# Patient Record
Sex: MALE | Race: WHITE | NOT HISPANIC OR LATINO | Employment: FULL TIME | URBAN - METROPOLITAN AREA
[De-identification: names, ages, dates, MRNs, and addresses within clinical notes are randomized per-mention and may not be internally consistent; named-entity substitution may affect disease eponyms.]

---

## 2017-01-01 ENCOUNTER — TELEPHONE (OUTPATIENT)
Dept: INTERNAL MEDICINE | Facility: CLINIC | Age: 62
End: 2017-01-01

## 2017-01-01 ENCOUNTER — ANESTHESIA EVENT (OUTPATIENT)
Dept: NEUROSURGERY | Facility: HOSPITAL | Age: 62
DRG: 981 | End: 2017-01-01
Payer: COMMERCIAL

## 2017-01-01 ENCOUNTER — ANESTHESIA (OUTPATIENT)
Dept: SURGERY | Facility: HOSPITAL | Age: 62
DRG: 981 | End: 2017-01-01
Payer: COMMERCIAL

## 2017-01-01 ENCOUNTER — ANESTHESIA (OUTPATIENT)
Dept: NEUROSURGERY | Facility: HOSPITAL | Age: 62
DRG: 981 | End: 2017-01-01
Payer: COMMERCIAL

## 2017-01-01 ENCOUNTER — ANESTHESIA EVENT (OUTPATIENT)
Dept: SURGERY | Facility: HOSPITAL | Age: 62
DRG: 981 | End: 2017-01-01
Payer: COMMERCIAL

## 2017-01-01 ENCOUNTER — HOSPITAL ENCOUNTER (INPATIENT)
Facility: HOSPITAL | Age: 62
LOS: 17 days | Discharge: NURSING, REHAB OR LONG-TERM CARE | DRG: 981 | End: 2017-10-13
Attending: INTERNAL MEDICINE | Admitting: INTERNAL MEDICINE
Payer: COMMERCIAL

## 2017-01-01 VITALS
TEMPERATURE: 98 F | HEART RATE: 82 BPM | BODY MASS INDEX: 30.79 KG/M2 | SYSTOLIC BLOOD PRESSURE: 130 MMHG | DIASTOLIC BLOOD PRESSURE: 87 MMHG | RESPIRATION RATE: 16 BRPM | HEIGHT: 78 IN | WEIGHT: 266.13 LBS | OXYGEN SATURATION: 95 %

## 2017-01-01 DIAGNOSIS — G40.209 COMPLEX PARTIAL SEIZURE EVOLVING TO GENERALIZED SEIZURE: ICD-10-CM

## 2017-01-01 DIAGNOSIS — G40.901 STATUS EPILEPTICUS, GENERALIZED CONVULSIVE: ICD-10-CM

## 2017-01-01 DIAGNOSIS — G04.90 ENCEPHALITIS AND ENCEPHALOMYELITIS, UNSPECIFIED: ICD-10-CM

## 2017-01-01 DIAGNOSIS — G93.40 ENCEPHALOPATHY: ICD-10-CM

## 2017-01-01 DIAGNOSIS — R56.9 SEIZURE: Primary | ICD-10-CM

## 2017-01-01 DIAGNOSIS — G04.90 ENCEPHALITIS: ICD-10-CM

## 2017-01-01 LAB
ABO + RH BLD: NORMAL
ALBUMIN SERPL BCP-MCNC: 2.5 G/DL
ALP SERPL-CCNC: 158 U/L
ALT SERPL W/O P-5'-P-CCNC: 27 U/L
ANION GAP SERPL CALC-SCNC: 10 MMOL/L
ANION GAP SERPL CALC-SCNC: 6 MMOL/L
ANION GAP SERPL CALC-SCNC: 7 MMOL/L
ANION GAP SERPL CALC-SCNC: 8 MMOL/L
ANION GAP SERPL CALC-SCNC: 9 MMOL/L
ANION GAP SERPL CALC-SCNC: 9 MMOL/L
ANISOCYTOSIS BLD QL SMEAR: SLIGHT
AORTIC VALVE REGURGITATION: NORMAL
APTT BLDCRRT: <21 SEC
AST SERPL-CCNC: 18 U/L
BACTERIA BLD CULT: NORMAL
BACTERIA BLD CULT: NORMAL
BACTERIA SPEC AEROBE CULT: NORMAL
BASOPHILS # BLD AUTO: 0 K/UL
BASOPHILS # BLD AUTO: 0.01 K/UL
BASOPHILS # BLD AUTO: 0.03 K/UL
BASOPHILS # BLD AUTO: 0.03 K/UL
BASOPHILS NFR BLD: 0 %
BASOPHILS NFR BLD: 0.1 %
BASOPHILS NFR BLD: 0.2 %
BASOPHILS NFR BLD: 0.4 %
BASOPHILS NFR BLD: 0.5 %
BILIRUB SERPL-MCNC: 0.4 MG/DL
BILIRUB UR QL STRIP: NEGATIVE
BLD GP AB SCN CELLS X3 SERPL QL: NORMAL
BUN SERPL-MCNC: 13 MG/DL
BUN SERPL-MCNC: 15 MG/DL
BUN SERPL-MCNC: 18 MG/DL
BUN SERPL-MCNC: 21 MG/DL
BUN SERPL-MCNC: 21 MG/DL
BUN SERPL-MCNC: 22 MG/DL
BUN SERPL-MCNC: 22 MG/DL
BUN SERPL-MCNC: 23 MG/DL
BUN SERPL-MCNC: 25 MG/DL
BUN SERPL-MCNC: 5 MG/DL
BUN SERPL-MCNC: 6 MG/DL
BUN SERPL-MCNC: 6 MG/DL
BUN SERPL-MCNC: 8 MG/DL
BUN SERPL-MCNC: 9 MG/DL
CALCIUM SERPL-MCNC: 8.5 MG/DL
CALCIUM SERPL-MCNC: 8.7 MG/DL
CALCIUM SERPL-MCNC: 8.8 MG/DL
CALCIUM SERPL-MCNC: 8.9 MG/DL
CALCIUM SERPL-MCNC: 9 MG/DL
CALCIUM SERPL-MCNC: 9.1 MG/DL
CALCIUM SERPL-MCNC: 9.2 MG/DL
CALCIUM SERPL-MCNC: 9.3 MG/DL
CHLORIDE SERPL-SCNC: 104 MMOL/L
CHLORIDE SERPL-SCNC: 105 MMOL/L
CHLORIDE SERPL-SCNC: 106 MMOL/L
CHLORIDE SERPL-SCNC: 106 MMOL/L
CHLORIDE SERPL-SCNC: 107 MMOL/L
CHLORIDE SERPL-SCNC: 107 MMOL/L
CHLORIDE SERPL-SCNC: 108 MMOL/L
CHLORIDE SERPL-SCNC: 109 MMOL/L
CHLORIDE SERPL-SCNC: 110 MMOL/L
CHLORIDE SERPL-SCNC: 111 MMOL/L
CHLORIDE SERPL-SCNC: 112 MMOL/L
CHOLEST SERPL-MCNC: 154 MG/DL
CHOLEST SERPL-MCNC: 160 MG/DL
CHOLEST/HDLC SERPL: 5 {RATIO}
CHOLEST/HDLC SERPL: 5.5 {RATIO}
CLARITY CSF: ABNORMAL
CLARITY UR REFRACT.AUTO: CLEAR
CMV SPEC QL SHELL VIAL CULT: NO GROWTH
CO2 SERPL-SCNC: 22 MMOL/L
CO2 SERPL-SCNC: 23 MMOL/L
CO2 SERPL-SCNC: 25 MMOL/L
CO2 SERPL-SCNC: 26 MMOL/L
CO2 SERPL-SCNC: 27 MMOL/L
CO2 SERPL-SCNC: 29 MMOL/L
COLOR CSF: ABNORMAL
COLOR UR AUTO: YELLOW
CREAT SERPL-MCNC: 0.8 MG/DL
CREAT SERPL-MCNC: 0.9 MG/DL
CREAT SERPL-MCNC: 1 MG/DL
CRP SERPL-MCNC: 4.61 MG/L
CSF, COXSACKIE A10 AB: NORMAL
CSF, COXSACKIE A16 AB: NORMAL
CSF, COXSACKIE A2 AB: NORMAL
CSF, COXSACKIE A4 AB: NORMAL
CSF, COXSACKIE A7 AB: NORMAL
CSF, COXSACKIE A9 AB: NORMAL
CSF, COXSACKIE B1 AB: NORMAL
CSF, COXSACKIE B2 AB: NORMAL
CSF, COXSACKIE B3 AB: NORMAL
CSF, COXSACKIE B4 AB: NORMAL
CSF, COXSACKIE B5 AB: NORMAL
CSF, COXSACKIE B6 AB: NORMAL
CSF, ECHOVIRUS, 11 AB: NORMAL
CSF, ECHOVIRUS, 30 AB: NORMAL
CSF, ECHOVIRUS, 4 AB: NORMAL
CSF, ECHOVIRUS, 7 AB: NORMAL
CSF, ECHOVIRUS, 9 AB: NORMAL
DACRYOCYTES BLD QL SMEAR: ABNORMAL
DIASTOLIC DYSFUNCTION: NO
DIFFERENTIAL METHOD: ABNORMAL
EEEV IGG TITR CSF IF: NORMAL {TITER}
EEEV IGM TITR CSF IF: NORMAL {TITER}
EOSINOPHIL # BLD AUTO: 0 K/UL
EOSINOPHIL # BLD AUTO: 0.1 K/UL
EOSINOPHIL # BLD AUTO: 0.2 K/UL
EOSINOPHIL # BLD AUTO: 0.2 K/UL
EOSINOPHIL NFR BLD: 0 %
EOSINOPHIL NFR BLD: 0.1 %
EOSINOPHIL NFR BLD: 2.2 %
EOSINOPHIL NFR BLD: 2.7 %
EOSINOPHIL NFR BLD: 3.6 %
EOSINOPHIL NFR CSF MANUAL: 1 %
ERYTHROCYTE [DISTWIDTH] IN BLOOD BY AUTOMATED COUNT: 12.5 %
ERYTHROCYTE [DISTWIDTH] IN BLOOD BY AUTOMATED COUNT: 12.7 %
ERYTHROCYTE [DISTWIDTH] IN BLOOD BY AUTOMATED COUNT: 12.8 %
ERYTHROCYTE [DISTWIDTH] IN BLOOD BY AUTOMATED COUNT: 12.9 %
ERYTHROCYTE [DISTWIDTH] IN BLOOD BY AUTOMATED COUNT: 12.9 %
ERYTHROCYTE [DISTWIDTH] IN BLOOD BY AUTOMATED COUNT: 13.1 %
ERYTHROCYTE [DISTWIDTH] IN BLOOD BY AUTOMATED COUNT: 13.2 %
ERYTHROCYTE [DISTWIDTH] IN BLOOD BY AUTOMATED COUNT: 13.4 %
ERYTHROCYTE [DISTWIDTH] IN BLOOD BY AUTOMATED COUNT: 13.7 %
ERYTHROCYTE [DISTWIDTH] IN BLOOD BY AUTOMATED COUNT: 13.8 %
ERYTHROCYTE [DISTWIDTH] IN BLOOD BY AUTOMATED COUNT: 13.8 %
ERYTHROCYTE [DISTWIDTH] IN BLOOD BY AUTOMATED COUNT: 13.9 %
ERYTHROCYTE [DISTWIDTH] IN BLOOD BY AUTOMATED COUNT: 13.9 %
ERYTHROCYTE [SEDIMENTATION RATE] IN BLOOD BY WESTERGREN METHOD: 28 MM/HR
EST. GFR  (AFRICAN AMERICAN): >60 ML/MIN/1.73 M^2
EST. GFR  (NON AFRICAN AMERICAN): >60 ML/MIN/1.73 M^2
ESTIMATED AVG GLUCOSE: 103 MG/DL
GLUCOSE CSF-MCNC: 57 MG/DL
GLUCOSE SERPL-MCNC: 114 MG/DL
GLUCOSE SERPL-MCNC: 115 MG/DL
GLUCOSE SERPL-MCNC: 118 MG/DL
GLUCOSE SERPL-MCNC: 121 MG/DL
GLUCOSE SERPL-MCNC: 122 MG/DL
GLUCOSE SERPL-MCNC: 124 MG/DL
GLUCOSE SERPL-MCNC: 125 MG/DL
GLUCOSE SERPL-MCNC: 129 MG/DL
GLUCOSE SERPL-MCNC: 147 MG/DL
GLUCOSE SERPL-MCNC: 152 MG/DL
GLUCOSE SERPL-MCNC: 87 MG/DL
GLUCOSE SERPL-MCNC: 93 MG/DL
GLUCOSE SERPL-MCNC: 99 MG/DL
GLUCOSE UR QL STRIP: NEGATIVE
GRAM STN SPEC: NORMAL
HBA1C MFR BLD HPLC: 5.2 %
HCT VFR BLD AUTO: 30.3 %
HCT VFR BLD AUTO: 30.7 %
HCT VFR BLD AUTO: 31.3 %
HCT VFR BLD AUTO: 31.5 %
HCT VFR BLD AUTO: 31.7 %
HCT VFR BLD AUTO: 32 %
HCT VFR BLD AUTO: 34 %
HCT VFR BLD AUTO: 34.9 %
HCT VFR BLD AUTO: 35.9 %
HCT VFR BLD AUTO: 36.2 %
HCT VFR BLD AUTO: 36.2 %
HCT VFR BLD AUTO: 36.7 %
HCT VFR BLD AUTO: 37.1 %
HCT VFR BLD AUTO: 37.4 %
HCT VFR BLD AUTO: 37.7 %
HCT VFR BLD AUTO: 38.2 %
HDLC SERPL-MCNC: 29 MG/DL
HDLC SERPL-MCNC: 31 MG/DL
HDLC SERPL: 18.1 %
HDLC SERPL: 20.1 %
HGB BLD-MCNC: 10.1 G/DL
HGB BLD-MCNC: 10.1 G/DL
HGB BLD-MCNC: 10.2 G/DL
HGB BLD-MCNC: 10.2 G/DL
HGB BLD-MCNC: 10.5 G/DL
HGB BLD-MCNC: 10.5 G/DL
HGB BLD-MCNC: 11.1 G/DL
HGB BLD-MCNC: 11.5 G/DL
HGB BLD-MCNC: 11.7 G/DL
HGB BLD-MCNC: 11.9 G/DL
HGB BLD-MCNC: 12 G/DL
HGB BLD-MCNC: 12.2 G/DL
HGB BLD-MCNC: 12.2 G/DL
HGB BLD-MCNC: 12.3 G/DL
HGB BLD-MCNC: 12.5 G/DL
HGB BLD-MCNC: 12.5 G/DL
HGB UR QL STRIP: NEGATIVE
HSV1, PCR, CSF: NEGATIVE
HSV2, PCR, CSF: NEGATIVE
HYPOCHROMIA BLD QL SMEAR: ABNORMAL
INR PPP: 1.1
KETONES UR QL STRIP: NEGATIVE
LACV IGG CSF QL IF: NORMAL
LACV IGM CSF QL IF: NORMAL
LDLC SERPL CALC-MCNC: 104.8 MG/DL
LDLC SERPL CALC-MCNC: 93 MG/DL
LEUKOCYTE ESTERASE UR QL STRIP: NEGATIVE
LYMPHOCYTES # BLD AUTO: 0.6 K/UL
LYMPHOCYTES # BLD AUTO: 0.7 K/UL
LYMPHOCYTES # BLD AUTO: 0.8 K/UL
LYMPHOCYTES # BLD AUTO: 0.9 K/UL
LYMPHOCYTES # BLD AUTO: 1 K/UL
LYMPHOCYTES # BLD AUTO: 1.2 K/UL
LYMPHOCYTES # BLD AUTO: 1.5 K/UL
LYMPHOCYTES # BLD AUTO: 1.6 K/UL
LYMPHOCYTES # BLD AUTO: 1.8 K/UL
LYMPHOCYTES # BLD AUTO: 1.8 K/UL
LYMPHOCYTES NFR BLD: 11.6 %
LYMPHOCYTES NFR BLD: 17 %
LYMPHOCYTES NFR BLD: 23.5 %
LYMPHOCYTES NFR BLD: 24.3 %
LYMPHOCYTES NFR BLD: 31.3 %
LYMPHOCYTES NFR BLD: 4.2 %
LYMPHOCYTES NFR BLD: 6.2 %
LYMPHOCYTES NFR BLD: 6.3 %
LYMPHOCYTES NFR BLD: 6.5 %
LYMPHOCYTES NFR BLD: 6.9 %
LYMPHOCYTES NFR BLD: 7 %
LYMPHOCYTES NFR BLD: 7.1 %
LYMPHOCYTES NFR BLD: 8.9 %
LYMPHOCYTES NFR BLD: 9.1 %
LYMPHOCYTES NFR BLD: 9.5 %
LYMPHOCYTES NFR CSF MANUAL: 18 %
MAGNESIUM SERPL-MCNC: 1.5 MG/DL
MAGNESIUM SERPL-MCNC: 1.6 MG/DL
MAGNESIUM SERPL-MCNC: 1.7 MG/DL
MAGNESIUM SERPL-MCNC: 1.8 MG/DL
MAGNESIUM SERPL-MCNC: 1.9 MG/DL
MAGNESIUM SERPL-MCNC: 2 MG/DL
MAGNESIUM SERPL-MCNC: 2.2 MG/DL
MCH RBC QN AUTO: 33.2 PG
MCH RBC QN AUTO: 33.2 PG
MCH RBC QN AUTO: 33.5 PG
MCH RBC QN AUTO: 33.6 PG
MCH RBC QN AUTO: 33.8 PG
MCH RBC QN AUTO: 33.9 PG
MCH RBC QN AUTO: 33.9 PG
MCH RBC QN AUTO: 34 PG
MCH RBC QN AUTO: 34.1 PG
MCH RBC QN AUTO: 34.1 PG
MCH RBC QN AUTO: 34.2 PG
MCH RBC QN AUTO: 34.3 PG
MCH RBC QN AUTO: 34.5 PG
MCHC RBC AUTO-ENTMCNC: 32.3 G/DL
MCHC RBC AUTO-ENTMCNC: 32.4 G/DL
MCHC RBC AUTO-ENTMCNC: 32.6 G/DL
MCHC RBC AUTO-ENTMCNC: 32.7 G/DL
MCHC RBC AUTO-ENTMCNC: 32.7 G/DL
MCHC RBC AUTO-ENTMCNC: 32.8 G/DL
MCHC RBC AUTO-ENTMCNC: 32.9 G/DL
MCHC RBC AUTO-ENTMCNC: 33 G/DL
MCHC RBC AUTO-ENTMCNC: 33.1 G/DL
MCHC RBC AUTO-ENTMCNC: 33.2 G/DL
MCHC RBC AUTO-ENTMCNC: 33.7 G/DL
MCHC RBC AUTO-ENTMCNC: 33.7 G/DL
MCV RBC AUTO: 102 FL
MCV RBC AUTO: 103 FL
MCV RBC AUTO: 104 FL
MCV RBC AUTO: 105 FL
MITRAL VALVE MOBILITY: NORMAL
MONOCYTES # BLD AUTO: 0.5 K/UL
MONOCYTES # BLD AUTO: 0.6 K/UL
MONOCYTES # BLD AUTO: 0.7 K/UL
MONOCYTES # BLD AUTO: 0.8 K/UL
MONOCYTES # BLD AUTO: 1 K/UL
MONOCYTES # BLD AUTO: 1.1 K/UL
MONOCYTES # BLD AUTO: 1.1 K/UL
MONOCYTES # BLD AUTO: 1.2 K/UL
MONOCYTES # BLD AUTO: 1.2 K/UL
MONOCYTES # BLD AUTO: 1.3 K/UL
MONOCYTES # BLD AUTO: 1.4 K/UL
MONOCYTES NFR BLD: 10 %
MONOCYTES NFR BLD: 10.5 %
MONOCYTES NFR BLD: 11.5 %
MONOCYTES NFR BLD: 5.1 %
MONOCYTES NFR BLD: 6.1 %
MONOCYTES NFR BLD: 6.4 %
MONOCYTES NFR BLD: 7.1 %
MONOCYTES NFR BLD: 7.4 %
MONOCYTES NFR BLD: 8 %
MONOCYTES NFR BLD: 8.5 %
MONOCYTES NFR BLD: 8.7 %
MONOCYTES NFR BLD: 8.8 %
MONOCYTES NFR BLD: 9 %
MONOCYTES NFR BLD: 9.8 %
MONOS+MACROS NFR CSF MANUAL: 4 %
NEUTROPHILS # BLD AUTO: 10 K/UL
NEUTROPHILS # BLD AUTO: 10.2 K/UL
NEUTROPHILS # BLD AUTO: 10.5 K/UL
NEUTROPHILS # BLD AUTO: 10.6 K/UL
NEUTROPHILS # BLD AUTO: 12.5 K/UL
NEUTROPHILS # BLD AUTO: 3.2 K/UL
NEUTROPHILS # BLD AUTO: 4.1 K/UL
NEUTROPHILS # BLD AUTO: 4.7 K/UL
NEUTROPHILS # BLD AUTO: 6.6 K/UL
NEUTROPHILS # BLD AUTO: 6.8 K/UL
NEUTROPHILS # BLD AUTO: 8.2 K/UL
NEUTROPHILS # BLD AUTO: 8.6 K/UL
NEUTROPHILS # BLD AUTO: 9.5 K/UL
NEUTROPHILS # BLD AUTO: 9.6 K/UL
NEUTROPHILS # BLD AUTO: 9.9 K/UL
NEUTROPHILS NFR BLD: 54.9 %
NEUTROPHILS NFR BLD: 62.3 %
NEUTROPHILS NFR BLD: 64.8 %
NEUTROPHILS NFR BLD: 73.8 %
NEUTROPHILS NFR BLD: 79.9 %
NEUTROPHILS NFR BLD: 80.8 %
NEUTROPHILS NFR BLD: 80.9 %
NEUTROPHILS NFR BLD: 81.2 %
NEUTROPHILS NFR BLD: 82.7 %
NEUTROPHILS NFR BLD: 82.8 %
NEUTROPHILS NFR BLD: 82.9 %
NEUTROPHILS NFR BLD: 84.6 %
NEUTROPHILS NFR BLD: 85.7 %
NEUTROPHILS NFR BLD: 86.1 %
NEUTROPHILS NFR BLD: 86.5 %
NEUTROPHILS NFR BLD: 86.8 %
NEUTROPHILS NFR CSF MANUAL: 77 %
NITRITE UR QL STRIP: NEGATIVE
NONHDLC SERPL-MCNC: 123 MG/DL
NONHDLC SERPL-MCNC: 131 MG/DL
OVALOCYTES BLD QL SMEAR: ABNORMAL
PH UR STRIP: 6 [PH] (ref 5–8)
PHOSPHATE SERPL-MCNC: 2.4 MG/DL
PHOSPHATE SERPL-MCNC: 2.6 MG/DL
PHOSPHATE SERPL-MCNC: 2.7 MG/DL
PHOSPHATE SERPL-MCNC: 2.8 MG/DL
PHOSPHATE SERPL-MCNC: 2.9 MG/DL
PHOSPHATE SERPL-MCNC: 3 MG/DL
PHOSPHATE SERPL-MCNC: 3.2 MG/DL
PHOSPHATE SERPL-MCNC: 3.2 MG/DL
PHOSPHATE SERPL-MCNC: 3.3 MG/DL
PHOSPHATE SERPL-MCNC: 3.4 MG/DL
PHOSPHATE SERPL-MCNC: 3.6 MG/DL
PLATELET # BLD AUTO: 170 K/UL
PLATELET # BLD AUTO: 173 K/UL
PLATELET # BLD AUTO: 177 K/UL
PLATELET # BLD AUTO: 179 K/UL
PLATELET # BLD AUTO: 184 K/UL
PLATELET # BLD AUTO: 184 K/UL
PLATELET # BLD AUTO: 224 K/UL
PLATELET # BLD AUTO: 277 K/UL
PLATELET # BLD AUTO: 290 K/UL
PLATELET # BLD AUTO: 326 K/UL
PLATELET # BLD AUTO: 356 K/UL
PLATELET # BLD AUTO: 357 K/UL
PLATELET # BLD AUTO: 368 K/UL
PLATELET # BLD AUTO: 395 K/UL
PLATELET # BLD AUTO: 401 K/UL
PLATELET # BLD AUTO: 404 K/UL
PMV BLD AUTO: 10 FL
PMV BLD AUTO: 10.2 FL
PMV BLD AUTO: 10.4 FL
PMV BLD AUTO: 10.6 FL
PMV BLD AUTO: 10.6 FL
PMV BLD AUTO: 10.7 FL
PMV BLD AUTO: 10.8 FL
PMV BLD AUTO: 11.1 FL
PMV BLD AUTO: 11.2 FL
PMV BLD AUTO: 11.5 FL
PMV BLD AUTO: 11.7 FL
PMV BLD AUTO: 11.7 FL
PMV BLD AUTO: 11.8 FL
PMV BLD AUTO: 9.9 FL
POCT GLUCOSE: 102 MG/DL (ref 70–110)
POCT GLUCOSE: 109 MG/DL (ref 70–110)
POCT GLUCOSE: 110 MG/DL (ref 70–110)
POCT GLUCOSE: 110 MG/DL (ref 70–110)
POCT GLUCOSE: 114 MG/DL (ref 70–110)
POCT GLUCOSE: 114 MG/DL (ref 70–110)
POCT GLUCOSE: 117 MG/DL (ref 70–110)
POCT GLUCOSE: 120 MG/DL (ref 70–110)
POCT GLUCOSE: 121 MG/DL (ref 70–110)
POCT GLUCOSE: 122 MG/DL (ref 70–110)
POCT GLUCOSE: 123 MG/DL (ref 70–110)
POCT GLUCOSE: 124 MG/DL (ref 70–110)
POCT GLUCOSE: 126 MG/DL (ref 70–110)
POCT GLUCOSE: 129 MG/DL (ref 70–110)
POCT GLUCOSE: 132 MG/DL (ref 70–110)
POCT GLUCOSE: 133 MG/DL (ref 70–110)
POCT GLUCOSE: 136 MG/DL (ref 70–110)
POCT GLUCOSE: 139 MG/DL (ref 70–110)
POCT GLUCOSE: 141 MG/DL (ref 70–110)
POCT GLUCOSE: 142 MG/DL (ref 70–110)
POCT GLUCOSE: 144 MG/DL (ref 70–110)
POCT GLUCOSE: 145 MG/DL (ref 70–110)
POCT GLUCOSE: 147 MG/DL (ref 70–110)
POCT GLUCOSE: 150 MG/DL (ref 70–110)
POCT GLUCOSE: 152 MG/DL (ref 70–110)
POCT GLUCOSE: 154 MG/DL (ref 70–110)
POCT GLUCOSE: 162 MG/DL (ref 70–110)
POCT GLUCOSE: 163 MG/DL (ref 70–110)
POCT GLUCOSE: 164 MG/DL (ref 70–110)
POCT GLUCOSE: 167 MG/DL (ref 70–110)
POCT GLUCOSE: 170 MG/DL (ref 70–110)
POCT GLUCOSE: 180 MG/DL (ref 70–110)
POCT GLUCOSE: 188 MG/DL (ref 70–110)
POCT GLUCOSE: 197 MG/DL (ref 70–110)
POIKILOCYTOSIS BLD QL SMEAR: SLIGHT
POLYCHROMASIA BLD QL SMEAR: ABNORMAL
POTASSIUM SERPL-SCNC: 3.4 MMOL/L
POTASSIUM SERPL-SCNC: 3.5 MMOL/L
POTASSIUM SERPL-SCNC: 3.8 MMOL/L
POTASSIUM SERPL-SCNC: 3.9 MMOL/L
POTASSIUM SERPL-SCNC: 3.9 MMOL/L
POTASSIUM SERPL-SCNC: 4.1 MMOL/L
POTASSIUM SERPL-SCNC: 4.2 MMOL/L
POTASSIUM SERPL-SCNC: 4.2 MMOL/L
POTASSIUM SERPL-SCNC: 4.4 MMOL/L
POTASSIUM SERPL-SCNC: 4.5 MMOL/L
POTASSIUM SERPL-SCNC: 4.6 MMOL/L
POTASSIUM SERPL-SCNC: 4.8 MMOL/L
PROCALCITONIN SERPL IA-MCNC: 0.03 NG/ML
PROCALCITONIN SERPL IA-MCNC: <0.02 NG/ML
PROT CSF-MCNC: 278 MG/DL
PROT SERPL-MCNC: 6.9 G/DL
PROT UR QL STRIP: NEGATIVE
PROTHROMBIN TIME: 11.8 SEC
RBC # BLD AUTO: 2.97 M/UL
RBC # BLD AUTO: 2.98 M/UL
RBC # BLD AUTO: 3.04 M/UL
RBC # BLD AUTO: 3.07 M/UL
RBC # BLD AUTO: 3.08 M/UL
RBC # BLD AUTO: 3.08 M/UL
RBC # BLD AUTO: 3.31 M/UL
RBC # BLD AUTO: 3.42 M/UL
RBC # BLD AUTO: 3.49 M/UL
RBC # BLD AUTO: 3.51 M/UL
RBC # BLD AUTO: 3.53 M/UL
RBC # BLD AUTO: 3.54 M/UL
RBC # BLD AUTO: 3.56 M/UL
RBC # BLD AUTO: 3.64 M/UL
RBC # BLD AUTO: 3.64 M/UL
RBC # BLD AUTO: 3.65 M/UL
RBC # CSF: ABNORMAL /CU MM
RETIRED EF AND QEF - SEE NOTES: 55 (ref 55–65)
SLEV IGG TITR CSF IF: NORMAL {TITER}
SLEV IGM TITR CSF IF: NORMAL {TITER}
SODIUM SERPL-SCNC: 137 MMOL/L
SODIUM SERPL-SCNC: 138 MMOL/L
SODIUM SERPL-SCNC: 139 MMOL/L
SODIUM SERPL-SCNC: 139 MMOL/L
SODIUM SERPL-SCNC: 140 MMOL/L
SODIUM SERPL-SCNC: 141 MMOL/L
SODIUM SERPL-SCNC: 142 MMOL/L
SODIUM SERPL-SCNC: 144 MMOL/L
SODIUM SERPL-SCNC: 144 MMOL/L
SP GR UR STRIP: 1.01 (ref 1–1.03)
SPECIMEN VOL CSF: 1 ML
T PALLIDUM AB SER QL IF: NORMAL
TRIGL SERPL-MCNC: 131 MG/DL
TRIGL SERPL-MCNC: 150 MG/DL
URN SPEC COLLECT METH UR: NORMAL
UROBILINOGEN UR STRIP-ACNC: NEGATIVE EU/DL
VANCOMYCIN TROUGH SERPL-MCNC: 18.1 UG/ML
VANCOMYCIN TROUGH SERPL-MCNC: 22 UG/ML
VANCOMYCIN TROUGH SERPL-MCNC: 26.6 UG/ML
VANCOMYCIN TROUGH SERPL-MCNC: 9.6 UG/ML
WBC # BLD AUTO: 10.41 K/UL
WBC # BLD AUTO: 10.99 K/UL
WBC # BLD AUTO: 11.62 K/UL
WBC # BLD AUTO: 12.24 K/UL
WBC # BLD AUTO: 12.26 K/UL
WBC # BLD AUTO: 12.35 K/UL
WBC # BLD AUTO: 12.46 K/UL
WBC # BLD AUTO: 12.62 K/UL
WBC # BLD AUTO: 12.67 K/UL
WBC # BLD AUTO: 14.41 K/UL
WBC # BLD AUTO: 5.82 K/UL
WBC # BLD AUTO: 6.31 K/UL
WBC # BLD AUTO: 7.53 K/UL
WBC # BLD AUTO: 8.08 K/UL
WBC # BLD AUTO: 9.17 K/UL
WBC # BLD AUTO: 9.54 K/UL
WBC # CSF: 64 /CU MM
WEEV IGG TITR CSF IF: NORMAL {TITER}
WEEV IGM TITR CSF IF: NORMAL {TITER}

## 2017-01-01 PROCEDURE — 63600175 PHARM REV CODE 636 W HCPCS: Performed by: HOSPITALIST

## 2017-01-01 PROCEDURE — 97530 THERAPEUTIC ACTIVITIES: CPT

## 2017-01-01 PROCEDURE — S0166 INJ OLANZAPINE 2.5MG: HCPCS | Performed by: PHYSICIAN ASSISTANT

## 2017-01-01 PROCEDURE — 63600175 PHARM REV CODE 636 W HCPCS: Performed by: STUDENT IN AN ORGANIZED HEALTH CARE EDUCATION/TRAINING PROGRAM

## 2017-01-01 PROCEDURE — 25000003 PHARM REV CODE 250: Performed by: HOSPITALIST

## 2017-01-01 PROCEDURE — 25000003 PHARM REV CODE 250: Performed by: PSYCHIATRY & NEUROLOGY

## 2017-01-01 PROCEDURE — 84100 ASSAY OF PHOSPHORUS: CPT

## 2017-01-01 PROCEDURE — 99232 SBSQ HOSP IP/OBS MODERATE 35: CPT | Mod: ,,, | Performed by: INTERNAL MEDICINE

## 2017-01-01 PROCEDURE — 25000003 PHARM REV CODE 250

## 2017-01-01 PROCEDURE — 95816 EEG AWAKE AND DROWSY: CPT

## 2017-01-01 PROCEDURE — 63600175 PHARM REV CODE 636 W HCPCS: Performed by: NURSE PRACTITIONER

## 2017-01-01 PROCEDURE — 80048 BASIC METABOLIC PNL TOTAL CA: CPT

## 2017-01-01 PROCEDURE — C8929 TTE W OR WO FOL WCON,DOPPLER: HCPCS

## 2017-01-01 PROCEDURE — 20600001 HC STEP DOWN PRIVATE ROOM

## 2017-01-01 PROCEDURE — 97116 GAIT TRAINING THERAPY: CPT

## 2017-01-01 PROCEDURE — 93306 TTE W/DOPPLER COMPLETE: CPT | Mod: 26,,, | Performed by: INTERNAL MEDICINE

## 2017-01-01 PROCEDURE — 63600175 PHARM REV CODE 636 W HCPCS: Performed by: INTERNAL MEDICINE

## 2017-01-01 PROCEDURE — 89051 BODY FLUID CELL COUNT: CPT

## 2017-01-01 PROCEDURE — 88307 TISSUE EXAM BY PATHOLOGIST: CPT

## 2017-01-01 PROCEDURE — 11000001 HC ACUTE MED/SURG PRIVATE ROOM

## 2017-01-01 PROCEDURE — 99233 SBSQ HOSP IP/OBS HIGH 50: CPT | Mod: ,,, | Performed by: PSYCHIATRY & NEUROLOGY

## 2017-01-01 PROCEDURE — S0030 INJECTION, METRONIDAZOLE: HCPCS | Performed by: NURSE PRACTITIONER

## 2017-01-01 PROCEDURE — 36415 COLL VENOUS BLD VENIPUNCTURE: CPT

## 2017-01-01 PROCEDURE — 99223 1ST HOSP IP/OBS HIGH 75: CPT | Mod: ,,, | Performed by: INTERNAL MEDICINE

## 2017-01-01 PROCEDURE — 97535 SELF CARE MNGMENT TRAINING: CPT

## 2017-01-01 PROCEDURE — 97802 MEDICAL NUTRITION INDIV IN: CPT

## 2017-01-01 PROCEDURE — 36000710: Performed by: NEUROLOGICAL SURGERY

## 2017-01-01 PROCEDURE — 25000003 PHARM REV CODE 250: Performed by: NURSE PRACTITIONER

## 2017-01-01 PROCEDURE — 86658 ENTEROVIRUS ANTIBODY: CPT | Mod: 91

## 2017-01-01 PROCEDURE — 25000003 PHARM REV CODE 250: Performed by: SURGERY

## 2017-01-01 PROCEDURE — 85651 RBC SED RATE NONAUTOMATED: CPT

## 2017-01-01 PROCEDURE — 80202 ASSAY OF VANCOMYCIN: CPT | Mod: 91

## 2017-01-01 PROCEDURE — 84145 PROCALCITONIN (PCT): CPT

## 2017-01-01 PROCEDURE — 99233 SBSQ HOSP IP/OBS HIGH 50: CPT | Mod: ,,, | Performed by: HOSPITALIST

## 2017-01-01 PROCEDURE — 20000000 HC ICU ROOM

## 2017-01-01 PROCEDURE — 85025 COMPLETE CBC W/AUTO DIFF WBC: CPT

## 2017-01-01 PROCEDURE — 99024 POSTOP FOLLOW-UP VISIT: CPT | Mod: ,,, | Performed by: PHYSICIAN ASSISTANT

## 2017-01-01 PROCEDURE — 99231 SBSQ HOSP IP/OBS SF/LOW 25: CPT | Mod: ,,, | Performed by: HOSPITALIST

## 2017-01-01 PROCEDURE — 97167 OT EVAL HIGH COMPLEX 60 MIN: CPT

## 2017-01-01 PROCEDURE — 25000003 PHARM REV CODE 250: Performed by: STUDENT IN AN ORGANIZED HEALTH CARE EDUCATION/TRAINING PROGRAM

## 2017-01-01 PROCEDURE — 25500020 PHARM REV CODE 255: Performed by: HOSPITALIST

## 2017-01-01 PROCEDURE — 63600175 PHARM REV CODE 636 W HCPCS: Performed by: PHYSICIAN ASSISTANT

## 2017-01-01 PROCEDURE — 83735 ASSAY OF MAGNESIUM: CPT | Mod: 91

## 2017-01-01 PROCEDURE — 87070 CULTURE OTHR SPECIMN AEROBIC: CPT

## 2017-01-01 PROCEDURE — 99233 SBSQ HOSP IP/OBS HIGH 50: CPT | Mod: ,,, | Performed by: PHYSICIAN ASSISTANT

## 2017-01-01 PROCEDURE — 99223 1ST HOSP IP/OBS HIGH 75: CPT | Mod: ,,, | Performed by: HOSPITALIST

## 2017-01-01 PROCEDURE — 88307 TISSUE EXAM BY PATHOLOGIST: CPT | Mod: 26,,,

## 2017-01-01 PROCEDURE — 00B20ZX EXCISION OF DURA MATER, OPEN APPROACH, DIAGNOSTIC: ICD-10-PCS | Performed by: NEUROLOGICAL SURGERY

## 2017-01-01 PROCEDURE — 27800903 OPTIME MED/SURG SUP & DEVICES OTHER IMPLANTS: Performed by: NEUROLOGICAL SURGERY

## 2017-01-01 PROCEDURE — 94761 N-INVAS EAR/PLS OXIMETRY MLT: CPT

## 2017-01-01 PROCEDURE — 99233 SBSQ HOSP IP/OBS HIGH 50: CPT | Mod: ,,, | Performed by: NURSE PRACTITIONER

## 2017-01-01 PROCEDURE — 99223 1ST HOSP IP/OBS HIGH 75: CPT | Mod: ,,, | Performed by: PSYCHIATRY & NEUROLOGY

## 2017-01-01 PROCEDURE — 80202 ASSAY OF VANCOMYCIN: CPT

## 2017-01-01 PROCEDURE — 84157 ASSAY OF PROTEIN OTHER: CPT

## 2017-01-01 PROCEDURE — 92507 TX SP LANG VOICE COMM INDIV: CPT

## 2017-01-01 PROCEDURE — 86900 BLOOD TYPING SEROLOGIC ABO: CPT

## 2017-01-01 PROCEDURE — 37000008 HC ANESTHESIA 1ST 15 MINUTES: Performed by: NEUROLOGICAL SURGERY

## 2017-01-01 PROCEDURE — 87529 HSV DNA AMP PROBE: CPT

## 2017-01-01 PROCEDURE — G8996 SWALLOW CURRENT STATUS: HCPCS | Mod: CM

## 2017-01-01 PROCEDURE — 61304 CRNEC/CRNOT EXPL SUPRATNTORL: CPT | Mod: ,,, | Performed by: NEUROLOGICAL SURGERY

## 2017-01-01 PROCEDURE — 25000003 PHARM REV CODE 250: Performed by: PHYSICIAN ASSISTANT

## 2017-01-01 PROCEDURE — 80053 COMPREHEN METABOLIC PANEL: CPT

## 2017-01-01 PROCEDURE — C9113 INJ PANTOPRAZOLE SODIUM, VIA: HCPCS | Performed by: STUDENT IN AN ORGANIZED HEALTH CARE EDUCATION/TRAINING PROGRAM

## 2017-01-01 PROCEDURE — D9220A PRA ANESTHESIA: Mod: ,,, | Performed by: ANESTHESIOLOGY

## 2017-01-01 PROCEDURE — 25000003 PHARM REV CODE 250: Performed by: INTERNAL MEDICINE

## 2017-01-01 PROCEDURE — 87077 CULTURE AEROBIC IDENTIFY: CPT

## 2017-01-01 PROCEDURE — 80061 LIPID PANEL: CPT

## 2017-01-01 PROCEDURE — 63600175 PHARM REV CODE 636 W HCPCS: Performed by: NEUROLOGICAL SURGERY

## 2017-01-01 PROCEDURE — 61781 SCAN PROC CRANIAL INTRA: CPT | Mod: ,,, | Performed by: NEUROLOGICAL SURGERY

## 2017-01-01 PROCEDURE — 84100 ASSAY OF PHOSPHORUS: CPT | Mod: 91

## 2017-01-01 PROCEDURE — 99232 SBSQ HOSP IP/OBS MODERATE 35: CPT | Mod: ,,, | Performed by: PSYCHIATRY & NEUROLOGY

## 2017-01-01 PROCEDURE — 25000003 PHARM REV CODE 250: Performed by: NEUROLOGICAL SURGERY

## 2017-01-01 PROCEDURE — 86780 TREPONEMA PALLIDUM: CPT

## 2017-01-01 PROCEDURE — 86901 BLOOD TYPING SEROLOGIC RH(D): CPT

## 2017-01-01 PROCEDURE — 27000221 HC OXYGEN, UP TO 24 HOURS

## 2017-01-01 PROCEDURE — 93005 ELECTROCARDIOGRAM TRACING: CPT

## 2017-01-01 PROCEDURE — G8998 SWALLOW D/C STATUS: HCPCS | Mod: CI

## 2017-01-01 PROCEDURE — 97110 THERAPEUTIC EXERCISES: CPT

## 2017-01-01 PROCEDURE — 83735 ASSAY OF MAGNESIUM: CPT

## 2017-01-01 PROCEDURE — 69990 MICROSURGERY ADD-ON: CPT | Mod: 59,,, | Performed by: NEUROLOGICAL SURGERY

## 2017-01-01 PROCEDURE — 99233 SBSQ HOSP IP/OBS HIGH 50: CPT | Mod: ,,, | Performed by: INTERNAL MEDICINE

## 2017-01-01 PROCEDURE — B31GYZZ FLUOROSCOPY OF BILATERAL VERTEBRAL ARTERIES USING OTHER CONTRAST: ICD-10-PCS | Performed by: NEUROLOGICAL SURGERY

## 2017-01-01 PROCEDURE — 99232 SBSQ HOSP IP/OBS MODERATE 35: CPT | Mod: ,,, | Performed by: HOSPITALIST

## 2017-01-01 PROCEDURE — 95816 EEG AWAKE AND DROWSY: CPT | Mod: 26,,, | Performed by: PSYCHIATRY & NEUROLOGY

## 2017-01-01 PROCEDURE — 99223 1ST HOSP IP/OBS HIGH 75: CPT | Mod: ,,, | Performed by: NEUROLOGICAL SURGERY

## 2017-01-01 PROCEDURE — 99239 HOSP IP/OBS DSCHRG MGMT >30: CPT | Mod: ,,, | Performed by: HOSPITALIST

## 2017-01-01 PROCEDURE — 93010 ELECTROCARDIOGRAM REPORT: CPT | Mod: ,,, | Performed by: INTERNAL MEDICINE

## 2017-01-01 PROCEDURE — 97162 PT EVAL MOD COMPLEX 30 MIN: CPT

## 2017-01-01 PROCEDURE — 87205 SMEAR GRAM STAIN: CPT

## 2017-01-01 PROCEDURE — 37000009 HC ANESTHESIA EA ADD 15 MINS: Performed by: NEUROLOGICAL SURGERY

## 2017-01-01 PROCEDURE — 82945 GLUCOSE OTHER FLUID: CPT

## 2017-01-01 PROCEDURE — 83036 HEMOGLOBIN GLYCOSYLATED A1C: CPT

## 2017-01-01 PROCEDURE — 63600175 PHARM REV CODE 636 W HCPCS: Performed by: RADIOLOGY

## 2017-01-01 PROCEDURE — 97164 PT RE-EVAL EST PLAN CARE: CPT

## 2017-01-01 PROCEDURE — 95951 PR EEG MONITORING/VIDEORECORD: CPT | Mod: 26,,, | Performed by: PSYCHIATRY & NEUROLOGY

## 2017-01-01 PROCEDURE — 97532 *HC OT COG SKL DEV EA 15: CPT

## 2017-01-01 PROCEDURE — 87186 SC STD MICRODIL/AGAR DIL: CPT

## 2017-01-01 PROCEDURE — 92523 SPEECH SOUND LANG COMPREHEN: CPT

## 2017-01-01 PROCEDURE — 00U20JZ SUPPLEMENT DURA MATER WITH SYNTHETIC SUBSTITUTE, OPEN APPROACH: ICD-10-PCS | Performed by: NEUROLOGICAL SURGERY

## 2017-01-01 PROCEDURE — 36000711: Performed by: NEUROLOGICAL SURGERY

## 2017-01-01 PROCEDURE — B318YZZ FLUOROSCOPY OF BILATERAL INTERNAL CAROTID ARTERIES USING OTHER CONTRAST: ICD-10-PCS | Performed by: NEUROLOGICAL SURGERY

## 2017-01-01 PROCEDURE — 95951 HC EEG MONITORING/VIDEO RECORD: CPT

## 2017-01-01 PROCEDURE — B31CYZZ FLUOROSCOPY OF BILATERAL EXTERNAL CAROTID ARTERIES USING OTHER CONTRAST: ICD-10-PCS | Performed by: NEUROLOGICAL SURGERY

## 2017-01-01 PROCEDURE — 99232 SBSQ HOSP IP/OBS MODERATE 35: CPT | Mod: ,,, | Performed by: NEUROLOGICAL SURGERY

## 2017-01-01 PROCEDURE — 00B70ZX EXCISION OF CEREBRAL HEMISPHERE, OPEN APPROACH, DIAGNOSTIC: ICD-10-PCS | Performed by: NEUROLOGICAL SURGERY

## 2017-01-01 PROCEDURE — 86141 C-REACTIVE PROTEIN HS: CPT

## 2017-01-01 PROCEDURE — 87040 BLOOD CULTURE FOR BACTERIA: CPT | Mod: 59

## 2017-01-01 PROCEDURE — S0166 INJ OLANZAPINE 2.5MG: HCPCS | Performed by: HOSPITALIST

## 2017-01-01 PROCEDURE — 97168 OT RE-EVAL EST PLAN CARE: CPT

## 2017-01-01 PROCEDURE — 81003 URINALYSIS AUTO W/O SCOPE: CPT

## 2017-01-01 PROCEDURE — 27201423 OPTIME MED/SURG SUP & DEVICES STERILE SUPPLY: Performed by: NEUROLOGICAL SURGERY

## 2017-01-01 PROCEDURE — 92610 EVALUATE SWALLOWING FUNCTION: CPT

## 2017-01-01 PROCEDURE — G8997 SWALLOW GOAL STATUS: HCPCS | Mod: CM

## 2017-01-01 PROCEDURE — 85730 THROMBOPLASTIN TIME PARTIAL: CPT

## 2017-01-01 PROCEDURE — 85610 PROTHROMBIN TIME: CPT

## 2017-01-01 PROCEDURE — 61304 CRNEC/CRNOT EXPL SUPRATNTORL: CPT | Mod: AS,,, | Performed by: PHYSICIAN ASSISTANT

## 2017-01-01 PROCEDURE — 95957 EEG DIGITAL ANALYSIS: CPT

## 2017-01-01 PROCEDURE — C1713 ANCHOR/SCREW BN/BN,TIS/BN: HCPCS | Performed by: NEUROLOGICAL SURGERY

## 2017-01-01 PROCEDURE — 99233 SBSQ HOSP IP/OBS HIGH 50: CPT | Mod: 57,,, | Performed by: NEUROLOGICAL SURGERY

## 2017-01-01 PROCEDURE — 86651 ENCEPHALITIS CALIFORN ANTBDY: CPT

## 2017-01-01 DEVICE — SCREW SD 1.5X4MM TI CROSS PIN: Type: IMPLANTABLE DEVICE | Site: SCALP | Status: FUNCTIONAL

## 2017-01-01 DEVICE — PLATE BONE 2X2 HOLE SM BOX: Type: IMPLANTABLE DEVICE | Site: SCALP | Status: FUNCTIONAL

## 2017-01-01 DEVICE — PLATE BONE BUR HLE CVR 7MM TAB: Type: IMPLANTABLE DEVICE | Site: SCALP | Status: FUNCTIONAL

## 2017-01-01 DEVICE — DURAFORM 2 X 2: Type: IMPLANTABLE DEVICE | Site: CRANIAL | Status: FUNCTIONAL

## 2017-01-01 RX ORDER — IODIXANOL 320 MG/ML
200 INJECTION, SOLUTION INTRAVASCULAR
Status: COMPLETED | OUTPATIENT
Start: 2017-01-01 | End: 2017-01-01

## 2017-01-01 RX ORDER — LANOLIN ALCOHOL/MO/W.PET/CERES
100 CREAM (GRAM) TOPICAL DAILY
COMMUNITY
Start: 2017-01-01

## 2017-01-01 RX ORDER — CEFEPIME HYDROCHLORIDE 2 G/50ML
2 INJECTION, SOLUTION INTRAVENOUS
Status: DISCONTINUED | OUTPATIENT
Start: 2017-01-01 | End: 2017-01-01

## 2017-01-01 RX ORDER — HEPARIN 100 UNIT/ML
300 SYRINGE INTRAVENOUS ONCE
Status: DISCONTINUED | OUTPATIENT
Start: 2017-01-01 | End: 2017-01-01

## 2017-01-01 RX ORDER — SODIUM,POTASSIUM PHOSPHATES 280-250MG
1 POWDER IN PACKET (EA) ORAL ONCE
Status: COMPLETED | OUTPATIENT
Start: 2017-01-01 | End: 2017-01-01

## 2017-01-01 RX ORDER — CARVEDILOL 25 MG/1
25 TABLET ORAL 2 TIMES DAILY WITH MEALS
Status: DISCONTINUED | OUTPATIENT
Start: 2017-01-01 | End: 2017-01-01 | Stop reason: HOSPADM

## 2017-01-01 RX ORDER — LANOLIN ALCOHOL/MO/W.PET/CERES
800 CREAM (GRAM) TOPICAL
Status: DISCONTINUED | OUTPATIENT
Start: 2017-01-01 | End: 2017-01-01

## 2017-01-01 RX ORDER — FENTANYL CITRATE 50 UG/ML
INJECTION, SOLUTION INTRAMUSCULAR; INTRAVENOUS CODE/TRAUMA/SEDATION MEDICATION
Status: COMPLETED | OUTPATIENT
Start: 2017-01-01 | End: 2017-01-01

## 2017-01-01 RX ORDER — METRONIDAZOLE 500 MG/100ML
500 INJECTION, SOLUTION INTRAVENOUS
Status: DISCONTINUED | OUTPATIENT
Start: 2017-01-01 | End: 2017-01-01

## 2017-01-01 RX ORDER — MIDAZOLAM HYDROCHLORIDE 1 MG/ML
INJECTION, SOLUTION INTRAMUSCULAR; INTRAVENOUS CODE/TRAUMA/SEDATION MEDICATION
Status: COMPLETED | OUTPATIENT
Start: 2017-01-01 | End: 2017-01-01

## 2017-01-01 RX ORDER — LACOSAMIDE 50 MG/1
200 TABLET ORAL 2 TIMES DAILY
Status: DISCONTINUED | OUTPATIENT
Start: 2017-01-01 | End: 2017-01-01 | Stop reason: HOSPADM

## 2017-01-01 RX ORDER — SODIUM CHLORIDE 0.9 % (FLUSH) 0.9 %
3 SYRINGE (ML) INJECTION
Status: DISCONTINUED | OUTPATIENT
Start: 2017-01-01 | End: 2017-01-01

## 2017-01-01 RX ORDER — OLANZAPINE 10 MG/2ML
7.5 INJECTION, POWDER, FOR SOLUTION INTRAMUSCULAR ONCE AS NEEDED
Status: COMPLETED | OUTPATIENT
Start: 2017-01-01 | End: 2017-01-01

## 2017-01-01 RX ORDER — QUETIAPINE FUMARATE 25 MG/1
100 TABLET, FILM COATED ORAL NIGHTLY
Status: DISCONTINUED | OUTPATIENT
Start: 2017-01-01 | End: 2017-01-01

## 2017-01-01 RX ORDER — MAGNESIUM SULFATE HEPTAHYDRATE 40 MG/ML
2 INJECTION, SOLUTION INTRAVENOUS ONCE
Status: COMPLETED | OUTPATIENT
Start: 2017-01-01 | End: 2017-01-01

## 2017-01-01 RX ORDER — FENTANYL CITRATE 50 UG/ML
INJECTION, SOLUTION INTRAMUSCULAR; INTRAVENOUS
Status: DISCONTINUED | OUTPATIENT
Start: 2017-01-01 | End: 2017-01-01

## 2017-01-01 RX ORDER — OLANZAPINE 10 MG/2ML
7.5 INJECTION, POWDER, FOR SOLUTION INTRAMUSCULAR ONCE
Status: COMPLETED | OUTPATIENT
Start: 2017-01-01 | End: 2017-01-01

## 2017-01-01 RX ORDER — NEOSTIGMINE METHYLSULFATE 1 MG/ML
INJECTION, SOLUTION INTRAVENOUS
Status: DISCONTINUED | OUTPATIENT
Start: 2017-01-01 | End: 2017-01-01

## 2017-01-01 RX ORDER — IBUPROFEN 200 MG
24 TABLET ORAL
Status: DISCONTINUED | OUTPATIENT
Start: 2017-01-01 | End: 2017-01-01 | Stop reason: HOSPADM

## 2017-01-01 RX ORDER — POTASSIUM CHLORIDE 20 MEQ/15ML
60 SOLUTION ORAL
Status: DISCONTINUED | OUTPATIENT
Start: 2017-01-01 | End: 2017-01-01

## 2017-01-01 RX ORDER — LANOLIN ALCOHOL/MO/W.PET/CERES
1200 CREAM (GRAM) TOPICAL ONCE
Status: DISCONTINUED | OUTPATIENT
Start: 2017-01-01 | End: 2017-01-01

## 2017-01-01 RX ORDER — MIDAZOLAM HYDROCHLORIDE 1 MG/ML
2 INJECTION INTRAMUSCULAR; INTRAVENOUS ONCE
Status: COMPLETED | OUTPATIENT
Start: 2017-01-01 | End: 2017-01-01

## 2017-01-01 RX ORDER — DEXMEDETOMIDINE HYDROCHLORIDE 4 UG/ML
INJECTION, SOLUTION INTRAVENOUS
Status: COMPLETED
Start: 2017-01-01 | End: 2017-01-01

## 2017-01-01 RX ORDER — QUETIAPINE FUMARATE 25 MG/1
25 TABLET, FILM COATED ORAL ONCE
Status: COMPLETED | OUTPATIENT
Start: 2017-01-01 | End: 2017-01-01

## 2017-01-01 RX ORDER — LIDOCAINE HCL/PF 100 MG/5ML
SYRINGE (ML) INTRAVENOUS
Status: DISCONTINUED | OUTPATIENT
Start: 2017-01-01 | End: 2017-01-01

## 2017-01-01 RX ORDER — INSULIN ASPART 100 [IU]/ML
1-10 INJECTION, SOLUTION INTRAVENOUS; SUBCUTANEOUS
Status: DISCONTINUED | OUTPATIENT
Start: 2017-01-01 | End: 2017-01-01 | Stop reason: HOSPADM

## 2017-01-01 RX ORDER — IBUPROFEN 200 MG
16 TABLET ORAL
Status: DISCONTINUED | OUTPATIENT
Start: 2017-01-01 | End: 2017-01-01 | Stop reason: HOSPADM

## 2017-01-01 RX ORDER — QUETIAPINE FUMARATE 25 MG/1
25 TABLET, FILM COATED ORAL ONCE
Status: DISCONTINUED | OUTPATIENT
Start: 2017-01-01 | End: 2017-01-01

## 2017-01-01 RX ORDER — GLUCAGON 1 MG
1 KIT INJECTION
Status: DISCONTINUED | OUTPATIENT
Start: 2017-01-01 | End: 2017-01-01 | Stop reason: HOSPADM

## 2017-01-01 RX ORDER — ENOXAPARIN SODIUM 100 MG/ML
40 INJECTION SUBCUTANEOUS EVERY 24 HOURS
Status: DISCONTINUED | OUTPATIENT
Start: 2017-01-01 | End: 2017-01-01

## 2017-01-01 RX ORDER — ACETAMINOPHEN 650 MG/1
650 SUPPOSITORY RECTAL EVERY 6 HOURS PRN
Status: DISCONTINUED | OUTPATIENT
Start: 2017-01-01 | End: 2017-01-01

## 2017-01-01 RX ORDER — DEXAMETHASONE SODIUM PHOSPHATE 4 MG/ML
4 INJECTION, SOLUTION INTRA-ARTICULAR; INTRALESIONAL; INTRAMUSCULAR; INTRAVENOUS; SOFT TISSUE EVERY 6 HOURS
Status: DISCONTINUED | OUTPATIENT
Start: 2017-01-01 | End: 2017-01-01

## 2017-01-01 RX ORDER — PROPOFOL 10 MG/ML
VIAL (ML) INTRAVENOUS
Status: DISCONTINUED | OUTPATIENT
Start: 2017-01-01 | End: 2017-01-01

## 2017-01-01 RX ORDER — BACITRACIN 50000 [IU]/1
INJECTION, POWDER, FOR SOLUTION INTRAMUSCULAR
Status: DISCONTINUED | OUTPATIENT
Start: 2017-01-01 | End: 2017-01-01 | Stop reason: HOSPADM

## 2017-01-01 RX ORDER — ONDANSETRON 8 MG/1
8 TABLET, ORALLY DISINTEGRATING ORAL EVERY 8 HOURS PRN
Status: DISCONTINUED | OUTPATIENT
Start: 2017-01-01 | End: 2017-01-01 | Stop reason: HOSPADM

## 2017-01-01 RX ORDER — LABETALOL HYDROCHLORIDE 5 MG/ML
10 INJECTION, SOLUTION INTRAVENOUS EVERY 4 HOURS PRN
Status: DISCONTINUED | OUTPATIENT
Start: 2017-01-01 | End: 2017-01-01 | Stop reason: HOSPADM

## 2017-01-01 RX ORDER — MAGNESIUM SULFATE HEPTAHYDRATE 40 MG/ML
2 INJECTION, SOLUTION INTRAVENOUS ONCE
Status: DISCONTINUED | OUTPATIENT
Start: 2017-01-01 | End: 2017-01-01

## 2017-01-01 RX ORDER — ROCURONIUM BROMIDE 10 MG/ML
INJECTION, SOLUTION INTRAVENOUS
Status: DISCONTINUED | OUTPATIENT
Start: 2017-01-01 | End: 2017-01-01

## 2017-01-01 RX ORDER — SODIUM,POTASSIUM PHOSPHATES 280-250MG
2 POWDER IN PACKET (EA) ORAL
Status: DISCONTINUED | OUTPATIENT
Start: 2017-01-01 | End: 2017-01-01

## 2017-01-01 RX ORDER — GADOBUTROL 604.72 MG/ML
10 INJECTION INTRAVENOUS
Status: DISCONTINUED | OUTPATIENT
Start: 2017-01-01 | End: 2017-01-01 | Stop reason: HOSPADM

## 2017-01-01 RX ORDER — QUETIAPINE FUMARATE 25 MG/1
75 TABLET, FILM COATED ORAL NIGHTLY
Qty: 90 TABLET | Refills: 1 | Status: SHIPPED | OUTPATIENT
Start: 2017-01-01

## 2017-01-01 RX ORDER — POTASSIUM CHLORIDE 20 MEQ/15ML
40 SOLUTION ORAL
Status: DISCONTINUED | OUTPATIENT
Start: 2017-01-01 | End: 2017-01-01

## 2017-01-01 RX ORDER — DEXAMETHASONE 1 MG/1
1 TABLET ORAL DAILY
Status: DISCONTINUED | OUTPATIENT
Start: 2017-01-01 | End: 2017-01-01 | Stop reason: HOSPADM

## 2017-01-01 RX ORDER — CEFTRIAXONE 2 G/50ML
2 INJECTION, SOLUTION INTRAVENOUS
Status: DISCONTINUED | OUTPATIENT
Start: 2017-01-01 | End: 2017-01-01

## 2017-01-01 RX ORDER — MUPIROCIN 20 MG/G
1 OINTMENT TOPICAL
Status: COMPLETED | OUTPATIENT
Start: 2017-01-01 | End: 2017-01-01

## 2017-01-01 RX ORDER — LABETALOL HYDROCHLORIDE 5 MG/ML
10 INJECTION, SOLUTION INTRAVENOUS EVERY 6 HOURS PRN
Status: DISCONTINUED | OUTPATIENT
Start: 2017-01-01 | End: 2017-01-01

## 2017-01-01 RX ORDER — DEXAMETHASONE SODIUM PHOSPHATE 4 MG/ML
INJECTION, SOLUTION INTRA-ARTICULAR; INTRALESIONAL; INTRAMUSCULAR; INTRAVENOUS; SOFT TISSUE
Status: DISCONTINUED | OUTPATIENT
Start: 2017-01-01 | End: 2017-01-01

## 2017-01-01 RX ORDER — OLANZAPINE 10 MG/2ML
5 INJECTION, POWDER, FOR SOLUTION INTRAMUSCULAR ONCE AS NEEDED
Status: COMPLETED | OUTPATIENT
Start: 2017-01-01 | End: 2017-01-01

## 2017-01-01 RX ORDER — DEXAMETHASONE 1 MG/1
2 TABLET ORAL EVERY 12 HOURS
Status: DISCONTINUED | OUTPATIENT
Start: 2017-01-01 | End: 2017-01-01 | Stop reason: HOSPADM

## 2017-01-01 RX ORDER — QUETIAPINE FUMARATE 25 MG/1
25 TABLET, FILM COATED ORAL DAILY
Qty: 30 TABLET | Refills: 2 | Status: SHIPPED | OUTPATIENT
Start: 2017-01-01

## 2017-01-01 RX ORDER — ASPIRIN 81 MG/1
81 TABLET ORAL DAILY
Refills: 0 | COMMUNITY
Start: 2017-01-01

## 2017-01-01 RX ORDER — DEXAMETHASONE 1 MG/1
1 TABLET ORAL EVERY 12 HOURS
Qty: 8 TABLET | Refills: 0
Start: 2017-01-01 | End: 2017-01-01

## 2017-01-01 RX ORDER — LIDOCAINE HYDROCHLORIDE AND EPINEPHRINE 10; 10 MG/ML; UG/ML
INJECTION, SOLUTION INFILTRATION; PERINEURAL
Status: DISCONTINUED | OUTPATIENT
Start: 2017-01-01 | End: 2017-01-01 | Stop reason: HOSPADM

## 2017-01-01 RX ORDER — DEXAMETHASONE 2 MG/1
2 TABLET ORAL EVERY 12 HOURS
Qty: 8 TABLET | Refills: 0
Start: 2017-01-01 | End: 2017-01-01

## 2017-01-01 RX ORDER — LIDOCAINE HYDROCHLORIDE 10 MG/ML
5 INJECTION INFILTRATION; PERINEURAL ONCE
Status: DISCONTINUED | OUTPATIENT
Start: 2017-01-01 | End: 2017-01-01

## 2017-01-01 RX ORDER — ATORVASTATIN CALCIUM 20 MG/1
20 TABLET, FILM COATED ORAL NIGHTLY
Status: DISCONTINUED | OUTPATIENT
Start: 2017-01-01 | End: 2017-01-01 | Stop reason: HOSPADM

## 2017-01-01 RX ORDER — QUETIAPINE FUMARATE 25 MG/1
25 TABLET, FILM COATED ORAL NIGHTLY
Status: DISCONTINUED | OUTPATIENT
Start: 2017-01-01 | End: 2017-01-01

## 2017-01-01 RX ORDER — ACETAMINOPHEN 325 MG/1
650 TABLET ORAL EVERY 4 HOURS PRN
Status: DISCONTINUED | OUTPATIENT
Start: 2017-01-01 | End: 2017-01-01

## 2017-01-01 RX ORDER — PANTOPRAZOLE SODIUM 40 MG/1
40 TABLET, DELAYED RELEASE ORAL DAILY
Status: DISCONTINUED | OUTPATIENT
Start: 2017-01-01 | End: 2017-01-01 | Stop reason: HOSPADM

## 2017-01-01 RX ORDER — BISACODYL 10 MG
10 SUPPOSITORY, RECTAL RECTAL DAILY PRN
Status: DISCONTINUED | OUTPATIENT
Start: 2017-01-01 | End: 2017-01-01 | Stop reason: HOSPADM

## 2017-01-01 RX ORDER — BACITRACIN ZINC 500 UNIT/G
OINTMENT (GRAM) TOPICAL
Status: DISCONTINUED | OUTPATIENT
Start: 2017-01-01 | End: 2017-01-01 | Stop reason: HOSPADM

## 2017-01-01 RX ORDER — SODIUM CHLORIDE 9 MG/ML
INJECTION, SOLUTION INTRAVENOUS CONTINUOUS PRN
Status: DISCONTINUED | OUTPATIENT
Start: 2017-01-01 | End: 2017-01-01

## 2017-01-01 RX ORDER — ONDANSETRON 2 MG/ML
4 INJECTION INTRAMUSCULAR; INTRAVENOUS DAILY PRN
Status: DISCONTINUED | OUTPATIENT
Start: 2017-01-01 | End: 2017-01-01

## 2017-01-01 RX ORDER — GLYCOPYRROLATE 0.2 MG/ML
INJECTION INTRAMUSCULAR; INTRAVENOUS
Status: DISCONTINUED | OUTPATIENT
Start: 2017-01-01 | End: 2017-01-01

## 2017-01-01 RX ORDER — RAMELTEON 8 MG/1
8 TABLET ORAL NIGHTLY
Status: DISCONTINUED | OUTPATIENT
Start: 2017-01-01 | End: 2017-01-01 | Stop reason: HOSPADM

## 2017-01-01 RX ORDER — ACETAMINOPHEN 325 MG/1
650 TABLET ORAL EVERY 6 HOURS PRN
Status: DISCONTINUED | OUTPATIENT
Start: 2017-01-01 | End: 2017-01-01

## 2017-01-01 RX ORDER — DEXAMETHASONE 4 MG/1
4 TABLET ORAL EVERY 8 HOURS
Status: COMPLETED | OUTPATIENT
Start: 2017-01-01 | End: 2017-01-01

## 2017-01-01 RX ORDER — HEPARIN SODIUM 5000 [USP'U]/ML
7500 INJECTION, SOLUTION INTRAVENOUS; SUBCUTANEOUS EVERY 8 HOURS
Status: DISCONTINUED | OUTPATIENT
Start: 2017-01-01 | End: 2017-01-01

## 2017-01-01 RX ORDER — HYDROMORPHONE HYDROCHLORIDE 1 MG/ML
0.2 INJECTION, SOLUTION INTRAMUSCULAR; INTRAVENOUS; SUBCUTANEOUS EVERY 5 MIN PRN
Status: DISCONTINUED | OUTPATIENT
Start: 2017-01-01 | End: 2017-01-01

## 2017-01-01 RX ORDER — SODIUM CHLORIDE 9 MG/ML
INJECTION, SOLUTION INTRAVENOUS CONTINUOUS
Status: DISCONTINUED | OUTPATIENT
Start: 2017-01-01 | End: 2017-01-01

## 2017-01-01 RX ORDER — QUETIAPINE FUMARATE 25 MG/1
25 TABLET, FILM COATED ORAL DAILY
Status: DISCONTINUED | OUTPATIENT
Start: 2017-01-01 | End: 2017-01-01 | Stop reason: HOSPADM

## 2017-01-01 RX ORDER — RAMELTEON 8 MG/1
8 TABLET ORAL NIGHTLY
Qty: 30 TABLET | Refills: 0 | Status: SHIPPED | OUTPATIENT
Start: 2017-01-01

## 2017-01-01 RX ORDER — VANCOMYCIN HCL IN 5 % DEXTROSE 1.25 G/25
1250 PLASTIC BAG, INJECTION (ML) INTRAVENOUS
Status: DISCONTINUED | OUTPATIENT
Start: 2017-01-01 | End: 2017-01-01

## 2017-01-01 RX ORDER — DEXMEDETOMIDINE HYDROCHLORIDE 4 UG/ML
0.2 INJECTION, SOLUTION INTRAVENOUS CONTINUOUS
Status: DISCONTINUED | OUTPATIENT
Start: 2017-01-01 | End: 2017-01-01

## 2017-01-01 RX ORDER — DEXAMETHASONE SODIUM PHOSPHATE 4 MG/ML
4 INJECTION, SOLUTION INTRA-ARTICULAR; INTRALESIONAL; INTRAMUSCULAR; INTRAVENOUS; SOFT TISSUE EVERY 6 HOURS
Status: COMPLETED | OUTPATIENT
Start: 2017-01-01 | End: 2017-01-01

## 2017-01-01 RX ORDER — HYDRALAZINE HYDROCHLORIDE 20 MG/ML
10 INJECTION INTRAMUSCULAR; INTRAVENOUS EVERY 6 HOURS PRN
Status: DISCONTINUED | OUTPATIENT
Start: 2017-01-01 | End: 2017-01-01

## 2017-01-01 RX ORDER — LABETALOL 100 MG/1
100 TABLET, FILM COATED ORAL EVERY 12 HOURS
Status: DISCONTINUED | OUTPATIENT
Start: 2017-01-01 | End: 2017-01-01

## 2017-01-01 RX ORDER — ACETAMINOPHEN 325 MG/1
650 TABLET ORAL EVERY 6 HOURS PRN
Status: DISCONTINUED | OUTPATIENT
Start: 2017-01-01 | End: 2017-01-01 | Stop reason: HOSPADM

## 2017-01-01 RX ORDER — DEXAMETHASONE 1 MG/1
1 TABLET ORAL EVERY 12 HOURS
Status: DISCONTINUED | OUTPATIENT
Start: 2017-01-01 | End: 2017-01-01 | Stop reason: HOSPADM

## 2017-01-01 RX ORDER — LEVETIRACETAM 500 MG/1
500 TABLET ORAL 2 TIMES DAILY
Status: DISCONTINUED | OUTPATIENT
Start: 2017-01-01 | End: 2017-01-01

## 2017-01-01 RX ORDER — QUETIAPINE FUMARATE 25 MG/1
75 TABLET, FILM COATED ORAL NIGHTLY
Status: DISCONTINUED | OUTPATIENT
Start: 2017-01-01 | End: 2017-01-01 | Stop reason: HOSPADM

## 2017-01-01 RX ORDER — LACOSAMIDE 50 MG/1
200 TABLET ORAL 2 TIMES DAILY
Qty: 240 TABLET | Refills: 2 | Status: SHIPPED | OUTPATIENT
Start: 2017-01-01

## 2017-01-01 RX ORDER — DEXAMETHASONE 4 MG/1
4 TABLET ORAL EVERY 12 HOURS
Qty: 8 TABLET | Refills: 0
Start: 2017-01-01 | End: 2017-01-01

## 2017-01-01 RX ORDER — DEXAMETHASONE 4 MG/1
4 TABLET ORAL EVERY 12 HOURS
Status: DISCONTINUED | OUTPATIENT
Start: 2017-01-01 | End: 2017-01-01 | Stop reason: HOSPADM

## 2017-01-01 RX ORDER — VANCOMYCIN HCL IN 5 % DEXTROSE 1.5G/250ML
1500 PLASTIC BAG, INJECTION (ML) INTRAVENOUS
Status: DISCONTINUED | OUTPATIENT
Start: 2017-01-01 | End: 2017-01-01

## 2017-01-01 RX ORDER — POLYETHYLENE GLYCOL 3350 17 G/17G
17 POWDER, FOR SOLUTION ORAL DAILY
Status: DISCONTINUED | OUTPATIENT
Start: 2017-01-01 | End: 2017-01-01 | Stop reason: HOSPADM

## 2017-01-01 RX ORDER — DEXAMETHASONE 1 MG/1
1 TABLET ORAL DAILY
Qty: 4 TABLET | Refills: 0
Start: 2017-01-01 | End: 2017-10-27

## 2017-01-01 RX ORDER — QUETIAPINE FUMARATE 25 MG/1
50 TABLET, FILM COATED ORAL NIGHTLY
Status: DISCONTINUED | OUTPATIENT
Start: 2017-01-01 | End: 2017-01-01

## 2017-01-01 RX ORDER — LIDOCAINE HYDROCHLORIDE 10 MG/ML
1 INJECTION INFILTRATION; PERINEURAL ONCE AS NEEDED
Status: ACTIVE | OUTPATIENT
Start: 2017-01-01 | End: 2017-01-01

## 2017-01-01 RX ORDER — HEPARIN SODIUM 5000 [USP'U]/ML
5000 INJECTION, SOLUTION INTRAVENOUS; SUBCUTANEOUS EVERY 8 HOURS
Status: DISCONTINUED | OUTPATIENT
Start: 2017-01-01 | End: 2017-01-01 | Stop reason: HOSPADM

## 2017-01-01 RX ORDER — OLANZAPINE 10 MG/2ML
5 INJECTION, POWDER, FOR SOLUTION INTRAMUSCULAR ONCE AS NEEDED
Status: DISCONTINUED | OUTPATIENT
Start: 2017-01-01 | End: 2017-01-01

## 2017-01-01 RX ORDER — PANTOPRAZOLE SODIUM 40 MG/10ML
40 INJECTION, POWDER, LYOPHILIZED, FOR SOLUTION INTRAVENOUS DAILY
Status: DISCONTINUED | OUTPATIENT
Start: 2017-01-01 | End: 2017-01-01

## 2017-01-01 RX ORDER — LANOLIN ALCOHOL/MO/W.PET/CERES
400 CREAM (GRAM) TOPICAL ONCE
Status: DISCONTINUED | OUTPATIENT
Start: 2017-01-01 | End: 2017-01-01

## 2017-01-01 RX ORDER — HYDRALAZINE HYDROCHLORIDE 20 MG/ML
10 INJECTION INTRAMUSCULAR; INTRAVENOUS EVERY 6 HOURS PRN
Status: DISCONTINUED | OUTPATIENT
Start: 2017-01-01 | End: 2017-01-01 | Stop reason: SDUPTHER

## 2017-01-01 RX ORDER — HYDRALAZINE HYDROCHLORIDE 20 MG/ML
10 INJECTION INTRAMUSCULAR; INTRAVENOUS EVERY 4 HOURS PRN
Status: DISCONTINUED | OUTPATIENT
Start: 2017-01-01 | End: 2017-01-01

## 2017-01-01 RX ORDER — LANOLIN ALCOHOL/MO/W.PET/CERES
800 CREAM (GRAM) TOPICAL ONCE
Status: DISCONTINUED | OUTPATIENT
Start: 2017-01-01 | End: 2017-01-01 | Stop reason: HOSPADM

## 2017-01-01 RX ORDER — THIAMINE HCL 50 MG
100 TABLET ORAL DAILY
Status: DISCONTINUED | OUTPATIENT
Start: 2017-01-01 | End: 2017-01-01 | Stop reason: HOSPADM

## 2017-01-01 RX ORDER — RAMELTEON 8 MG/1
8 TABLET ORAL ONCE
Status: DISCONTINUED | OUTPATIENT
Start: 2017-01-01 | End: 2017-01-01

## 2017-01-01 RX ORDER — POTASSIUM CHLORIDE 20 MEQ/1
60 TABLET, EXTENDED RELEASE ORAL ONCE
Status: COMPLETED | OUTPATIENT
Start: 2017-01-01 | End: 2017-01-01

## 2017-01-01 RX ORDER — MIDAZOLAM HYDROCHLORIDE 1 MG/ML
INJECTION, SOLUTION INTRAMUSCULAR; INTRAVENOUS
Status: DISCONTINUED | OUTPATIENT
Start: 2017-01-01 | End: 2017-01-01

## 2017-01-01 RX ORDER — ONDANSETRON 2 MG/ML
4 INJECTION INTRAMUSCULAR; INTRAVENOUS EVERY 8 HOURS PRN
Status: DISCONTINUED | OUTPATIENT
Start: 2017-01-01 | End: 2017-01-01

## 2017-01-01 RX ADMIN — ACYCLOVIR SODIUM 900 MG: 50 INJECTION, SOLUTION INTRAVENOUS at 09:09

## 2017-01-01 RX ADMIN — ENOXAPARIN SODIUM 40 MG: 100 INJECTION SUBCUTANEOUS at 05:09

## 2017-01-01 RX ADMIN — CARVEDILOL 25 MG: 25 TABLET, FILM COATED ORAL at 05:10

## 2017-01-01 RX ADMIN — DEXAMETHASONE SODIUM PHOSPHATE 4 MG: 4 INJECTION, SOLUTION INTRAMUSCULAR; INTRAVENOUS at 01:10

## 2017-01-01 RX ADMIN — HEPARIN SODIUM 7500 UNITS: 5000 INJECTION, SOLUTION INTRAVENOUS; SUBCUTANEOUS at 02:10

## 2017-01-01 RX ADMIN — ATORVASTATIN CALCIUM 20 MG: 20 TABLET, FILM COATED ORAL at 09:09

## 2017-01-01 RX ADMIN — HYDRALAZINE HYDROCHLORIDE 10 MG: 20 INJECTION INTRAMUSCULAR; INTRAVENOUS at 10:10

## 2017-01-01 RX ADMIN — CEFTRIAXONE 2 G: 2 INJECTION, SOLUTION INTRAVENOUS at 08:10

## 2017-01-01 RX ADMIN — MIDAZOLAM HYDROCHLORIDE 2 MG: 1 INJECTION, SOLUTION INTRAMUSCULAR; INTRAVENOUS at 04:10

## 2017-01-01 RX ADMIN — DEXAMETHASONE 4 MG: 4 TABLET ORAL at 05:10

## 2017-01-01 RX ADMIN — QUETIAPINE FUMARATE 25 MG: 25 TABLET, FILM COATED ORAL at 08:10

## 2017-01-01 RX ADMIN — MAGNESIUM SULFATE HEPTAHYDRATE 3 G: 500 INJECTION, SOLUTION INTRAMUSCULAR; INTRAVENOUS at 09:10

## 2017-01-01 RX ADMIN — SODIUM CHLORIDE: 0.9 INJECTION, SOLUTION INTRAVENOUS at 03:09

## 2017-01-01 RX ADMIN — OLANZAPINE 5 MG: 10 INJECTION, POWDER, FOR SOLUTION INTRAMUSCULAR at 04:10

## 2017-01-01 RX ADMIN — ATORVASTATIN CALCIUM 20 MG: 20 TABLET, FILM COATED ORAL at 08:10

## 2017-01-01 RX ADMIN — LACOSAMIDE 200 MG: 50 TABLET, FILM COATED ORAL at 10:09

## 2017-01-01 RX ADMIN — RAMELTEON 8 MG: 8 TABLET, FILM COATED ORAL at 09:10

## 2017-01-01 RX ADMIN — HEPARIN SODIUM 5000 UNITS: 5000 INJECTION, SOLUTION INTRAVENOUS; SUBCUTANEOUS at 05:10

## 2017-01-01 RX ADMIN — QUETIAPINE FUMARATE 25 MG: 25 TABLET, FILM COATED ORAL at 08:09

## 2017-01-01 RX ADMIN — SODIUM CHLORIDE: 0.9 INJECTION, SOLUTION INTRAVENOUS at 05:10

## 2017-01-01 RX ADMIN — CARVEDILOL 25 MG: 25 TABLET, FILM COATED ORAL at 08:10

## 2017-01-01 RX ADMIN — QUETIAPINE FUMARATE 75 MG: 25 TABLET, FILM COATED ORAL at 09:10

## 2017-01-01 RX ADMIN — LACOSAMIDE 200 MG: 50 TABLET, FILM COATED ORAL at 09:10

## 2017-01-01 RX ADMIN — Medication 1250 MG: at 01:10

## 2017-01-01 RX ADMIN — DEXMEDETOMIDINE HYDROCHLORIDE 0.4 MCG/KG/HR: 4 INJECTION, SOLUTION INTRAVENOUS at 04:10

## 2017-01-01 RX ADMIN — LACOSAMIDE 200 MG: 50 TABLET, FILM COATED ORAL at 08:10

## 2017-01-01 RX ADMIN — Medication 1500 MG: at 12:10

## 2017-01-01 RX ADMIN — DEXAMETHASONE SODIUM PHOSPHATE 4 MG: 4 INJECTION, SOLUTION INTRAMUSCULAR; INTRAVENOUS at 09:09

## 2017-01-01 RX ADMIN — METRONIDAZOLE 500 MG: 500 INJECTION, SOLUTION INTRAVENOUS at 01:10

## 2017-01-01 RX ADMIN — LACOSAMIDE 200 MG: 50 TABLET, FILM COATED ORAL at 09:09

## 2017-01-01 RX ADMIN — CARVEDILOL 25 MG: 25 TABLET, FILM COATED ORAL at 09:10

## 2017-01-01 RX ADMIN — PANTOPRAZOLE SODIUM 40 MG: 40 TABLET, DELAYED RELEASE ORAL at 08:10

## 2017-01-01 RX ADMIN — CEFTRIAXONE 2 G: 2 INJECTION, SOLUTION INTRAVENOUS at 10:10

## 2017-01-01 RX ADMIN — METRONIDAZOLE 500 MG: 500 INJECTION, SOLUTION INTRAVENOUS at 04:10

## 2017-01-01 RX ADMIN — LIDOCAINE HYDROCHLORIDE 100 MG: 20 INJECTION, SOLUTION INTRAVENOUS at 07:10

## 2017-01-01 RX ADMIN — ATORVASTATIN CALCIUM 20 MG: 20 TABLET, FILM COATED ORAL at 02:09

## 2017-01-01 RX ADMIN — OLANZAPINE 7.5 MG: 10 INJECTION, POWDER, LYOPHILIZED, FOR SOLUTION INTRAMUSCULAR at 09:10

## 2017-01-01 RX ADMIN — ENOXAPARIN SODIUM 40 MG: 100 INJECTION SUBCUTANEOUS at 05:10

## 2017-01-01 RX ADMIN — VANCOMYCIN HYDROCHLORIDE 2000 MG: 1 INJECTION, POWDER, LYOPHILIZED, FOR SOLUTION INTRAVENOUS at 02:10

## 2017-01-01 RX ADMIN — DEXAMETHASONE 4 MG: 4 TABLET ORAL at 08:10

## 2017-01-01 RX ADMIN — METRONIDAZOLE 500 MG: 500 INJECTION, SOLUTION INTRAVENOUS at 08:10

## 2017-01-01 RX ADMIN — DEXAMETHASONE SODIUM PHOSPHATE 4 MG: 4 INJECTION, SOLUTION INTRAMUSCULAR; INTRAVENOUS at 11:10

## 2017-01-01 RX ADMIN — DEXAMETHASONE 4 MG: 4 TABLET ORAL at 09:10

## 2017-01-01 RX ADMIN — QUETIAPINE FUMARATE 75 MG: 25 TABLET, FILM COATED ORAL at 08:10

## 2017-01-01 RX ADMIN — DEXAMETHASONE SODIUM PHOSPHATE 4 MG: 4 INJECTION, SOLUTION INTRAMUSCULAR; INTRAVENOUS at 04:09

## 2017-01-01 RX ADMIN — CEFEPIME HYDROCHLORIDE 2 G: 2 INJECTION, SOLUTION INTRAVENOUS at 01:10

## 2017-01-01 RX ADMIN — LACOSAMIDE 200 MG: 50 TABLET, FILM COATED ORAL at 08:09

## 2017-01-01 RX ADMIN — QUETIAPINE FUMARATE 50 MG: 25 TABLET, FILM COATED ORAL at 08:10

## 2017-01-01 RX ADMIN — DEXAMETHASONE SODIUM PHOSPHATE 4 MG: 4 INJECTION, SOLUTION INTRAMUSCULAR; INTRAVENOUS at 05:10

## 2017-01-01 RX ADMIN — QUETIAPINE FUMARATE 25 MG: 25 TABLET, FILM COATED ORAL at 09:10

## 2017-01-01 RX ADMIN — DEXAMETHASONE 4 MG: 4 TABLET ORAL at 07:10

## 2017-01-01 RX ADMIN — OLANZAPINE 5 MG: 10 INJECTION, POWDER, LYOPHILIZED, FOR SOLUTION INTRAMUSCULAR at 03:10

## 2017-01-01 RX ADMIN — CARVEDILOL 25 MG: 25 TABLET, FILM COATED ORAL at 09:09

## 2017-01-01 RX ADMIN — LABETALOL HCL 100 MG: 100 TABLET, FILM COATED ORAL at 06:10

## 2017-01-01 RX ADMIN — Medication 800 MG: at 06:10

## 2017-01-01 RX ADMIN — Medication 100 MG: at 08:10

## 2017-01-01 RX ADMIN — PANTOPRAZOLE SODIUM 40 MG: 40 INJECTION, POWDER, FOR SOLUTION INTRAVENOUS at 09:10

## 2017-01-01 RX ADMIN — ACYCLOVIR SODIUM 900 MG: 50 INJECTION, SOLUTION INTRAVENOUS at 06:10

## 2017-01-01 RX ADMIN — LABETALOL HYDROCHLORIDE 10 MG: 5 INJECTION, SOLUTION INTRAVENOUS at 06:10

## 2017-01-01 RX ADMIN — ACYCLOVIR SODIUM 914 MG: 1000 INJECTION, SOLUTION INTRAVENOUS at 10:09

## 2017-01-01 RX ADMIN — LACOSAMIDE 200 MG: 50 TABLET, FILM COATED ORAL at 12:09

## 2017-01-01 RX ADMIN — PANTOPRAZOLE SODIUM 40 MG: 40 INJECTION, POWDER, FOR SOLUTION INTRAVENOUS at 08:10

## 2017-01-01 RX ADMIN — DEXAMETHASONE 4 MG: 4 TABLET ORAL at 01:10

## 2017-01-01 RX ADMIN — LABETALOL HYDROCHLORIDE 10 MG: 5 INJECTION, SOLUTION INTRAVENOUS at 10:10

## 2017-01-01 RX ADMIN — CEFTRIAXONE 2 G: 2 INJECTION, SOLUTION INTRAVENOUS at 09:10

## 2017-01-01 RX ADMIN — ACETAMINOPHEN 650 MG: 325 TABLET ORAL at 05:10

## 2017-01-01 RX ADMIN — CARVEDILOL 25 MG: 25 TABLET, FILM COATED ORAL at 04:10

## 2017-01-01 RX ADMIN — Medication 100 MG: at 10:09

## 2017-01-01 RX ADMIN — Medication 100 MG: at 09:10

## 2017-01-01 RX ADMIN — HEPARIN SODIUM 7500 UNITS: 5000 INJECTION, SOLUTION INTRAVENOUS; SUBCUTANEOUS at 05:10

## 2017-01-01 RX ADMIN — HEPARIN SODIUM 5000 UNITS: 5000 INJECTION, SOLUTION INTRAVENOUS; SUBCUTANEOUS at 09:10

## 2017-01-01 RX ADMIN — PROPOFOL 200 MG: 10 INJECTION, EMULSION INTRAVENOUS at 07:10

## 2017-01-01 RX ADMIN — DEXAMETHASONE SODIUM PHOSPHATE 4 MG: 4 INJECTION, SOLUTION INTRAMUSCULAR; INTRAVENOUS at 09:10

## 2017-01-01 RX ADMIN — METRONIDAZOLE 500 MG: 500 INJECTION, SOLUTION INTRAVENOUS at 12:10

## 2017-01-01 RX ADMIN — Medication 1250 MG: at 02:10

## 2017-01-01 RX ADMIN — ATORVASTATIN CALCIUM 20 MG: 20 TABLET, FILM COATED ORAL at 09:10

## 2017-01-01 RX ADMIN — METRONIDAZOLE 500 MG: 500 INJECTION, SOLUTION INTRAVENOUS at 05:10

## 2017-01-01 RX ADMIN — MIDAZOLAM HYDROCHLORIDE 1 MG: 1 INJECTION, SOLUTION INTRAMUSCULAR; INTRAVENOUS at 07:09

## 2017-01-01 RX ADMIN — Medication 1250 MG: at 12:10

## 2017-01-01 RX ADMIN — OLANZAPINE 7.5 MG: 10 INJECTION, POWDER, FOR SOLUTION INTRAMUSCULAR at 09:10

## 2017-01-01 RX ADMIN — DEXAMETHASONE SODIUM PHOSPHATE 4 MG: 4 INJECTION, SOLUTION INTRAMUSCULAR; INTRAVENOUS at 07:10

## 2017-01-01 RX ADMIN — INSULIN ASPART 1 UNITS: 100 INJECTION, SOLUTION INTRAVENOUS; SUBCUTANEOUS at 09:10

## 2017-01-01 RX ADMIN — HEPARIN SODIUM 7500 UNITS: 5000 INJECTION, SOLUTION INTRAVENOUS; SUBCUTANEOUS at 10:10

## 2017-01-01 RX ADMIN — METRONIDAZOLE 500 MG: 500 INJECTION, SOLUTION INTRAVENOUS at 09:10

## 2017-01-01 RX ADMIN — ACETAMINOPHEN 650 MG: 325 TABLET ORAL at 10:10

## 2017-01-01 RX ADMIN — HEPARIN SODIUM 7500 UNITS: 5000 INJECTION, SOLUTION INTRAVENOUS; SUBCUTANEOUS at 04:10

## 2017-01-01 RX ADMIN — DEXMEDETOMIDINE HYDROCHLORIDE 0.4 MCG: 4 INJECTION, SOLUTION INTRAVENOUS at 04:10

## 2017-01-01 RX ADMIN — CARVEDILOL 25 MG: 25 TABLET, FILM COATED ORAL at 08:09

## 2017-01-01 RX ADMIN — RAMELTEON 8 MG: 8 TABLET, FILM COATED ORAL at 08:10

## 2017-01-01 RX ADMIN — Medication 1250 MG: at 07:10

## 2017-01-01 RX ADMIN — HYDRALAZINE HYDROCHLORIDE 10 MG: 20 INJECTION INTRAMUSCULAR; INTRAVENOUS at 07:10

## 2017-01-01 RX ADMIN — HEPARIN SODIUM 7500 UNITS: 5000 INJECTION, SOLUTION INTRAVENOUS; SUBCUTANEOUS at 09:10

## 2017-01-01 RX ADMIN — HYDRALAZINE HYDROCHLORIDE 10 MG: 20 INJECTION INTRAMUSCULAR; INTRAVENOUS at 03:10

## 2017-01-01 RX ADMIN — DEXAMETHASONE SODIUM PHOSPHATE 8 MG: 4 INJECTION, SOLUTION INTRAMUSCULAR; INTRAVENOUS at 07:10

## 2017-01-01 RX ADMIN — QUETIAPINE FUMARATE 100 MG: 25 TABLET, FILM COATED ORAL at 09:10

## 2017-01-01 RX ADMIN — HEPARIN SODIUM 7500 UNITS: 5000 INJECTION, SOLUTION INTRAVENOUS; SUBCUTANEOUS at 01:10

## 2017-01-01 RX ADMIN — CARVEDILOL 25 MG: 25 TABLET, FILM COATED ORAL at 07:10

## 2017-01-01 RX ADMIN — CARVEDILOL 25 MG: 25 TABLET, FILM COATED ORAL at 12:09

## 2017-01-01 RX ADMIN — Medication 800 MG: at 03:10

## 2017-01-01 RX ADMIN — SODIUM CHLORIDE: 0.9 INJECTION, SOLUTION INTRAVENOUS at 06:10

## 2017-01-01 RX ADMIN — INSULIN ASPART 1 UNITS: 100 INJECTION, SOLUTION INTRAVENOUS; SUBCUTANEOUS at 10:10

## 2017-01-01 RX ADMIN — IODIXANOL 85 ML: 320 INJECTION, SOLUTION INTRAVASCULAR at 08:09

## 2017-01-01 RX ADMIN — MIDAZOLAM HYDROCHLORIDE 2 MG: 1 INJECTION, SOLUTION INTRAMUSCULAR; INTRAVENOUS at 06:10

## 2017-01-01 RX ADMIN — Medication 800 MG: at 07:10

## 2017-01-01 RX ADMIN — QUETIAPINE FUMARATE 25 MG: 25 TABLET, FILM COATED ORAL at 03:10

## 2017-01-01 RX ADMIN — DEXAMETHASONE 4 MG: 4 TABLET ORAL at 02:10

## 2017-01-01 RX ADMIN — Medication 1250 MG: at 10:10

## 2017-01-01 RX ADMIN — POLYETHYLENE GLYCOL 3350 17 G: 17 POWDER, FOR SOLUTION ORAL at 09:10

## 2017-01-01 RX ADMIN — ACYCLOVIR SODIUM 914 MG: 1000 INJECTION, SOLUTION INTRAVENOUS at 02:09

## 2017-01-01 RX ADMIN — CARVEDILOL 25 MG: 25 TABLET, FILM COATED ORAL at 05:09

## 2017-01-01 RX ADMIN — DEXAMETHASONE SODIUM PHOSPHATE 4 MG: 4 INJECTION, SOLUTION INTRAMUSCULAR; INTRAVENOUS at 12:10

## 2017-01-01 RX ADMIN — ACYCLOVIR SODIUM 900 MG: 50 INJECTION, SOLUTION INTRAVENOUS at 10:09

## 2017-01-01 RX ADMIN — METRONIDAZOLE 500 MG: 500 INJECTION, SOLUTION INTRAVENOUS at 11:10

## 2017-01-01 RX ADMIN — ACYCLOVIR SODIUM 900 MG: 50 INJECTION, SOLUTION INTRAVENOUS at 12:09

## 2017-01-01 RX ADMIN — ACYCLOVIR SODIUM 900 MG: 50 INJECTION, SOLUTION INTRAVENOUS at 09:10

## 2017-01-01 RX ADMIN — Medication 1250 MG: at 05:10

## 2017-01-01 RX ADMIN — ACYCLOVIR SODIUM 914 MG: 1000 INJECTION, SOLUTION INTRAVENOUS at 03:09

## 2017-01-01 RX ADMIN — FENTANYL CITRATE 50 MCG: 50 INJECTION, SOLUTION INTRAMUSCULAR; INTRAVENOUS at 07:10

## 2017-01-01 RX ADMIN — SODIUM CHLORIDE: 0.9 INJECTION, SOLUTION INTRAVENOUS at 10:09

## 2017-01-01 RX ADMIN — QUETIAPINE FUMARATE 50 MG: 25 TABLET, FILM COATED ORAL at 09:10

## 2017-01-01 RX ADMIN — LABETALOL HYDROCHLORIDE 10 MG: 5 INJECTION, SOLUTION INTRAVENOUS at 02:10

## 2017-01-01 RX ADMIN — INSULIN ASPART 2 UNITS: 100 INJECTION, SOLUTION INTRAVENOUS; SUBCUTANEOUS at 05:10

## 2017-01-01 RX ADMIN — HEPARIN SODIUM 5000 UNITS: 5000 INJECTION, SOLUTION INTRAVENOUS; SUBCUTANEOUS at 01:10

## 2017-01-01 RX ADMIN — POTASSIUM & SODIUM PHOSPHATES POWDER PACK 280-160-250 MG 2 PACKET: 280-160-250 PACK at 07:10

## 2017-01-01 RX ADMIN — NEOSTIGMINE METHYLSULFATE 5 MG: 1 INJECTION INTRAVENOUS at 08:10

## 2017-01-01 RX ADMIN — DEXAMETHASONE 4 MG: 4 TABLET ORAL at 10:10

## 2017-01-01 RX ADMIN — QUETIAPINE FUMARATE 25 MG: 25 TABLET, FILM COATED ORAL at 01:10

## 2017-01-01 RX ADMIN — POLYETHYLENE GLYCOL 3350 17 G: 17 POWDER, FOR SOLUTION ORAL at 08:10

## 2017-01-01 RX ADMIN — CARVEDILOL 25 MG: 25 TABLET, FILM COATED ORAL at 04:09

## 2017-01-01 RX ADMIN — CEFTRIAXONE 2 G: 2 INJECTION, SOLUTION INTRAVENOUS at 11:10

## 2017-01-01 RX ADMIN — ACYCLOVIR SODIUM 900 MG: 50 INJECTION, SOLUTION INTRAVENOUS at 03:09

## 2017-01-01 RX ADMIN — ENOXAPARIN SODIUM 40 MG: 100 INJECTION SUBCUTANEOUS at 04:09

## 2017-01-01 RX ADMIN — CEFTRIAXONE 2 G: 2 INJECTION, SOLUTION INTRAVENOUS at 07:10

## 2017-01-01 RX ADMIN — HYDRALAZINE HYDROCHLORIDE 10 MG: 20 INJECTION INTRAMUSCULAR; INTRAVENOUS at 05:10

## 2017-01-01 RX ADMIN — QUETIAPINE FUMARATE 25 MG: 25 TABLET, FILM COATED ORAL at 09:09

## 2017-01-01 RX ADMIN — DEXAMETHASONE SODIUM PHOSPHATE 4 MG: 4 INJECTION, SOLUTION INTRAMUSCULAR; INTRAVENOUS at 06:10

## 2017-01-01 RX ADMIN — PANTOPRAZOLE SODIUM 40 MG: 40 TABLET, DELAYED RELEASE ORAL at 09:10

## 2017-01-01 RX ADMIN — DEXAMETHASONE SODIUM PHOSPHATE 4 MG: 4 INJECTION, SOLUTION INTRAMUSCULAR; INTRAVENOUS at 03:09

## 2017-01-01 RX ADMIN — ACYCLOVIR SODIUM 900 MG: 50 INJECTION, SOLUTION INTRAVENOUS at 06:09

## 2017-01-01 RX ADMIN — ATORVASTATIN CALCIUM 20 MG: 20 TABLET, FILM COATED ORAL at 08:09

## 2017-01-01 RX ADMIN — ACYCLOVIR SODIUM 914 MG: 1000 INJECTION, SOLUTION INTRAVENOUS at 06:09

## 2017-01-01 RX ADMIN — METRONIDAZOLE 500 MG: 500 INJECTION, SOLUTION INTRAVENOUS at 03:10

## 2017-01-01 RX ADMIN — Medication 1500 MG: at 01:10

## 2017-01-01 RX ADMIN — CARVEDILOL 25 MG: 25 TABLET, FILM COATED ORAL at 06:10

## 2017-01-01 RX ADMIN — FENTANYL CITRATE 50 MCG: 50 INJECTION, SOLUTION INTRAMUSCULAR; INTRAVENOUS at 07:09

## 2017-01-01 RX ADMIN — MAGNESIUM SULFATE HEPTAHYDRATE 3 G: 500 INJECTION, SOLUTION INTRAMUSCULAR; INTRAVENOUS at 12:09

## 2017-01-01 RX ADMIN — INSULIN ASPART 2 UNITS: 100 INJECTION, SOLUTION INTRAVENOUS; SUBCUTANEOUS at 09:10

## 2017-01-01 RX ADMIN — FENTANYL CITRATE 100 MCG: 50 INJECTION, SOLUTION INTRAMUSCULAR; INTRAVENOUS at 07:10

## 2017-01-01 RX ADMIN — POTASSIUM & SODIUM PHOSPHATES POWDER PACK 280-160-250 MG 2 PACKET: 280-160-250 PACK at 03:10

## 2017-01-01 RX ADMIN — Medication 100 MG: at 09:09

## 2017-01-01 RX ADMIN — Medication 800 MG: at 09:10

## 2017-01-01 RX ADMIN — SODIUM CHLORIDE: 0.9 INJECTION, SOLUTION INTRAVENOUS at 09:10

## 2017-01-01 RX ADMIN — ROCURONIUM BROMIDE 50 MG: 10 INJECTION, SOLUTION INTRAVENOUS at 07:10

## 2017-01-01 RX ADMIN — HEPARIN SODIUM 7500 UNITS: 5000 INJECTION, SOLUTION INTRAVENOUS; SUBCUTANEOUS at 06:10

## 2017-01-01 RX ADMIN — POTASSIUM & SODIUM PHOSPHATES POWDER PACK 280-160-250 MG 1 PACKET: 280-160-250 PACK at 09:10

## 2017-01-01 RX ADMIN — Medication 1500 MG: at 02:10

## 2017-01-01 RX ADMIN — MUPIROCIN 1 G: 20 OINTMENT TOPICAL at 06:10

## 2017-01-01 RX ADMIN — ACYCLOVIR SODIUM 900 MG: 50 INJECTION, SOLUTION INTRAVENOUS at 05:09

## 2017-01-01 RX ADMIN — Medication 1250 MG: at 11:10

## 2017-01-01 RX ADMIN — SODIUM CHLORIDE: 0.9 INJECTION, SOLUTION INTRAVENOUS at 02:09

## 2017-01-01 RX ADMIN — HYDRALAZINE HYDROCHLORIDE 10 MG: 20 INJECTION INTRAMUSCULAR; INTRAVENOUS at 02:10

## 2017-01-01 RX ADMIN — Medication 100 MG: at 12:09

## 2017-01-01 RX ADMIN — POTASSIUM CHLORIDE 60 MEQ: 1500 TABLET, EXTENDED RELEASE ORAL at 09:10

## 2017-01-01 RX ADMIN — GLYCOPYRROLATE 0.6 MG: 0.2 INJECTION, SOLUTION INTRAMUSCULAR; INTRAVENOUS at 08:10

## 2017-01-01 RX ADMIN — MAGNESIUM SULFATE IN WATER 2 G: 40 INJECTION, SOLUTION INTRAVENOUS at 07:10

## 2017-01-01 RX ADMIN — LABETALOL HYDROCHLORIDE 10 MG: 5 INJECTION, SOLUTION INTRAVENOUS at 04:10

## 2017-01-01 RX ADMIN — FENTANYL CITRATE 50 MCG: 50 INJECTION, SOLUTION INTRAMUSCULAR; INTRAVENOUS at 08:09

## 2017-01-01 RX ADMIN — CEFTRIAXONE 2 G: 2 INJECTION, SOLUTION INTRAVENOUS at 12:10

## 2017-04-07 PROBLEM — G89.29 CHRONIC PAIN OF RIGHT KNEE: Status: ACTIVE | Noted: 2017-01-01

## 2017-04-07 PROBLEM — M25.561 CHRONIC PAIN OF RIGHT KNEE: Status: ACTIVE | Noted: 2017-01-01

## 2017-04-20 PROBLEM — I10 HYPERTENSION: Status: ACTIVE | Noted: 2017-01-01

## 2017-04-20 PROBLEM — K21.9 GERD (GASTROESOPHAGEAL REFLUX DISEASE): Status: ACTIVE | Noted: 2017-01-01

## 2017-04-20 PROBLEM — E78.5 HYPERLIPIDEMIA: Status: ACTIVE | Noted: 2017-01-01

## 2017-04-20 PROBLEM — E63.9 INADEQUATE DIETARY INTAKE: Status: ACTIVE | Noted: 2017-01-01

## 2017-04-21 PROBLEM — D64.9 ANEMIA: Status: ACTIVE | Noted: 2017-01-01

## 2017-08-11 PROBLEM — R56.9 SEIZURE: Status: ACTIVE | Noted: 2017-01-01

## 2017-09-09 PROBLEM — G40.901 STATUS EPILEPTICUS, GENERALIZED CONVULSIVE: Status: ACTIVE | Noted: 2017-01-01

## 2017-09-10 PROBLEM — Z99.11 ON MECHANICALLY ASSISTED VENTILATION: Status: ACTIVE | Noted: 2017-01-01

## 2017-09-10 PROBLEM — J69.0 ASPIRATION PNEUMONIA OF BOTH LOWER LOBES DUE TO VOMIT: Status: ACTIVE | Noted: 2017-01-01

## 2017-09-10 PROBLEM — G04.90 ENCEPHALITIS AND ENCEPHALOMYELITIS, UNSPECIFIED: Status: ACTIVE | Noted: 2017-01-01

## 2017-09-10 PROBLEM — E87.6 HYPOKALEMIA: Status: ACTIVE | Noted: 2017-01-01

## 2017-09-16 PROBLEM — E87.6 HYPOKALEMIA: Status: RESOLVED | Noted: 2017-01-01 | Resolved: 2017-01-01

## 2017-09-21 PROBLEM — S22.089A T11 VERTEBRAL FRACTURE: Status: ACTIVE | Noted: 2017-01-01

## 2017-09-23 PROBLEM — Z99.11 ON MECHANICALLY ASSISTED VENTILATION: Status: RESOLVED | Noted: 2017-01-01 | Resolved: 2017-01-01

## 2017-09-26 PROBLEM — G04.90 ENCEPHALITIS: Status: ACTIVE | Noted: 2017-01-01

## 2017-09-26 NOTE — PLAN OF CARE
Outside Transfer Acceptance Note    Transferring Physician or Mid Level Provider/Speciality: Dr. Cassie Cruz/Neurology    Accepting Physician: Carol Ware     Date of Acceptance: 09/26/2017     Transferring Facility/Hospital: [facility] [place]    Reason for Transfer to [facility]: Neurosurgery evaluation    Report from Transferring Physician or Mid-Level provider/Hospital course: A 61-year-old man with recently diagnosed tonic clonic seizure disorder,  HTN and dyslipidemia admitted to [facility] [place] [facility] after 2 witnessed seizures 9/10/17. 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 He was treated with Acyclovir and ultimately underwent 2 lumbar punctures which have been unrevealing. HSV PCR from first LP is negative. After he failed to improve significantly, diagnostic imaging was done and showed expansion of the meningeal enhancement as well as possible pathologic spinal compression fractures. C discussed case with Dr. Sweeney for concern of possible malignancy and need for tissue diagnosis.     To do list upon patient arrival:    -Review [facility] chart available in [facility]   -Consult Neurosurgery. [facility] discussed with Dr. Sweeney   -Consider spine service consultation for possible bone biopsy.   -Continue acyclovir and consider ID service evaluation.   -I have requested HSV PCR be done on fluid from second LP if available.    -Perform CT head w/wo contrast. (Requested by Dr. Sweeney)    Please call extension [phone] upon patient arrival to floor for Hospital Medicine admit team assignment and for additional admit orders. If patient is coming from another [facility] facility please also call [phone] to inform the admit team/office that patient has arrived from the [facility] facility to the floor so patient can be evaluated.

## 2017-09-27 PROBLEM — G93.40 ENCEPHALOPATHY: Status: ACTIVE | Noted: 2017-01-01

## 2017-09-27 NOTE — HOSPITAL COURSE
9/26: Patient presented to C from OSH for further evaluation of L temporoparietal brain lesion; AMS with no FNDs  9/27: Patient to undergo diagnostic angiogram tomorrow; reccs to follow; exam stable  9/28: No acute events overnight. Diagnostic cerebral angiogram today.   9/29: Angio shows possible vasculitis; starting dex 4q6; will plan for bx Tuesday if steroids unyielding  9/30: FU LP results. Will discuss w/neurology. Bx tentatively scheduled for Tuesday  10/1: Dex4q6 started 2 days ago. No significant improvement with pt's speech. Several episodes of agitation overnight.   10/2: Pt with mild improvement of symptoms. Completed course of acyclovir. MRI brain with stealth. Plan for biopsy tomorrow.   10/3: Patient went for Bx today and is progressing well post-op. He is admitted to [facility] for post-op surveillance and will likely step down in AM  10/4: Patient scan shows expected post-op changes; He will step down to primary service today.  10/5: NAEON. Pt transitioned back to primary team once on floor  10/12: AZALEA. Pending rehab

## 2017-09-27 NOTE — SUBJECTIVE & OBJECTIVE
Prescriptions Prior to Admission   Medication Sig Dispense Refill Last Dose    aspirin (ECOTRIN) 81 MG EC tablet Take 81 mg by mouth once daily.   9/9/2017    atorvastatin (LIPITOR) 20 MG tablet Take 20 mg by mouth every evening.   9/9/2017    carvedilol (COREG) 25 MG tablet Take 25 mg by mouth 2 (two) times daily with meals.   9/9/2017    cyclobenzaprine (FLEXERIL) 10 MG tablet Take 10 mg by mouth 3 (three) times daily.   Unknown at Unknown time    irbesartan-hydrochlorothiazide (AVALIDE) 150-12.5 mg per tablet Take 1 tablet by mouth once daily.    9/9/2017    levetiracetam (KEPPRA) 500 MG Tab Take 500 mg by mouth 2 (two) times daily.       niacin (NIASPAN) 1000 MG CR tablet Take 1,000 mg by mouth once daily.   9/9/2017    omeprazole (PRILOSEC) 40 MG capsule Take 40 mg by mouth once daily.   9/9/2017       Review of patient's allergies indicates:  No Known Allergies    Past Medical History:   Diagnosis Date    Acid reflux     Arthritis     Elevated cholesterol     Hypertension      Past Surgical History:   Procedure Laterality Date    JOINT REPLACEMENT Right 04/20/2017    uneventful recovery    KNEE ARTHROPLASTY Right 02/2007    WISDOM TOOTH EXTRACTION       Family History     Problem Relation (Age of Onset)    Cancer Mother    Diabetes Father        Social History Main Topics    Smoking status: Former Smoker    Smokeless tobacco: Never Used      Comment: quit 17 yrs ago    Alcohol use 16.2 oz/week     3 Glasses of wine, 24 Cans of beer per week    Drug use: No    Sexual activity: Yes     Partners: Female     Birth control/ protection: Other-see comments      Comment: same partner many years; wife is postmenopausal     Review of Systems   Unable to perform ROS: Mental status change     Objective:     Weight: 121.8 kg (268 lb 8 oz)  Body mass index is 31.03 kg/m².  Vital Signs (Most Recent):  Temp: 98.4 °F (36.9 °C) (09/27/17 0740)  Pulse: 87 (09/27/17 0740)  Resp: 20 (09/27/17 0740)  BP: (!)  153/93 (09/27/17 0740)  SpO2: 96 % (09/27/17 0740) Vital Signs (24h Range):  Temp:  [97.2 °F (36.2 °C)-98.5 °F (36.9 °C)] 98.4 °F (36.9 °C)  Pulse:  [] 87  Resp:  [18-20] 20  SpO2:  [96 %-97 %] 96 %  BP: (116-153)/(69-94) 153/93                 Oxygen Concentration (%):  [94-97] 97         Physical Exam:  Nursing note and vitals reviewed.    Constitutional: He appears well-developed and well-nourished.     Eyes: Pupils are equal, round, and reactive to light. Conjunctivae and EOM are normal.     Cardiovascular: Normal rate, regular rhythm and normal pulses.     Abdominal: Soft. Bowel sounds are normal.     Psych/Behavior: He is alert. He is oriented to person, place, and time. He has a normal mood and affect.     Neurosurgery Exam:  General: AOx1, GCS15, confused speech during exam  CNII-XII: Fine exam demonstrated no CN deficits; PERRL; No pronator  Extremities: 5/5 motor in all distributions; sensorium intact; DTRs 2+; coordination intact    Significant Labs:    Recent Labs  Lab 09/26/17  0540 09/27/17  0436    99    140   K 3.8 4.1    106   CO2 26 26   BUN 7* 6   CREATININE 1.00 0.9   CALCIUM 8.9 9.3       Recent Labs  Lab 09/26/17  0540 09/27/17  0436   WBC 8.70 7.53   HGB 10.1* 10.1*   HCT 29.8* 31.3*   * 401*     No results for input(s): LABPT, INR, APTT in the last 48 hours.  Microbiology Results (last 7 days)     ** No results found for the last 168 hours. **        ABGs: No results for input(s): PH, PCO2, PO2, HCO3, POCSATURATED, BE in the last 48 hours.  Cardiac markers: No results for input(s): CKMB, CPKMB, TROPONINT, TROPONINI, MYOGLOBIN in the last 48 hours.  CMP:   Recent Labs  Lab 09/26/17  0540 09/27/17  0436    99   CALCIUM 8.9 9.3   ALBUMIN 3.2* 2.5*   PROT 5.9* 6.9    140   K 3.8 4.1   CO2 26 26    106   BUN 7* 6   CREATININE 1.00 0.9   ALKPHOS 146* 158*   ALT 54* 27   AST 21 18   BILITOT 0.7 0.4     CRP: No results for input(s): CRP in the last  48 hours.  ESR: No results for input(s): POCESR, ERYTHROCYTES in the last 48 hours.  LFTs:   Recent Labs  Lab 09/26/17  0540 09/27/17  0436   ALT 54* 27   AST 21 18   ALKPHOS 146* 158*   BILITOT 0.7 0.4   PROT 5.9* 6.9   ALBUMIN 3.2* 2.5*     Procalcitonin: No results for input(s): PROCAL in the last 48 hours.    Significant Diagnostics:  No new diagnostic results in the last 24h

## 2017-09-27 NOTE — SUBJECTIVE & OBJECTIVE
Past Medical History:   Diagnosis Date    Acid reflux     Arthritis     Elevated cholesterol     Hypertension        Past Surgical History:   Procedure Laterality Date    JOINT REPLACEMENT Right 04/20/2017    uneventful recovery    KNEE ARTHROPLASTY Right 02/2007    WISDOM TOOTH EXTRACTION         Review of patient's allergies indicates:  No Known Allergies    Medications:  Prescriptions Prior to Admission   Medication Sig    aspirin (ECOTRIN) 81 MG EC tablet Take 81 mg by mouth once daily.    atorvastatin (LIPITOR) 20 MG tablet Take 20 mg by mouth every evening.    carvedilol (COREG) 25 MG tablet Take 25 mg by mouth 2 (two) times daily with meals.    cyclobenzaprine (FLEXERIL) 10 MG tablet Take 10 mg by mouth 3 (three) times daily.    irbesartan-hydrochlorothiazide (AVALIDE) 150-12.5 mg per tablet Take 1 tablet by mouth once daily.     levetiracetam (KEPPRA) 500 MG Tab Take 500 mg by mouth 2 (two) times daily.    niacin (NIASPAN) 1000 MG CR tablet Take 1,000 mg by mouth once daily.    omeprazole (PRILOSEC) 40 MG capsule Take 40 mg by mouth once daily.     Antibiotics     None        Antifungals     None        Antivirals         Stop Route Frequency     acyclovir (ZOVIRAX) injection      -- IV Every 8 hours (non-standard times)             There is no immunization history on file for this patient.    Family History     Problem Relation (Age of Onset)    Cancer Mother    Diabetes Father        Social History     Social History    Marital status:      Spouse name: N/A    Number of children: N/A    Years of education: N/A     Social History Main Topics    Smoking status: Former Smoker    Smokeless tobacco: Never Used      Comment: quit 17 yrs ago    Alcohol use 16.2 oz/week     3 Glasses of wine, 24 Cans of beer per week    Drug use: No    Sexual activity: Yes     Partners: Female     Birth control/ protection: Other-see comments      Comment: same partner many years; wife is  postmenopausal     Other Topics Concern    None     Social History Narrative    None     Review of Systems   Constitutional: Negative for chills, diaphoresis, fatigue and fever.   HENT: Negative for congestion, drooling, ear discharge, mouth sores and rhinorrhea.    Gastrointestinal: Negative for abdominal distention, abdominal pain, diarrhea, nausea and vomiting.   Skin: Negative for rash.   Neurological: Positive for seizures and speech difficulty.   Psychiatric/Behavioral: Positive for confusion and hallucinations.     Objective:     Vital Signs (Most Recent):  Temp: 98 °F (36.7 °C) (09/27/17 1534)  Pulse: 83 (09/27/17 1534)  Resp: 20 (09/27/17 1534)  BP: (!) 150/86 (09/27/17 1534)  SpO2: 97 % (09/27/17 1534) Vital Signs (24h Range):  Temp:  [97.2 °F (36.2 °C)-98.5 °F (36.9 °C)] 98 °F (36.7 °C)  Pulse:  [] 83  Resp:  [18-20] 20  SpO2:  [96 %-97 %] 97 %  BP: (125-153)/(69-94) 150/86     Weight: 121.8 kg (268 lb 8 oz)  Body mass index is 31.03 kg/m².    Estimated Creatinine Clearance: 127.9 mL/min (based on SCr of 0.9 mg/dL).    Physical Exam   Constitutional: He appears well-developed and well-nourished.   HENT:   Head: Normocephalic and atraumatic.   Eyes: EOM are normal.   Neck: Normal range of motion. Neck supple.   Cardiovascular: Normal rate, regular rhythm and normal heart sounds.    Pulmonary/Chest: Effort normal and breath sounds normal.   Abdominal: Soft. Bowel sounds are normal. He exhibits no distension. There is no tenderness. There is no guarding.   Neurological: He is alert.   Skin: No rash noted.       Significant Labs:   Blood Culture:   Recent Labs  Lab 09/09/17 2040   LABBLOO No growth after 5 days.     BMP:   Recent Labs  Lab 09/27/17 0436   GLU 99      K 4.1      CO2 26   BUN 6   CREATININE 0.9   CALCIUM 9.3     CBC:   Recent Labs  Lab 09/26/17  0540 09/27/17 0436   WBC 8.70 7.53   HGB 10.1* 10.1*   HCT 29.8* 31.3*   * 401*     CSF:   Recent Labs  Lab  09/11/17  1047   CSFCULTURE No Growth     Hepatitis Panel: No results for input(s): HEPBSAG, HEPAIGM, HEPCAB in the last 48 hours.    Invalid input(s): HAPBIGM  HIV 1/2 Antibodies: No results for input(s): TLA78VBGI in the last 48 hours.  Lactic Acid: No results for input(s): LACTATE in the last 48 hours.  Microbiology Results (last 7 days)     ** No results found for the last 168 hours. **        Urine Culture:   Recent Labs  Lab 09/15/17  1216 09/18/17  1526   LABURIN No growth No growth       Significant Imaging: I have reviewed all pertinent imaging results/findings within the past 24 hours.

## 2017-09-27 NOTE — ASSESSMENT & PLAN NOTE
Likely secondary to encephalitis. No further seizures since initial presentation. Pt had recently been dx'ed with seizure d/o prior to admission. Started on lacosamide at OSH, will resume. Seizure precautions. Neuro consult.

## 2017-09-27 NOTE — SUBJECTIVE & OBJECTIVE
Past Medical History:   Diagnosis Date    Acid reflux     Arthritis     Elevated cholesterol     Hypertension      Past Surgical History:   Procedure Laterality Date    JOINT REPLACEMENT Right 04/20/2017    uneventful recovery    KNEE ARTHROPLASTY Right 02/2007    WISDOM TOOTH EXTRACTION       Review of patient's allergies indicates:  No Known Allergies    Current Facility-Administered Medications on File Prior to Encounter   Medication    [DISCONTINUED] 0.9%  NaCl infusion    [DISCONTINUED] acetaminophen oral solution 650 mg    [DISCONTINUED] acetaminophen suppository 650 mg    [DISCONTINUED] acyclovir (ZOVIRAX) 910 mg in dextrose 5 % 250 mL IVPB    [DISCONTINUED] albuterol sulfate nebulizer solution 2.5 mg    [DISCONTINUED] diphenhydrAMINE injection 25 mg    [DISCONTINUED] HYDROmorphone injection 0.2 mg    [DISCONTINUED] lacosamide tablet 200 mg    [DISCONTINUED] mupirocin 2 % ointment    [DISCONTINUED] ondansetron injection 4 mg    [DISCONTINUED] ondansetron injection 4 mg    [DISCONTINUED] pantoprazole injection 40 mg    [DISCONTINUED] piperacillin-tazobactam 3.375 g in dextrose 5 % 50 mL IVPB (ready to mix system)    [DISCONTINUED] potassium chloride 10% solution 40 mEq    [DISCONTINUED] promethazine (PHENERGAN) 12.5 mg in dextrose 5 % 50 mL IVPB    [DISCONTINUED] promethazine (PHENERGAN) 6.25 mg in dextrose 5 % 50 mL IVPB    [DISCONTINUED] quetiapine tablet 25 mg    [DISCONTINUED] sodium chloride 0.9% flush 3 mL    [DISCONTINUED] thiamine tablet 100 mg    [DISCONTINUED] vancomycin (VANCOCIN) 1,500 mg in dextrose 5 % 250 mL IVPB     Current Outpatient Prescriptions on File Prior to Encounter   Medication Sig    aspirin (ECOTRIN) 81 MG EC tablet Take 81 mg by mouth once daily.    atorvastatin (LIPITOR) 20 MG tablet Take 20 mg by mouth every evening.    carvedilol (COREG) 25 MG tablet Take 25 mg by mouth 2 (two) times daily with meals.    cyclobenzaprine (FLEXERIL) 10 MG tablet Take  10 mg by mouth 3 (three) times daily.    irbesartan-hydrochlorothiazide (AVALIDE) 150-12.5 mg per tablet Take 1 tablet by mouth once daily.     levetiracetam (KEPPRA) 500 MG Tab Take 500 mg by mouth 2 (two) times daily.    niacin (NIASPAN) 1000 MG CR tablet Take 1,000 mg by mouth once daily.    omeprazole (PRILOSEC) 40 MG capsule Take 40 mg by mouth once daily.     Family History     Problem Relation (Age of Onset)    Cancer Mother    Diabetes Father        Social History Main Topics    Smoking status: Former Smoker    Smokeless tobacco: Never Used      Comment: quit 17 yrs ago    Alcohol use 16.2 oz/week     3 Glasses of wine, 24 Cans of beer per week    Drug use: No    Sexual activity: Yes     Partners: Female     Birth control/ protection: Other-see comments      Comment: same partner many years; wife is postmenopausal     Review of Systems   Unable to perform ROS: Mental status change   Neurological: Positive for seizures.   Psychiatric/Behavioral: Positive for confusion, decreased concentration and hallucinations.     Objective:     Vital Signs (Most Recent):  Temp: 98 °F (36.7 °C) (09/27/17 1534)  Pulse: 83 (09/27/17 1534)  Resp: 20 (09/27/17 1534)  BP: (!) 150/86 (09/27/17 1534)  SpO2: 97 % (09/27/17 1534) Vital Signs (24h Range):  Temp:  [97.2 °F (36.2 °C)-98.5 °F (36.9 °C)] 98 °F (36.7 °C)  Pulse:  [] 83  Resp:  [18-20] 20  SpO2:  [96 %-97 %] 97 %  BP: (125-153)/(69-94) 150/86     Weight: 121.8 kg (268 lb 8 oz)  Body mass index is 31.03 kg/m².    Physical Exam   Constitutional: He appears well-developed and well-nourished. No distress.   HENT:   Head: Normocephalic.   Eyes: Conjunctivae and EOM are normal. Pupils are equal, round, and reactive to light.   Neck: Normal range of motion. Neck supple.   Pulmonary/Chest: Effort normal.   Musculoskeletal: Normal range of motion.   Neurological: He has a normal Finger-Nose-Finger Test.   Reflex Scores:       Tricep reflexes are 2+ on the right side  "and 2+ on the left side.       Bicep reflexes are 2+ on the right side and 2+ on the left side.       Brachioradialis reflexes are 2+ on the right side and 2+ on the left side.  Skin: He is not diaphoretic.   Psychiatric: His speech is normal.     NEUROLOGICAL EXAMINATION:     MENTAL STATUS   Oriented to person.   Disoriented to place. (Thinks he is still in [place])  Follows commands: trouble with multi step commands.   Attention: decreased. Concentration: decreased.   Speech: speech is normal   Level of consciousness: alert  Unable to name object: able to name ring, but not ring band or key. Unable to repeat: needed prompting to repeat "[employer]"         Oriented to wife     CRANIAL NERVES     CN III, IV, VI   Pupils are equal, round, and reactive to light.  Extraocular motions are normal.   Right pupil: Shape: regular. Reactivity: brisk.   Left pupil: Shape: regular. Reactivity: brisk.   Nystagmus: none   Ophthalmoparesis: none    CN V   Facial sensation intact.     CN VII   Facial expression full, symmetric.     CN VIII   Hearing: intact    CN XI   CN XI normal.     CN XII   CN XII normal.     MOTOR EXAM   Muscle bulk: normal  Overall muscle tone: normal  Right arm pronator drift: absent  Left arm pronator drift: absent       Moving all extremities antigravity  Unable to follow command to fully assess strength      REFLEXES     Reflexes   Right brachioradialis: 2+  Left brachioradialis: 2+  Right biceps: 2+  Left biceps: 2+  Right triceps: 2+  Left triceps: 2+    SENSORY EXAM   Light touch normal.     GAIT AND COORDINATION      Coordination   Finger to nose coordination: normal    Tremor   Resting tremor: absent    Significant Labs:   Hemoglobin A1c: No results for input(s): HGBA1C in the last 720 hours.  Blood Culture: No results for input(s): LABBLOO in the last 48 hours.  CBC:   Recent Labs  Lab 09/26/17  0540 09/27/17  0436   WBC 8.70 7.53   HGB 10.1* 10.1*   HCT 29.8* 31.3*   * 401* "     CMP:   Recent Labs  Lab 09/26/17  0540 09/27/17  0436    99    140   K 3.8 4.1    106   CO2 26 26   BUN 7* 6   CREATININE 1.00 0.9   CALCIUM 8.9 9.3   PROT 5.9* 6.9   ALBUMIN 3.2* 2.5*   BILITOT 0.7 0.4   ALKPHOS 146* 158*   AST 21 18   ALT 54* 27   ANIONGAP  --  8   EGFRNONAA >60 >60.0     CSF Culture: No results for input(s): CSFCULTURE in the last 48 hours.  CSF Studies: No results for input(s): ALIQUT, APPEARCSF, COLORCSF, CSFWBC, CSFRBC, GLUCCSF, LDHCSF, PROTEINCSF, VDRLCSF in the last 48 hours.  Inflammatory Markers: No results for input(s): SEDRATE, CRP, PROCAL in the last 48 hours.  Respiratory Culture: No results for input(s): GSRESP, RESPIRATORYC in the last 48 hours.  Urine Culture: No results for input(s): LABURIN in the last 48 hours.  Urine Studies: No results for input(s): COLORU, APPEARANCEUA, PHUR, SPECGRAV, PROTEINUA, GLUCUA, KETONESU, BILIRUBINUA, OCCULTUA, NITRITE, UROBILINOGEN, LEUKOCYTESUR, RBCUA, WBCUA, BACTERIA, SQUAMEPITHEL, HYALINECASTS in the last 48 hours.    Invalid input(s): WRIGHTSUR     HSV PCR 9/11/17 negative     Negative:  -[place] IgM and IgG, HIV, ANCA, Hepatitis panel, VDRL, Arbovirus     Positive:  -Quantiferon gold   -Influenza     All pertinent lab results from the past 24 hours have been reviewed.    Significant Imaging: I have reviewed and interpreted all pertinent imaging results/findings within the past 24 hours.     MRI Brain W WO contrast (9/25/17):  Significant increase in the T2 signal in the left temporal lobe particularly increased in the left posterior parietal lobe with some serpiginous meningeal enhancement suspicious for encephalitis.Punctate areas of diffusion signal in the left posterior parietal lobe suspicious for punctate areas of acute ischemia versus less likely shine through    MRI Cervical spine W WO contrast (9/25/17):  Reversal of the normal cervical lordosis. There are anterior osteophytes and disc desiccation throughout. The  cord signal is normal.  C3-4 there is a 2-3 mm disc or bony proliferation decrease in the subarachnoid space anteriorly.At C4-5 bulge or bony perforation effacing the cord causing some mild narrowing of the spinal canal and decrease in fat in the neural foramina greater on the right.At C5-6 small bulge or bony proliferation effacing the cord with mild narrowing of spinal canal and decrease in the neural foramina.At C6-7 small bulge and/or bony proliferation obliterating the subarachnoid space and decrease in the neural foramina. There is faint enhancement along the surface of the cord probably normal physiologic enhancement no discrete enhancing masses.    MRI Thoracic spine W WO contrast (9/25/17):  Bone marrow edema T11 vertebral body with decreased height approximately 30% suggesting an acute compression fracture. There is old mild compression of the superior aspect of L1.  There is some mild old compression of the superior aspect of 4 and T5 levels.  Cord signal is normal.  There is no abnormal enhancement of the cord.  Small bulge at T6-7.  There is a small central protrusion at T7-8.    MRI Lumbar spine W WO contrast (9/25/17):  There is abnormal bone marrow signal throughout the L4 vertebral body which enhances. There is slight decrease in height superiorly suggesting a compression fracture.  Fluid is in the disc however cannot exclude infection or pathologic fracture..  Minimal retrolisthesis of L2 on L3.  Facet changes and 2-3 mm bulge at L3-4.  Small disc bulge extending into lateral positions greater on the left with decrease of the neural foramina. At L4-5 there are facet changes small bulge decrease of the neural foramina.  Anterolisthesis of L5 on S1 with facet changes and diffuse disc bulge causing bilateral neural foramina narrowing.Old mild compression fracture of the superior L1

## 2017-09-27 NOTE — ASSESSMENT & PLAN NOTE
Patient likely aspirated during period of AMS. Chest X ray with resolved findings of pneumonia. Patient past course with zosyn and vancomycin covered empirically. No need for additional treatment at this time.

## 2017-09-27 NOTE — HPI
62 y/o male PMHX arthritis, AHTN, admitted on 9/26/17 transferred from [facility]. Patient originally presented to OSH due to 2 episodes of witnessed seizures at home. Patient was at that time intubated in the ED due to desaturation and AMS for airway protection. On 9/11 MRI done showed temporal lobe abnormalities consistent with encephalitis. Patient had lumbar punctures X 2. Multiple CSF studies were ordered and all were negative.  He was started on steroids empirically about 5 days ago.  He ultimately underwent brain biopsy on October 3.  We are re-consulted to assist with his care.

## 2017-09-27 NOTE — HPI
"A 61-year-old man with recently diagnosed tonic clonic seizure disorder,  HTN and dyslipidemia admitted to [facility] after 5 witnessed seizures 9/10/17. He was initially intubated on presentation for low sats and inability to protect airway, extubated 9/14. 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 He was treated with Acyclovir and ultimately underwent 2 lumbar punctures which have been unrevealing. HSV PCR from first LP is negative. After he failed to improve significantly, diagnostic imaging was done and showed expansion of the meningeal enhancement as well as possible pathologic spinal compression fractures. EEG's done failed to reveal epileptic form seizures. MR Spect/Stealth non-specific, infectious/seizure-related edema rather than tumor but non-diagnostic. MRI on 09/22 shows "significant increase in the T2 signal in the left temporal lobe particularly increased in the left posterior parietal lobe with some serpiginous meningeal enhancement suspicious for encephalitis."  Patient subsequently had brain bx of L posterior temperol and occipital lobe lesion with NSGY. Brain bx suspicious for infection that ID was consulted and patient was started on vancomycin, cefepime and flagyl. Patient recent Bcx, Ucx and chest xray are all negative for infectious process. AFB from CSF pending. Currently patient is on seizure prophylaxis and Seroquel for delirium and agitation. Per wife who was at bedside today states patient is not quiet at baseline, however, he is much improved cognitively, he knows he is in the hospital as well as his mobility has improved. Cheng to pull himself up from his seat and standing though still needs some support. Wife states patient was health prior initial presentation with only hypertension and HLD. Also said his behavior has improved, less agitation and confusion since been weaned of decadron.          "

## 2017-09-27 NOTE — CONSULTS
"[facility]  Neurology  Consult Note    Patient Name: Tommy Way  MRN: [id number]  Admission Date: 9/26/2017  Hospital Length of Stay: 1 days  Code Status: Full Code   Attending Provider: Gregor Abbott MD   Consulting Provider: Layla Vanegas PA-C  Primary Care Physician: Adan Torres MD  Principal Problem:Encephalopathy    Inpatient consult to Neurology  Consult performed by: LAYLA VANEGAS  Consult ordered by: SHAVON CARLOS         Subjective:     Chief Complaint:  AMS, ?encephalitis    HPI:   61 y.o. Male with hx of HTN, HLD and GERD was transferred from [facility] to [facility] on 9/26/17 for higher level of care and workup of vasculitis vs encephalitis vs malignancy.    8/11/17- presented to [facility] after episode at home described as "playing cards w/ family members then became unable to speak around followed almost immediately by onset of decerebrate posturing and rhythmic jerking motions that lasted about 5 minutes. Afterward, he was very combative, would alternately respond inappropriately to questions or speak unintelligibly. This lasted all the way to the ED and only stopped after MRI of his brain around 90 minutes later. His signs and symptoms then totally resolved and have not returned.  -B/B incontinence. Pt only remembers initially not being able to speak at onset of the episode, but he does not remember anything else up until after resolution of symptoms as noted above." No hx of seizures, head trauma with LOC or CNS infections. Evaluated by Dr. Ceron and MRI Brain WO contrast showed T2 signal abnormality in the left temporal lobe possibly 2/2 post ictal temporary sequelae. Repeat MRI brain with and without contrast in 1-2 weeks with outpatient EEG. Started on Keppra 500 mg BID.   8/12/17-discharged home, irritable and combative so Keppra was weaned off over 3 days  8/15/17 routine EEG in outpatient Neuro clinic was unremarkable for ictal " "discharges  9/9/[facility] after having multiple witnessed seizures at home. He was given 6mg Ativan bolus, started on an Ativan drip and propofol. Intubated for airway protection. Concern for status epilepticus, started on Vimpat 200 mg BID.   9/10/17-MRI Brain showed increased signal in the left temporal lobe with enhancement suggesting herpes encephalitis, started on IV acyclovir 850 mg q 8 hr.   9/11/17-LP performed remarkable for elevated protein and RBC (1 WBC, 94 RBC, glu 66, protein 77). HSV PCR negative   EEG generalized slowing with superimposed beta activity and no ongoing subclinical seizure activity.   9/14/17-extubated and able to answer questions, but still encephalopathic. CT chest, abdomen and pelvis negative for occult malignancy.   9/16/17-ID consult and acyclovir continued empirically while viral studies pending  9/19/17-LP repeated for further viral studies and cytology   9/25/17- MRI C, T, L spine showed enhancement in C spine, bone marrow edema and enhancement of the vertebral body of T11,  acute compression fracture. Old compression fractures of L1-, T4 and T5. MRI Brain W WO contrast showed "significant increase in the T2 signal in the left temporal lobe particularly increased in the left posterior parietal lobe with some serpiginous meningeal enhancement suspicious for encephalitis.Punctate areas of diffusion signal in the left posterior parietal lobe suspicious for punctate areas of acute ischemia versus less likely shine through"   9/26/17-transferred to [facility] for angiogram and possible brain bx     Past Medical History:   Diagnosis Date    Acid reflux     Arthritis     Elevated cholesterol     Hypertension      Past Surgical History:   Procedure Laterality Date    JOINT REPLACEMENT Right 04/20/2017    uneventful recovery    KNEE ARTHROPLASTY Right 02/2007    WISDOM TOOTH EXTRACTION       Review of patient's allergies indicates:  No Known Allergies    Current " Facility-Administered Medications on File Prior to Encounter   Medication    [DISCONTINUED] 0.9%  NaCl infusion    [DISCONTINUED] acetaminophen oral solution 650 mg    [DISCONTINUED] acetaminophen suppository 650 mg    [DISCONTINUED] acyclovir (ZOVIRAX) 910 mg in dextrose 5 % 250 mL IVPB    [DISCONTINUED] albuterol sulfate nebulizer solution 2.5 mg    [DISCONTINUED] diphenhydrAMINE injection 25 mg    [DISCONTINUED] HYDROmorphone injection 0.2 mg    [DISCONTINUED] lacosamide tablet 200 mg    [DISCONTINUED] mupirocin 2 % ointment    [DISCONTINUED] ondansetron injection 4 mg    [DISCONTINUED] ondansetron injection 4 mg    [DISCONTINUED] pantoprazole injection 40 mg    [DISCONTINUED] piperacillin-tazobactam 3.375 g in dextrose 5 % 50 mL IVPB (ready to mix system)    [DISCONTINUED] potassium chloride 10% solution 40 mEq    [DISCONTINUED] promethazine (PHENERGAN) 12.5 mg in dextrose 5 % 50 mL IVPB    [DISCONTINUED] promethazine (PHENERGAN) 6.25 mg in dextrose 5 % 50 mL IVPB    [DISCONTINUED] quetiapine tablet 25 mg    [DISCONTINUED] sodium chloride 0.9% flush 3 mL    [DISCONTINUED] thiamine tablet 100 mg    [DISCONTINUED] vancomycin (VANCOCIN) 1,500 mg in dextrose 5 % 250 mL IVPB     Current Outpatient Prescriptions on File Prior to Encounter   Medication Sig    aspirin (ECOTRIN) 81 MG EC tablet Take 81 mg by mouth once daily.    atorvastatin (LIPITOR) 20 MG tablet Take 20 mg by mouth every evening.    carvedilol (COREG) 25 MG tablet Take 25 mg by mouth 2 (two) times daily with meals.    cyclobenzaprine (FLEXERIL) 10 MG tablet Take 10 mg by mouth 3 (three) times daily.    irbesartan-hydrochlorothiazide (AVALIDE) 150-12.5 mg per tablet Take 1 tablet by mouth once daily.     levetiracetam (KEPPRA) 500 MG Tab Take 500 mg by mouth 2 (two) times daily.    niacin (NIASPAN) 1000 MG CR tablet Take 1,000 mg by mouth once daily.    omeprazole (PRILOSEC) 40 MG capsule Take 40 mg by mouth once daily.      Family History     Problem Relation (Age of Onset)    Cancer Mother    Diabetes Father        Social History Main Topics    Smoking status: Former Smoker    Smokeless tobacco: Never Used      Comment: quit 17 yrs ago    Alcohol use 16.2 oz/week     3 Glasses of wine, 24 Cans of beer per week    Drug use: No    Sexual activity: Yes     Partners: Female     Birth control/ protection: Other-see comments      Comment: same partner many years; wife is postmenopausal     Review of Systems   Unable to perform ROS: Mental status change   Neurological: Positive for seizures.   Psychiatric/Behavioral: Positive for confusion, decreased concentration and hallucinations.     Objective:     Vital Signs (Most Recent):  Temp: 98 °F (36.7 °C) (09/27/17 1534)  Pulse: 83 (09/27/17 1534)  Resp: 20 (09/27/17 1534)  BP: (!) 150/86 (09/27/17 1534)  SpO2: 97 % (09/27/17 1534) Vital Signs (24h Range):  Temp:  [97.2 °F (36.2 °C)-98.5 °F (36.9 °C)] 98 °F (36.7 °C)  Pulse:  [] 83  Resp:  [18-20] 20  SpO2:  [96 %-97 %] 97 %  BP: (125-153)/(69-94) 150/86     Weight: 121.8 kg (268 lb 8 oz)  Body mass index is 31.03 kg/m².    Physical Exam   Constitutional: He appears well-developed and well-nourished. No distress.   HENT:   Head: Normocephalic.   Eyes: Conjunctivae and EOM are normal. Pupils are equal, round, and reactive to light.   Neck: Normal range of motion. Neck supple.   Pulmonary/Chest: Effort normal.   Musculoskeletal: Normal range of motion.   Neurological: He has a normal Finger-Nose-Finger Test.   Reflex Scores:       Tricep reflexes are 2+ on the right side and 2+ on the left side.       Bicep reflexes are 2+ on the right side and 2+ on the left side.       Brachioradialis reflexes are 2+ on the right side and 2+ on the left side.  Skin: He is not diaphoretic.   Psychiatric: His speech is normal.     NEUROLOGICAL EXAMINATION:     MENTAL STATUS   Oriented to person.   Disoriented to place. (Thinks he is still in  "[place])  Follows commands: trouble with multi step commands.   Attention: decreased. Concentration: decreased.   Speech: speech is normal   Level of consciousness: alert  Unable to name object: able to name ring, but not ring band or key. Unable to repeat: needed prompting to repeat "Hoahaoism, Restorationist"         Oriented to wife     CRANIAL NERVES     CN III, IV, VI   Pupils are equal, round, and reactive to light.  Extraocular motions are normal.   Right pupil: Shape: regular. Reactivity: brisk.   Left pupil: Shape: regular. Reactivity: brisk.   Nystagmus: none   Ophthalmoparesis: none    CN V   Facial sensation intact.     CN VII   Facial expression full, symmetric.     CN VIII   Hearing: intact    CN XI   CN XI normal.     CN XII   CN XII normal.     MOTOR EXAM   Muscle bulk: normal  Overall muscle tone: normal  Right arm pronator drift: absent  Left arm pronator drift: absent       Moving all extremities antigravity  Unable to follow command to fully assess strength      REFLEXES     Reflexes   Right brachioradialis: 2+  Left brachioradialis: 2+  Right biceps: 2+  Left biceps: 2+  Right triceps: 2+  Left triceps: 2+    SENSORY EXAM   Light touch normal.     GAIT AND COORDINATION      Coordination   Finger to nose coordination: normal    Tremor   Resting tremor: absent    Significant Labs:   Hemoglobin A1c: No results for input(s): HGBA1C in the last 720 hours.  Blood Culture: No results for input(s): LABBLOO in the last 48 hours.  CBC:   Recent Labs  Lab 09/26/17  0540 09/27/17  0436   WBC 8.70 7.53   HGB 10.1* 10.1*   HCT 29.8* 31.3*   * 401*     CMP:   Recent Labs  Lab 09/26/17  0540 09/27/17  0436    99    140   K 3.8 4.1    106   CO2 26 26   BUN 7* 6   CREATININE 1.00 0.9   CALCIUM 8.9 9.3   PROT 5.9* 6.9   ALBUMIN 3.2* 2.5*   BILITOT 0.7 0.4   ALKPHOS 146* 158*   AST 21 18   ALT 54* 27   ANIONGAP  --  8   EGFRNONAA >60 >60.0     CSF Culture: No results for input(s): CSFCULTURE in " the last 48 hours.  CSF Studies: No results for input(s): ALIQUT, APPEARCSF, COLORCSF, CSFWBC, CSFRBC, GLUCCSF, LDHCSF, PROTEINCSF, VDRLCSF in the last 48 hours.  Inflammatory Markers: No results for input(s): SEDRATE, CRP, PROCAL in the last 48 hours.  Respiratory Culture: No results for input(s): GSRESP, RESPIRATORYC in the last 48 hours.  Urine Culture: No results for input(s): LABURIN in the last 48 hours.  Urine Studies: No results for input(s): COLORU, APPEARANCEUA, PHUR, SPECGRAV, PROTEINUA, GLUCUA, KETONESU, BILIRUBINUA, OCCULTUA, NITRITE, UROBILINOGEN, LEUKOCYTESUR, RBCUA, WBCUA, BACTERIA, SQUAMEPITHEL, HYALINECASTS in the last 48 hours.    Invalid input(s): WRIGHTSUR     HSV PCR 9/11/17 negative     Negative:  -[place] IgM and IgG, HIV, ANCA, Hepatitis panel, VDRL, Arbovirus     Positive:  -Quantiferon gold   -Influenza     All pertinent lab results from the past 24 hours have been reviewed.    Significant Imaging: I have reviewed and interpreted all pertinent imaging results/findings within the past 24 hours.     MRI Brain W WO contrast (9/25/17):  Significant increase in the T2 signal in the left temporal lobe particularly increased in the left posterior parietal lobe with some serpiginous meningeal enhancement suspicious for encephalitis.Punctate areas of diffusion signal in the left posterior parietal lobe suspicious for punctate areas of acute ischemia versus less likely shine through    MRI Cervical spine W WO contrast (9/25/17):  Reversal of the normal cervical lordosis. There are anterior osteophytes and disc desiccation throughout. The cord signal is normal.  C3-4 there is a 2-3 mm disc or bony proliferation decrease in the subarachnoid space anteriorly.At C4-5 bulge or bony perforation effacing the cord causing some mild narrowing of the spinal canal and decrease in fat in the neural foramina greater on the right.At C5-6 small bulge or bony proliferation effacing the cord with mild narrowing of  spinal canal and decrease in the neural foramina.At C6-7 small bulge and/or bony proliferation obliterating the subarachnoid space and decrease in the neural foramina. There is faint enhancement along the surface of the cord probably normal physiologic enhancement no discrete enhancing masses.    MRI Thoracic spine W WO contrast (9/25/17):  Bone marrow edema T11 vertebral body with decreased height approximately 30% suggesting an acute compression fracture. There is old mild compression of the superior aspect of L1.  There is some mild old compression of the superior aspect of 4 and T5 levels.  Cord signal is normal.  There is no abnormal enhancement of the cord.  Small bulge at T6-7.  There is a small central protrusion at T7-8.    MRI Lumbar spine W WO contrast (9/25/17):  There is abnormal bone marrow signal throughout the L4 vertebral body which enhances. There is slight decrease in height superiorly suggesting a compression fracture.  Fluid is in the disc however cannot exclude infection or pathologic fracture..  Minimal retrolisthesis of L2 on L3.  Facet changes and 2-3 mm bulge at L3-4.  Small disc bulge extending into lateral positions greater on the left with decrease of the neural foramina. At L4-5 there are facet changes small bulge decrease of the neural foramina.  Anterolisthesis of L5 on S1 with facet changes and diffuse disc bulge causing bilateral neural foramina narrowing.Old mild compression fracture of the superior L1    Assessment and Plan:     * Encephalopathy    61 y.o male with hx of HTN transferred from [facility] 9/26/17. Patient originally presented to OSH after witnessed seizures at home. Intubated for airway protection. 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 LP done at OSH x 2 with benign CSF besides elevated protein 77. Vancomycin, zosyn started for aspiration pneumonia as well. LP repeated 9/19/17. HSV PCR, VDRL, HIV  "and arbovirus panel negative. Quantiferon gold and influenza panel only positive results. Followed by Dr. Reyes (ID) and Osmany (Neuro) in [place].     9/27- patient encephalopathic with aphasia, but able to follow some simple commands.  Cerebral angiogram to r/o vasculitis  ID consulted "CSF from both LPs with mildly elevated protein, normal glucose and no significant WBC, non diagnostic. Dificult to determine source of encephalopathy at this time. "    Recommendations:  -f/u angiogram results  -appreciate ID recommendations--"repeat LP prior to brain bx to repeat HSV PCR, TB PCR from CSF, enterovirus and arbovirus panel. FTA to evaluate syphilis exposure. Continue acyclovir since can't rule out HSV at this time. All blood cultures and CSF cultures negative to date for growth."  Add cytology/flow cytometry, 14-3-3 and paraneoplastic panel to CSF orders please  -routine EEG pending to r/o subclinical seizures, continue Vimpat 200 mg BID   -appreciate NSGY involvement for possible brain bx        VTE Risk Mitigation         Ordered     enoxaparin injection 40 mg  Daily     Route:  Subcutaneous        09/27/17 0029     Medium Risk of VTE  Once      09/27/17 0029        Thank you for your consult. I will follow-up with patient. Please contact us if you have any additional questions.    Layla Vanegas PA-C  General Neurology Consult  Neuro Consult Spectralink # [id number]  "

## 2017-09-27 NOTE — SUBJECTIVE & OBJECTIVE
Past Medical History:   Diagnosis Date    Acid reflux     Arthritis     Elevated cholesterol     Hypertension        Past Surgical History:   Procedure Laterality Date    JOINT REPLACEMENT Right 04/20/2017    uneventful recovery    KNEE ARTHROPLASTY Right 02/2007    WISDOM TOOTH EXTRACTION         Review of patient's allergies indicates:  No Known Allergies    Current Facility-Administered Medications on File Prior to Encounter   Medication    [COMPLETED] quetiapine tablet 25 mg    [DISCONTINUED] 0.9%  NaCl infusion    [DISCONTINUED] acetaminophen oral solution 650 mg    [DISCONTINUED] acetaminophen suppository 650 mg    [DISCONTINUED] acyclovir (ZOVIRAX) 910 mg in dextrose 5 % 250 mL IVPB    [DISCONTINUED] albuterol sulfate nebulizer solution 2.5 mg    [DISCONTINUED] diphenhydrAMINE injection 25 mg    [DISCONTINUED] HYDROmorphone injection 0.2 mg    [DISCONTINUED] lacosamide tablet 200 mg    [DISCONTINUED] mupirocin 2 % ointment    [DISCONTINUED] ondansetron injection 4 mg    [DISCONTINUED] ondansetron injection 4 mg    [DISCONTINUED] pantoprazole injection 40 mg    [DISCONTINUED] piperacillin-tazobactam 3.375 g in dextrose 5 % 50 mL IVPB (ready to mix system)    [DISCONTINUED] potassium chloride 10% solution 40 mEq    [DISCONTINUED] promethazine (PHENERGAN) 12.5 mg in dextrose 5 % 50 mL IVPB    [DISCONTINUED] promethazine (PHENERGAN) 6.25 mg in dextrose 5 % 50 mL IVPB    [DISCONTINUED] quetiapine tablet 25 mg    [DISCONTINUED] sodium chloride 0.9% flush 3 mL    [DISCONTINUED] thiamine tablet 100 mg    [DISCONTINUED] vancomycin (VANCOCIN) 1,500 mg in dextrose 5 % 250 mL IVPB     Current Outpatient Prescriptions on File Prior to Encounter   Medication Sig    aspirin (ECOTRIN) 81 MG EC tablet Take 81 mg by mouth once daily.    atorvastatin (LIPITOR) 20 MG tablet Take 20 mg by mouth every evening.    carvedilol (COREG) 25 MG tablet Take 25 mg by mouth 2 (two) times daily with meals.     cyclobenzaprine (FLEXERIL) 10 MG tablet Take 10 mg by mouth 3 (three) times daily.    irbesartan-hydrochlorothiazide (AVALIDE) 150-12.5 mg per tablet Take 1 tablet by mouth once daily.     levetiracetam (KEPPRA) 500 MG Tab Take 500 mg by mouth 2 (two) times daily.    niacin (NIASPAN) 1000 MG CR tablet Take 1,000 mg by mouth once daily.    omeprazole (PRILOSEC) 40 MG capsule Take 40 mg by mouth once daily.     Family History     Problem Relation (Age of Onset)    Cancer Mother    Diabetes Father        Social History Main Topics    Smoking status: Former Smoker    Smokeless tobacco: Never Used      Comment: quit 17 yrs ago    Alcohol use 16.2 oz/week     3 Glasses of wine, 24 Cans of beer per week    Drug use: No    Sexual activity: Yes     Partners: Female     Birth control/ protection: Other-see comments      Comment: same partner many years; wife is postmenopausal     Review of Systems   Unable to perform ROS: Mental status change     Objective:     Vital Signs (Most Recent):  Temp: 97.9 °F (36.6 °C) (09/26/17 2353)  Pulse: 95 (09/26/17 2353)  Resp: 20 (09/26/17 2353)  BP: (!) 151/94 (09/26/17 2353)  SpO2: 96 % (09/26/17 2353) Vital Signs (24h Range):  Temp:  [96.2 °F (35.7 °C)-98.1 °F (36.7 °C)] 97.9 °F (36.6 °C)  Pulse:  [] 95  Resp:  [18-20] 20  SpO2:  [96 %] 96 %  BP: (116-151)/(69-94) 151/94        There is no height or weight on file to calculate BMI.    Physical Exam   Constitutional: He appears well-nourished. No distress.   HENT:   Head: Normocephalic and atraumatic.   Nose: Nose normal.   Mouth/Throat: Oropharynx is clear and moist. No oropharyngeal exudate.   Eyes: Conjunctivae and EOM are normal. Pupils are equal, round, and reactive to light. Right eye exhibits no discharge. Left eye exhibits no discharge. No scleral icterus.   Neck: Neck supple. No JVD present. No tracheal deviation present. No thyromegaly present.   Cardiovascular: Normal rate, regular rhythm, normal heart sounds and  intact distal pulses.  Exam reveals no friction rub.    No murmur heard.  Pulmonary/Chest: Effort normal and breath sounds normal. No stridor. No respiratory distress. He has no wheezes. He has no rales. He exhibits no tenderness.   Abdominal: Soft. Bowel sounds are normal. He exhibits no distension and no mass. There is no tenderness. There is no rebound and no guarding.   Musculoskeletal: Normal range of motion. He exhibits no edema, tenderness or deformity.   Lymphadenopathy:     He has no cervical adenopathy.   Neurological: He is alert. No cranial nerve deficit. He exhibits normal muscle tone. Coordination normal.   Oriented to self only; confused; attempts to answer questions but inappropriately; follows commands; no focal deficits.    Skin: Skin is warm and dry. No rash noted. He is not diaphoretic. No erythema. No pallor.   Psychiatric: He has a normal mood and affect. His behavior is normal. Judgment and thought content normal.   Vitals reviewed.       Significant Labs:   BMP:   Recent Labs  Lab 09/26/17  0540         K 3.8      CO2 26   BUN 7*   CREATININE 1.00   CALCIUM 8.9     CBC:   Recent Labs  Lab 09/25/17  0513 09/26/17  0540   WBC 7.20 8.70   HGB 10.6* 10.1*   HCT 31.8* 29.8*   * 370*       Significant Imaging:  Spinal MRI: acute compression fracture at T11. Mild compression fracture of L4. Old compression fractures at L1, T4, T5  Brain MRI: Significant increase in the T2 signal in the left temporal lobe particularly increased in the left posterior parietal lobe with some serpiginous meningeal enhancement findings concerning for encephalitis; punctate areas of diffusion signal in the left posterior parietal lobe suspicious for punctate areas of acute ischemia versus less likely shine through

## 2017-09-27 NOTE — CONSULTS
Consult received. Full note to follow.    Please call for questions.    Thanks,  Lalo Yeung MD  Infectious Disease Fellow, PGY-4  Pager: [phone]  [facility]

## 2017-09-27 NOTE — ASSESSMENT & PLAN NOTE
Pt alert but oriented to self only. Follows commands but use of words not appropriate. 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 He was treated with acyclovir and ultimately underwent 2 lumbar punctures which have been unrevealing. HSV PCR from first LP is negative. Additional viral studies apparently unremarkable. After he failed to improve significantly, diagnostic imaging was done and showed expansion of the meningeal enhancement as well as possible pathologic spnal compression fractures. [facility] discussed case with Dr. Sweeney for concern of possible malignancy and need for tissue diagnosis. Dr Ware requested HSV PCR on 2nd LP sample if available. Continue acyclovir. Consult Neuro. Consult Neurosurgery for consideration of biopsy for tissue diagnosis. CT head with & w/o contrast ordered. Seizure precautions. Neuro checks.

## 2017-09-27 NOTE — ASSESSMENT & PLAN NOTE
OSH MRI with acute compression fracture at T11. Mild compression fracture of L4. Old compression fractures at L1, T4, T5. No neuro deficits noted on exam other than altered mentation. Neurosurgery consult as above.

## 2017-09-27 NOTE — H&P
Inpatient Radiology Pre-procedure Note    History of Present Illness:  Tommy Way is a 61 y.o. male transfer from OSH for AMS and tonic-clonic seizure. MRI has shown left temporal lobe edema and peculiar finding at the left transverse-sigmoid sinus junction.  Angiography is requested to better delineate the arterial for an inflow etiology such as vasculitis or focal stenosis, as well as to evaluate the venous outflow from the temporal lobe to see if this could be a venous infarct.  These measures are being taken prior to brain biopsy.      Admission H&P reviewed.  Past Medical History:   Diagnosis Date    Acid reflux     Arthritis     Elevated cholesterol     Hypertension      Past Surgical History:   Procedure Laterality Date    JOINT REPLACEMENT Right 04/20/2017    uneventful recovery    KNEE ARTHROPLASTY Right 02/2007    WISDOM TOOTH EXTRACTION         Review of Systems:   As documented in primary team H&P    Home Meds:   Prior to Admission medications    Medication Sig Start Date End Date Taking? Authorizing Provider   aspirin (ECOTRIN) 81 MG EC tablet Take 81 mg by mouth once daily.    Historical Provider, MD   atorvastatin (LIPITOR) 20 MG tablet Take 20 mg by mouth every evening.    Historical Provider, MD   carvedilol (COREG) 25 MG tablet Take 25 mg by mouth 2 (two) times daily with meals.    Historical Provider, MD   cyclobenzaprine (FLEXERIL) 10 MG tablet Take 10 mg by mouth 3 (three) times daily.    Historical Provider, MD   irbesartan-hydrochlorothiazide (AVALIDE) 150-12.5 mg per tablet Take 1 tablet by mouth once daily.     Historical Provider, MD   levetiracetam (KEPPRA) 500 MG Tab Take 500 mg by mouth 2 (two) times daily.    Historical Provider, MD   niacin (NIASPAN) 1000 MG CR tablet Take 1,000 mg by mouth once daily.    Historical Provider, MD   omeprazole (PRILOSEC) 40 MG capsule Take 40 mg by mouth once daily.    Historical Provider, MD     Scheduled Meds:    acyclovir  10 mg/kg (Ideal)  Intravenous Q8H    atorvastatin  20 mg Oral QHS    carvedilol  25 mg Oral BID WM    enoxaparin  40 mg Subcutaneous Daily    lacosamide  200 mg Oral BID    quetiapine  25 mg Oral QHS    thiamine  100 mg Oral Daily     Continuous Infusions:    PRN Meds:dextrose 50%, dextrose 50%, glucagon (human recombinant), glucose, glucose  Anticoagulants/Antiplatelets: aspirin    Allergies: Review of patient's allergies indicates:  No Known Allergies  Sedation Hx: have not been any systemic reactions    Labs:  No results for input(s): INR in the last 168 hours.    Invalid input(s):  PT,  PTT    Recent Labs  Lab 09/27/17 0436   WBC 7.53   HGB 10.1*   HCT 31.3*   *   *      Recent Labs  Lab 09/27/17 0436   GLU 99      K 4.1      CO2 26   BUN 6   CREATININE 0.9   CALCIUM 9.3   ALT 27   AST 18   ALBUMIN 2.5*   BILITOT 0.4         Vitals:  Temp: 98.4 °F (36.9 °C) (09/27/17 0740)  Pulse: 87 (09/27/17 0740)  Resp: 20 (09/27/17 0740)  BP: (!) 153/93 (09/27/17 0740)  SpO2: 96 % (09/27/17 0740)     Physical Exam:  ASA: 3  Mallampati: 2    Assessment / Plan: 61 year old male with altered mental status and possible encephalitis vs. Developing infarction in left temporal and occipital lobes.    Diagnostic cerebral angiogram to be performed today.    Sedation: moderate    Diego Rivero MD  Neurointerventional Fellow  Department of Radiology  [phone]

## 2017-09-27 NOTE — H&P
[facility] Hospital Medicine  History & Physical    Patient Name: Tommy Way  MRN: [id number]  Admission Date: 9/26/2017  Attending Physician: Carol Ware MD   Primary Care Provider: Adan Torres MD      Patient information was obtained from past medical records.     Subjective:     Principal Problem:Encephalitis    Chief Complaint: altered mental status x 2 wks     HPI: History is limited as patient is unable to provide given altered mentation. A 61-year-old man with recently diagnosed tonic clonic seizure disorder,  HTN and dyslipidemia admitted to [facility] after 5 witnessed seizures 9/10/17. He was initially intubated on presentation for low sats and inability to protect airway, extubated 9/14. 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 He was treated with Acyclovir and ultimately underwent 2 lumbar punctures which have been unrevealing. HSV PCR from first LP is negative. After he failed to improve significantly, diagnostic imaging was done and showed expansion of the meningeal enhancement as well as possible pathologic spinal compression fractures. [facility] discussed case with Dr. Sweeney for concern of possible malignancy and need for tissue diagnosis. On interview, patient is alert but oriented to self only. Attempts to answer questions but inappropriately and is confused. Follows commands. Denies any complaints.     Past Medical History:   Diagnosis Date    Acid reflux     Arthritis     Elevated cholesterol     Hypertension        Past Surgical History:   Procedure Laterality Date    JOINT REPLACEMENT Right 04/20/2017    uneventful recovery    KNEE ARTHROPLASTY Right 02/2007    WISDOM TOOTH EXTRACTION         Review of patient's allergies indicates:  No Known Allergies    Current Facility-Administered Medications on File Prior to Encounter   Medication    [COMPLETED] quetiapine tablet  25 mg    [DISCONTINUED] 0.9%  NaCl infusion    [DISCONTINUED] acetaminophen oral solution 650 mg    [DISCONTINUED] acetaminophen suppository 650 mg    [DISCONTINUED] acyclovir (ZOVIRAX) 910 mg in dextrose 5 % 250 mL IVPB    [DISCONTINUED] albuterol sulfate nebulizer solution 2.5 mg    [DISCONTINUED] diphenhydrAMINE injection 25 mg    [DISCONTINUED] HYDROmorphone injection 0.2 mg    [DISCONTINUED] lacosamide tablet 200 mg    [DISCONTINUED] mupirocin 2 % ointment    [DISCONTINUED] ondansetron injection 4 mg    [DISCONTINUED] ondansetron injection 4 mg    [DISCONTINUED] pantoprazole injection 40 mg    [DISCONTINUED] piperacillin-tazobactam 3.375 g in dextrose 5 % 50 mL IVPB (ready to mix system)    [DISCONTINUED] potassium chloride 10% solution 40 mEq    [DISCONTINUED] promethazine (PHENERGAN) 12.5 mg in dextrose 5 % 50 mL IVPB    [DISCONTINUED] promethazine (PHENERGAN) 6.25 mg in dextrose 5 % 50 mL IVPB    [DISCONTINUED] quetiapine tablet 25 mg    [DISCONTINUED] sodium chloride 0.9% flush 3 mL    [DISCONTINUED] thiamine tablet 100 mg    [DISCONTINUED] vancomycin (VANCOCIN) 1,500 mg in dextrose 5 % 250 mL IVPB     Current Outpatient Prescriptions on File Prior to Encounter   Medication Sig    aspirin (ECOTRIN) 81 MG EC tablet Take 81 mg by mouth once daily.    atorvastatin (LIPITOR) 20 MG tablet Take 20 mg by mouth every evening.    carvedilol (COREG) 25 MG tablet Take 25 mg by mouth 2 (two) times daily with meals.    cyclobenzaprine (FLEXERIL) 10 MG tablet Take 10 mg by mouth 3 (three) times daily.    irbesartan-hydrochlorothiazide (AVALIDE) 150-12.5 mg per tablet Take 1 tablet by mouth once daily.     levetiracetam (KEPPRA) 500 MG Tab Take 500 mg by mouth 2 (two) times daily.    niacin (NIASPAN) 1000 MG CR tablet Take 1,000 mg by mouth once daily.    omeprazole (PRILOSEC) 40 MG capsule Take 40 mg by mouth once daily.     Family History     Problem Relation (Age of Onset)    Cancer Mother     Diabetes Father        Social History Main Topics    Smoking status: Former Smoker    Smokeless tobacco: Never Used      Comment: quit 17 yrs ago    Alcohol use 16.2 oz/week     3 Glasses of wine, 24 Cans of beer per week    Drug use: No    Sexual activity: Yes     Partners: Female     Birth control/ protection: Other-see comments      Comment: same partner many years; wife is postmenopausal     Review of Systems   Unable to perform ROS: Mental status change     Objective:     Vital Signs (Most Recent):  Temp: 97.9 °F (36.6 °C) (09/26/17 2353)  Pulse: 95 (09/26/17 2353)  Resp: 20 (09/26/17 2353)  BP: (!) 151/94 (09/26/17 2353)  SpO2: 96 % (09/26/17 2353) Vital Signs (24h Range):  Temp:  [96.2 °F (35.7 °C)-98.1 °F (36.7 °C)] 97.9 °F (36.6 °C)  Pulse:  [] 95  Resp:  [18-20] 20  SpO2:  [96 %] 96 %  BP: (116-151)/(69-94) 151/94        There is no height or weight on file to calculate BMI.    Physical Exam   Constitutional: He appears well-nourished. No distress.   HENT:   Head: Normocephalic and atraumatic.   Nose: Nose normal.   Mouth/Throat: Oropharynx is clear and moist. No oropharyngeal exudate.   Eyes: Conjunctivae and EOM are normal. Pupils are equal, round, and reactive to light. Right eye exhibits no discharge. Left eye exhibits no discharge. No scleral icterus.   Neck: Neck supple. No JVD present. No tracheal deviation present. No thyromegaly present.   Cardiovascular: Normal rate, regular rhythm, normal heart sounds and intact distal pulses.  Exam reveals no friction rub.    No murmur heard.  Pulmonary/Chest: Effort normal and breath sounds normal. No stridor. No respiratory distress. He has no wheezes. He has no rales. He exhibits no tenderness.   Abdominal: Soft. Bowel sounds are normal. He exhibits no distension and no mass. There is no tenderness. There is no rebound and no guarding.   Musculoskeletal: Normal range of motion. He exhibits no edema, tenderness or deformity.   Lymphadenopathy:      He has no cervical adenopathy.   Neurological: He is alert. No cranial nerve deficit. He exhibits normal muscle tone. Coordination normal.   Oriented to self only; confused; attempts to answer questions but inappropriately; follows commands; no focal deficits.    Skin: Skin is warm and dry. No rash noted. He is not diaphoretic. No erythema. No pallor.   Psychiatric: He has a normal mood and affect. His behavior is normal. Judgment and thought content normal.   Vitals reviewed.       Significant Labs:   BMP:   Recent Labs  Lab 09/26/17  0540         K 3.8      CO2 26   BUN 7*   CREATININE 1.00   CALCIUM 8.9     CBC:   Recent Labs  Lab 09/25/17  0513 09/26/17  0540   WBC 7.20 8.70   HGB 10.6* 10.1*   HCT 31.8* 29.8*   * 370*       Significant Imaging:  Spinal MRI: acute compression fracture at T11. Mild compression fracture of L4. Old compression fractures at L1, T4, T5  Brain MRI: Significant increase in the T2 signal in the left temporal lobe particularly increased in the left posterior parietal lobe with some serpiginous meningeal enhancement findings concerning for encephalitis; punctate areas of diffusion signal in the left posterior parietal lobe suspicious for punctate areas of acute ischemia versus less likely shine through     Assessment/Plan:     * Encephalitis    Pt alert but oriented to self only. Follows commands but use of words not appropriate. 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 He was treated with acyclovir and ultimately underwent 2 lumbar punctures which have been unrevealing. HSV PCR from first LP is negative. Additional viral studies apparently unremarkable. After he failed to improve significantly, diagnostic imaging was done and showed expansion of the meningeal enhancement as well as possible pathologic spnal compression fractures. [facility] discussed case with Dr. Sweeney for  concern of possible malignancy and need for tissue diagnosis. Dr Ware requested HSV PCR on 2nd LP sample if available. Continue acyclovir. Consult Neuro. Consult Neurosurgery for consideration of biopsy for tissue diagnosis. CT head with & w/o contrast ordered. Seizure precautions. Neuro checks.         T11 vertebral fracture    OSH MRI with acute compression fracture at T11. Mild compression fracture of L4. Old compression fractures at L1, T4, T5. No neuro deficits noted on exam other than altered mentation. Neurosurgery consult as above.           Aspiration pneumonia of both lower lobes due to vomit    Treated with Zosyn for 11 days at OSH; will not continue abx. Respiratory status, VS stable.           Status epilepticus, generalized convulsive    Likely secondary to encephalitis. No further seizures since initial presentation. Pt had recently been dx'ed with seizure d/o prior to admission. Started on lacosamide at OSH, will resume. Seizure precautions. Neuro consult.         Hypertension    's. Resume home carvedilol           VTE Risk Mitigation         Ordered     enoxaparin injection 40 mg  Daily     Route:  Subcutaneous        09/27/17 0029     Medium Risk of VTE  Once      09/27/17 0029             Maren Li MD  Department of Hospital Medicine   [facility]

## 2017-09-27 NOTE — ASSESSMENT & PLAN NOTE
"Patient with extensive work up done for encephalitis. MRI suggestive for HSV as etiology with "significant increase in the T2 signal in the left temporal lobe particularly increased in the left posterior parietal lobe with some serpiginous meningeal enhancement suspicious for encephalitis. Punctate areas of diffusion signal in the left posterior parietal lobe suspicious for punctate areas of acute ischemia versus" So far HSV PCR done on the 9/11/17 LP was negative this study is highly sensitive but false negatives can occur. [place] IgM and IgG also done with negative results. Patient also had HIV, ANCA, Hepatitis panel, VDRL, Arbovirus panel with negative results. Only positive results quantiferon gold and Influenza virus not explaining MRI findings or current neurological state. Patient CSF from both LPs with mildly elevated protein, normal glucose and no significant WBC, non diagnostic. Dificult to determine source of encephalopathy at this time. Will recommend repeat LP prior to brain biopsy. Along with routine serology for CSF order repeat HSV PCR, get TB PCR from CSF, enterovirus panel and repeat arbovirus panel. Would also recommend FTA to evaluate if past syphilis exposure. Depending on results obtained from new LP sample would make recommendation for brain biopsy. Continue acyclovir since can't rule out HSV at this time. All blood cultures and CSF cultures negative to date for growth.    - Repeat LP  - From CSF do HSV PCR, arboviral panel, TB PCR, enterovirus panel  - order FTA   - continue acyclovir   "

## 2017-09-27 NOTE — HPI
"61 y.o. Male with hx of HTN, HLD and GERD was transferred from [facility] to [facility] on 9/26/17 for higher level of care and workup of vasculitis vs encephalitis vs malignancy.    8/11/17- presented to [facility] after episode at home described as "playing cards w/ family members then became unable to speak around followed almost immediately by onset of decerebrate posturing and rhythmic jerking motions that lasted about 5 minutes. Afterward, he was very combative, would alternately respond inappropriately to questions or speak unintelligibly. This lasted all the way to the ED and only stopped after MRI of his brain around 90 minutes later. His signs and symptoms then totally resolved and have not returned.  -B/B incontinence. Pt only remembers initially not being able to speak at onset of the episode, but he does not remember anything else up until after resolution of symptoms as noted above." Evaluated by Dr. Ceron and MRI Brain WO contrast showed T2 signal abnormality in the left temporal lobe possibly 2/2 post ictal temporary sequelae. Repeat MRI brain with and without contrast in 1-2 weeks with outpatient EEG. Started on Keppra 500 mg BID.   8/12/17-discharged home, irritable and combative so Keppra was weaned off over 3 days  8/15/17 routine EEG in outpatient Neuro clinic was unremarkable for ictal discharges  9/9/17-[facility] after having multiple witnessed seizures at home. He was given 6mg Ativan bolus, started on an Ativan drip and propofol. Intubated for airway protection. Concern for status epilepticus, started on Vimpat 200 mg BID.   9/10/17-MRI Brain showed increased signal in the left temporal lobe with enhancement suggesting herpes encephalitis, started on IV acyclovir 850 mg q 8 hr.   9/11/17-LP performed remarkable for elevated protein and RBC (1 WBC, 94 RBC, glu 66, protein 77). HSV PCR negative   EEG generalized slowing with superimposed beta activity and no ongoing " "subclinical seizure activity.   9/14/17-extubated and able to answer questions, but still encephalopathic. CT chest, abdomen and pelvis negative for occult malignancy.   9/16/17-ID consult and acyclovir continued empirically while viral studies pending  9/19/17-LP repeated for further viral studies and cytology   9/25/17- MRI C, T, L spine showed enhancement in C spine, bone marrow edema and enhancement of the vertebral body of T11,  acute compression fracture. Old compression fractures of L1-, T4 and T5. MRI Brain W WO contrast showed "significant increase in the T2 signal in the left temporal lobe particularly increased in the left posterior parietal lobe with some serpiginous meningeal enhancement suspicious for encephalitis.Punctate areas of diffusion signal in the left posterior parietal lobe suspicious for punctate areas of acute ischemia versus less likely shine through"   9/26/17-transferred to [facility] for angiogram and possible brain bx  ID rec repeat LP prior to brain bx          "

## 2017-09-27 NOTE — HPI
Patient is a transfer from [facility] who presented for tonic-clonic seizure and AMS on 9/10 per OSH. CTH and follow-up MRI demonstrated L posterior temporal and occipital lobe lesion. 2x LP with analysis were negative for HSV, VZV, syphillis and HHV-6 were all negative and differential was unremarkable. Thorough autoimmune and infectious testing was also completed and returned without result. Patient is [place] [facility] [place] diagnostic work-up per NSGY and neurology. He remains altered on mentation with otherwise non-focal exam.

## 2017-09-27 NOTE — CONSULTS
"[facility]  Infectious Disease  Consult Note    Patient Name: Tommy Way  MRN: [id number]  Admission Date: 9/26/2017  Hospital Length of Stay: 1 days  Attending Physician: Gregor Abbott MD  Primary Care Provider: Adan Torres MD     Isolation Status: No active isolations    Patient information was obtained from spouse/SO and ER records.      Consults  Assessment/Plan:     Aspiration pneumonia of both lower lobes due to vomit    Patient likely aspirated during period of AMS. Chest X ray with resolved findings of pneumonia. Patient past course with zosyn and vancomycin covered empirically. No need for additional treatment at this time.          Encephalitis and encephalomyelitis, unspecified    Patient with extensive work up done for encephalitis. MRI suggestive for HSV as etiology with "significant increase in the T2 signal in the left temporal lobe particularly increased in the left posterior parietal lobe with some serpiginous meningeal enhancement suspicious for encephalitis. Punctate areas of diffusion signal in the left posterior parietal lobe suspicious for punctate areas of acute ischemia versus" So far HSV PCR done on the 9/11/17 LP was negative this study is highly sensitive but false negatives can occur. [place] IgM and IgG also done with negative results. Patient also had HIV, ANCA, Hepatitis panel, VDRL, Arbovirus panel with negative results. Only positive results quantiferon gold and Influenza virus not explaining MRI findings or current neurological state. Patient CSF from both LPs with mildly elevated protein, normal glucose and no significant WBC, non diagnostic. Dificult to determine source of encephalopathy at this time. Will recommend repeat LP prior to brain biopsy. Along with routine serology for CSF order repeat HSV PCR, get TB PCR from CSF, enterovirus panel and repeat arbovirus panel. Would also recommend FTA to evaluate if past syphilis exposure. Depending on " results obtained from new LP sample would make recommendation for brain biopsy. Continue acyclovir since can't rule out HSV at this time. All blood cultures and CSF cultures negative to date for growth.    - Repeat LP  - From CSF do HSV PCR, arboviral panel, TB PCR, enterovirus panel  - order FTA   - continue acyclovir             Thank you for your consult. I will follow-up with patient. Please contact us if you have any additional questions.    Lalo Banerjee MD  Infectious Disease  [facility]    Subjective:     Principal Problem: Encephalitis    HPI: Case of 62 y/o male PMHX arthritis, AHTN, admitted on 9/26/17 transferred from [facility]. Patient originally presented to OSH due to 2 episodes of witnessed seizures at home. Patient was at that time intubated in the ED due to desaturation and AMS for airway protection. On 9/11 MRI done showed temporal lobe abnormalities consistent with encephalitis. Patient had LP done as well non diagnostic. Patient at that time started on acyclovir. 9/14/17 patient was extubated. Had been on a course of vancomycin, zosyn for aspiration pneumonia and acyclovir for suspicion of HSV meningitis. 9/19/17 LP repeated non diagnostic once more. Patient had extensive work up done with HSV PCR that was negative, VDRL, HIV and arbovirus panel with no positive results. Quantiferon gold was positive as well as influenza panel. ID consulted at [facility] this time after transfer for further recommendations, patient had been followed by Dr. Reyes at [facility]. Wife denies recent travel outside of the country, fevers, chills, recent viral illness or new rashes. Denied pets in the house. Mentions having contact with small children that have not been reported ill at this time.        Past Medical History:   Diagnosis Date    Acid reflux     Arthritis     Elevated cholesterol     Hypertension        Past Surgical History:   Procedure Laterality Date    JOINT REPLACEMENT  Right 04/20/2017    uneventful recovery    KNEE ARTHROPLASTY Right 02/2007    WISDOM TOOTH EXTRACTION         Review of patient's allergies indicates:  No Known Allergies    Medications:  Prescriptions Prior to Admission   Medication Sig    aspirin (ECOTRIN) 81 MG EC tablet Take 81 mg by mouth once daily.    atorvastatin (LIPITOR) 20 MG tablet Take 20 mg by mouth every evening.    carvedilol (COREG) 25 MG tablet Take 25 mg by mouth 2 (two) times daily with meals.    cyclobenzaprine (FLEXERIL) 10 MG tablet Take 10 mg by mouth 3 (three) times daily.    irbesartan-hydrochlorothiazide (AVALIDE) 150-12.5 mg per tablet Take 1 tablet by mouth once daily.     levetiracetam (KEPPRA) 500 MG Tab Take 500 mg by mouth 2 (two) times daily.    niacin (NIASPAN) 1000 MG CR tablet Take 1,000 mg by mouth once daily.    omeprazole (PRILOSEC) 40 MG capsule Take 40 mg by mouth once daily.     Antibiotics     None        Antifungals     None        Antivirals         Stop Route Frequency     acyclovir (ZOVIRAX) injection      -- IV Every 8 hours (non-standard times)             There is no immunization history on file for this patient.    Family History     Problem Relation (Age of Onset)    Cancer Mother    Diabetes Father        Social History     Social History    Marital status:      Spouse name: N/A    Number of children: N/A    Years of education: N/A     Social History Main Topics    Smoking status: Former Smoker    Smokeless tobacco: Never Used      Comment: quit 17 yrs ago    Alcohol use 16.2 oz/week     3 Glasses of wine, 24 Cans of beer per week    Drug use: No    Sexual activity: Yes     Partners: Female     Birth control/ protection: Other-see comments      Comment: same partner many years; wife is postmenopausal     Other Topics Concern    None     Social History Narrative    None     Review of Systems   Constitutional: Negative for chills, diaphoresis, fatigue and fever.   HENT: Negative for  congestion, drooling, ear discharge, mouth sores and rhinorrhea.    Gastrointestinal: Negative for abdominal distention, abdominal pain, diarrhea, nausea and vomiting.   Skin: Negative for rash.   Neurological: Positive for seizures and speech difficulty.   Psychiatric/Behavioral: Positive for confusion and hallucinations.     Objective:     Vital Signs (Most Recent):  Temp: 98 °F (36.7 °C) (09/27/17 1534)  Pulse: 83 (09/27/17 1534)  Resp: 20 (09/27/17 1534)  BP: (!) 150/86 (09/27/17 1534)  SpO2: 97 % (09/27/17 1534) Vital Signs (24h Range):  Temp:  [97.2 °F (36.2 °C)-98.5 °F (36.9 °C)] 98 °F (36.7 °C)  Pulse:  [] 83  Resp:  [18-20] 20  SpO2:  [96 %-97 %] 97 %  BP: (125-153)/(69-94) 150/86     Weight: 121.8 kg (268 lb 8 oz)  Body mass index is 31.03 kg/m².    Estimated Creatinine Clearance: 127.9 mL/min (based on SCr of 0.9 mg/dL).    Physical Exam   Constitutional: He appears well-developed and well-nourished.   HENT:   Head: Normocephalic and atraumatic.   Eyes: EOM are normal.   Neck: Normal range of motion. Neck supple.   Cardiovascular: Normal rate, regular rhythm and normal heart sounds.    Pulmonary/Chest: Effort normal and breath sounds normal.   Abdominal: Soft. Bowel sounds are normal. He exhibits no distension. There is no tenderness. There is no guarding.   Neurological: He is alert.   Skin: No rash noted.       Significant Labs:   Blood Culture:   Recent Labs  Lab 09/09/17  2040   LABBLOO No growth after 5 days.     BMP:   Recent Labs  Lab 09/27/17  0436   GLU 99      K 4.1      CO2 26   BUN 6   CREATININE 0.9   CALCIUM 9.3     CBC:   Recent Labs  Lab 09/26/17  0540 09/27/17  0436   WBC 8.70 7.53   HGB 10.1* 10.1*   HCT 29.8* 31.3*   * 401*     CSF:   Recent Labs  Lab 09/11/17  1047   CSFCULTURE No Growth     Hepatitis Panel: No results for input(s): HEPBSAG, HEPAIGM, HEPCAB in the last 48 hours.    Invalid input(s): HAPBIGM  HIV 1/2 Antibodies: No results for input(s):  [id number] in the last 48 hours.  Lactic Acid: No results for input(s): LACTATE in the last 48 hours.  Microbiology Results (last 7 days)     ** No results found for the last 168 hours. **        Urine Culture:   Recent Labs  Lab 09/15/17  1216 09/18/17  1526   LABURIN No growth No growth       Significant Imaging: I have reviewed all pertinent imaging results/findings within the past 24 hours.

## 2017-09-27 NOTE — ASSESSMENT & PLAN NOTE
"61 y.o male with hx of HTN transferred from [facility] 9/26/17. Patient originally presented to OSH after witnessed seizures at home. Intubated for airway protection. 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 LP done at OSH x 2 with benign CSF besides elevated protein 77. On course of vancomycin, zosyn for aspiration pneumonia as well. LP repeated 9/19/17. HSV PCR, VDRL, HIV and arbovirus panel negative. Quantiferon gold and influenza panel positive. Followed by Dr. Reyes (ID) and Osmany (Neuro) in [place].     9/27- patient encephalopathic with aphasia, but able to follow some simple commands  Cerebral angiogram to r/o vasculitis  ID consulted "CSF from both LPs with mildly elevated protein, normal glucose and no significant WBC, non diagnostic. Dificult to determine source of encephalopathy at this time. "    Recommendations:  -f/u angiogram results  -appreciate ID recommendations--"repeat LP prior to brain bx to repeat HSV PCR, TB PCR from CSF, enterovirus and arbovirus panel. FTA to evaluate syphilis exposure. Continue acyclovir since can't rule out HSV at this time. All blood cultures and CSF cultures negative to date for growth."  Add cytology/flow cytometry, 14-3-3 and paraneoplastic panel to CSF orders please  -routine EEG pending to r/o subclinical seizures, continue Vimpat 200 mg BID   -appreciate NSGY involvement for possible brain bx  "

## 2017-09-27 NOTE — CONSULTS
[facility]  Neurosurgery  Consult Note    Consults  Subjective:     Chief Complaint/Reason for Admission: AMS with brain lesion    History of Present Illness: Patient is a transfer from [facility] who presented for tonic-clonic seizure and AMS on 9/10 per OSH. CTH and follow-up MRI demonstrated L posterior temporal and occipital lobe lesion. 2x LP with analysis were negative for HSV, VZV, syphillis and HHV-6 were all negative and differential was unremarkable. Thorough autoimmune and infectious testing was also completed and returned without result. Patient is transferred to [facility] for further diagnostic work-up per NSGY and neurology. He remains altered on mentation with otherwise non-focal exam.    Prescriptions Prior to Admission   Medication Sig Dispense Refill Last Dose    aspirin (ECOTRIN) 81 MG EC tablet Take 81 mg by mouth once daily.   9/9/2017    atorvastatin (LIPITOR) 20 MG tablet Take 20 mg by mouth every evening.   9/9/2017    carvedilol (COREG) 25 MG tablet Take 25 mg by mouth 2 (two) times daily with meals.   9/9/2017    cyclobenzaprine (FLEXERIL) 10 MG tablet Take 10 mg by mouth 3 (three) times daily.   Unknown at Unknown time    irbesartan-hydrochlorothiazide (AVALIDE) 150-12.5 mg per tablet Take 1 tablet by mouth once daily.    9/9/2017    levetiracetam (KEPPRA) 500 MG Tab Take 500 mg by mouth 2 (two) times daily.       niacin (NIASPAN) 1000 MG CR tablet Take 1,000 mg by mouth once daily.   9/9/2017    omeprazole (PRILOSEC) 40 MG capsule Take 40 mg by mouth once daily.   9/9/2017       Review of patient's allergies indicates:  No Known Allergies    Past Medical History:   Diagnosis Date    Acid reflux     Arthritis     Elevated cholesterol     Hypertension      Past Surgical History:   Procedure Laterality Date    JOINT REPLACEMENT Right 04/20/2017    uneventful recovery    KNEE ARTHROPLASTY Right 02/2007    WISDOM TOOTH EXTRACTION       Family History      Problem Relation (Age of Onset)    Cancer Mother    Diabetes Father        Social History Main Topics    Smoking status: Former Smoker    Smokeless tobacco: Never Used      Comment: quit 17 yrs ago    Alcohol use 16.2 oz/week     3 Glasses of wine, 24 Cans of beer per week    Drug use: No    Sexual activity: Yes     Partners: Female     Birth control/ protection: Other-see comments      Comment: same partner many years; wife is postmenopausal     Review of Systems   Unable to perform ROS: Mental status change     Objective:     Weight: 121.8 kg (268 lb 8 oz)  Body mass index is 31.03 kg/m².  Vital Signs (Most Recent):  Temp: 98.4 °F (36.9 °C) (09/27/17 0740)  Pulse: 87 (09/27/17 0740)  Resp: 20 (09/27/17 0740)  BP: (!) 153/93 (09/27/17 0740)  SpO2: 96 % (09/27/17 0740) Vital Signs (24h Range):  Temp:  [97.2 °F (36.2 °C)-98.5 °F (36.9 °C)] 98.4 °F (36.9 °C)  Pulse:  [] 87  Resp:  [18-20] 20  SpO2:  [96 %-97 %] 96 %  BP: (116-153)/(69-94) 153/93                 Oxygen Concentration (%):  [94-97] 97         Physical Exam:  Nursing note and vitals reviewed.    Constitutional: He appears well-developed and well-nourished.     Eyes: Pupils are equal, round, and reactive to light. Conjunctivae and EOM are normal.     Cardiovascular: Normal rate, regular rhythm and normal pulses.     Abdominal: Soft. Bowel sounds are normal.     Psych/Behavior: He is alert. He is oriented to person, place, and time. He has a normal mood and affect.     Neurosurgery Exam:  General: AOx1, GCS15, confused speech during exam  CNII-XII: Fine exam demonstrated no CN deficits; PERRL; No pronator  Extremities: 5/5 motor in all distributions; sensorium intact; DTRs 2+; coordination intact    Significant Labs:    Recent Labs  Lab 09/26/17  0540 09/27/17  0436    99    140   K 3.8 4.1    106   CO2 26 26   BUN 7* 6   CREATININE 1.00 0.9   CALCIUM 8.9 9.3       Recent Labs  Lab 09/26/17  0540 09/27/17  0436   WBC 8.70 7.53    HGB 10.1* 10.1*   HCT 29.8* 31.3*   * 401*     No results for input(s): LABPT, INR, APTT in the last 48 hours.  Microbiology Results (last 7 days)     ** No results found for the last 168 hours. **        ABGs: No results for input(s): PH, PCO2, PO2, HCO3, POCSATURATED, BE in the last 48 hours.  Cardiac markers: No results for input(s): CKMB, CPKMB, TROPONINT, TROPONINI, MYOGLOBIN in the last 48 hours.  CMP:   Recent Labs  Lab 09/26/17  0540 09/27/17  0436    99   CALCIUM 8.9 9.3   ALBUMIN 3.2* 2.5*   PROT 5.9* 6.9    140   K 3.8 4.1   CO2 26 26    106   BUN 7* 6   CREATININE 1.00 0.9   ALKPHOS 146* 158*   ALT 54* 27   AST 21 18   BILITOT 0.7 0.4     CRP: No results for input(s): CRP in the last 48 hours.  ESR: No results for input(s): POCESR, ERYTHROCYTES in the last 48 hours.  LFTs:   Recent Labs  Lab 09/26/17  0540 09/27/17  0436   ALT 54* 27   AST 21 18   ALKPHOS 146* 158*   BILITOT 0.7 0.4   PROT 5.9* 6.9   ALBUMIN 3.2* 2.5*     Procalcitonin: No results for input(s): PROCAL in the last 48 hours.    Significant Diagnostics:  No new diagnostic results in the last 24h    Assessment/Plan:     Encephalitis and encephalomyelitis, unspecified    61M who presents from OSH with presenting sx on 9/10 of AMS and tonic-clonic seizure for further work-up.    --Admitted to medicine  --Patient discussed at radiology conference this AM   -To undergo cerebral angiogram today  --Possible future brain bx pending IR results  --Consult neurology for recommendations concerning further workup  --Continue NPO  --Continue MNGT per IM            Thank you for your consult. I will follow-up with patient. Please contact us if you have any additional questions.    Gabino Rich MD  Neurosurgery  [facility]

## 2017-09-27 NOTE — ASSESSMENT & PLAN NOTE
61M who presents from OSH with presenting sx on 9/10 of AMS and tonic-clonic seizure for further work-up.    --Admitted to medicine  --Patient discussed at radiology conference this AM   -To undergo cerebral angiogram today  --Possible future brain bx pending IR results  --Consult neurology for recommendations concerning further workup  --Continue NPO  --Continue MNGT per IM

## 2017-09-28 NOTE — PROGRESS NOTES
Dr. Waylon Mcgee notified of new drainage on patients groin dressing.  Slight swelling noted.  Pulses still present. Only tenderness upon palpation noted. Dr at bedside to assess.  No orders given.

## 2017-09-28 NOTE — SUBJECTIVE & OBJECTIVE
Past Medical History:   Diagnosis Date    Acid reflux     Arthritis     Elevated cholesterol     Hypertension        Past Surgical History:   Procedure Laterality Date    JOINT REPLACEMENT Right 04/20/2017    uneventful recovery    KNEE ARTHROPLASTY Right 02/2007    WISDOM TOOTH EXTRACTION         Review of patient's allergies indicates:  No Known Allergies    Medications:  Prescriptions Prior to Admission   Medication Sig    aspirin (ECOTRIN) 81 MG EC tablet Take 81 mg by mouth once daily.    atorvastatin (LIPITOR) 20 MG tablet Take 20 mg by mouth every evening.    carvedilol (COREG) 25 MG tablet Take 25 mg by mouth 2 (two) times daily with meals.    cyclobenzaprine (FLEXERIL) 10 MG tablet Take 10 mg by mouth 3 (three) times daily.    irbesartan-hydrochlorothiazide (AVALIDE) 150-12.5 mg per tablet Take 1 tablet by mouth once daily.     levetiracetam (KEPPRA) 500 MG Tab Take 500 mg by mouth 2 (two) times daily.    niacin (NIASPAN) 1000 MG CR tablet Take 1,000 mg by mouth once daily.    omeprazole (PRILOSEC) 40 MG capsule Take 40 mg by mouth once daily.     Antibiotics     None        Antifungals     None        Antivirals         Stop Route Frequency     acyclovir (ZOVIRAX) injection      -- IV Every 8 hours (non-standard times)             There is no immunization history on file for this patient.    Family History     Problem Relation (Age of Onset)    Cancer Mother    Diabetes Father        Social History     Social History    Marital status:      Spouse name: N/A    Number of children: N/A    Years of education: N/A     Social History Main Topics    Smoking status: Former Smoker    Smokeless tobacco: Never Used      Comment: quit 17 yrs ago    Alcohol use 16.2 oz/week     3 Glasses of wine, 24 Cans of beer per week    Drug use: No    Sexual activity: Yes     Partners: Female     Birth control/ protection: Other-see comments      Comment: same partner many years; wife is  postmenopausal     Other Topics Concern    None     Social History Narrative    None     Review of Systems   Constitutional: Negative for chills, diaphoresis, fatigue and fever.   HENT: Negative for congestion, drooling, ear discharge, mouth sores and rhinorrhea.    Gastrointestinal: Negative for abdominal distention, abdominal pain, diarrhea, nausea and vomiting.   Skin: Negative for rash.   Neurological: Positive for seizures and speech difficulty.   Psychiatric/Behavioral: Positive for confusion and hallucinations.     Objective:     Vital Signs (Most Recent):  Temp: 97.1 °F (36.2 °C) (09/28/17 1218)  Pulse: 80 (09/28/17 1328)  Resp: 18 (09/28/17 1328)  BP: 127/79 (09/28/17 1328)  SpO2: 95 % (09/28/17 1328) Vital Signs (24h Range):  Temp:  [97.1 °F (36.2 °C)-98.8 °F (37.1 °C)] 97.1 °F (36.2 °C)  Pulse:  [63-88] 80  Resp:  [11-20] 18  SpO2:  [3 %-100 %] 95 %  BP: (127-172)/(61-99) 127/79     Weight: 121.8 kg (268 lb 8 oz)  Body mass index is 31.03 kg/m².    Estimated Creatinine Clearance: 143.9 mL/min (based on SCr of 0.8 mg/dL).    Physical Exam   Constitutional: He appears well-developed and well-nourished.   HENT:   Head: Normocephalic and atraumatic.   Eyes: EOM are normal.   Neck: Normal range of motion. Neck supple.   Cardiovascular: Normal rate, regular rhythm and normal heart sounds.    Pulmonary/Chest: Effort normal and breath sounds normal.   Abdominal: Soft. Bowel sounds are normal. He exhibits no distension. There is no tenderness. There is no guarding.   Neurological: He is alert.   Skin: No rash noted.       Significant Labs:   Blood Culture:     Recent Labs  Lab 09/09/17 2040   LABBLOO No growth after 5 days.     BMP:     Recent Labs  Lab 09/28/17  0300   GLU 87      K 3.5      CO2 26   BUN 6   CREATININE 0.8   CALCIUM 9.0   MG 1.5*     CBC:     Recent Labs  Lab 09/27/17  0436 09/28/17  0300   WBC 7.53 5.82   HGB 10.1* 10.2*   HCT 31.3* 30.3*   * 395*     CSF:     Recent  Labs  Lab 09/11/17  1047   CSFCULTURE No Growth     Hepatitis Panel: No results for input(s): HEPBSAG, HEPAIGM, HEPCAB in the last 48 hours.    Invalid input(s): HAPBIGM  HIV 1/2 Antibodies: No results for input(s): [id number] in the last 48 hours.  Lactic Acid: No results for input(s): LACTATE in the last 48 hours.  Microbiology Results (last 7 days)     Procedure Component Value Units Date/Time    CSF culture [[id number]]     Order Status:  No result Specimen:  CSF (Spinal Fluid)         Urine Culture:     Recent Labs  Lab 09/15/17  1216 09/18/17  1526   LABURIN No growth No growth       Significant Imaging: I have reviewed all pertinent imaging results/findings within the past 24 hours.

## 2017-09-28 NOTE — PROCEDURES
DATE OF PROCEDURE:  09/27/2017    EEG NUMBER:  [id number]    REQUESTING PHYSICIAN:  Gregor Abbott M.D.    LOCATION OF SERVICE:  721    ELECTROENCEPHALOGRAM REPORT  Extended Recording    METHODOLOGY:  Electroencephalographic (EEG) is recorded with electrodes placed   according to the International 10-20 placement system.  Thirty two (32) channels   of digital signal (sampling rate of 512/sec), including T1 and T2, were   simultaneously recorded from the scalp and may include EKG, EMG, and/or eye   monitors.  Recording band pass was 0.1 to 512 Hz.  Digital video recording of   the patient is simultaneously recorded with the EEG.  The patient is instructed   to report clinical symptoms which may occur during the recording session.  EEG   and video recording are stored and archived in digital format.  Activation   procedures, which include photic stimulation, hyperventilation and instructing   patients to perform simple tasks, are done in selected patients.    The EEG is displayed on a monitor screen and can be reviewed using different   montages.  Computer-assisted analysis is employed to detect spike and   electrographic seizure activity.  The entire record is submitted for computer   analysis.  The entire recording is visually reviewed, and the times identified   by computer analysis as being spikes or seizures are reviewed again.    Compressed spectral analysis (CSA) is also performed on the activity recorded   from each individual channel.  This is displayed as a power display of   frequencies from 0 to 30 Hz over time.  The CSA is reviewed looking for   asymmetries in power between homologous areas of the scalp, then compared with   the original EEG recording.    [employer] software was also utilized in the review of this study.  This software   suite analyzes the EEG recording in multiple domains.  Coherence and rhythmicity   are computed to identify EEG sections which may contain organized seizures.    Each channel  undergoes analysis to detect the presence of spike and sharp waves   which have special and morphological characteristics of epileptic activity.  The   routine EEG recording is converted from special into frequency domain.  This is   then displayed comparing homologous areas to identify areas of significant   asymmetry.  Algorithm to identify non-cortically generated artifact is used to   separate artifact from the EEG.    EEG FINDINGS:  Recording was obtained at the patient's bedside in the hospital   room.  The patient was awake and moving around spontaneously and interacting   with the nursing personnel.  Background was very disorganized.  There were brief   segments in which an 8 Hz posterior dominant rhythm was seen in the occipital   regions bilaterally.  In general, the background over the 2 hemispheres is slow   consisting of an irregular theta and delta frequencies.  The delta is more   prominent over the left hemisphere, but maximally noted in the temporal leads.    No spike or sharp wave activity was seen.  Activation procedures were not   carried out.    IMPRESSION:  Markedly abnormal EEG.  There was slowing and disorganization of   the background rhythms indicating the presence of a moderate generalized   encephalopathy.  In addition, there is focal slowing, which is a polymorphic   delta noted over the left hemisphere, maximal in the temporal leads.  This would   indicate a lateralized structural process and a polymorphic character of the   waveforms would suggest subcortical, particularly white matter involvement.  No   epileptic activity was seen.    CLINICAL CORRELATION:  The patient is a 61-year-old male who was transferred   from an outside hospital for altered mental status and the patient apparently   has experienced a tonic-clonic seizure.  MRI shows left temporal lobe edema,   which correlates well with the findings on this EEG, in which there is evidence   for structural dysfunction in the  left hemisphere, maximum in the temporal area.    In this recording, there was no evidence for an irritative process.      RR/HN  dd: 09/28/2017 10:05:30 (CDT)  td: 09/28/2017 10:42:51 (CDT)  Doc ID   #[id number]  Job ID #[id number]    CC:

## 2017-09-28 NOTE — PROCEDURES
Radiology Post-Procedure Note    Pre Op Diagnosis: Seizures, left temporal lobe edema    Post Op Diagnosis: Possible vasculitis    Procedure: Cerebral angiogram    Procedure performed by: Dr. Sweeney, Dr. Rivero, Dr. Mcgee    Written Informed Consent Obtained: Yes    Specimen Removed: NO    Estimated Blood Loss: Minimal    Procedure report:     A 5F sheath was placed into the right femoral artery and a 5F Berenstein catheter was advanced into the aortic arch.  The bilateral common carotid, internal carotid, external carotid, and vertebral arteries were subselected and angiography of the brain was performed after injection into each of these vessels.    Please see Imaging report for full details.    A right femoral artery angiogram was performed, the sheath removed and hemostasis achieved using Angioseal.  No hematoma was present at the time of hemostasis.    The patient tolerated the procedure well.     Diego Rivero MD  Neurointerventional Fellow  Department of Radiology  [phone]

## 2017-09-28 NOTE — ASSESSMENT & PLAN NOTE
61M who presents from OSH with presenting sx on 9/10 of AMS and tonic-clonic seizure for further work-up.    -- s/p diagnostic cerebral angiogram: narrowing of left distal MCA arteries possibly d/t vasculitis or secondary to cerebral edema  -- rec lumbar puncture for infectious workup per ID  -- will continue to follow for possible biopsy if CSF studies non-diagnostic   --Continue MNGT per IM

## 2017-09-28 NOTE — PROGRESS NOTES
Pt arrived to room 190 for diagnostic cerebral angiogram. Pt identified using two pt identifiers. Allergies reviewed. NAD noted. OMI.

## 2017-09-28 NOTE — PROGRESS NOTES
[facility] Hospital Medicine  Progress Note    Patient Name: Tommy Way  MRN: [id number]  Patient Class: IP- Inpatient   Admission Date: 9/26/2017  Length of Stay: 2 days  Attending Physician: Gregor Abbott MD  Primary Care Provider: Adan Torres MD    [facility] Medicine Team: [facility] Gregor Abbott MD    Subjective:     Principal Problem:Encephalopathy    HPI:  History is limited as patient is unable to provide given altered mentation. A 61-year-old man with recently diagnosed tonic clonic seizure disorder,  HTN and dyslipidemia admitted to [facility] after 5 witnessed seizures 9/10/17. He was initially intubated on presentation for low sats and inability to protect airway, extubated 9/14. 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 He was treated with Acyclovir and ultimately underwent 2 lumbar punctures which have been unrevealing. HSV PCR from first LP is negative. After he failed to improve significantly, diagnostic imaging was done and showed expansion of the meningeal enhancement as well as possible pathologic spinal compression fractures. [facility] discussed case with Dr. Sweeney for concern of possible malignancy and need for tissue diagnosis. On interview, patient is alert but oriented to self only. Attempts to answer questions but inappropriately and is confused. Follows commands. Denies any complaints.     Hospital Course:  No notes on file    Interval History: doing well today; planning for brain angiogram and LP in the AM; cont acyclovir, ID and neurosurgery on board    Review of Systems   Constitutional: Negative for activity change and appetite change.   HENT: Negative for drooling and facial swelling.    Eyes: Negative for discharge and itching.   Respiratory: Negative for cough, shortness of breath and wheezing.    Cardiovascular: Negative for chest pain and palpitations.    Gastrointestinal: Negative for abdominal pain and nausea.   Genitourinary: Negative for difficulty urinating and dysuria.   Skin: Negative for color change and pallor.   Neurological: Negative for dizziness and numbness.   Psychiatric/Behavioral: Negative for behavioral problems and confusion.     Objective:     Vital Signs (Most Recent):  Temp: 98 °F (36.7 °C) (09/28/17 1738)  Pulse: 75 (09/28/17 1738)  Resp: 16 (09/28/17 1738)  BP: 135/84 (09/28/17 1738)  SpO2: 95 % (09/28/17 1738) Vital Signs (24h Range):  Temp:  [97.1 °F (36.2 °C)-98.8 °F (37.1 °C)] 98 °F (36.7 °C)  Pulse:  [63-88] 75  Resp:  [11-20] 16  SpO2:  [3 %-100 %] 95 %  BP: (127-172)/(61-99) 135/84     Weight: 121.8 kg (268 lb 8 oz)  Body mass index is 31.03 kg/m².  No intake or output data in the 24 hours ending 09/28/17 1750   Physical Exam   Constitutional: He is oriented to person, place, and time. He appears well-developed and well-nourished.   HENT:   Head: Normocephalic and atraumatic.   Eyes: Conjunctivae are normal. Pupils are equal, round, and reactive to light.   Neck: Normal range of motion. No thyromegaly present.   Cardiovascular: Normal rate, regular rhythm and normal heart sounds.    Pulmonary/Chest: Effort normal and breath sounds normal.   Abdominal: Soft. He exhibits no distension. There is no tenderness.   Neurological: He is alert and oriented to person, place, and time. Coordination normal.   wernickes aphasia   Skin: No rash noted. No erythema.       MELD-Na score: 8 at 8/12/2017  4:50 AM  MELD score: 8 at 8/12/2017  4:50 AM  Calculated from:  Serum Creatinine: 0.80 mg/dL (Rounded to 1) at 8/12/2017  4:50 AM  Serum Sodium: 139 mmol/L (Rounded to 137) at 8/12/2017  4:50 AM  Total Bilirubin: 1.2 mg/dL at 8/12/2017  4:50 AM  INR(ratio): 1.1 at 8/11/2017  4:10 PM  Age: 61 years    Significant Labs:  CBC:    Recent Labs  Lab 09/27/17  0436 09/28/17  0300   WBC 7.53 5.82   HGB 10.1* 10.2*   HCT 31.3* 30.3*   * 395*      CMP:    Recent Labs  Lab 09/27/17  0436 09/28/17  0300    142   K 4.1 3.5    108   CO2 26 26   GLU 99 87   BUN 6 6   CREATININE 0.9 0.8   CALCIUM 9.3 9.0   PROT 6.9  --    ALBUMIN 2.5*  --    BILITOT 0.4  --    ALKPHOS 158*  --    AST 18  --    ALT 27  --    ANIONGAP 8 8   EGFRNONAA >60.0 >60.0     PTINR:  No results for input(s): INR in the last 48 hours.    Significant Procedures:   Dobutamine Stress Test with Color Flow: No results found for this or any previous visit.      Assessment/Plan:      * Encephalopathy    Pt alert but oriented to self only. Follows commands but use of words not appropriate. 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 He was treated with acyclovir and ultimately underwent 2 lumbar punctures which have been unrevealing. HSV PCR from first LP is negative. Additional viral studies apparently unremarkable. After he failed to improve significantly, diagnostic imaging was done and showed expansion of the meningeal enhancement as well as possible pathologic spnal compression fractures. [facility] discussed case with Dr. Sweeney for concern of possible malignancy and need for tissue diagnosis. Dr Ware requested HSV PCR on 2nd LP sample if available. Continue acyclovir. Consult Neuro. Consult Neurosurgery for consideration of biopsy for tissue diagnosis. CT head with & w/o contrast ordered. Seizure precautions. Neuro checks.         T11 vertebral fracture    OSH MRI with acute compression fracture at T11. Mild compression fracture of L4. Old compression fractures at L1, T4, T5. No neuro deficits noted on exam other than altered mentation. Neurosurgery consult as above.           Aspiration pneumonia of both lower lobes due to vomit    Treated with Zosyn for 11 days at OSH; will not continue abx. Respiratory status, VS stable.           Encephalitis and encephalomyelitis, unspecified    - cont acyclovir;  ID and neurosurgery on board; planning for brain angiogram and LP in the AM          Status epilepticus, generalized convulsive    Likely secondary to encephalitis. No further seizures since initial presentation. Pt had recently been dx'ed with seizure d/o prior to admission. Started on lacosamide at OSH, will resume. Seizure precautions. Neuro consult.         Hypertension    's. Resume home carvedilol           VTE Risk Mitigation         Ordered     enoxaparin injection 40 mg  Daily     Route:  Subcutaneous        09/27/17 0029     Medium Risk of VTE  Once      09/27/17 0029              Gregor Abbott MD  Department of Hospital Medicine   [facility]

## 2017-09-28 NOTE — PT/OT/SLP PROGRESS
Occupational Therapy      Tommy Way  MRN: [id number]    OT evaluation acknowledged. Patient not seen today secondary to off floor for angiogram in AM. OT unable to return to perform evaluation 2/2 pt remain with HOB flat. Will follow up as scheduled.   Hilary josé OT  9/28/2017

## 2017-09-28 NOTE — PT/OT/SLP PROGRESS
Physical Therapy      Tommy Way  MRN: [id number]    Patient not seen today secondary to pt undergoing angiogram in AM on first attempt. PT unable to return later in day to perform evaluation . Will follow-up on next scheduled visit.      Claudia Garrett, PT, DPT  9/28/2017

## 2017-09-28 NOTE — SUBJECTIVE & OBJECTIVE
Subjective:     Interval History:   Routine EEG 9/27 showed slowing and disorganization c/w moderate encephalopathy, no ictal discharges  Cerebral angiogram today without clear evidence of vasculitis. Planned for LP later this afternoon     Current Facility-Administered Medications   Medication Dose Route Frequency Provider Last Rate Last Dose    0.9%  NaCl infusion   Intravenous Continuous Gregor Abbott  mL/hr at 09/27/17 1440      acyclovir (ZOVIRAX) 900 mg in dextrose 5 % 250 mL IVPB  900 mg Intravenous Q8H Venessa Reyes  mL/hr at 09/28/17 1205 900 mg at 09/28/17 1205    atorvastatin tablet 20 mg  20 mg Oral QHS Maren Li MD   20 mg at 09/27/17 2057    carvedilol tablet 25 mg  25 mg Oral BID WM Maren Li MD   25 mg at 09/28/17 1206    dextrose 50% injection 12.5 g  12.5 g Intravenous PRN Maren Li MD        dextrose 50% injection 25 g  25 g Intravenous PRN Maren Li MD        enoxaparin injection 40 mg  40 mg Subcutaneous Daily Maren Li MD   40 mg at 09/27/17 1723    glucagon (human recombinant) injection 1 mg  1 mg Intramuscular PRN Maren Li MD        glucose chewable tablet 16 g  16 g Oral PRN Maren Li MD        glucose chewable tablet 24 g  24 g Oral PRN Maren Li MD        lacosamide tablet 200 mg  200 mg Oral BID Maren Li MD   200 mg at 09/28/17 1206    lidocaine HCL 10 mg/ml (1%) injection 1 mL  1 mL Other Once PRN Gregor Abbott MD        lidocaine HCL 10 mg/ml (1%) injection 5 mL  5 mL Other Once Gregor Abbott MD   Stopped at 09/27/17 2330    magnesium sulfate 3 g in dextrose 5 % 250 mL IVPB  3 g Intravenous Once Gregor Abbott MD 62.5 mL/hr at 09/28/17 1224 3 g at 09/28/17 1224    quetiapine tablet 25 mg  25 mg Oral QHS Maren iL MD   25 mg at 09/27/17 2057    thiamine tablet 100 mg  100 mg Oral Daily Maren Li MD   100 mg at 09/28/17 1206     Review of Systems   Unable to perform ROS: Mental status change  "  Psychiatric/Behavioral: Positive for confusion and decreased concentration.     Objective:     Vital Signs (Most Recent):  Temp: 97.1 °F (36.2 °C) (09/28/17 1218)  Pulse: 72 (09/28/17 1453)  Resp: 18 (09/28/17 1453)  BP: (!) 153/86 (09/28/17 1453)  SpO2: 96 % (09/28/17 1453) Vital Signs (24h Range):  Temp:  [97.1 °F (36.2 °C)-98.8 °F (37.1 °C)] 97.1 °F (36.2 °C)  Pulse:  [63-88] 72  Resp:  [11-20] 18  SpO2:  [3 %-100 %] 96 %  BP: (127-172)/(61-99) 153/86     Weight: 121.8 kg (268 lb 8 oz)  Body mass index is 31.03 kg/m².    Physical Exam   Constitutional: He appears well-developed and well-nourished. No distress.   HENT:   Head: Normocephalic and atraumatic.   Neck: Neck supple.   Pulmonary/Chest: Effort normal.   Skin: He is not diaphoretic.     NEUROLOGICAL EXAMINATION:     MENTAL STATUS   Oriented to person.   Attention: decreased. Concentration: decreased.   Level of consciousness: alert  Abnormal comprehension.        Unable to state wife's name, but called her "the wife"    Unable to repeat or name objects (key, earring etc) called everything a stone after hearing the phrase "does a stone float on water?"     Significant Labs:   Hemoglobin A1c: No results for input(s): HGBA1C in the last 720 hours.  Blood Culture: No results for input(s): LABBLOO in the last 48 hours.  CBC:   Recent Labs  Lab 09/27/17  0436 09/28/17  0300   WBC 7.53 5.82   HGB 10.1* 10.2*   HCT 31.3* 30.3*   * 395*     CMP:   Recent Labs  Lab 09/27/17  0436 09/28/17  0300   GLU 99 87    142   K 4.1 3.5    108   CO2 26 26   BUN 6 6   CREATININE 0.9 0.8   CALCIUM 9.3 9.0   MG  --  1.5*   PROT 6.9  --    ALBUMIN 2.5*  --    BILITOT 0.4  --    ALKPHOS 158*  --    AST 18  --    ALT 27  --    ANIONGAP 8 8   EGFRNONAA >60.0 >60.0     Inflammatory Markers: No results for input(s): SEDRATE, CRP, PROCAL in the last 48 hours.  Respiratory Culture: No results for input(s): GSRESP, RESPIRATORYC in the last 48 hours.  Urine Culture: No " results for input(s): LABURIN in the last 48 hours.  Urine Studies: No results for input(s): COLORU, APPEARANCEUA, PHUR, SPECGRAV, PROTEINUA, GLUCUA, KETONESU, BILIRUBINUA, OCCULTUA, NITRITE, UROBILINOGEN, LEUKOCYTESUR, RBCUA, WBCUA, BACTERIA, SQUAMEPITHEL, HYALINECASTS in the last 48 hours.    Invalid input(s): WRIGHTSUR  All pertinent lab results     EEG (9/27/17): Wilda  EEG FINDINGS:  Recording was obtained at the patient's bedside in the hospital   room.  The patient was awake and moving around spontaneously and interacting   with the nursing personnel.  Background was very disorganized.  There were brief   segments in which an 8 Hz posterior dominant rhythm was seen in the occipital   regions bilaterally.  In general, the background over the 2 hemispheres is slow   consisting of an irregular theta and delta frequencies.  The delta is more   prominent over the left hemisphere, but maximally noted in the temporal leads.    No spike or sharp wave activity was seen.  Activation procedures were not   carried out.     IMPRESSION:  Markedly abnormal EEG.  There was slowing and disorganization of   the background rhythms indicating the presence of a moderate generalized   encephalopathy.  In addition, there is focal slowing, which is a polymorphic   delta noted over the left hemisphere, maximal in the temporal leads.  This would   indicate a lateralized structural process and a polymorphic character of the   waveforms would suggest subcortical, particularly white matter involvement.  No   epileptic activity was seen.     CLINICAL CORRELATION:  The patient is a 61-year-old male who was transferred   from an outside hospital for altered mental status and the patient apparently   has experienced a tonic-clonic seizure.  MRI shows left temporal lobe edema,   which correlates well with the findings on this EEG, in which there is evidence   for structural dysfunction in the left hemisphere, maximum in the temporal  area.  In this recording, there was no evidence for an irritative process.    Significant Imaging: I have reviewed and interpreted all pertinent imaging results/findings within the past 24 hours.     Cerebral angiogram (9/28/17): preliminary   Impression: Narrowing of small arteries involving the posterior left MCA territory. It is unclear whether is this a primary process or sequelae of swelling noted on MRI.  If it is to be a considered a primary process then vasculitis could be questioned, however no other region of vascular abnormality is identified to support this.

## 2017-09-28 NOTE — SUBJECTIVE & OBJECTIVE
Interval History: doing well today; planning for brain angiogram and LP in the AM; cont acyclovir, ID and neurosurgery on board    Review of Systems   Constitutional: Negative for activity change and appetite change.   HENT: Negative for drooling and facial swelling.    Eyes: Negative for discharge and itching.   Respiratory: Negative for cough, shortness of breath and wheezing.    Cardiovascular: Negative for chest pain and palpitations.   Gastrointestinal: Negative for abdominal pain and nausea.   Genitourinary: Negative for difficulty urinating and dysuria.   Skin: Negative for color change and pallor.   Neurological: Negative for dizziness and numbness.   Psychiatric/Behavioral: Negative for behavioral problems and confusion.     Objective:     Vital Signs (Most Recent):  Temp: 98 °F (36.7 °C) (09/28/17 1738)  Pulse: 75 (09/28/17 1738)  Resp: 16 (09/28/17 1738)  BP: 135/84 (09/28/17 1738)  SpO2: 95 % (09/28/17 1738) Vital Signs (24h Range):  Temp:  [97.1 °F (36.2 °C)-98.8 °F (37.1 °C)] 98 °F (36.7 °C)  Pulse:  [63-88] 75  Resp:  [11-20] 16  SpO2:  [3 %-100 %] 95 %  BP: (127-172)/(61-99) 135/84     Weight: 121.8 kg (268 lb 8 oz)  Body mass index is 31.03 kg/m².  No intake or output data in the 24 hours ending 09/28/17 1750   Physical Exam   Constitutional: He is oriented to person, place, and time. He appears well-developed and well-nourished.   HENT:   Head: Normocephalic and atraumatic.   Eyes: Conjunctivae are normal. Pupils are equal, round, and reactive to light.   Neck: Normal range of motion. No thyromegaly present.   Cardiovascular: Normal rate, regular rhythm and normal heart sounds.    Pulmonary/Chest: Effort normal and breath sounds normal.   Abdominal: Soft. He exhibits no distension. There is no tenderness.   Neurological: He is alert and oriented to person, place, and time. Coordination normal.   wernickes aphasia   Skin: No rash noted. No erythema.       MELD-Na score: 8 at 8/12/2017  4:50 AM  MELD  score: 8 at 8/12/2017  4:50 AM  Calculated from:  Serum Creatinine: 0.80 mg/dL (Rounded to 1) at 8/12/2017  4:50 AM  Serum Sodium: 139 mmol/L (Rounded to 137) at 8/12/2017  4:50 AM  Total Bilirubin: 1.2 mg/dL at 8/12/2017  4:50 AM  INR(ratio): 1.1 at 8/11/2017  4:10 PM  Age: 61 years    Significant Labs:  CBC:    Recent Labs  Lab 09/27/17  0436 09/28/17  0300   WBC 7.53 5.82   HGB 10.1* 10.2*   HCT 31.3* 30.3*   * 395*     CMP:    Recent Labs  Lab 09/27/17 0436 09/28/17  0300    142   K 4.1 3.5    108   CO2 26 26   GLU 99 87   BUN 6 6   CREATININE 0.9 0.8   CALCIUM 9.3 9.0   PROT 6.9  --    ALBUMIN 2.5*  --    BILITOT 0.4  --    ALKPHOS 158*  --    AST 18  --    ALT 27  --    ANIONGAP 8 8   EGFRNONAA >60.0 >60.0     PTINR:  No results for input(s): INR in the last 48 hours.    Significant Procedures:   Dobutamine Stress Test with Color Flow: No results found for this or any previous visit.

## 2017-09-28 NOTE — SUBJECTIVE & OBJECTIVE
Interval History: patient had brain angiogram done today, results pending; anesthesia planning on coming by and doing the LP today and csf will be sent off for several serologies; wife at the bedside;     Review of Systems   Constitutional: Negative for activity change and appetite change.   HENT: Negative for drooling and facial swelling.    Eyes: Negative for discharge and itching.   Respiratory: Negative for cough, shortness of breath and wheezing.    Cardiovascular: Negative for chest pain and palpitations.   Gastrointestinal: Negative for abdominal pain and nausea.   Genitourinary: Negative for difficulty urinating and dysuria.   Skin: Negative for color change and pallor.   Neurological: Negative for dizziness and numbness.   Psychiatric/Behavioral: Negative for behavioral problems and confusion.     Objective:     Vital Signs (Most Recent):  Temp: 98 °F (36.7 °C) (09/28/17 1738)  Pulse: 75 (09/28/17 1738)  Resp: 16 (09/28/17 1738)  BP: 135/84 (09/28/17 1738)  SpO2: 95 % (09/28/17 1738) Vital Signs (24h Range):  Temp:  [97.1 °F (36.2 °C)-98.8 °F (37.1 °C)] 98 °F (36.7 °C)  Pulse:  [63-88] 75  Resp:  [11-20] 16  SpO2:  [3 %-100 %] 95 %  BP: (127-172)/(61-99) 135/84     Weight: 121.8 kg (268 lb 8 oz)  Body mass index is 31.03 kg/m².  No intake or output data in the 24 hours ending 09/28/17 1756   Physical Exam   Constitutional: He is oriented to person, place, and time. He appears well-developed and well-nourished.   HENT:   Head: Normocephalic and atraumatic.   Eyes: Conjunctivae are normal. Pupils are equal, round, and reactive to light.   Neck: Normal range of motion. No thyromegaly present.   Cardiovascular: Normal rate, regular rhythm and normal heart sounds.    Pulmonary/Chest: Effort normal and breath sounds normal.   Abdominal: Soft. He exhibits no distension. There is no tenderness.   Neurological: He is alert and oriented to person, place, and time. Coordination normal.   wernickes aphasia   Skin: No  rash noted. No erythema.       MELD-Na score: 8 at 8/12/2017  4:50 AM  MELD score: 8 at 8/12/2017  4:50 AM  Calculated from:  Serum Creatinine: 0.80 mg/dL (Rounded to 1) at 8/12/2017  4:50 AM  Serum Sodium: 139 mmol/L (Rounded to 137) at 8/12/2017  4:50 AM  Total Bilirubin: 1.2 mg/dL at 8/12/2017  4:50 AM  INR(ratio): 1.1 at 8/11/2017  4:10 PM  Age: 61 years    Significant Labs:  CBC:    Recent Labs  Lab 09/27/17  0436 09/28/17  0300   WBC 7.53 5.82   HGB 10.1* 10.2*   HCT 31.3* 30.3*   * 395*     CMP:    Recent Labs  Lab 09/27/17  0436 09/28/17  0300    142   K 4.1 3.5    108   CO2 26 26   GLU 99 87   BUN 6 6   CREATININE 0.9 0.8   CALCIUM 9.3 9.0   PROT 6.9  --    ALBUMIN 2.5*  --    BILITOT 0.4  --    ALKPHOS 158*  --    AST 18  --    ALT 27  --    ANIONGAP 8 8   EGFRNONAA >60.0 >60.0     PTINR:  No results for input(s): INR in the last 48 hours.    Significant Procedures:   Dobutamine Stress Test with Color Flow: No results found for this or any previous visit.

## 2017-09-28 NOTE — SUBJECTIVE & OBJECTIVE
Interval History: No acute events overnight. Diagnostic cerebral angiogram today.     Medications:  Continuous Infusions:   sodium chloride 0.9% 125 mL/hr at 09/27/17 1440     Scheduled Meds:   acyclovir  900 mg Intravenous Q8H    atorvastatin  20 mg Oral QHS    carvedilol  25 mg Oral BID WM    enoxaparin  40 mg Subcutaneous Daily    lacosamide  200 mg Oral BID    lidocaine HCL 10 mg/ml (1%)  5 mL Other Once    magnesium sulfate IVPB  3 g Intravenous Once    quetiapine  25 mg Oral QHS    thiamine  100 mg Oral Daily     PRN Meds:dextrose 50%, dextrose 50%, glucagon (human recombinant), glucose, glucose, lidocaine HCL 10 mg/ml (1%)     Review of Systems  Objective:     Weight: 121.8 kg (268 lb 8 oz)  Body mass index is 31.03 kg/m².  Vital Signs (Most Recent):  Temp: 97.1 °F (36.2 °C) (09/28/17 1218)  Pulse: 78 (09/28/17 1237)  Resp: 16 (09/28/17 1218)  BP: (!) 148/95 (09/28/17 1237)  SpO2: 96 % (09/28/17 1237) Vital Signs (24h Range):  Temp:  [97.1 °F (36.2 °C)-98.8 °F (37.1 °C)] 97.1 °F (36.2 °C)  Pulse:  [63-88] 78  Resp:  [11-20] 16  SpO2:  [3 %-100 %] 96 %  BP: (136-172)/(61-99) 148/95       Neurosurgery Physical Exam  Vital signs: reviewed above.   Constitutional: well-developed, no apparent distress  Abdomen soft, non-tender  Cardiovascular: regular rate and rhythm  Respiratory: non-distressed on room air   Extremities: groin soft, no e/o hematoma. Dorsalis pedis 2/4  Neurological  GCS 15  Alert and oriented  Speech/Language: expressive aphasia  Head: normocephalic, atraumatic   PERRL, EOMI, Visual fields intact.   Facial expression symmetric  Tongue midline  Spontaneously moves all extremities;   Strength       RUE: 5/5       RLE: 5/5       LUE: 5/5        LLE: 5/5  Tone: no spasticity, no rigidity, no clonus, no atrophy  Pronator Drift: neg   DTRs 2/4 in all extremities   Sensation to Light touch: intact  Coordination: Finger-to-nose intact    Significant Labs:    Recent Labs  Lab 09/27/17  4135  09/28/17  0300   GLU 99 87    142   K 4.1 3.5    108   CO2 26 26   BUN 6 6   CREATININE 0.9 0.8   CALCIUM 9.3 9.0   MG  --  1.5*       Recent Labs  Lab 09/27/17  0436 09/28/17  0300   WBC 7.53 5.82   HGB 10.1* 10.2*   HCT 31.3* 30.3*   * 395*     All pertinent labs from the last 24 hours have been reviewed.    Significant Diagnostics:  Narrowing of small arteries involving the posterior left MCA territory.  It is unclear whether is this a primary process or sequelae of swelling noted on MRI.  If it is to be a considered a primary process then vasculitis could be questioned, however no other region of vascular abnormality is identified to support this.  Diego Rivero  Date: 09/28/17  Time: 12:05

## 2017-09-28 NOTE — PROGRESS NOTES
"[facility]  Infectious Disease  Progress Note    Patient Name: Tommy Way  MRN: [id number]  Admission Date: 9/26/2017  Length of Stay: 2 days  Attending Physician: Gregor Abbott MD  Primary Care Provider: Adan Torres MD    Isolation Status: No active isolations  Assessment/Plan:      Aspiration pneumonia of both lower lobes due to vomit    Patient likely aspirated during period of AMS. Chest X ray with resolved findings of pneumonia. Patient past course with zosyn and vancomycin covered empirically. No need for additional treatment at this time.          Encephalitis and encephalomyelitis, unspecified    Patient with extensive work up done for encephalitis. MRI suggestive for HSV as etiology with "significant increase in the T2 signal in the left temporal lobe particularly increased in the left posterior parietal lobe with some serpiginous meningeal enhancement suspicious for encephalitis. Punctate areas of diffusion signal in the left posterior parietal lobe suspicious for punctate areas of acute ischemia versus" So far HSV PCR done on the 9/11/17 LP was negative this study is highly sensitive but false negatives can occur. [place] IgM and IgG also done with negative results. Patient also had HIV, ANCA, Hepatitis panel, VDRL, Arbovirus panel with negative results. Only positive results quantiferon gold and Influenza virus not explaining MRI findings or current neurological state. Patient CSF from both LPs with mildly elevated protein, normal glucose and no significant WBC, non diagnostic. Dificult to determine source of encephalopathy at this time. Will recommend repeat LP prior to brain biopsy. Along with routine serology for CSF order repeat HSV PCR, get TB PCR from CSF, enterovirus panel and repeat arbovirus panel. Would also recommend FTA to evaluate if past syphilis exposure. Depending on results obtained from new LP sample would make recommendation for brain biopsy. Continue " acyclovir since can't rule out HSV at this time. All blood cultures and CSF cultures negative to date for growth.      Plan  - Will follow up with LP results of CSF do HSV PCR, arboviral panel, TB PCR, enterovirus panel, and FTA   - continue acyclovir             Anticipated Disposition: as above    Thank you for your consult. I will follow-up with patient. Please contact us if you have any additional questions.    Timo Livingston MD  Infectious Disease  [facility]    Subjective:     Principal Problem:Encephalopathy    HPI: Case of 62 y/o male PMHX arthritis, AHTN, admitted on 9/26/17 transferred from [facility]. Patient originally presented to OSH due to 2 episodes of witnessed seizures at home. Patient was at that time intubated in the ED due to desaturation and AMS for airway protection. On 9/11 MRI done showed temporal lobe abnormalities consistent with encephalitis. Patient had LP done as well non diagnostic. Patient at that time started on acyclovir. 9/14/17 patient was extubated. Had been on a course of vancomycin, zosyn for aspiration pneumonia and acyclovir for suspicion of HSV meningitis. 9/19/17 LP repeated non diagnostic once more. Patient had extensive work up done with HSV PCR that was negative, VDRL, HIV and arbovirus panel with no positive results. Quantiferon gold was positive as well as influenza panel. ID consulted at [facility] this time after transfer for further recommendations, patient had been followed by Dr. Reyes at [facility]. Wife denies recent travel outside of the country, fevers, chills, recent viral illness or new rashes. Denied pets in the house. Mentions having contact with small children that have not been reported ill at this time.      Past Medical History:   Diagnosis Date    Acid reflux     Arthritis     Elevated cholesterol     Hypertension        Past Surgical History:   Procedure Laterality Date    JOINT REPLACEMENT Right 04/20/2017    uneventful recovery     KNEE ARTHROPLASTY Right 02/2007    WISDOM TOOTH EXTRACTION         Review of patient's allergies indicates:  No Known Allergies    Medications:  Prescriptions Prior to Admission   Medication Sig    aspirin (ECOTRIN) 81 MG EC tablet Take 81 mg by mouth once daily.    atorvastatin (LIPITOR) 20 MG tablet Take 20 mg by mouth every evening.    carvedilol (COREG) 25 MG tablet Take 25 mg by mouth 2 (two) times daily with meals.    cyclobenzaprine (FLEXERIL) 10 MG tablet Take 10 mg by mouth 3 (three) times daily.    irbesartan-hydrochlorothiazide (AVALIDE) 150-12.5 mg per tablet Take 1 tablet by mouth once daily.     levetiracetam (KEPPRA) 500 MG Tab Take 500 mg by mouth 2 (two) times daily.    niacin (NIASPAN) 1000 MG CR tablet Take 1,000 mg by mouth once daily.    omeprazole (PRILOSEC) 40 MG capsule Take 40 mg by mouth once daily.     Antibiotics     None        Antifungals     None        Antivirals         Stop Route Frequency     acyclovir (ZOVIRAX) injection      -- IV Every 8 hours (non-standard times)             There is no immunization history on file for this patient.    Family History     Problem Relation (Age of Onset)    Cancer Mother    Diabetes Father        Social History     Social History    Marital status:      Spouse name: N/A    Number of children: N/A    Years of education: N/A     Social History Main Topics    Smoking status: Former Smoker    Smokeless tobacco: Never Used      Comment: quit 17 yrs ago    Alcohol use 16.2 oz/week     3 Glasses of wine, 24 Cans of beer per week    Drug use: No    Sexual activity: Yes     Partners: Female     Birth control/ protection: Other-see comments      Comment: same partner many years; wife is postmenopausal     Other Topics Concern    None     Social History Narrative    None     Review of Systems   Constitutional: Negative for chills, diaphoresis, fatigue and fever.   HENT: Negative for congestion, drooling, ear discharge, mouth  sores and rhinorrhea.    Gastrointestinal: Negative for abdominal distention, abdominal pain, diarrhea, nausea and vomiting.   Skin: Negative for rash.   Neurological: Positive for seizures and speech difficulty.   Psychiatric/Behavioral: Positive for confusion and hallucinations.     Objective:     Vital Signs (Most Recent):  Temp: 97.1 °F (36.2 °C) (09/28/17 1218)  Pulse: 80 (09/28/17 1328)  Resp: 18 (09/28/17 1328)  BP: 127/79 (09/28/17 1328)  SpO2: 95 % (09/28/17 1328) Vital Signs (24h Range):  Temp:  [97.1 °F (36.2 °C)-98.8 °F (37.1 °C)] 97.1 °F (36.2 °C)  Pulse:  [63-88] 80  Resp:  [11-20] 18  SpO2:  [3 %-100 %] 95 %  BP: (127-172)/(61-99) 127/79     Weight: 121.8 kg (268 lb 8 oz)  Body mass index is 31.03 kg/m².    Estimated Creatinine Clearance: 143.9 mL/min (based on SCr of 0.8 mg/dL).    Physical Exam   Constitutional: He appears well-developed and well-nourished.   HENT:   Head: Normocephalic and atraumatic.   Eyes: EOM are normal.   Neck: Normal range of motion. Neck supple.   Cardiovascular: Normal rate, regular rhythm and normal heart sounds.    Pulmonary/Chest: Effort normal and breath sounds normal.   Abdominal: Soft. Bowel sounds are normal. He exhibits no distension. There is no tenderness. There is no guarding.   Neurological: He is alert.   Skin: No rash noted.       Significant Labs:   Blood Culture:     Recent Labs  Lab 09/09/17  2040   LABBLOO No growth after 5 days.     BMP:     Recent Labs  Lab 09/28/17  0300   GLU 87      K 3.5      CO2 26   BUN 6   CREATININE 0.8   CALCIUM 9.0   MG 1.5*     CBC:     Recent Labs  Lab 09/27/17  0436 09/28/17  0300   WBC 7.53 5.82   HGB 10.1* 10.2*   HCT 31.3* 30.3*   * 395*     CSF:     Recent Labs  Lab 09/11/17  1047   CSFCULTURE No Growth     Hepatitis Panel: No results for input(s): HEPBSAG, HEPAIGM, HEPCAB in the last 48 hours.    Invalid input(s): HAPBIGM  HIV 1/2 Antibodies: No results for input(s): [id number] in the last 48  hours.  Lactic Acid: No results for input(s): LACTATE in the last 48 hours.  Microbiology Results (last 7 days)     Procedure Component Value Units Date/Time    CSF culture [[id number]]     Order Status:  No result Specimen:  CSF (Spinal Fluid)         Urine Culture:     Recent Labs  Lab 09/15/17  1216 09/18/17  1526   LABURIN No growth No growth       Significant Imaging: I have reviewed all pertinent imaging results/findings within the past 24 hours.

## 2017-09-28 NOTE — ASSESSMENT & PLAN NOTE
- cont acyclovir; ID and neurosurgery on board; planning for brain angiogram and LP in the AM    9/28- angiogram done today, LP to be done as well; cont acyclovir; appreciate recs

## 2017-09-28 NOTE — ASSESSMENT & PLAN NOTE
"Patient with extensive work up done for encephalitis. MRI suggestive for HSV as etiology with "significant increase in the T2 signal in the left temporal lobe particularly increased in the left posterior parietal lobe with some serpiginous meningeal enhancement suspicious for encephalitis. Punctate areas of diffusion signal in the left posterior parietal lobe suspicious for punctate areas of acute ischemia versus" So far HSV PCR done on the 9/11/17 LP was negative this study is highly sensitive but false negatives can occur. [place] IgM and IgG also done with negative results. Patient also had HIV, ANCA, Hepatitis panel, VDRL, Arbovirus panel with negative results. Only positive results quantiferon gold and Influenza virus not explaining MRI findings or current neurological state. Patient CSF from both LPs with mildly elevated protein, normal glucose and no significant WBC, non diagnostic. Dificult to determine source of encephalopathy at this time. Will recommend repeat LP prior to brain biopsy. Along with routine serology for CSF order repeat HSV PCR, get TB PCR from CSF, enterovirus panel and repeat arbovirus panel. Would also recommend FTA to evaluate if past syphilis exposure. Depending on results obtained from new LP sample would make recommendation for brain biopsy. Continue acyclovir since can't rule out HSV at this time. All blood cultures and CSF cultures negative to date for growth.      Plan  - Will follow up with LP results of CSF do HSV PCR, arboviral panel, TB PCR, enterovirus panel, and FTA   - continue acyclovir   "

## 2017-09-28 NOTE — PLAN OF CARE
Problem: Patient Care Overview  Goal: Plan of Care Review  Outcome: Ongoing (interventions implemented as appropriate)  Plan of care reviewed with pt and spouse at bedside, pt unable to verbalize understanding or acceptance, Pt displays some expressive aphasia and word finding difficulties, pleasant calm and cooperative.  Pt free from falls or injury. No new skin breakdown noted. Pt had 1 BM this shift. Pt has been NPO since 0000 in preparation for angiogram today.   VS stable throughout shift. No s/sx of distress noted, slept throughout shift. [facility]    Temp:  [97.7 °F (36.5 °C)-98.8 °F (37.1 °C)]   Pulse:  [84-88]   Resp:  [18]   BP: (137-168)/(88-94)   SpO2:  [93 %-95 %]

## 2017-09-28 NOTE — PROGRESS NOTES
[facility] Hospital Medicine  Progress Note    Patient Name: Tommy Way  MRN: [id number]  Patient Class: IP- Inpatient   Admission Date: 9/26/2017  Length of Stay: 2 days  Attending Physician: Gregor Abbott MD  Primary Care Provider: Adan Torres MD    [facility] MED A Gregor Abbott MD    Subjective:     Principal Problem:Encephalopathy    HPI:  History is limited as patient is unable to provide given altered mentation. A 61-year-old man with recently diagnosed tonic clonic seizure disorder,  HTN and dyslipidemia admitted to [facility] after 5 witnessed seizures 9/10/17. He was initially intubated on presentation for low sats and inability to protect airway, extubated 9/14. 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 He was treated with Acyclovir and ultimately underwent 2 lumbar punctures which have been unrevealing. HSV PCR from first LP is negative. After he failed to improve significantly, diagnostic imaging was done and showed expansion of the meningeal enhancement as well as possible pathologic spinal compression fractures. [facility] discussed case with Dr. Sweeney for concern of possible malignancy and need for tissue diagnosis. On interview, patient is alert but oriented to self only. Attempts to answer questions but inappropriately and is confused. Follows commands. Denies any complaints.     Hospital Course:  No notes on file    Interval History: patient had brain angiogram done today, results pending; anesthesia planning on coming by and doing the LP today and csf will be sent off for several serologies; wife at the bedside;     Review of Systems   Constitutional: Negative for activity change and appetite change.   HENT: Negative for drooling and facial swelling.    Eyes: Negative for discharge and itching.   Respiratory: Negative for cough, shortness of breath and wheezing.     Cardiovascular: Negative for chest pain and palpitations.   Gastrointestinal: Negative for abdominal pain and nausea.   Genitourinary: Negative for difficulty urinating and dysuria.   Skin: Negative for color change and pallor.   Neurological: Negative for dizziness and numbness.   Psychiatric/Behavioral: Negative for behavioral problems and confusion.     Objective:     Vital Signs (Most Recent):  Temp: 98 °F (36.7 °C) (09/28/17 1738)  Pulse: 75 (09/28/17 1738)  Resp: 16 (09/28/17 1738)  BP: 135/84 (09/28/17 1738)  SpO2: 95 % (09/28/17 1738) Vital Signs (24h Range):  Temp:  [97.1 °F (36.2 °C)-98.8 °F (37.1 °C)] 98 °F (36.7 °C)  Pulse:  [63-88] 75  Resp:  [11-20] 16  SpO2:  [3 %-100 %] 95 %  BP: (127-172)/(61-99) 135/84     Weight: 121.8 kg (268 lb 8 oz)  Body mass index is 31.03 kg/m².  No intake or output data in the 24 hours ending 09/28/17 1756   Physical Exam   Constitutional: He is oriented to person, place, and time. He appears well-developed and well-nourished.   HENT:   Head: Normocephalic and atraumatic.   Eyes: Conjunctivae are normal. Pupils are equal, round, and reactive to light.   Neck: Normal range of motion. No thyromegaly present.   Cardiovascular: Normal rate, regular rhythm and normal heart sounds.    Pulmonary/Chest: Effort normal and breath sounds normal.   Abdominal: Soft. He exhibits no distension. There is no tenderness.   Neurological: He is alert and oriented to person, place, and time. Coordination normal.   wernickes aphasia   Skin: No rash noted. No erythema.       MELD-Na score: 8 at 8/12/2017  4:50 AM  MELD score: 8 at 8/12/2017  4:50 AM  Calculated from:  Serum Creatinine: 0.80 mg/dL (Rounded to 1) at 8/12/2017  4:50 AM  Serum Sodium: 139 mmol/L (Rounded to 137) at 8/12/2017  4:50 AM  Total Bilirubin: 1.2 mg/dL at 8/12/2017  4:50 AM  INR(ratio): 1.1 at 8/11/2017  4:10 PM  Age: 61 years    Significant Labs:  CBC:    Recent Labs  Lab 09/27/17  0436 09/28/17  0300   WBC 7.53 5.82    HGB 10.1* 10.2*   HCT 31.3* 30.3*   * 395*     CMP:    Recent Labs  Lab 09/27/17  0436 09/28/17  0300    142   K 4.1 3.5    108   CO2 26 26   GLU 99 87   BUN 6 6   CREATININE 0.9 0.8   CALCIUM 9.3 9.0   PROT 6.9  --    ALBUMIN 2.5*  --    BILITOT 0.4  --    ALKPHOS 158*  --    AST 18  --    ALT 27  --    ANIONGAP 8 8   EGFRNONAA >60.0 >60.0     PTINR:  No results for input(s): INR in the last 48 hours.    Significant Procedures:   Dobutamine Stress Test with Color Flow: No results found for this or any previous visit.      Assessment/Plan:      * Encephalopathy    Pt alert but oriented to self only. Follows commands but use of words not appropriate. 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 He was treated with acyclovir and ultimately underwent 2 lumbar punctures which have been unrevealing. HSV PCR from first LP is negative. Additional viral studies apparently unremarkable. After he failed to improve significantly, diagnostic imaging was done and showed expansion of the meningeal enhancement as well as possible pathologic spnal compression fractures. C discussed case with Dr. Sweeney for concern of possible malignancy and need for tissue diagnosis. Dr Ware requested HSV PCR on 2nd LP sample if available. Continue acyclovir. Consult Neuro. Consult Neurosurgery for consideration of biopsy for tissue diagnosis. CT head with & w/o contrast ordered. Seizure precautions. Neuro checks.         T11 vertebral fracture    OSH MRI with acute compression fracture at T11. Mild compression fracture of L4. Old compression fractures at L1, T4, T5. No neuro deficits noted on exam other than altered mentation. Neurosurgery consult as above.           Aspiration pneumonia of both lower lobes due to vomit    Treated with Zosyn for 11 days at OSH; will not continue abx. Respiratory status, VS stable.           Encephalitis and  encephalomyelitis, unspecified    - cont acyclovir; ID and neurosurgery on board; planning for brain angiogram and LP in the AM    9/28- angiogram done today, LP to be done as well; cont acyclovir; appreciate recs        Status epilepticus, generalized convulsive    Likely secondary to encephalitis. No further seizures since initial presentation. Pt had recently been dx'ed with seizure d/o prior to admission. Started on lacosamide at OSH, will resume. Seizure precautions. Neuro consult.         Hypertension    's. Resume home carvedilol           VTE Risk Mitigation         Ordered     enoxaparin injection 40 mg  Daily     Route:  Subcutaneous        09/27/17 0029     Medium Risk of VTE  Once      09/27/17 0029              Gregor Abbott MD  Department of Hospital Medicine   [facility]

## 2017-09-28 NOTE — PROGRESS NOTES
[facility]  Neurosurgery  Progress Note    Subjective:     History of Present Illness: Patient is a transfer from [facility] who presented for tonic-clonic seizure and AMS on 9/10 per OSH. CTH and follow-up MRI demonstrated L posterior temporal and occipital lobe lesion. 2x LP with analysis were negative for HSV, VZV, syphillis and HHV-6 were all negative and differential was unremarkable. Thorough autoimmune and infectious testing was also completed and returned without result. Patient is transferred to [facility] for further diagnostic work-up per NSGY and neurology. He remains altered on mentation with otherwise non-focal exam.    Post-Op Info:  Procedure(s) (LRB):  ANGIOGRAM-CEREBRAL for Dr. Brennan Lynn (Bilateral)         Interval History: No acute events overnight. Diagnostic cerebral angiogram today.     Medications:  Continuous Infusions:   sodium chloride 0.9% 125 mL/hr at 09/27/17 1440     Scheduled Meds:   acyclovir  900 mg Intravenous Q8H    atorvastatin  20 mg Oral QHS    carvedilol  25 mg Oral BID WM    enoxaparin  40 mg Subcutaneous Daily    lacosamide  200 mg Oral BID    lidocaine HCL 10 mg/ml (1%)  5 mL Other Once    magnesium sulfate IVPB  3 g Intravenous Once    quetiapine  25 mg Oral QHS    thiamine  100 mg Oral Daily     PRN Meds:dextrose 50%, dextrose 50%, glucagon (human recombinant), glucose, glucose, lidocaine HCL 10 mg/ml (1%)     Review of Systems  Objective:     Weight: 121.8 kg (268 lb 8 oz)  Body mass index is 31.03 kg/m².  Vital Signs (Most Recent):  Temp: 97.1 °F (36.2 °C) (09/28/17 1218)  Pulse: 78 (09/28/17 1237)  Resp: 16 (09/28/17 1218)  BP: (!) 148/95 (09/28/17 1237)  SpO2: 96 % (09/28/17 1237) Vital Signs (24h Range):  Temp:  [97.1 °F (36.2 °C)-98.8 °F (37.1 °C)] 97.1 °F (36.2 °C)  Pulse:  [63-88] 78  Resp:  [11-20] 16  SpO2:  [3 %-100 %] 96 %  BP: (136-172)/(61-99) 148/95       Neurosurgery Physical Exam  Vital signs: reviewed above.    Constitutional: well-developed, no apparent distress  Abdomen soft, non-tender  Cardiovascular: regular rate and rhythm  Respiratory: non-distressed on room air   Extremities: groin soft, no e/o hematoma. Dorsalis pedis 2/4  Neurological  GCS 15  Alert and oriented  Speech/Language: expressive aphasia  Head: normocephalic, atraumatic   PERRL, EOMI, Visual fields intact.   Facial expression symmetric  Tongue midline  Spontaneously moves all extremities;   Strength       RUE: 5/5       RLE: 5/5       LUE: 5/5        LLE: 5/5  Tone: no spasticity, no rigidity, no clonus, no atrophy  Pronator Drift: neg   DTRs 2/4 in all extremities   Sensation to Light touch: intact  Coordination: Finger-to-nose intact    Significant Labs:    Recent Labs  Lab 09/27/17  0436 09/28/17  0300   GLU 99 87    142   K 4.1 3.5    108   CO2 26 26   BUN 6 6   CREATININE 0.9 0.8   CALCIUM 9.3 9.0   MG  --  1.5*       Recent Labs  Lab 09/27/17  0436 09/28/17  0300   WBC 7.53 5.82   HGB 10.1* 10.2*   HCT 31.3* 30.3*   * 395*     All pertinent labs from the last 24 hours have been reviewed.    Significant Diagnostics:  Narrowing of small arteries involving the posterior left MCA territory.  It is unclear whether is this a primary process or sequelae of swelling noted on MRI.  If it is to be a considered a primary process then vasculitis could be questioned, however no other region of vascular abnormality is identified to support this.  Diego Rivero  Date: 09/28/17  Time: 12:05    Assessment/Plan:     Encephalitis and encephalomyelitis, unspecified    61M who presents from OSH with presenting sx on 9/10 of AMS and tonic-clonic seizure for further work-up.    -- s/p diagnostic cerebral angiogram: narrowing of left distal MCA arteries possibly d/t vasculitis or secondary to cerebral edema  -- rec lumbar puncture for infectious workup per ID  -- will continue to follow for possible biopsy if CSF studies non-diagnostic    --Continue MNGT per IM            Tu Guerrero D.O.  Neurological Surgery  [facility] [place] [facility]

## 2017-09-28 NOTE — PT/OT/SLP EVAL
Speech Language Pathology      Tommy Way   721/721 A    MRN: [id number]    Patient not seen today secondary to Nursing hold (Comment) (2nd attempt to see this date. Per RN, pt must remain flat on back until 3pm. ). Will follow-up 9/29/17 if medically stable.     FILIPE Ying, CCC-SLP  [phone]  9/28/2017

## 2017-09-28 NOTE — MEDICAL/APP STUDENT
[facility]  Infectious Disease  Consult Note    Patient Name: Tommy Way  MRN: [id number]  Admission Date: 9/26/2017  Hospital Length of Stay: 2 days  Attending Physician: Gregor Abbott MD  Primary Care Provider: Adan Torres MD     Isolation Status: No active isolations      Consults  Assessment/Plan:     Assessment:  Patient is currently stable, he remains afebrile with normal WBC.     Plan:  Encephalopathy  - Continue acyclovir dude to possible HSV infection (started 9/12)   - Will repeat LP with CSF analysis (HSV, arbovirus, TB, enterovirus)  - FTA IgG/IgM ordered  - IR to assess for vasculitis via angiogram     Active Diagnoses:    Diagnosis Date Noted POA    PRINCIPAL PROBLEM:  Encephalopathy [G93.40] 09/26/2017 Yes    T11 vertebral fracture [S22.089A] 09/21/2017 Yes    Aspiration pneumonia of both lower lobes due to vomit [J69.0] 09/10/2017 Yes    Encephalitis and encephalomyelitis, unspecified [G04.90] 09/10/2017 Yes    Status epilepticus, generalized convulsive [G40.901] 09/09/2017 Yes    Hypertension [I10] 04/20/2017 Yes      Problems Resolved During this Admission:    Diagnosis Date Noted Date Resolved POA         Thank you for your consult, we will continue following the patient.     Miguel Banegas  Infectious Disease  [facility]    Subjective:     Principal Problem: Encephalopathy    HPI: See HPI    Past Medical History:   Diagnosis Date    Acid reflux     Arthritis     Elevated cholesterol     Hypertension        Past Surgical History:   Procedure Laterality Date    JOINT REPLACEMENT Right 04/20/2017    uneventful recovery    KNEE ARTHROPLASTY Right 02/2007    WISDOM TOOTH EXTRACTION         Review of patient's allergies indicates:  No Known Allergies    Medications:  Prescriptions Prior to Admission   Medication Sig    aspirin (ECOTRIN) 81 MG EC tablet Take 81 mg by mouth once daily.    atorvastatin (LIPITOR) 20 MG tablet Take 20 mg by  mouth every evening.    carvedilol (COREG) 25 MG tablet Take 25 mg by mouth 2 (two) times daily with meals.    cyclobenzaprine (FLEXERIL) 10 MG tablet Take 10 mg by mouth 3 (three) times daily.    irbesartan-hydrochlorothiazide (AVALIDE) 150-12.5 mg per tablet Take 1 tablet by mouth once daily.     levetiracetam (KEPPRA) 500 MG Tab Take 500 mg by mouth 2 (two) times daily.    niacin (NIASPAN) 1000 MG CR tablet Take 1,000 mg by mouth once daily.    omeprazole (PRILOSEC) 40 MG capsule Take 40 mg by mouth once daily.     Antibiotics     None        Antifungals     None        Antivirals         Stop Route Frequency     acyclovir (ZOVIRAX) injection      -- IV Every 8 hours (non-standard times)             There is no immunization history on file for this patient.    Family History     Problem Relation (Age of Onset)    Cancer Mother    Diabetes Father        Social History     Social History    Marital status:      Spouse name: N/A    Number of children: N/A    Years of education: N/A     Social History Main Topics    Smoking status: Former Smoker    Smokeless tobacco: Never Used      Comment: quit 17 yrs ago    Alcohol use 16.2 oz/week     3 Glasses of wine, 24 Cans of beer per week    Drug use: No    Sexual activity: Yes     Partners: Female     Birth control/ protection: Other-see comments      Comment: same partner many years; wife is postmenopausal     Other Topics Concern    None     Social History Narrative    None       Review of Systems   Unable to complete ROS, patient undergoing angiogram    Objective:     Vital Signs (Most Recent):  Temp: 97.1 °F (36.2 °C) (09/28/17 1218)  Pulse: 78 (09/28/17 1237)  Resp: 16 (09/28/17 1218)  BP: (!) 148/95 (09/28/17 1237)  SpO2: 96 % (09/28/17 1237) Vital Signs (24h Range):  Temp:  [97.1 °F (36.2 °C)-98.8 °F (37.1 °C)] 97.1 °F (36.2 °C)  Pulse:  [63-88] 78  Resp:  [11-20] 16  SpO2:  [3 %-100 %] 96 %  BP: (136-172)/(61-99) 148/95     Weight: 121.8 kg  (268 lb 8 oz)  Body mass index is 31.03 kg/m².    Estimated Creatinine Clearance: 143.9 mL/min (based on SCr of 0.8 mg/dL).    Physical Exam   Unable to perform physical exam, patient undergoing angiogram    Significant Labs: All lab results have been reviewed     Significant Imaging: All imaging has been reviewed

## 2017-09-28 NOTE — PROGRESS NOTES
[facility]  Neurology  Progress Note    Patient Name: Tommy Way  MRN: [id number]  Admission Date: 9/26/2017  Hospital Length of Stay: 2 days  Code Status: Full Code   Attending Provider: Gregor Abbott MD  Primary Care Physician: Adan Torres MD   Principal Problem:Encephalopathy      Subjective:     Interval History:   Routine EEG 9/27 showed slowing and disorganization c/w moderate encephalopathy, no ictal discharges  Cerebral angiogram today without clear evidence of vasculitis. Planned for LP later this afternoon     Current Facility-Administered Medications   Medication Dose Route Frequency Provider Last Rate Last Dose    0.9%  NaCl infusion   Intravenous Continuous Gregor Abbott  mL/hr at 09/27/17 1440      acyclovir (ZOVIRAX) 900 mg in dextrose 5 % 250 mL IVPB  900 mg Intravenous Q8H Venessa Reyes  mL/hr at 09/28/17 1205 900 mg at 09/28/17 1205    atorvastatin tablet 20 mg  20 mg Oral QHS Maren Li MD   20 mg at 09/27/17 2057    carvedilol tablet 25 mg  25 mg Oral BID WM Maren Li MD   25 mg at 09/28/17 1206    dextrose 50% injection 12.5 g  12.5 g Intravenous PRN Maren Li MD        dextrose 50% injection 25 g  25 g Intravenous PRN Maren Li MD        enoxaparin injection 40 mg  40 mg Subcutaneous Daily Maren Li MD   40 mg at 09/27/17 1723    glucagon (human recombinant) injection 1 mg  1 mg Intramuscular PRN Maren Li MD        glucose chewable tablet 16 g  16 g Oral PRN Maren Li MD        glucose chewable tablet 24 g  24 g Oral PRN Maren Li MD        lacosamide tablet 200 mg  200 mg Oral BID Maren Li MD   200 mg at 09/28/17 1206    lidocaine HCL 10 mg/ml (1%) injection 1 mL  1 mL Other Once PRN Gregor Abbott MD        lidocaine HCL 10 mg/ml (1%) injection 5 mL  5 mL Other Once Gregor Abbott MD   Stopped at 09/27/17 2330    magnesium sulfate 3 g in dextrose 5 % 250 mL IVPB  3 g Intravenous Once Gregor  "MD Scar 62.5 mL/hr at 09/28/17 1224 3 g at 09/28/17 1224    quetiapine tablet 25 mg  25 mg Oral QHS Maren Li MD   25 mg at 09/27/17 2057    thiamine tablet 100 mg  100 mg Oral Daily Maren Li MD   100 mg at 09/28/17 1206     Review of Systems   Unable to perform ROS: Mental status change   Psychiatric/Behavioral: Positive for confusion and decreased concentration.     Objective:     Vital Signs (Most Recent):  Temp: 97.1 °F (36.2 °C) (09/28/17 1218)  Pulse: 72 (09/28/17 1453)  Resp: 18 (09/28/17 1453)  BP: (!) 153/86 (09/28/17 1453)  SpO2: 96 % (09/28/17 1453) Vital Signs (24h Range):  Temp:  [97.1 °F (36.2 °C)-98.8 °F (37.1 °C)] 97.1 °F (36.2 °C)  Pulse:  [63-88] 72  Resp:  [11-20] 18  SpO2:  [3 %-100 %] 96 %  BP: (127-172)/(61-99) 153/86     Weight: 121.8 kg (268 lb 8 oz)  Body mass index is 31.03 kg/m².    Physical Exam   Constitutional: He appears well-developed and well-nourished. No distress.   HENT:   Head: Normocephalic and atraumatic.   Neck: Neck supple.   Pulmonary/Chest: Effort normal.   Skin: He is not diaphoretic.     NEUROLOGICAL EXAMINATION:     MENTAL STATUS   Oriented to person.   Attention: decreased. Concentration: decreased.   Level of consciousness: alert  Abnormal comprehension.        Unable to state wife's name, but called her "the wife"    Unable to repeat or name objects (key, earring etc) called everything a stone after hearing the phrase "does a stone float on water?"     Significant Labs:   Hemoglobin A1c: No results for input(s): HGBA1C in the last 720 hours.  Blood Culture: No results for input(s): LABBLOO in the last 48 hours.  CBC:   Recent Labs  Lab 09/27/17  0436 09/28/17  0300   WBC 7.53 5.82   HGB 10.1* 10.2*   HCT 31.3* 30.3*   * 395*     CMP:   Recent Labs  Lab 09/27/17  0436 09/28/17  0300   GLU 99 87    142   K 4.1 3.5    108   CO2 26 26   BUN 6 6   CREATININE 0.9 0.8   CALCIUM 9.3 9.0   MG  --  1.5*   PROT 6.9  --    ALBUMIN 2.5*  --  "   BILITOT 0.4  --    ALKPHOS 158*  --    AST 18  --    ALT 27  --    ANIONGAP 8 8   EGFRNONAA >60.0 >60.0     Inflammatory Markers: No results for input(s): SEDRATE, CRP, PROCAL in the last 48 hours.  Respiratory Culture: No results for input(s): GSRESP, RESPIRATORYC in the last 48 hours.  Urine Culture: No results for input(s): LABURIN in the last 48 hours.  Urine Studies: No results for input(s): COLORU, APPEARANCEUA, PHUR, SPECGRAV, PROTEINUA, GLUCUA, KETONESU, BILIRUBINUA, OCCULTUA, NITRITE, UROBILINOGEN, LEUKOCYTESUR, RBCUA, WBCUA, BACTERIA, SQUAMEPITHEL, HYALINECASTS in the last 48 hours.    Invalid input(s): WRIGHTSUR  All pertinent lab results     EEG (9/27/17): Wilda  EEG FINDINGS:  Recording was obtained at the patient's bedside in the hospital   room.  The patient was awake and moving around spontaneously and interacting   with the nursing personnel.  Background was very disorganized.  There were brief   segments in which an 8 Hz posterior dominant rhythm was seen in the occipital   regions bilaterally.  In general, the background over the 2 hemispheres is slow   consisting of an irregular theta and delta frequencies.  The delta is more   prominent over the left hemisphere, but maximally noted in the temporal leads.    No spike or sharp wave activity was seen.  Activation procedures were not   carried out.     IMPRESSION:  Markedly abnormal EEG.  There was slowing and disorganization of   the background rhythms indicating the presence of a moderate generalized   encephalopathy.  In addition, there is focal slowing, which is a polymorphic   delta noted over the left hemisphere, maximal in the temporal leads.  This would   indicate a lateralized structural process and a polymorphic character of the   waveforms would suggest subcortical, particularly white matter involvement.  No   epileptic activity was seen.     CLINICAL CORRELATION:  The patient is a 61-year-old male who was transferred   from an outside  hospital for altered mental status and the patient apparently   has experienced a tonic-clonic seizure.  MRI shows left temporal lobe edema,   which correlates well with the findings on this EEG, in which there is evidence   for structural dysfunction in the left hemisphere, maximum in the temporal area.  In this recording, there was no evidence for an irritative process.    Significant Imaging: I have reviewed and interpreted all pertinent imaging results/findings within the past 24 hours.     Cerebral angiogram (9/28/17): preliminary   Impression: Narrowing of small arteries involving the posterior left MCA territory. It is unclear whether is this a primary process or sequelae of swelling noted on MRI.  If it is to be a considered a primary process then vasculitis could be questioned, however no other region of vascular abnormality is identified to support this.    Assessment and Plan:     * Encephalopathy    61 y.o male with hx of HTN transferred from [facility] 9/26/17. Patient originally presented to OSH after witnessed seizures at home. Intubated for airway protection. 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 LP done at OSH x 2 with benign CSF besides elevated protein 77. On course of vancomycin, zosyn for aspiration pneumonia as well. LP repeated 9/19/17. HSV PCR, VDRL, HIV and arbovirus panel negative. Quantiferon gold and influenza panel positive. Followed by Dr. Reyes (ID) and Osmany (Neuro) in [place].     9/27- patient encephalopathic with aphasia, but able to follow some simple commands  EEG showed structural dysfunction in the left hemisphere, maximum in the temporal area. No evidence for an irritative process    9/28- cerebral angiogram performed without clear evidence of vasculitis  LP planned for later this afternoon    Recommendations:  -LP with cell ct with diff, glucose, protein, CSF Cx, HSV, TB PCR, FTA, cytology/flow cytometry    Currently on empiric IV acyclovir while HSV is pending  -continue Vimpat 200 mg BID   -appreciate NSGY involvement for possible brain bx if CSF is unremarkable        VTE Risk Mitigation         Ordered     enoxaparin injection 40 mg  Daily     Route:  Subcutaneous        09/27/17 0029     Medium Risk of VTE  Once      09/27/17 0029        Neurology to follow. Please call with any questions/clarifications    Layla Vanegas PA-C  General Neurology Consult  Neuro Consult [facility] # [id number]

## 2017-09-28 NOTE — PT/OT/SLP EVAL
Speech Language Pathology      Tommy Way   721/721 A    MRN: [id number]    Patient not seen today secondary to Other (Comment) (Per NSG, pt NIKKI in cath lab. ). Will follow-up pm of 9/28/17 or 9/29/17.     FILIPE Ying, CCC-SLP  [phone]  9/28/2017

## 2017-09-28 NOTE — ASSESSMENT & PLAN NOTE
61 y.o male with hx of HTN transferred from [facility] 9/26/17. Patient originally presented to OSH after witnessed seizures at home. Intubated for airway protection. 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 LP done at OSH x 2 with benign CSF besides elevated protein 77. On course of vancomycin, zosyn for aspiration pneumonia as well. LP repeated 9/19/17. HSV PCR, VDRL, HIV and arbovirus panel negative. Quantiferon gold and influenza panel positive. Followed by Dr. Reyes (ID) and Osmany (Neuro) in [place].     9/27- patient encephalopathic with aphasia, but able to follow some simple commands  EEG showed structural dysfunction in the left hemisphere, maximum in the temporal area. No evidence for an irritative process    9/28- cerebral angiogram performed without clear evidence of vasculitis  LP planned for later this afternoon    Recommendations:  -LP with cell ct with diff, glucose, protein, CSF Cx, HSV, TB PCR, FTA, cytology/flow cytometry   Currently on empiric IV acyclovir while HSV is pending  -continue Vimpat 200 mg BID   -appreciate NSGY involvement for possible brain bx if CSF is unremarkable

## 2017-09-29 NOTE — PROGRESS NOTES
[facility]  Neurology  Progress Note    Patient Name: Tommy Way  MRN: [id number]  Admission Date: 9/26/2017  Hospital Length of Stay: 3 days  Code Status: Full Code   Attending Provider: Gregor Abbott MD  Primary Care Physician: Adan Torres MD   Principal Problem:Encephalopathy      Subjective:     Interval History:   9/29/2017: No acute events overnight. This morning not oriented to time and place for me. Following simple midline commands.       Current Facility-Administered Medications   Medication Dose Route Frequency Provider Last Rate Last Dose    0.9%  NaCl infusion   Intravenous Continuous Gregor Abbott  mL/hr at 09/27/17 1440      acyclovir (ZOVIRAX) 900 mg in dextrose 5 % 250 mL IVPB  900 mg Intravenous Q8H Venessa Reyes  mL/hr at 09/29/17 1027 900 mg at 09/29/17 1027    atorvastatin tablet 20 mg  20 mg Oral QHS Maren Li MD   20 mg at 09/28/17 2151    carvedilol tablet 25 mg  25 mg Oral BID WM Maren Li MD   25 mg at 09/29/17 0930    dextrose 50% injection 12.5 g  12.5 g Intravenous PRN Maren Li MD        dextrose 50% injection 25 g  25 g Intravenous PRN Maren Li MD        enoxaparin injection 40 mg  40 mg Subcutaneous Daily Maren Li MD   40 mg at 09/28/17 1734    glucagon (human recombinant) injection 1 mg  1 mg Intramuscular PRN Maren Li MD        glucose chewable tablet 16 g  16 g Oral PRN Maren Li MD        glucose chewable tablet 24 g  24 g Oral PRN Maren Li MD        lacosamide tablet 200 mg  200 mg Oral BID Maren Li MD   200 mg at 09/29/17 0931    lidocaine HCL 10 mg/ml (1%) injection 5 mL  5 mL Other Once Gregor Abbott MD   Stopped at 09/27/17 2330    quetiapine tablet 25 mg  25 mg Oral QHS Maren Li MD   25 mg at 09/28/17 2150    thiamine tablet 100 mg  100 mg Oral Daily Maren Li MD   100 mg at 09/29/17 0931     Review of Systems   Unable to perform ROS: Mental status change  "  Psychiatric/Behavioral: Positive for confusion and decreased concentration.     Objective:     Vital Signs (Most Recent):  Temp: 98.8 °F (37.1 °C) (09/29/17 1224)  Pulse: 84 (09/29/17 1224)  Resp: 18 (09/29/17 1224)  BP: (!) 121/56 (09/29/17 1224)  SpO2: 98 % (09/29/17 1224) Vital Signs (24h Range):  Temp:  [97.2 °F (36.2 °C)-98.8 °F (37.1 °C)] 98.8 °F (37.1 °C)  Pulse:  [72-97] 84  Resp:  [16-20] 18  SpO2:  [95 %-98 %] 98 %  BP: (102-154)/(56-92) 121/56     Weight: 121.8 kg (268 lb 8 oz)  Body mass index is 31.03 kg/m².    Physical Exam   Constitutional: He appears well-developed and well-nourished. No distress.   HENT:   Head: Normocephalic and atraumatic.   Neck: Neck supple.   Pulmonary/Chest: Effort normal.   Skin: He is not diaphoretic.     NEUROLOGICAL EXAMINATION:     MENTAL STATUS   Oriented to person.   Attention: decreased. Concentration: decreased.   Level of consciousness: alert  Abnormal comprehension.        Unable to repeat or name objects (key, earring etc) called everything a stone after hearing the phrase "does a stone float on water?"     Significant Labs:   Hemoglobin A1c: No results for input(s): HGBA1C in the last 720 hours.  Blood Culture: No results for input(s): LABBLOO in the last 48 hours.  CBC:     Recent Labs  Lab 09/28/17 0300 09/29/17  0538   WBC 5.82 6.31   HGB 10.2* 10.5*   HCT 30.3* 32.0*   * 357*     CMP:     Recent Labs  Lab 09/28/17 0300 09/29/17  0538   GLU 87 93    139   K 3.5 3.5    107   CO2 26 22*   BUN 6 5*   CREATININE 0.8 0.8   CALCIUM 9.0 8.9   MG 1.5* 1.9   ANIONGAP 8 10   EGFRNONAA >60.0 >60.0     Inflammatory Markers: No results for input(s): SEDRATE, CRP, PROCAL in the last 48 hours.  Respiratory Culture: No results for input(s): GSRESP, RESPIRATORYC in the last 48 hours.  Urine Culture: No results for input(s): LABURIN in the last 48 hours.  Urine Studies: No results for input(s): COLORU, APPEARANCEUA, PHUR, SPECGRAV, PROTEINUA, GLUCUA, " KETONESU, BILIRUBINUA, OCCULTUA, NITRITE, UROBILINOGEN, LEUKOCYTESUR, RBCUA, WBCUA, BACTERIA, SQUAMEPITHEL, HYALINECASTS in the last 48 hours.    Invalid input(s): JAQUAN  All pertinent lab results     EEG (9/27/17): Wilda  EEG FINDINGS:  Recording was obtained at the patient's bedside in the hospital   room.  The patient was awake and moving around spontaneously and interacting   with the nursing personnel.  Background was very disorganized.  There were brief   segments in which an 8 Hz posterior dominant rhythm was seen in the occipital   regions bilaterally.  In general, the background over the 2 hemispheres is slow   consisting of an irregular theta and delta frequencies.  The delta is more   prominent over the left hemisphere, but maximally noted in the temporal leads.    No spike or sharp wave activity was seen.  Activation procedures were not   carried out.     IMPRESSION:  Markedly abnormal EEG.  There was slowing and disorganization of   the background rhythms indicating the presence of a moderate generalized   encephalopathy.  In addition, there is focal slowing, which is a polymorphic   delta noted over the left hemisphere, maximal in the temporal leads.  This would   indicate a lateralized structural process and a polymorphic character of the   waveforms would suggest subcortical, particularly white matter involvement.  No   epileptic activity was seen.     CLINICAL CORRELATION:  The patient is a 61-year-old male who was transferred   from an outside hospital for altered mental status and the patient apparently   has experienced a tonic-clonic seizure.  MRI shows left temporal lobe edema,   which correlates well with the findings on this EEG, in which there is evidence   for structural dysfunction in the left hemisphere, maximum in the temporal area.  In this recording, there was no evidence for an irritative process.    Significant Imaging: I have reviewed and interpreted all pertinent imaging  results/findings within the past 24 hours.     Cerebral angiogram (9/28/17): preliminary   Impression: Narrowing of small arteries involving the posterior left MCA territory. It is unclear whether is this a primary process or sequelae of swelling noted on MRI.  If it is to be a considered a primary process then vasculitis could be questioned, however no other region of vascular abnormality is identified to support this.    Assessment and Plan:     * Encephalopathy    61 y.o male with hx of HTN transferred from [facility] 9/26/17. Patient originally presented to OSH after witnessed seizures at home. Intubated for airway protection. 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 LP done at OSH x 2 with benign CSF besides elevated protein 77. On course of vancomycin, zosyn for aspiration pneumonia as well. LP repeated 9/19/17. HSV PCR, VDRL, HIV and arbovirus panel negative. Quantiferon gold and influenza panel positive. Followed by Dr. Reyes (ID) and Osmany (Neuro) in [place].     9/27- patient encephalopathic with aphasia, but able to follow some simple commands  EEG showed structural dysfunction in the left hemisphere, maximum in the temporal area. No evidence for an irritative process    9/28- cerebral angiogram performed without clear evidence of vasculitis    Recommendations:  - Discussed with Neurosurgery. Neurosurgery recommended starting steroid and we agree with the decision.  - If no improvement would consider biopsy. Neurosurgery onboard.   - If neurosurgery thinks steroids will decrease the yield of biopsy, we can consider biopsy first followed by steroid trial   - Send ACE and Paraneoplastic panel   - Consider MR spectroscopy with perfusion - which may (not always) give us an idea of infection vs tumor pathology.   - Follow CSF lab   - Currently on empiric IV acyclovir while HSV is pending  - Continue Vimpat 200 mg BID               VTE Risk  Mitigation         Ordered     enoxaparin injection 40 mg  Daily     Route:  Subcutaneous        09/27/17 0029     Medium Risk of VTE  Once      09/27/17 0029          Nessa Dhillon II, MD  Neurology  [facility]

## 2017-09-29 NOTE — PT/OT/SLP EVAL
Occupational Therapy  Evaluation/Treatment    Tommy Way   MRN: [id number]   Admitting Diagnosis: Encephalopathy    OT Date of Treatment: 09/29/17   OT Start Time: 1400  OT Stop Time: 1455  OT Total Time (min): 55 min    Billable Minutes:  Evaluation 15  Self Care/Home Management 20  Therapeutic Activity 10  Cognitive Retraining 10    Diagnosis: Encephalopathy   Pt admitted from [facility] with confusion and seizures of unknown etiology.  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    Past Medical History:   Diagnosis Date    Acid reflux     Arthritis     Elevated cholesterol     Hypertension       Past Surgical History:   Procedure Laterality Date    JOINT REPLACEMENT Right 04/20/2017    uneventful recovery    KNEE ARTHROPLASTY Right 02/2007    WISDOM TOOTH EXTRACTION         Referring physician: Dr. Gregor Abbott  Date referred to OT: 9/27/2017    General Precautions: Standard, fall  Orthopedic Precautions: N/A  Braces: N/A    Do you have any cultural, spiritual, Yazidism conflicts, given your current situation?: no issues     Patient History:  Living Environment  Lives With:  (See above for complicated situation.  He and wife has a 2story house in [place]; their bed/bath is on second floor but there are other bed/baths on first.  0 [address].  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 Tub/shower.)  Equipment Currently Used at Home: bedside commode, walker, rolling, cane, straight (did not use these items)    Prior level of function:   Bed Mobility/Transfers: independent  Grooming: independent  Bathing: independent  Upper Body Dressing: independent  Lower Body Dressing: independent  Toileting: independent  Home Management Skills: independent  Type of Occupation: Pt works FT for [employer] in [place].  He lives most of the time in [place] in a second floor condo with full flight of steps to enter.  He cooks,  cleans, etc. the condo.  He drives home to his wife and house in [place] every other weekend.  Leisure and Hobbies: Pt enjoys plista and [employer].  He watches racing on tv and has been to [employer] races.     Dominant hand: right    Subjective:  Communicated with RN prior to session.  No issues  Chief Complaint: Pt unable to verbalize; word salad  Patient/Family stated goals: Family's goal is a return to plof    Pain/Comfort  Pain Rating 1: 0/10    Objective:       Cognitive Exam:  Oriented to: Person  Follows Commands/attention: Easily distracted and follows one step commands approx 60% of time; requires repetition  Communication: 80% of the time, the pt has word salad and his speech is incomprehensible  Memory:  Memory appears impaired; pt has no understanding that he is in the hospital  Safety awareness/insight to disability: impaired  Coping skills/emotional control: Appropriate to situation    Visual/perceptual:  Intact    Physical Exam:  Postural examination/scapula alignment: Rounded shoulder  Skin integrity: Visible skin intact  Edema: None noted     Sensation:   Pt unable to participate in sensory eval but he was aware of OT touching all 4 limbs    Upper Extremity Range of Motion:  Right Upper Extremity: AROM WFL  Left Upper Extremity: AROM WFL    Upper Extremity Strength:  Right Upper Extremity: Pt unable to participate in MMT but approx 4/5 BUEs and 4-/5 BLEs  Left Upper Extremity: see RUE   Strength: approx 4/5    Fine motor coordination:   Intact    Gross motor coordination: WFL    Functional Mobility:  Bed Mobility:  Rolling/Turning to Left: Stand by assistance, With side rail  Rolling/Turning Right: Minimum assistance  Scooting/Bridging: Stand by Assistance (scoot to eob in sitting)  Supine to Sit: Minimum Assistance, WIth side rail (hobup 40 deg)  Sit to Supine: Contact Guard Assistance (no rail; hob up 20 deg)    Transfers:  Sit <> Stand Assistance: Minimum Assistance  Sit <> Stand  Assistive Device: No Assistive Device  Bed <> Chair Technique: Stand Pivot  Bed <> Chair Transfer Assistance: Moderate Assistance  Bed <> Chair Assistive Device: No Assistive Device    Functional Ambulation: 2 steps to chair with mod a    Activities of Daily Living:     Feeding:  Min a     UB dressing: Mod a to don gown like a robe due to pt confusion.  He understood putting it on like a robe and started doing it correctly but he then grabbed the wrong gown by the neck and could not self correct.     LB dressing:  Don/doff slipper socks with SBA  Grooming Position: Seated  Grooming Level of Assistance: Minimum assistance (toothbrushing; see details below. SBA hair combing')  Toileting Where Assessed: Bed level  Toileting Level of Assistance: Total assistance (Incontinent of bowel; nugent cath)            Balance:   Static Sit: FAIR+: Able to take MINIMAL challenges from all directions  Dynamic Sit: FAIR+: Maintains balance through MINIMAL excursions of active trunk motion  Static Stand: POOR: Needs MODERATE assist to maintain  Dynamic stand: POOR: N/A    Therapeutic Activities and Exercises:  -OT set up for toothbrushing at sink.  Pt immed picked a cup up and twisted it as if attempting to get a lid off of a jar.  OT placed toothpaste in hand, he was unable to name it.  OT asked him to open it, he stuck the whole thing in his mouth and left it there like a cigar.  OT showed how to turn lid, he opened it and started squeezing paste into mouth.  OT showed toothbrush, he applied and then needed almost no cues to actually brush, rinse, spit and manage faucet.  -Pt was very excited to be given a comb and combed hair with no cues and good quality    AM-PAC 6 CLICK ADL  How much help from another person does this patient currently need?  1 = Unable, Total/Dependent Assistance  2 = A lot, Maximum/Moderate Assistance  3 = A little, Minimum/Contact Guard/Supervision  4 = None, Modified [place]/Independent    Putting on  "and taking off regular lower body clothing? : 2  Bathing (including washing, rinsing, drying)?: 2  Toileting, which includes using toilet, bedpan, or urinal? : 2  Putting on and taking off regular upper body clothing?: 2  Taking care of personal grooming such as brushing teeth?: 3  Eating meals?: 3  Total Score: 14    AM-PAC Raw Score CMS "G-Code Modifier Level of Impairment Assistance   6 % Total / Unable   7 - 9 CM 80 - 100% Maximal Assist   10-14 CL 60 - 80% Moderate Assist   15 - 19 CK 40 - 60% Moderate Assist   20 - 22 CJ 20 - 40% Minimal Assist   23 CI 1-20% SBA / CGA   24 CH 0% Independent/ Mod I       Patient left HOB elevated with all lines intact, call button in reach and sister present    Assessment:  Tommy Way is a 61 y.o. male with a medical diagnosis of Encephalopathy.  The pt is currently at min a to total a level self-care 2' to severely impaired communication, max impaired insight/problem solving, severe confusion, impaired strength BUE/BLEs, impaired standing balance/endurance, impaired sequencing, motor apraxia, inability to name and recognize common objects.  The pt was previously working full time and indep all self-care and I-ADLs.  Pt to be seen by OT to increase self care indep.  The pt's cognition is difficult to fully understand as seen in varying ability during toothbrushing task.    Rehab identified problem list/impairments: Rehab identified problem list/impairments: weakness, impaired self care skills, impaired functional mobilty, gait instability, impaired endurance, impaired cognition, decreased safety awareness    Rehab potential is good for goals.    Activity tolerance: Excellent    Discharge recommendations: Discharge Facility/Level Of Care Needs: rehabilitation facility     Barriers to discharge: Barriers to Discharge: Decreased caregiver support    Equipment recommendations: wheelchair, bath bench     GOALS:    Occupational Therapy Goals        Problem: Occupational " Therapy Goal    Goal Priority Disciplines Outcome Interventions   Occupational Therapy Goal     OT, PT/OT Ongoing (interventions implemented as appropriate)    Description:  Goals to be met by 10/13/2017:    1.  Brush teeth with min cues for the steps  2.  Don robe with min a and min cues  3.  Transfer bed-chair with min a  4.  Toilet self with mod a  5.  BSC transfer with min a                      PLAN:  Patient to be seen 5 x/week to address the above listed problems via self-care/home management, therapeutic activities, therapeutic exercises, neuromuscular re-education, cognitive retraining  Plan of Care expires: 10/29/17  Plan of Care reviewed with: patient, sibling         Tasha SCHULTZ PABLO Oneil  09/29/2017

## 2017-09-29 NOTE — HOSPITAL COURSE
9/29/2017: NO acute events overnight. This morning not oriented to jeramy and place for me. Following simple midline commands for me.

## 2017-09-29 NOTE — PROGRESS NOTES
"[facility]  Infectious Disease  Progress Note    Patient Name: Tommy Way  MRN: [id number]  Admission Date: 9/26/2017  Length of Stay: 3 days  Attending Physician: Gregor Abbott MD  Primary Care Provider: Adan Torres MD    Isolation Status: No active isolations  Assessment/Plan:      Encephalitis and encephalomyelitis, unspecified    Patient with extensive work up done for encephalitis. MRI suggestive for HSV as etiology with "significant increase in the T2 signal in the left temporal lobe particularly increased in the left posterior parietal lobe with some serpiginous meningeal enhancement suspicious for encephalitis. Punctate areas of diffusion signal in the left posterior parietal lobe suspicious for punctate areas of acute ischemia versus" So far HSV PCR done on the 9/11/17 LP was negative this study is highly sensitive but false negatives can occur. [place] IgM and IgG also done with negative results. Patient also had HIV, ANCA, Hepatitis panel, VDRL, Arbovirus panel with negative results. Only positive results quantiferon gold and Influenza virus not explaining MRI findings or current neurological state. Patient CSF from both LPs with mildly elevated protein, normal glucose and no significant WBC, non diagnostic. Dificult to determine source of encephalopathy at this time. Will recommend repeat LP prior to brain biopsy. Along with routine serology for CSF order repeat HSV PCR, get TB PCR from CSF, enterovirus panel and repeat arbovirus panel. Would also recommend FTA to evaluate if past syphilis exposure. Depending on results obtained from new LP sample would make recommendation for brain biopsy. Continue acyclovir since can't rule out HSV at this time. All blood cultures and CSF cultures negative to date for growth.      Plan  - Will follow up with LP results of CSF: HSV PCR, arboviral panel, TB PCR, enterovirus panel, FTA, TB PCR  - continue acyclovir             Anticipated " Disposition: as above    Thank you for your consult. I will follow-up with patient. Please contact us if you have any additional questions.    Sandra Young MD  Infectious Disease  [facility]    Subjective:     Principal Problem:Encephalopathy    HPI: Case of 60 y/o male PMHX arthritis, AHTN, admitted on 9/26/17 transferred from [facility]. Patient originally presented to OSH due to 2 episodes of witnessed seizures at home. Patient was at that time intubated in the ED due to desaturation and AMS for airway protection. On 9/11 MRI done showed temporal lobe abnormalities consistent with encephalitis. Patient had LP done as well non diagnostic. Patient at that time started on acyclovir. 9/14/17 patient was extubated. Had been on a course of vancomycin, zosyn for aspiration pneumonia and acyclovir for suspicion of HSV meningitis. 9/19/17 LP repeated non diagnostic once more. Patient had extensive work up done with HSV PCR that was negative, VDRL, HIV and arbovirus panel with no positive results. Quantiferon gold was positive as well as influenza panel. ID consulted at [facility] this time after transfer for further recommendations, patient had been followed by Dr. Reyes at [facility]. Wife denies recent travel outside of the country, fevers, chills, recent viral illness or new rashes. Denied pets in the house. Mentions having contact with small children that have not been reported ill at this time.          Review of Systems   Unable to perform ROS: Mental status change     Objective:     Vital Signs (Most Recent):  Temp: 98.8 °F (37.1 °C) (09/29/17 1224)  Pulse: 84 (09/29/17 1224)  Resp: 18 (09/29/17 1224)  BP: (!) 121/56 (09/29/17 1224)  SpO2: 98 % (09/29/17 1224) Vital Signs (24h Range):  Temp:  [97.2 °F (36.2 °C)-98.8 °F (37.1 °C)] 98.8 °F (37.1 °C)  Pulse:  [75-97] 84  Resp:  [16-20] 18  SpO2:  [95 %-98 %] 98 %  BP: (102-154)/(56-92) 121/56     Weight: 121.8 kg (268 lb 8 oz)  Body mass index is  31.03 kg/m².    Estimated Creatinine Clearance: 143.9 mL/min (based on SCr of 0.8 mg/dL).    Physical Exam   Constitutional: He appears well-developed.   HENT:   Head: Normocephalic and atraumatic.   Eyes: Conjunctivae and EOM are normal. Right eye exhibits no discharge. Left eye exhibits no discharge.   Neck: Normal range of motion.   Cardiovascular: Normal rate.    No murmur heard.  Pulmonary/Chest: Effort normal. No respiratory distress.   Abdominal: Soft. Bowel sounds are normal.   Musculoskeletal: Normal range of motion.   Neurological: He is alert.   Skin: Skin is warm and dry.       Significant Labs: All pertinent labs within the past 24 hours have been reviewed.    Significant Imaging: I have reviewed all pertinent imaging results/findings within the past 24 hours.

## 2017-09-29 NOTE — PT/OT/SLP EVAL
Physical Therapy  Evaluation& Treatment    Tommy Way   MRN: [id number]   Admitting Diagnosis: Encephalopathy    PT Received On: 09/29/17  PT Start Time: 0635     PT Stop Time: 0702    PT Total Time (min): 27 min       Billable Minutes:  Evaluation 15 and Therapeutic Activity 12    Diagnosis: Encephalopathy      Past Medical History:   Diagnosis Date    Acid reflux     Arthritis     Elevated cholesterol     Hypertension       Past Surgical History:   Procedure Laterality Date    JOINT REPLACEMENT Right 04/20/2017    uneventful recovery    KNEE ARTHROPLASTY Right 02/2007    WISDOM TOOTH EXTRACTION         Referring physician: Gregor Abbott  Date referred to PT: 9/27/2017    General Precautions: Standard, fall  Orthopedic Precautions: N/A   Braces: N/A            Patient History:  Living Environment Comment: Pt lives with wife in 2 story home c/ threshold to enter; also lives in condominum at times. (I) with mobility and self care; utilized RW for transfer into tub/shower to assist with safety. Working and driving. Wife able to assist upon discharge.   DME owned (not currently used): rolling walker, single point cane and bedside commode    Previous Level of Function:  Ambulation Skills: independent  Transfer Skills: independent  ADL Skills: independent  Work/Leisure Activity: independent    Subjective:  Communicated with nsg prior to session.    Chief Complaint: wanting to move  Patient goals:to improve overall mobility     Pain/Comfort  Pain Rating 1: 0/10      Objective:   Patient found with: PICC line, oxygen, nugent catheter; found supine in bed c/ wife at bedside     Cognitive Exam:  Oriented to: Person; reported he was at a house and the year was 2011    Follows Commands/attention: Easily distracted and Follows one-step commands  Communication: clear/fluent  Safety awareness/insight to disability: impaired    Physical Exam:  Postural examination/scapula alignment: Rounded shoulder and Head  forward    Skin integrity: Visible skin intact  Edema: None noted in BLE    Sensation:   Intact; pt with no reports of numbness or tingling    Lower Extremity Range of Motion:  Right Lower Extremity: WFL  Left Lower Extremity: WFL    Lower Extremity Strength:  Right Lower Extremity: 4/5 hip flexion, knee ext/flex, ankle DF  Left Lower Extremity: 4/5 hip flexion, knee ext/flex, ankle DF     Fine motor coordination:  Impaired  RLE heel shin mod and LLE heel shin mod    Gross motor coordination: req incr verbal cueing to initiate movement     Functional Mobility:  Bed Mobility:  Supine to Sit: Minimum Assistance    Transfers:  Sit <> Stand Assistance: Moderate Assistance, Maximum Assistance  Sit <> Stand Assistive Device: Rolling Walker    Gait:   Gait Distance: 10 ft in room; slowed kirstin and required cueing for proper navigation, incr flexed trunk and shortened step and stride length  Assistance 1: Minimum assistance  Gait Assistive Device: Rolling walker  Gait Pattern: reciprocal  Gait Deviation(s): decreased kirstin, increased time in double stance, decreased weight-shifting ability, decreased step length, decreased stride length      Balance:   Static Sit: GOOD: Takes MODERATE challenges from all directions  Dynamic Sit: GOOD: Maintains balance through MODERATE excursions of active trunk movement  Static Stand: GOOD-: Takes MODERATE challenges from all directions inconsistently  Dynamic stand: FAIR+: Needs CLOSE SUPERVISION during gait and is able to right self with minor LOB    Therapeutic Activities and Exercises:  Educated pt and pt wife on the following topics (8 minutes)  - role of PT  - PT POC  - safety with mobility in hospital environment (sitting edge of bed for meals and performing stand pivot transfer with nsg assistance; instructed to leave pt in chair only with supervision)  - expected need for further therapy    EDUCATION  Pt educated pt on incr OOB activity including sitting in bedside chair  majority of day   Educated pt on being appropriate to transfer with nsg and PCT; safe to transfer with 1 person assistance and RW usage  **Mobility Technician appropriate to perform: out of bed, up to chair, back to bed, bed exercises, PROM,  Updated white board with appropriate PT information; notified nsg   Pt nsg agreement.       AM-PAC 6 CLICK MOBILITY  How much help from another person does this patient currently need?   1 = Unable, Total/Dependent Assistance  2 = A lot, Maximum/Moderate Assistance  3 = A little, Minimum/Contact Guard/Supervision  4 = None, Modified [place]/Independent    Turning over in bed (including adjusting bedclothes, sheets and blankets)?: 3  Sitting down on and standing up from a chair with arms (e.g., wheelchair, bedside commode, etc.): 2  Moving from lying on back to sitting on the side of the bed?: 3  Moving to and from a bed to a chair (including a wheelchair)?: 2  Need to walk in hospital room?: 2  Climbing 3-5 steps with a railing?: 1  Total Score: 13     AM-PAC Raw Score CMS G-Code Modifier Level of Impairment Assistance   6 % Total / Unable   7 - 9 CM 80 - 100% Maximal Assist   10 - 14 CL 60 - 80% Moderate Assist   15 - 19 CK 40 - 60% Moderate Assist   20 - 22 CJ 20 - 40% Minimal Assist   23 CI 1-20% SBA / CGA   24 CH 0% Independent/ Mod I     Patient left seated edge of bed with all lines intact and call button in reach.    Assessment:   Tommy Way is a 61 y.o. male with a medical diagnosis of Encephalopathy and presents with impaired balance, cognition, coordination, and gait during today's evaluation. PTA, pt (I) with mobility and self care; working and driving,living with wife who is able to assist. During evaluation, pt oriented to self only and unable to follow past two step commands; required demonstrative cueing for following commands. Pt with req incr cueing and assistance to perform transfers due to impaired coordination of LE musculature. Pt  appropriate for discharge to inpatient rehab to address current deficits.  Excellent inpatient rehab candidate; able to tolerate 3 hrs of therapy, motivated to improve mobility, great family support, and multiple neurological and cognitive impairments currently.  Pt appropriate for continued skilled services and discharge to IP REHAB to address the below deficits and improve pt return to PLOF.      MODerate PT EVAL CODE  - History/Comorbidities:encephalopathy, home environment  - Examination of Body Systems: addressing balance, coordination, cognition, gait instability  - Clinical Presentation:  evolving clinical presentation with changing clinical characteristics  - Outcome Measures: AMPAC, MMT      Rehab identified problem list/impairments: Rehab identified problem list/impairments: weakness, impaired endurance, impaired balance, gait instability, decreased lower extremity function, decreased safety awareness, decreased coordination, impaired cognition, impaired coordination, impaired cardiopulmonary response to activity    Rehab potential is good.    Activity tolerance: Good    Discharge recommendations: Discharge Facility/Level Of Care Needs: rehabilitation facility     Barriers to discharge: Barriers to Discharge: Decreased caregiver support    Equipment recommendations: Equipment Needed After Discharge:  (TBD pending progress)     GOALS:    Physical Therapy Goals        Problem: Physical Therapy Goal    Goal Priority Disciplines Outcome Goal Variances Interventions   Physical Therapy Goal     PT/OT, PT Ongoing (interventions implemented as appropriate)     Description:  Goals to be met by: 7 days (10/6)    Patient will increase functional independence with mobility by performin. Supine to sit with Set-up [place]  2. Sit to supine with Set-up [place]  3. Sit to stand transfer with Contact Guard Assistance  4. Gait  x 100 feet with Contact Guard Assistance using Rolling Walker.   5. Stand for 5  minutes with Stand-by Assistance using Rolling Walker  6. Lower extremity exercise program x30 reps per handout, with independence to improve muscular strength and endurance.                       PLAN:    Patient to be seen 5 x/week to address the above listed problems via gait training, therapeutic activities, therapeutic exercises, neuromuscular re-education  Plan of Care expires: 10/29/17  Plan of Care reviewed with: patient, spouse          Claudia Garrett, PT, DPT  09/29/2017

## 2017-09-29 NOTE — PLAN OF CARE
Problem: Fall Risk (Adult)  Goal: Identify Related Risk Factors and Signs and Symptoms  Related risk factors and signs and symptoms are identified upon initiation of Human Response Clinical Practice Guideline (CPG)   Outcome: Ongoing (interventions implemented as appropriate)  Pt will remain free of falls during hosital stay.

## 2017-09-29 NOTE — PLAN OF CARE
Problem: Occupational Therapy Goal  Goal: Occupational Therapy Goal  Goals to be met by 10/13/2017:    1.  Brush teeth with min cues for the steps  2.  Don robe with min a and min cues  3.  Transfer bed-chair with min a  4.  Toilet self with mod a  5.  BSC transfer with min a    Outcome: Ongoing (interventions implemented as appropriate)  Eval completed and goals established  PABLO Knox  9/29/2017  [phone]

## 2017-09-29 NOTE — SUBJECTIVE & OBJECTIVE
Review of Systems   Unable to perform ROS: Mental status change     Objective:     Vital Signs (Most Recent):  Temp: 98.8 °F (37.1 °C) (09/29/17 1224)  Pulse: 84 (09/29/17 1224)  Resp: 18 (09/29/17 1224)  BP: (!) 121/56 (09/29/17 1224)  SpO2: 98 % (09/29/17 1224) Vital Signs (24h Range):  Temp:  [97.2 °F (36.2 °C)-98.8 °F (37.1 °C)] 98.8 °F (37.1 °C)  Pulse:  [75-97] 84  Resp:  [16-20] 18  SpO2:  [95 %-98 %] 98 %  BP: (102-154)/(56-92) 121/56     Weight: 121.8 kg (268 lb 8 oz)  Body mass index is 31.03 kg/m².    Estimated Creatinine Clearance: 143.9 mL/min (based on SCr of 0.8 mg/dL).    Physical Exam   Constitutional: He appears well-developed.   HENT:   Head: Normocephalic and atraumatic.   Eyes: Conjunctivae and EOM are normal. Right eye exhibits no discharge. Left eye exhibits no discharge.   Neck: Normal range of motion.   Cardiovascular: Normal rate.    No murmur heard.  Pulmonary/Chest: Effort normal. No respiratory distress.   Abdominal: Soft. Bowel sounds are normal.   Musculoskeletal: Normal range of motion.   Neurological: He is alert.   Skin: Skin is warm and dry.       Significant Labs: All pertinent labs within the past 24 hours have been reviewed.    Significant Imaging: I have reviewed all pertinent imaging results/findings within the past 24 hours.

## 2017-09-29 NOTE — SUBJECTIVE & OBJECTIVE
Subjective:     Interval History:   9/29/2017: NO acute events overnight. This morning not oriented to jeramy and place for me. Following simple midline commands for me.      Current Facility-Administered Medications   Medication Dose Route Frequency Provider Last Rate Last Dose    0.9%  NaCl infusion   Intravenous Continuous Gregor Abbott  mL/hr at 09/27/17 1440      acyclovir (ZOVIRAX) 900 mg in dextrose 5 % 250 mL IVPB  900 mg Intravenous Q8H Venessa Reyes  mL/hr at 09/29/17 1027 900 mg at 09/29/17 1027    atorvastatin tablet 20 mg  20 mg Oral QHS Maren Li MD   20 mg at 09/28/17 2151    carvedilol tablet 25 mg  25 mg Oral BID WM Maren Li MD   25 mg at 09/29/17 0930    dextrose 50% injection 12.5 g  12.5 g Intravenous PRN Maren Li MD        dextrose 50% injection 25 g  25 g Intravenous PRN Maren Li MD        enoxaparin injection 40 mg  40 mg Subcutaneous Daily Maren Li MD   40 mg at 09/28/17 1734    glucagon (human recombinant) injection 1 mg  1 mg Intramuscular PRN Maren Li MD        glucose chewable tablet 16 g  16 g Oral PRN Maren Li MD        glucose chewable tablet 24 g  24 g Oral PRN Maren Li MD        lacosamide tablet 200 mg  200 mg Oral BID Maren Li MD   200 mg at 09/29/17 0931    lidocaine HCL 10 mg/ml (1%) injection 5 mL  5 mL Other Once Gregor Abbott MD   Stopped at 09/27/17 2330    quetiapine tablet 25 mg  25 mg Oral QHS Maren Li MD   25 mg at 09/28/17 2150    thiamine tablet 100 mg  100 mg Oral Daily Maren Li MD   100 mg at 09/29/17 0931     Review of Systems   Unable to perform ROS: Mental status change   Psychiatric/Behavioral: Positive for confusion and decreased concentration.     Objective:     Vital Signs (Most Recent):  Temp: 98.8 °F (37.1 °C) (09/29/17 1224)  Pulse: 84 (09/29/17 1224)  Resp: 18 (09/29/17 1224)  BP: (!) 121/56 (09/29/17 1224)  SpO2: 98 % (09/29/17 1224) Vital Signs (24h Range):  Temp:   "[97.2 °F (36.2 °C)-98.8 °F (37.1 °C)] 98.8 °F (37.1 °C)  Pulse:  [72-97] 84  Resp:  [16-20] 18  SpO2:  [95 %-98 %] 98 %  BP: (102-154)/(56-92) 121/56     Weight: 121.8 kg (268 lb 8 oz)  Body mass index is 31.03 kg/m².    Physical Exam   Constitutional: He appears well-developed and well-nourished. No distress.   HENT:   Head: Normocephalic and atraumatic.   Neck: Neck supple.   Pulmonary/Chest: Effort normal.   Skin: He is not diaphoretic.     NEUROLOGICAL EXAMINATION:     MENTAL STATUS   Oriented to person.   Attention: decreased. Concentration: decreased.   Level of consciousness: alert  Abnormal comprehension.        Unable to repeat or name objects (key, earring etc) called everything a stone after hearing the phrase "does a stone float on water?"     Significant Labs:   Hemoglobin A1c: No results for input(s): HGBA1C in the last 720 hours.  Blood Culture: No results for input(s): LABBLOO in the last 48 hours.  CBC:     Recent Labs  Lab 09/28/17  0300 09/29/17  0538   WBC 5.82 6.31   HGB 10.2* 10.5*   HCT 30.3* 32.0*   * 357*     CMP:     Recent Labs  Lab 09/28/17  0300 09/29/17  0538   GLU 87 93    139   K 3.5 3.5    107   CO2 26 22*   BUN 6 5*   CREATININE 0.8 0.8   CALCIUM 9.0 8.9   MG 1.5* 1.9   ANIONGAP 8 10   EGFRNONAA >60.0 >60.0     Inflammatory Markers: No results for input(s): SEDRATE, CRP, PROCAL in the last 48 hours.  Respiratory Culture: No results for input(s): GSRESP, RESPIRATORYC in the last 48 hours.  Urine Culture: No results for input(s): LABURIN in the last 48 hours.  Urine Studies: No results for input(s): COLORU, APPEARANCEUA, PHUR, SPECGRAV, PROTEINUA, GLUCUA, KETONESU, BILIRUBINUA, OCCULTUA, NITRITE, UROBILINOGEN, LEUKOCYTESUR, RBCUA, WBCUA, BACTERIA, SQUAMEPITHEL, HYALINECASTS in the last 48 hours.    Invalid input(s): JAQUAN  All pertinent lab results     EEG (9/27/17): [place]  EEG FINDINGS:  Recording was obtained at the patient's bedside in the hospital   room.  " The patient was awake and moving around spontaneously and interacting   with the nursing personnel.  Background was very disorganized.  There were brief   segments in which an 8 Hz posterior dominant rhythm was seen in the occipital   regions bilaterally.  In general, the background over the 2 hemispheres is slow   consisting of an irregular theta and delta frequencies.  The delta is more   prominent over the left hemisphere, but maximally noted in the temporal leads.    No spike or sharp wave activity was seen.  Activation procedures were not   carried out.     IMPRESSION:  Markedly abnormal EEG.  There was slowing and disorganization of   the background rhythms indicating the presence of a moderate generalized   encephalopathy.  In addition, there is focal slowing, which is a polymorphic   delta noted over the left hemisphere, maximal in the temporal leads.  This would   indicate a lateralized structural process and a polymorphic character of the   waveforms would suggest subcortical, particularly white matter involvement.  No   epileptic activity was seen.     CLINICAL CORRELATION:  The patient is a 61-year-old male who was transferred   from an outside hospital for altered mental status and the patient apparently   has experienced a tonic-clonic seizure.  MRI shows left temporal lobe edema,   which correlates well with the findings on this EEG, in which there is evidence   for structural dysfunction in the left hemisphere, maximum in the temporal area.  In this recording, there was no evidence for an irritative process.    Significant Imaging: I have reviewed and interpreted all pertinent imaging results/findings within the past 24 hours.     Cerebral angiogram (9/28/17): preliminary   Impression: Narrowing of small arteries involving the posterior left MCA territory. It is unclear whether is this a primary process or sequelae of swelling noted on MRI.  If it is to be a considered a primary process then  vasculitis could be questioned, however no other region of vascular abnormality is identified to support this.

## 2017-09-29 NOTE — ASSESSMENT & PLAN NOTE
"Patient with extensive work up done for encephalitis. MRI suggestive for HSV as etiology with "significant increase in the T2 signal in the left temporal lobe particularly increased in the left posterior parietal lobe with some serpiginous meningeal enhancement suspicious for encephalitis. Punctate areas of diffusion signal in the left posterior parietal lobe suspicious for punctate areas of acute ischemia versus" So far HSV PCR done on the 9/11/17 LP was negative this study is highly sensitive but false negatives can occur. [place] IgM and IgG also done with negative results. Patient also had HIV, ANCA, Hepatitis panel, VDRL, Arbovirus panel with negative results. Only positive results quantiferon gold and Influenza virus not explaining MRI findings or current neurological state. Patient CSF from both LPs with mildly elevated protein, normal glucose and no significant WBC, non diagnostic. Dificult to determine source of encephalopathy at this time. Will recommend repeat LP prior to brain biopsy. Along with routine serology for CSF order repeat HSV PCR, get TB PCR from CSF, enterovirus panel and repeat arbovirus panel. Would also recommend FTA to evaluate if past syphilis exposure. Depending on results obtained from new LP sample would make recommendation for brain biopsy. Continue acyclovir since can't rule out HSV at this time. All blood cultures and CSF cultures negative to date for growth.      Plan  - Will follow up with LP results of CSF: HSV PCR, arboviral panel, TB PCR, enterovirus panel, FTA, TB PCR  - continue acyclovir   "

## 2017-09-29 NOTE — ASSESSMENT & PLAN NOTE
61 y.o male with hx of HTN transferred from [facility] 9/26/17. Patient originally presented to OSH after witnessed seizures at home. Intubated for airway protection. 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 LP done at OSH x 2 with benign CSF besides elevated protein 77. On course of vancomycin, zosyn for aspiration pneumonia as well. LP repeated 9/19/17. HSV PCR, VDRL, HIV and arbovirus panel negative. Quantiferon gold and influenza panel positive. Followed by Dr. Reyes (ID) and Osmany (Neuro) in [place].     9/27- patient encephalopathic with aphasia, but able to follow some simple commands  EEG showed structural dysfunction in the left hemisphere, maximum in the temporal area. No evidence for an irritative process    9/28- cerebral angiogram performed without clear evidence of vasculitis    Recommendations:  - Discussed with Neurosurgery. Neurosurgery recommended starting steroid and we agree with the decision.  - If no improvement would consider biopsy. Neurosurgery onboard.   - If neurosurgery thinks steroids will decrease the yield of biopsy, we can consider biopsy first followed by steroid trial   - Send ACE and Paraneoplastic panel   - Consider MR spectroscopy with perfusion - which may (not always) give us an idea of infection vs tumor pathology.   - Follow CSF lab   - Currently on empiric IV acyclovir while HSV is pending  - Continue Vimpat 200 mg BID

## 2017-09-29 NOTE — PLAN OF CARE
Problem: SLP Goal  Goal: SLP Goal  Speech Language Pathology  Goals expected to be met by 10/6:  1. Pt will tolerate regular consistency diet and thin liquids with no overt s/s aspiration.   2. Pt will complete confrontational naming tasks with 70% accuracy given mod A.   3. Given a model, pt will complete rote speech tasks with 70% accuracy.   4. Pt will follow 1-step directions with 70% accuracy given mod A.   5. Pt will complete basic sentence completion tasks with 70% accuracy given mod A.   Outcome: Ongoing (interventions implemented as appropriate)  Clinical evaluation of swallow and speech/language/cognitive evaluation complete. Recommend continue regular consistency diet and thin liquids, NO straws, and strict aspiration precautions.     FILIPE Ying, CCC-SLP  [phone]  9/29/2017

## 2017-09-29 NOTE — PLAN OF CARE
Problem: Physical Therapy Goal  Goal: Physical Therapy Goal  Goals to be met by: 7 days (10/6)    Patient will increase functional independence with mobility by performin. Supine to sit with Set-up [place]  2. Sit to supine with Set-up [place]  3. Sit to stand transfer with Contact Guard Assistance  4. Gait  x 100 feet with Contact Guard Assistance using Rolling Walker.   5. Stand for 5 minutes with Stand-by Assistance using Rolling Walker  6. Lower extremity exercise program x30 reps per handout, with independence to improve muscular strength and endurance.     Outcome: Ongoing (interventions implemented as appropriate)  PT evaluation completed. POC initiated.  DC recommendation: Rehab pending progress with cognition    Claudia Garrett, PT, DPT  2017

## 2017-09-29 NOTE — PT/OT/SLP EVAL
"Speech Language Pathology Evaluation  Clinical Evaluation of Swallow    Tommy Way   MRN: [id number]   721/721 A    Admitting Diagnosis: Encephalopathy    Diet recommendations: Solid Diet Level: Regular  Liquid Diet Level: Thin   · NO straws  · PO intake only when fully awake  · Fully upright position for all PO intake  · Small bites/ sips  · Slow rate of eating/ drinking  · Refrain from talking prior to completion of swallow  · 1 bite/ sip @ a time    SLP Treatment Date: 09/29/17  Speech Start Time: 0935     Speech Stop Time: 1000     Speech Total (min): 25 min       TREATMENT BILLABLE MINUTES:  Eval 15  and Eval Swallow and Oral Function 10    Diagnosis: Encephalopathy    Past Medical History:   Diagnosis Date    Acid reflux     Arthritis     Elevated cholesterol     Hypertension      Past Surgical History:   Procedure Laterality Date    JOINT REPLACEMENT Right 04/20/2017    uneventful recovery    KNEE ARTHROPLASTY Right 02/2007    WISDOM TOOTH EXTRACTION         Has the patient been evaluated by SLP for swallowing? : Yes  Keep patient NPO?: No   General Precautions: Standard, aspiration, fall, seizure    Current Respiratory Status:  (room air)     Social Hx: Pt unable to report.     Prior diet: Pt unable to report.     Occupational/hobbies/homemaking: Pt unable to report.     Subjective:  "You're crackin me up." Pt pleasantly confused, appearing happy and laughing throughout session.     Pain/Comfort  Pain Rating 1: 0/10  Pain Rating Post-Intervention 1: 0/10    Objective:        Oral Musculature Evaluation  Oral Musculature: WFL  Dentition: present and adequate  Mucosal Quality: good  Mandibular Strength and Mobility: WFL  Oral Labial Strength and Mobility: WFL  Lingual Strength and Mobility: WFL  Velar Elevation: WFL  Buccal Strength and Mobility: WFL  Volitional Cough: WFL  Volitional Swallow: WFL  Voice Prior to PO Intake: Clear, dry     Cognitive Status:  Behavioral Observations: confabulatory, " confused, distractible and inappropriate-  Memory and Orientation: Despite cues, pt disoriented x4. Pt unable to complete any trials attempted during long term and immediate memory tasks.   Attention: Appeared complicated by confusion and perseveration.   Problem Solving: Despite cues, pt unable to answer any questions attempted during basic problem solving task.   Pragmatics: topic maintenance problems  Executive Function: insight/awareness, mental flexibility, organization, planning, self-correction and self-monitoring    Language: confabulation, perseveration and irrelevant    Auditory Comprehension: Pt unable to answer any questions attempted during basic y/n q's. Pt unable to follow 0/3 1-step directions ind'ly, completed 1/3 given repetition.     Verbal Expression: Pt unable to complete the following: rote speech task attempted x1, responsive naming tasks on 0/3 trials. Pt unable to complete confrontational naming task on 0/3 trials ind'ly however completed 1/3 trials given supervision. During repetition tasks, pt repeated 1-syllable words on 1/3 attempts ind'ly and 3/3 attempts given visual cues, however unable to repeat 2- and 3- syllable words. Pt completed 2/4 sentence completion tasks.     Motor Speech: No evidence of dysarthria. Given difficulty repeating multisyllabic words, unable to rule out apraxia. However, given evidence cognitive deficits, difficulty more likely thought to be 2/2 dec'd understanding of task.     Voice: WFL    Bedside Swallow Eval:  Consistencies Assessed: Multiple cup sips water in isolation and as liquid wash, straw sip water x1, PO meds x~3-4 @ once administered by RN with cup sip water, multiple tsp pureed apple sauce, multiple bites juan cracker  Oral Phase: WFL  Pharyngeal Phase: Cough elicited with straw sip water in isolation. No further overt s/s aspiration.     Additional Treatment:    Pt awake upon entry. HOB raised. Confusion and confabulatory speech evident  throughout session. Speech cx by intermittent non-sense words, as well as intermittent phonemic substitutions. Pt educated re: role of SLP and SLP POC. White board updated. No further questions. Results of clinical evaluation of swallow reviewed with NSG.     FIM:  Social Interaction: 1 Total Assistance--The patient interact appropriately les than 25% of the time, or not at all, and may need restraint due to socially inappropriate behaviors.   Problem Solvin Total Assistance--The patient solves routine problems less than 25% of the time, or does not effectively solve problems, and may require constant one-to-one direction to complete simple daily activities.  The patient may need a restraint for safety.   Comprehension: 2 Maximal Prompting--The patient understands directions and conversation about basic daily needs 25 to 49% of the time.  Understand only simple, commonly used spoken expressions (e.g., hello, how are you) or gestures (e.g., waving good-bye, thank you).   Expression: 1 Total Assistance--The patient expresses basic daily needs and ideas less than 25% of the time, or does not express basic needs appropriately or consistently despite prompting.   Memory: 1 Total Assistance--The patient recognizes and remembers less than 25% of the time, or does not effectively recognize and remember.     Assessment:  Tommy Way is a 61 y.o. male with a SLP diagnosis of severe cognitive-linguistic deficits and risk of aspiration.          Discharge recommendations: Discharge Facility/Level Of Care Needs: rehabilitation facility     Goals:    SLP Goals        Problem: SLP Goal    Goal Priority Disciplines Outcome   SLP Goal     SLP Ongoing (interventions implemented as appropriate)   Description:  Speech Language Pathology  Goals expected to be met by 10/6:  1. Pt will tolerate regular consistency diet and thin liquids with no overt s/s aspiration.   2. Pt will complete confrontational naming tasks with 70%  accuracy given mod A.   3. Given a model, pt will complete rote speech tasks with 70% accuracy.   4. Pt will follow 1-step directions with 70% accuracy given mod A.   5. Pt will complete basic sentence completion tasks with 70% accuracy given mod A.                      Plan:   Patient to be seen Therapy Frequency: 5 x/week   Plan of Care expires: 10/28/17  Plan of Care reviewed with: patient  SLP Follow-up?: Yes              FILIPE Ying, CCC-SLP  [phone]  9/29/2017

## 2017-09-30 NOTE — SUBJECTIVE & OBJECTIVE
Subjective:     Interval History:     Active concerns for agitations overnight. ? Relation to initiation of steroids, however stable over the morning.     Persisting concerns of global aphasia.     Current Facility-Administered Medications   Medication Dose Route Frequency Provider Last Rate Last Dose    0.9%  NaCl infusion   Intravenous Continuous Gregor Abbott  mL/hr at 09/30/17 0345      acyclovir (ZOVIRAX) 900 mg in dextrose 5 % 250 mL IVPB  900 mg Intravenous Q8H Venessa Reyes  mL/hr at 09/30/17 0941 900 mg at 09/30/17 0941    atorvastatin tablet 20 mg  20 mg Oral QHS Maren Li MD   20 mg at 09/29/17 2137    carvedilol tablet 25 mg  25 mg Oral BID WM Maren Li MD   25 mg at 09/30/17 0936    dexamethasone injection 4 mg  4 mg Intravenous Q6H Gabino Rich MD   4 mg at 09/30/17 0937    dextrose 50% injection 12.5 g  12.5 g Intravenous PRN Maren Li MD        dextrose 50% injection 25 g  25 g Intravenous PRN Maren Li MD        enoxaparin injection 40 mg  40 mg Subcutaneous Daily Maren Li MD   40 mg at 09/29/17 1742    glucagon (human recombinant) injection 1 mg  1 mg Intramuscular PRN Maren Li MD        glucose chewable tablet 16 g  16 g Oral PRN Maren Li MD        glucose chewable tablet 24 g  24 g Oral PRN Maren Li MD        lacosamide tablet 200 mg  200 mg Oral BID Maren Li MD   200 mg at 09/30/17 0936    lidocaine HCL 10 mg/ml (1%) injection 5 mL  5 mL Other Once Gregor Abbott MD   Stopped at 09/27/17 2330    quetiapine tablet 25 mg  25 mg Oral QHS Maren Li MD   25 mg at 09/29/17 2137    thiamine tablet 100 mg  100 mg Oral Daily Maren Li MD   100 mg at 09/30/17 0936     Review of Systems   Unable to perform ROS: Mental status change   Constitutional: Negative for fever.   Respiratory: Negative for shortness of breath.    Cardiovascular: Negative for chest pain.   Musculoskeletal: Negative for back pain.  "  Neurological: Negative for seizures, syncope, facial asymmetry and headaches.   Psychiatric/Behavioral: Positive for confusion and decreased concentration.     Objective:     Vital Signs (Most Recent):  Temp: 96.8 °F (36 °C) (09/30/17 0800)  Pulse: 91 (09/30/17 0800)  Resp: 18 (09/30/17 0800)  BP: 132/65 (09/30/17 0800)  SpO2: (!) 92 % (09/30/17 0800) Vital Signs (24h Range):  Temp:  [96.8 °F (36 °C)-98.1 °F (36.7 °C)] 96.8 °F (36 °C)  Pulse:  [75-91] 91  Resp:  [18-20] 18  SpO2:  [92 %-98 %] 92 %  BP: (123-147)/(65-87) 132/65     Weight: 120.7 kg (266 lb)  Body mass index is 30.74 kg/m².    Physical Exam   Constitutional: He appears well-developed and well-nourished. No distress.   HENT:   Head: Normocephalic and atraumatic.   Eyes: EOM are normal. Pupils are equal, round, and reactive to light.   Neck: Neck supple.   Pulmonary/Chest: Effort normal.   Neurological: He has normal strength.   Reflex Scores:       Tricep reflexes are 2+ on the right side and 2+ on the left side.       Bicep reflexes are 2+ on the right side and 2+ on the left side.       Brachioradialis reflexes are 2+ on the right side and 2+ on the left side.       Patellar reflexes are 2+ on the right side and 2+ on the left side.       Achilles reflexes are 2+ on the right side and 2+ on the left side.  Skin: He is not diaphoretic.   Psychiatric: His speech is normal.     NEUROLOGICAL EXAMINATION:     MENTAL STATUS   Oriented to person (global aphasia ).   Registration of memory: global aphasia    Attention: decreased. Concentration: decreased.   Speech: speech is normal   Level of consciousness: alert (global aphasia + ? follow in commands )  Unable to name object. Unable to repeat. Abnormal comprehension.        Unable to repeat or name objects (key, earring etc) called everything a stone after hearing the phrase "does a stone float on water?"     CRANIAL NERVES     CN II   Visual fields full to confrontation.     CN III, IV, VI   Pupils are " equal, round, and reactive to light.  Extraocular motions are normal.   Nystagmus: none   Ophthalmoparesis: none    CN V   Facial sensation intact.     CN VII   Facial expression full, symmetric.     CN VIII   CN VIII normal.     CN IX, X   CN IX normal.   CN X normal.     CN XI   CN XI normal.     CN XII   Tongue: not atrophic    MOTOR EXAM   Muscle bulk: normal  Overall muscle tone: normal    Strength   Strength 5/5 throughout.     REFLEXES     Reflexes   Right brachioradialis: 2+  Left brachioradialis: 2+  Right biceps: 2+  Left biceps: 2+  Right triceps: 2+  Left triceps: 2+  Right patellar: 2+  Left patellar: 2+  Right achilles: 2+  Left achilles: 2+  Right plantar: normal  Left plantar: normal    SENSORY EXAM   Pinprick normal.     GAIT AND COORDINATION     Gait  Gait: (NT)    Tremor   Resting tremor: absent  Intention tremor: absent    Significant Labs:   Hemoglobin A1c: No results for input(s): HGBA1C in the last 720 hours.  Blood Culture: No results for input(s): LABBLOO in the last 48 hours.  CBC:     Recent Labs  Lab 09/29/17  0538   WBC 6.31   HGB 10.5*   HCT 32.0*   *     CMP:     Recent Labs  Lab 09/29/17  0538   GLU 93      K 3.5      CO2 22*   BUN 5*   CREATININE 0.8   CALCIUM 8.9   MG 1.9   ANIONGAP 10   EGFRNONAA >60.0     Inflammatory Markers: No results for input(s): SEDRATE, CRP, PROCAL in the last 48 hours.  Respiratory Culture: No results for input(s): GSRESP, RESPIRATORYC in the last 48 hours.  Urine Culture: No results for input(s): LABURIN in the last 48 hours.  Urine Studies: No results for input(s): COLORU, APPEARANCEUA, PHUR, SPECGRAV, PROTEINUA, GLUCUA, KETONESU, BILIRUBINUA, OCCULTUA, NITRITE, UROBILINOGEN, LEUKOCYTESUR, RBCUA, WBCUA, BACTERIA, SQUAMEPITHEL, HYALINECASTS in the last 48 hours.    Invalid input(s): WRIGHTSUR  All pertinent lab results     EEG (9/27/17): [facility]  EEG FINDINGS:  Recording was obtained at the patient's bedside in the hospital   room.  The  patient was awake and moving around spontaneously and interacting   with the nursing personnel.  Background was very disorganized.  There were brief   segments in which an 8 Hz posterior dominant rhythm was seen in the occipital   regions bilaterally.  In general, the background over the 2 hemispheres is slow   consisting of an irregular theta and delta frequencies.  The delta is more   prominent over the left hemisphere, but maximally noted in the temporal leads.    No spike or sharp wave activity was seen.  Activation procedures were not   carried out.     IMPRESSION:  Markedly abnormal EEG.  There was slowing and disorganization of   the background rhythms indicating the presence of a moderate generalized   encephalopathy.  In addition, there is focal slowing, which is a polymorphic   delta noted over the left hemisphere, maximal in the temporal leads.  This would   indicate a lateralized structural process and a polymorphic character of the   waveforms would suggest subcortical, particularly white matter involvement.  No   epileptic activity was seen.     CLINICAL CORRELATION:  The patient is a 61-year-old male who was transferred   from an outside hospital for altered mental status and the patient apparently   has experienced a tonic-clonic seizure.  MRI shows left temporal lobe edema,   which correlates well with the findings on this EEG, in which there is evidence   for structural dysfunction in the left hemisphere, maximum in the temporal area.  In this recording, there was no evidence for an irritative process.    Significant Imaging: I have reviewed and interpreted all pertinent imaging results/findings within the past 24 hours.     Cerebral angiogram (9/28/17): preliminary   Impression: Narrowing of small arteries involving the posterior left MCA territory. It is unclear whether is this a primary process or sequelae of swelling noted on MRI.  If it is to be a considered a primary process then vasculitis  could be questioned, however no other region of vascular abnormality is identified to support this.

## 2017-09-30 NOTE — PROGRESS NOTES
[facility]  Neurology  Progress Note    Patient Name: Tommy Way  MRN: [id number]  Admission Date: 9/26/2017  Hospital Length of Stay: 4 days  Code Status: Full Code   Attending Provider: Gregor Abbott MD  Primary Care Physician: Adan Torres MD   Principal Problem:Encephalopathy      Subjective:     Interval History:     Active concerns for agitations overnight. ? Relation to initiation of steroids, however stable over the morning.     Persisting concerns of global aphasia.     Current Facility-Administered Medications   Medication Dose Route Frequency Provider Last Rate Last Dose    0.9%  NaCl infusion   Intravenous Continuous Gregor Abbott  mL/hr at 09/30/17 0345      acyclovir (ZOVIRAX) 900 mg in dextrose 5 % 250 mL IVPB  900 mg Intravenous Q8H Venessa Reyes  mL/hr at 09/30/17 0941 900 mg at 09/30/17 0941    atorvastatin tablet 20 mg  20 mg Oral QHS Maren Li MD   20 mg at 09/29/17 2137    carvedilol tablet 25 mg  25 mg Oral BID WM Maren Li MD   25 mg at 09/30/17 0936    dexamethasone injection 4 mg  4 mg Intravenous Q6H Gabino Rich MD   4 mg at 09/30/17 0937    dextrose 50% injection 12.5 g  12.5 g Intravenous PRN Maren Li MD        dextrose 50% injection 25 g  25 g Intravenous PRN Maren Li MD        enoxaparin injection 40 mg  40 mg Subcutaneous Daily Maren Li MD   40 mg at 09/29/17 1742    glucagon (human recombinant) injection 1 mg  1 mg Intramuscular PRN Maren Li MD        glucose chewable tablet 16 g  16 g Oral PRN Maren Li MD        glucose chewable tablet 24 g  24 g Oral PRN Maren Li MD        lacosamide tablet 200 mg  200 mg Oral BID Maren Li MD   200 mg at 09/30/17 0936    lidocaine HCL 10 mg/ml (1%) injection 5 mL  5 mL Other Once Gregor Abbott MD   Stopped at 09/27/17 2330    quetiapine tablet 25 mg  25 mg Oral QHS Maren Li MD   25 mg at 09/29/17 2137    thiamine tablet 100 mg  100 mg  Oral Daily Maren Li MD   100 mg at 09/30/17 0936     Review of Systems   Unable to perform ROS: Mental status change   Constitutional: Negative for fever.   Respiratory: Negative for shortness of breath.    Cardiovascular: Negative for chest pain.   Musculoskeletal: Negative for back pain.   Neurological: Negative for seizures, syncope, facial asymmetry and headaches.   Psychiatric/Behavioral: Positive for confusion and decreased concentration.     Objective:     Vital Signs (Most Recent):  Temp: 96.8 °F (36 °C) (09/30/17 0800)  Pulse: 91 (09/30/17 0800)  Resp: 18 (09/30/17 0800)  BP: 132/65 (09/30/17 0800)  SpO2: (!) 92 % (09/30/17 0800) Vital Signs (24h Range):  Temp:  [96.8 °F (36 °C)-98.1 °F (36.7 °C)] 96.8 °F (36 °C)  Pulse:  [75-91] 91  Resp:  [18-20] 18  SpO2:  [92 %-98 %] 92 %  BP: (123-147)/(65-87) 132/65     Weight: 120.7 kg (266 lb)  Body mass index is 30.74 kg/m².    Physical Exam   Constitutional: He appears well-developed and well-nourished. No distress.   HENT:   Head: Normocephalic and atraumatic.   Eyes: EOM are normal. Pupils are equal, round, and reactive to light.   Neck: Neck supple.   Pulmonary/Chest: Effort normal.   Neurological: He has normal strength.   Reflex Scores:       Tricep reflexes are 2+ on the right side and 2+ on the left side.       Bicep reflexes are 2+ on the right side and 2+ on the left side.       Brachioradialis reflexes are 2+ on the right side and 2+ on the left side.       Patellar reflexes are 2+ on the right side and 2+ on the left side.       Achilles reflexes are 2+ on the right side and 2+ on the left side.  Skin: He is not diaphoretic.   Psychiatric: His speech is normal.     NEUROLOGICAL EXAMINATION:     MENTAL STATUS   Oriented to person (global aphasia ).   Registration of memory: global aphasia    Attention: decreased. Concentration: decreased.   Speech: speech is normal   Level of consciousness: alert (global aphasia + ? follow in commands )  Unable to  "name object. Unable to repeat. Abnormal comprehension.        Unable to repeat or name objects (key, earring etc) called everything a stone after hearing the phrase "does a stone float on water?"     CRANIAL NERVES     CN II   Visual fields full to confrontation.     CN III, IV, VI   Pupils are equal, round, and reactive to light.  Extraocular motions are normal.   Nystagmus: none   Ophthalmoparesis: none    CN V   Facial sensation intact.     CN VII   Facial expression full, symmetric.     CN VIII   CN VIII normal.     CN IX, X   CN IX normal.   CN X normal.     CN XI   CN XI normal.     CN XII   Tongue: not atrophic    MOTOR EXAM   Muscle bulk: normal  Overall muscle tone: normal    Strength   Strength 5/5 throughout.     REFLEXES     Reflexes   Right brachioradialis: 2+  Left brachioradialis: 2+  Right biceps: 2+  Left biceps: 2+  Right triceps: 2+  Left triceps: 2+  Right patellar: 2+  Left patellar: 2+  Right achilles: 2+  Left achilles: 2+  Right plantar: normal  Left plantar: normal    SENSORY EXAM   Pinprick normal.     GAIT AND COORDINATION     Gait  Gait: (NT)    Tremor   Resting tremor: absent  Intention tremor: absent    Significant Labs:   Hemoglobin A1c: No results for input(s): HGBA1C in the last 720 hours.  Blood Culture: No results for input(s): LABBLOO in the last 48 hours.  CBC:     Recent Labs  Lab 09/29/17  0538   WBC 6.31   HGB 10.5*   HCT 32.0*   *     CMP:     Recent Labs  Lab 09/29/17  0538   GLU 93      K 3.5      CO2 22*   BUN 5*   CREATININE 0.8   CALCIUM 8.9   MG 1.9   ANIONGAP 10   EGFRNONAA >60.0     Inflammatory Markers: No results for input(s): SEDRATE, CRP, PROCAL in the last 48 hours.  Respiratory Culture: No results for input(s): GSRESP, RESPIRATORYC in the last 48 hours.  Urine Culture: No results for input(s): LABURIN in the last 48 hours.  Urine Studies: No results for input(s): COLORU, APPEARANCEUA, PHUR, SPECGRAV, PROTEINUA, GLUCUA, KETONESU, BILIRUBINUA, " OCCULTUA, NITRITE, UROBILINOGEN, LEUKOCYTESUR, RBCUA, WBCUA, BACTERIA, SQUAMEPITHEL, HYALINECASTS in the last 48 hours.    Invalid input(s): JAQUAN  All pertinent lab results     EEG (9/27/17): [facility]  EEG FINDINGS:  Recording was obtained at the patient's bedside in the hospital   room.  The patient was awake and moving around spontaneously and interacting   with the nursing personnel.  Background was very disorganized.  There were brief   segments in which an 8 Hz posterior dominant rhythm was seen in the occipital   regions bilaterally.  In general, the background over the 2 hemispheres is slow   consisting of an irregular theta and delta frequencies.  The delta is more   prominent over the left hemisphere, but maximally noted in the temporal leads.    No spike or sharp wave activity was seen.  Activation procedures were not   carried out.     IMPRESSION:  Markedly abnormal EEG.  There was slowing and disorganization of   the background rhythms indicating the presence of a moderate generalized   encephalopathy.  In addition, there is focal slowing, which is a polymorphic   delta noted over the left hemisphere, maximal in the temporal leads.  This would   indicate a lateralized structural process and a polymorphic character of the   waveforms would suggest subcortical, particularly white matter involvement.  No   epileptic activity was seen.     CLINICAL CORRELATION:  The patient is a 61-year-old male who was transferred   from an outside hospital for altered mental status and the patient apparently   has experienced a tonic-clonic seizure.  MRI shows left temporal lobe edema,   which correlates well with the findings on this EEG, in which there is evidence   for structural dysfunction in the left hemisphere, maximum in the temporal area.  In this recording, there was no evidence for an irritative process.    Significant Imaging: I have reviewed and interpreted all pertinent imaging results/findings within the  past 24 hours.     Cerebral angiogram (9/28/17): preliminary   Impression: Narrowing of small arteries involving the posterior left MCA territory. It is unclear whether is this a primary process or sequelae of swelling noted on MRI.  If it is to be a considered a primary process then vasculitis could be questioned, however no other region of vascular abnormality is identified to support this.    Assessment and Plan:     * Encephalopathy    61 y.o male with hx of HTN transferred from [facility] 9/26/17. Patient originally presented to OSH after witnessed seizures at home. Intubated for airway protection. 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 LP done at OSH x 2 with benign CSF besides elevated protein 77. On course of vancomycin, zosyn for aspiration pneumonia as well. LP repeated 9/19/17. HSV PCR, VDRL, HIV and arbovirus panel negative. Quantiferon gold and influenza panel positive. Followed by Dr. Reyes (ID) and Osmany (Neuro) in [place].     9/27- patient encephalopathic with aphasia, but able to follow some simple commands  EEG showed structural dysfunction in the left hemisphere, maximum in the temporal area. No evidence for an irritative process    9/28- cerebral angiogram performed without clear evidence of vasculitis  9/29- initiated on steroids. persisting concerns of global aphasia     Recommendations:  - Continue steroids as per NSGY   - Consideration for biopsy. Plan to perform on Tuesday by Neurosurgery onboard.   - F/u ACE and Paraneoplastic panel   - Consider MR spectroscopy with perfusion - which may (not always) give us an idea of infection vs tumor pathology.   - Follow CSF lab   - Currently on empiric IV acyclovir while HSV is pending  - Continue Vimpat 200 mg BID           Encephalitis and encephalomyelitis, unspecified    - see section above         Complex partial seizure evolving to generalized seizure    - see section above          Hyperlipidemia    - as per primary         Hypertension    - as per primary             VTE Risk Mitigation         Ordered     enoxaparin injection 40 mg  Daily     Route:  Subcutaneous        09/27/17 0029     Medium Risk of VTE  Once      09/27/17 0029          Jay Graves MD  Neurology  [facility]

## 2017-09-30 NOTE — SUBJECTIVE & OBJECTIVE
Medications:  Continuous Infusions:   sodium chloride 0.9% 125 mL/hr at 09/30/17 0345     Scheduled Meds:   acyclovir  900 mg Intravenous Q8H    atorvastatin  20 mg Oral QHS    carvedilol  25 mg Oral BID WM    dexamethasone  4 mg Intravenous Q6H    enoxaparin  40 mg Subcutaneous Daily    lacosamide  200 mg Oral BID    lidocaine HCL 10 mg/ml (1%)  5 mL Other Once    quetiapine  25 mg Oral QHS    thiamine  100 mg Oral Daily     PRN Meds:dextrose 50%, dextrose 50%, glucagon (human recombinant), glucose, glucose     Review of Systems   Constitutional: Negative for chills and fever.   Neurological: Negative for facial asymmetry and weakness.   Psychiatric/Behavioral: Positive for confusion and decreased concentration.   All other systems reviewed and are negative.    Objective:     Weight: 120.7 kg (266 lb)  Body mass index is 30.74 kg/m².  Vital Signs (Most Recent):  Temp: 97.4 °F (36.3 °C) (09/30/17 0354)  Pulse: 90 (09/30/17 0354)  Resp: 18 (09/30/17 0354)  BP: (!) 147/76 (09/30/17 0354)  SpO2: (!) 93 % (09/30/17 0354) Vital Signs (24h Range):  Temp:  [97 °F (36.1 °C)-98.8 °F (37.1 °C)] 97.4 °F (36.3 °C)  Pulse:  [75-90] 90  Resp:  [18-20] 18  SpO2:  [93 %-98 %] 93 %  BP: (121-147)/(56-87) 147/76       Date 09/30/17 0700 - 10/01/17 0659   Shift 3986-0166 8922-6440 4624-4110 24 Hour Total   I  N  T  A  K  E   P.O. 240   240    I.V.  (mL/kg) 1500  (12.4)   1500  (12.4)    Shift Total  (mL/kg) 1740  (14.4)   1740  (14.4)   O  U  T  P  U  T   Urine  (mL/kg/hr) 650   650    Shift Total  (mL/kg) 650  (5.4)   650  (5.4)   Weight (kg) 120.7 120.7 120.7 120.7                   Arterial Sheath 09/28/17 0800 Right (Active)   Closure Device Angio seal 9/28/2017  8:51 AM       Physical Exam:  Vitals reviewed.    Constitutional: He appears well-developed and well-nourished. He is not diaphoretic. No distress.     Eyes: Pupils are equal, round, and reactive to light. Right eye exhibits no discharge. Left eye exhibits  no discharge.     Cardiovascular: Normal rate.     Abdominal: Soft.     Skin: Skin displays no rash on trunk and no rash on extremities.     Psych/Behavior: He is alert. He has a normal mood and affect.   AOx1     Musculoskeletal:        Neck: Range of motion is full. There is no tenderness. Tone is normal.        Right Upper Extremities: Range of motion is full. There is no tenderness. Muscle strength is 5/5. Tone is normal.        Left Upper Extremities: Range of motion is full. There is no tenderness. Muscle strength is 5/5. Tone is normal.       Right Lower Extremities: Range of motion is full. There is no tenderness. Muscle strength is 5/5. Tone is normal.        Left Lower Extremities: Range of motion is full. There is no tenderness. Muscle strength is 5/5. Tone is normal.     Neurological:        Sensory: There is no sensory deficit in the trunk. There is no sensory deficit in the extremities.        Cranial nerves: Cranial nerve(s) II, III, IV, V, VI, VII, VIII, IX, X, XI and XII are intact.       Significant Labs:    Recent Labs  Lab 09/29/17  0538   GLU 93      K 3.5      CO2 22*   BUN 5*   CREATININE 0.8   CALCIUM 8.9   MG 1.9       Recent Labs  Lab 09/29/17  0538   WBC 6.31   HGB 10.5*   HCT 32.0*   *     No results for input(s): LABPT, INR, APTT in the last 48 hours.  Microbiology Results (last 7 days)     Procedure Component Value Units Date/Time    CSF culture [[id number]] Collected:  09/28/17 3809    Order Status:  Completed Specimen:  CSF (Spinal Fluid) from CSF Tap, Tube 4 Updated:  09/30/17 0736     CSF CULTURE No Growth to date     Gram Stain Result Rare WBC's      No organisms seen        All pertinent labs from the last 24 hours have been reviewed.    Significant Diagnostics:  I have reviewed all pertinent imaging results/findings within the past 24 hours.

## 2017-09-30 NOTE — PT/OT/SLP PROGRESS
Physical Therapy  Treatment    Tommy Way   MRN: [id number]   Admitting Diagnosis: Encephalopathy    PT Received On: 09/30/17  PT Start Time: 0956     PT Stop Time: 1025    PT Total Time (min): 29 min       Billable Minutes:  Gait [id number] and Therapeutic Activity 10    Treatment Type: Treatment  PT/PTA: PT     PTA Visit Number: 0       General Precautions: Standard, fall  Orthopedic Precautions: N/A   Braces: N/A         Subjective:  Communicated with RN prior to session.  Pt agreeable to therapy session.     Pain/Comfort  Pain Rating 1: 0/10  Pain Rating Post-Intervention 1: 0/10    Objective:   Patient found with: PICC line    Functional Mobility:  Bed Mobility:   Supine to Sit: Contact Guard Assistance    Transfers:  Sit <> Stand Assistance: Contact Guard Assistance (from EOB)  Sit <> Stand Assistive Device: Rolling Walker    Gait:   Gait Distance: 3 side steps to bedside chair, min A with AD management and max vc's for safety  Assistance 1: Minimum assistance  Gait Assistive Device: Rolling walker  Gait Pattern: reciprocal  Gait Deviation(s): decreased weight-shifting ability, decreased velocity of limb motion, decreased toe-to-floor clearance    Balance:   Static Sit: FAIR: Maintains without assist, but unable to take any challenges   Dynamic Sit: FAIR: Cannot move trunk without losing balance  Static Stand: FAIR: Maintains without assist but unable to take challenges  Dynamic stand: POOR: N/A     Therapeutic Activities and Exercises:  Pt and wife educated on safety with transfers and amb.  PT brought pt in hallway in bedside chair to further gait train.  Pt unable to follow commands or attend to task to perform sit to stand from chair with max verbal and tactile cues.  Pt unable to keep B feet on floor to prep for sit to stand transfer; pt crossing legs, pt putting his feet on PT or wife's feet or putting feet on RW.  Pt safe to perform transfers with RN staff once pt wanting to get back in bed.      AM-PAC 6 CLICK MOBILITY  How much help from another person does this patient currently need?   1 = Unable, Total/Dependent Assistance  2 = A lot, Maximum/Moderate Assistance  3 = A little, Minimum/Contact Guard/Supervision  4 = None, Modified [place]/Independent    Turning over in bed (including adjusting bedclothes, sheets and blankets)?: 3  Sitting down on and standing up from a chair with arms (e.g., wheelchair, bedside commode, etc.): 3  Moving from lying on back to sitting on the side of the bed?: 3  Moving to and from a bed to a chair (including a wheelchair)?: 3  Need to walk in hospital room?: 2  Climbing 3-5 steps with a railing?: 1  Total Score: 15    AM-PAC Raw Score CMS G-Code Modifier Level of Impairment Assistance   6 % Total / Unable   7 - 9 CM 80 - 100% Maximal Assist   10 - 14 CL 60 - 80% Moderate Assist   15 - 19 CK 40 - 60% Moderate Assist   20 - 22 CJ 20 - 40% Minimal Assist   23 CI 1-20% SBA / CGA   24 CH 0% Independent/ Mod I     Patient left up in chair with all lines intact, call button in reach, RN notified and wife present.    Assessment:  Tommy Way is a 61 y.o. male with a medical diagnosis of Encephalopathy and presents with decreased cognition, safety awareness and overall functional mobility. Pt performed bed mobility CGA and transfers CGA with RW. Pt took 3 side steps to bedside chair, min A with AD management and max vc's for safety. Pt then unable to follow commands or attend to task to further perform gait training. Pt will continue to benefit from skilled PT to improve deficits and increase overall functional mobility.     Rehab identified problem list/impairments: Rehab identified problem list/impairments: weakness, gait instability, decreased lower extremity function, impaired balance, impaired endurance, decreased safety awareness, impaired cognition, impaired functional mobilty    Rehab potential is good.    Activity tolerance: Good    Discharge  recommendations: Discharge Facility/Level Of Care Needs: rehabilitation facility     Barriers to discharge: Barriers to Discharge: Decreased caregiver support    Equipment recommendations: Equipment Needed After Discharge: wheelchair, bath bench     GOALS:    Physical Therapy Goals        Problem: Physical Therapy Goal    Goal Priority Disciplines Outcome Goal Variances Interventions   Physical Therapy Goal     PT/OT, PT Ongoing (interventions implemented as appropriate)     Description:  Goals to be met by: 7 days (10/6)    Patient will increase functional independence with mobility by performin. Supine to sit with Set-up [place]  2. Sit to supine with Set-up [place]  3. Sit to stand transfer with Contact Guard Assistance-met   Revised: sit to stand transfer with Supervision   4. Gait  x 100 feet with Contact Guard Assistance using Rolling Walker.   5. Stand for 5 minutes with Stand-by Assistance using Rolling Walker  6. Lower extremity exercise program x30 reps per handout, with independence to improve muscular strength and endurance.                        PLAN:    Patient to be seen 5 x/week  to address the above listed problems via gait training, therapeutic activities, therapeutic exercises, neuromuscular re-education  Plan of Care expires: 10/29/17  Plan of Care reviewed with: patient, spouse         CHELSEA YONI, PT  2017

## 2017-09-30 NOTE — SUBJECTIVE & OBJECTIVE
Interval History: No acute events overnight. Diagnostic cerebral angiogram shows possible vasculitis; starting on dex4q6    Medications:  Continuous Infusions:   sodium chloride 0.9% 125 mL/hr at 09/27/17 1440     Scheduled Meds:   acyclovir  900 mg Intravenous Q8H    atorvastatin  20 mg Oral QHS    carvedilol  25 mg Oral BID WM    dexamethasone  4 mg Intravenous Q6H    enoxaparin  40 mg Subcutaneous Daily    lacosamide  200 mg Oral BID    lidocaine HCL 10 mg/ml (1%)  5 mL Other Once    quetiapine  25 mg Oral QHS    thiamine  100 mg Oral Daily     PRN Meds:dextrose 50%, dextrose 50%, glucagon (human recombinant), glucose, glucose     Review of Systems    Objective:     Weight: 121.8 kg (268 lb 8 oz)  Body mass index is 31.03 kg/m².  Vital Signs (Most Recent):  Temp: 98.1 °F (36.7 °C) (09/29/17 1600)  Pulse: 82 (09/29/17 1600)  Resp: 20 (09/29/17 1600)  BP: 123/74 (09/29/17 1600)  SpO2: 98 % (09/29/17 1600) Vital Signs (24h Range):  Temp:  [97.2 °F (36.2 °C)-98.8 °F (37.1 °C)] 98.1 °F (36.7 °C)  Pulse:  [75-97] 82  Resp:  [17-20] 20  SpO2:  [96 %-98 %] 98 %  BP: (102-154)/(56-86) 123/74       Neurosurgery Physical Exam    Vital signs: reviewed above.   Constitutional: well-developed, no apparent distress  Abdomen soft, non-tender  Cardiovascular: regular rate and rhythm  Respiratory: non-distressed on room air   Extremities: groin soft, no e/o hematoma. Dorsalis pedis 2/4  Neurological  GCS 15  AOx1  Speech/Language: expressive aphasia  Head: normocephalic, atraumatic   PERRL, EOMI, Visual fields intact.   Facial expression symmetric  Tongue midline  Spontaneously moves all extremities;   Strength       RUE: 5/5       RLE: 5/5       LUE: 5/5        LLE: 5/5  Tone: no spasticity, no rigidity, no clonus, no atrophy  Pronator Drift: neg   DTRs 2/4 in all extremities   Sensation to Light touch: intact  Coordination: Finger-to-nose intact    Significant Labs:    Recent Labs  Lab 09/28/17  0300 09/29/17  0538    GLU 87 93    139   K 3.5 3.5    107   CO2 26 22*   BUN 6 5*   CREATININE 0.8 0.8   CALCIUM 9.0 8.9   MG 1.5* 1.9       Recent Labs  Lab 09/28/17  0300 09/29/17  0538   WBC 5.82 6.31   HGB 10.2* 10.5*   HCT 30.3* 32.0*   * 357*     All pertinent labs from the last 24 hours have been reviewed.    Significant Diagnostics:  Narrowing of small arteries involving the posterior left MCA territory.  It is unclear whether is this a primary process or sequelae of swelling noted on MRI.  If it is to be a considered a primary process then vasculitis could be questioned, however no other region of vascular abnormality is identified to support this.  Diego Rivero  Date: 09/28/17  Time: 12:05

## 2017-09-30 NOTE — SUBJECTIVE & OBJECTIVE
Interval History: patient had LP done yesterday, pending several serologies; cont acyclovir     Review of Systems   Constitutional: Negative for activity change and appetite change.   HENT: Negative for drooling and facial swelling.    Eyes: Negative for discharge and itching.   Respiratory: Negative for cough, shortness of breath and wheezing.    Cardiovascular: Negative for chest pain and palpitations.   Gastrointestinal: Negative for abdominal pain and nausea.   Genitourinary: Negative for difficulty urinating and dysuria.   Skin: Negative for color change and pallor.   Neurological: Negative for dizziness and numbness.   Psychiatric/Behavioral: Negative for behavioral problems and confusion.     Objective:     Vital Signs (Most Recent):  Temp: 98 °F (36.7 °C) (09/30/17 1510)  Pulse: 94 (09/30/17 1510)  Resp: 18 (09/30/17 1510)  BP: 130/65 (09/30/17 1614)  SpO2: 95 % (09/30/17 1510) Vital Signs (24h Range):  Temp:  [96.8 °F (36 °C)-98 °F (36.7 °C)] 98 °F (36.7 °C)  Pulse:  [75-94] 94  Resp:  [18] 18  SpO2:  [92 %-95 %] 95 %  BP: (114-147)/(44-87) 130/65     Weight: 120.7 kg (266 lb)  Body mass index is 30.74 kg/m².    Intake/Output Summary (Last 24 hours) at 09/30/17 1641  Last data filed at 09/30/17 0700   Gross per 24 hour   Intake             1740 ml   Output             2051 ml   Net             -311 ml      Physical Exam   Constitutional: He is oriented to person, place, and time. He appears well-developed and well-nourished.   HENT:   Head: Normocephalic and atraumatic.   Eyes: Conjunctivae are normal. Pupils are equal, round, and reactive to light.   Neck: Normal range of motion. No thyromegaly present.   Cardiovascular: Normal rate, regular rhythm and normal heart sounds.    Pulmonary/Chest: Effort normal and breath sounds normal.   Abdominal: Soft. He exhibits no distension. There is no tenderness.   Neurological: He is alert and oriented to person, place, and time. Coordination normal.   wernickes  aphasia   Skin: No rash noted. No erythema.       MELD-Na score: 8 at 8/12/2017  4:50 AM  MELD score: 8 at 8/12/2017  4:50 AM  Calculated from:  Serum Creatinine: 0.80 mg/dL (Rounded to 1) at 8/12/2017  4:50 AM  Serum Sodium: 139 mmol/L (Rounded to 137) at 8/12/2017  4:50 AM  Total Bilirubin: 1.2 mg/dL at 8/12/2017  4:50 AM  INR(ratio): 1.1 at 8/11/2017  4:10 PM  Age: 61 years    Significant Labs:  CBC:    Recent Labs  Lab 09/29/17  0538   WBC 6.31   HGB 10.5*   HCT 32.0*   *     CMP:    Recent Labs  Lab 09/29/17  0538      K 3.5      CO2 22*   GLU 93   BUN 5*   CREATININE 0.8   CALCIUM 8.9   ANIONGAP 10   EGFRNONAA >60.0     PTINR:  No results for input(s): INR in the last 48 hours.    Significant Procedures:   Dobutamine Stress Test with Color Flow: No results found for this or any previous visit.

## 2017-09-30 NOTE — PROGRESS NOTES
[facility] Hospital Medicine  Progress Note    Patient Name: Tommy Way  MRN: [id number]  Patient Class: IP- Inpatient   Admission Date: 9/26/2017  Length of Stay: 4 days  Attending Physician: Greogr Abbott MD  Primary Care Provider: Adan Torres MD    [facility] Medicine Team: [facility] [place] [facility] MED A Gregor Abbott MD    Subjective:     Principal Problem:Encephalopathy    HPI:  History is limited as patient is unable to provide given altered mentation. A 61-year-old man with recently diagnosed tonic clonic seizure disorder,  HTN and dyslipidemia admitted to [facility] after 5 witnessed seizures 9/10/17. He was initially intubated on presentation for low sats and inability to protect airway, extubated 9/14. 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 He was treated with Acyclovir and ultimately underwent 2 lumbar punctures which have been unrevealing. HSV PCR from first LP is negative. After he failed to improve significantly, diagnostic imaging was done and showed expansion of the meningeal enhancement as well as possible pathologic spinal compression fractures. [facility] discussed case with Dr. Sweeney for concern of possible malignancy and need for tissue diagnosis. On interview, patient is alert but oriented to self only. Attempts to answer questions but inappropriately and is confused. Follows commands. Denies any complaints.     Hospital Course:  No notes on file    Interval History: patient had LP done yesterday, pending several serologies; cont acyclovir     Review of Systems   Constitutional: Negative for activity change and appetite change.   HENT: Negative for drooling and facial swelling.    Eyes: Negative for discharge and itching.   Respiratory: Negative for cough, shortness of breath and wheezing.    Cardiovascular: Negative for chest pain and palpitations.   Gastrointestinal: Negative for abdominal pain and  nausea.   Genitourinary: Negative for difficulty urinating and dysuria.   Skin: Negative for color change and pallor.   Neurological: Negative for dizziness and numbness.   Psychiatric/Behavioral: Negative for behavioral problems and confusion.     Objective:     Vital Signs (Most Recent):  Temp: 98 °F (36.7 °C) (09/30/17 1510)  Pulse: 94 (09/30/17 1510)  Resp: 18 (09/30/17 1510)  BP: 130/65 (09/30/17 1614)  SpO2: 95 % (09/30/17 1510) Vital Signs (24h Range):  Temp:  [96.8 °F (36 °C)-98 °F (36.7 °C)] 98 °F (36.7 °C)  Pulse:  [75-94] 94  Resp:  [18] 18  SpO2:  [92 %-95 %] 95 %  BP: (114-147)/(44-87) 130/65     Weight: 120.7 kg (266 lb)  Body mass index is 30.74 kg/m².    Intake/Output Summary (Last 24 hours) at 09/30/17 1641  Last data filed at 09/30/17 0700   Gross per 24 hour   Intake             1740 ml   Output             2051 ml   Net             -311 ml      Physical Exam   Constitutional: He is oriented to person, place, and time. He appears well-developed and well-nourished.   HENT:   Head: Normocephalic and atraumatic.   Eyes: Conjunctivae are normal. Pupils are equal, round, and reactive to light.   Neck: Normal range of motion. No thyromegaly present.   Cardiovascular: Normal rate, regular rhythm and normal heart sounds.    Pulmonary/Chest: Effort normal and breath sounds normal.   Abdominal: Soft. He exhibits no distension. There is no tenderness.   Neurological: He is alert and oriented to person, place, and time. Coordination normal.   wernickes aphasia   Skin: No rash noted. No erythema.       MELD-Na score: 8 at 8/12/2017  4:50 AM  MELD score: 8 at 8/12/2017  4:50 AM  Calculated from:  Serum Creatinine: 0.80 mg/dL (Rounded to 1) at 8/12/2017  4:50 AM  Serum Sodium: 139 mmol/L (Rounded to 137) at 8/12/2017  4:50 AM  Total Bilirubin: 1.2 mg/dL at 8/12/2017  4:50 AM  INR(ratio): 1.1 at 8/11/2017  4:10 PM  Age: 61 years    Significant Labs:  CBC:    Recent Labs  Lab 09/29/17  0538   WBC 6.31   HGB 10.5*    HCT 32.0*   *     CMP:    Recent Labs  Lab 09/29/17  0538      K 3.5      CO2 22*   GLU 93   BUN 5*   CREATININE 0.8   CALCIUM 8.9   ANIONGAP 10   EGFRNONAA >60.0     PTINR:  No results for input(s): INR in the last 48 hours.    Significant Procedures:   Dobutamine Stress Test with Color Flow: No results found for this or any previous visit.      Assessment/Plan:      * Encephalopathy    Pt alert but oriented to self only. Follows commands but use of words not appropriate. 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 He was treated with acyclovir and ultimately underwent 2 lumbar punctures which have been unrevealing. HSV PCR from first LP is negative. Additional viral studies apparently unremarkable. After he failed to improve significantly, diagnostic imaging was done and showed expansion of the meningeal enhancement as well as possible pathologic spnal compression fractures. [facility] discussed case with Dr. Sweeney for concern of possible malignancy and need for tissue diagnosis. Dr Ware requested HSV PCR on 2nd LP sample if available. Continue acyclovir. Consult Neuro. Consult Neurosurgery for consideration of biopsy for tissue diagnosis. CT head with & w/o contrast ordered. Seizure precautions. Neuro checks.         T11 vertebral fracture    OSH MRI with acute compression fracture at T11. Mild compression fracture of L4. Old compression fractures at L1, T4, T5. No neuro deficits noted on exam other than altered mentation. Neurosurgery consult as above.           Aspiration pneumonia of both lower lobes due to vomit    Treated with Zosyn for 11 days at OSH; will not continue abx. Respiratory status, VS stable.           Encephalitis and encephalomyelitis, unspecified    - cont acyclovir; ID and neurosurgery on board; planning for brain angiogram and LP in the AM    9/28- angiogram done today, LP to be done as  well; cont acyclovir; appreciate recs    9/29- LP completed, follow up results; cont acyclovir; patient still having confusion         Status epilepticus, generalized convulsive    Likely secondary to encephalitis. No further seizures since initial presentation. Pt had recently been dx'ed with seizure d/o prior to admission. Started on lacosamide at OSH, will resume. Seizure precautions. Neuro consult.         Hypertension    's. Resume home carvedilol           VTE Risk Mitigation         Ordered     enoxaparin injection 40 mg  Daily     Route:  Subcutaneous        09/27/17 0029     Medium Risk of VTE  Once      09/27/17 0029              Gregor Abbott MD  Department of Hospital Medicine   [facility]

## 2017-09-30 NOTE — PROGRESS NOTES
[facility]  Neurosurgery  Progress Note    Subjective:     History of Present Illness: Patient is a transfer from [facility] who presented for tonic-clonic seizure and AMS on 9/10 per OSH. CTH and follow-up MRI demonstrated L posterior temporal and occipital lobe lesion. 2x LP with analysis were negative for HSV, VZV, syphillis and HHV-6 were all negative and differential was unremarkable. Thorough autoimmune and infectious testing was also completed and returned without result. [place] [facility] [place] per NSGY and neurology. He remains altered on mentation with otherwise non-focal exam.    Post-Op Info:  Procedure(s) (LRB):  ANGIOGRAM-CEREBRAL for Dr. Brennan Lynn (Bilateral)             Medications:  Continuous Infusions:   sodium chloride 0.9% 125 mL/hr at 09/30/17 0345     Scheduled Meds:   acyclovir  900 mg Intravenous Q8H    atorvastatin  20 mg Oral QHS    carvedilol  25 mg Oral BID WM    dexamethasone  4 mg Intravenous Q6H    enoxaparin  40 mg Subcutaneous Daily    lacosamide  200 mg Oral BID    lidocaine HCL 10 mg/ml (1%)  5 mL Other Once    quetiapine  25 mg Oral QHS    thiamine  100 mg Oral Daily     PRN Meds:dextrose 50%, dextrose 50%, glucagon (human recombinant), glucose, glucose     Review of Systems   Constitutional: Negative for chills and fever.   Neurological: Negative for facial asymmetry and weakness.   Psychiatric/Behavioral: Positive for confusion and decreased concentration.   All other systems reviewed and are negative.    Objective:     Weight: 120.7 kg (266 lb)  Body mass index is 30.74 kg/m².  Vital Signs (Most Recent):  Temp: 97.4 °F (36.3 °C) (09/30/17 0354)  Pulse: 90 (09/30/17 0354)  Resp: 18 (09/30/17 0354)  BP: (!) 147/76 (09/30/17 0354)  SpO2: (!) 93 % (09/30/17 0354) Vital Signs (24h Range):  Temp:  [97 °F (36.1 °C)-98.8 °F (37.1 °C)] 97.4 °F (36.3 °C)  Pulse:  [75-90] 90  Resp:  [18-20] 18  SpO2:  [93 %-98  %] 93 %  BP: (121-147)/(56-87) 147/76       Date 09/30/17 0700 - 10/01/17 0659   Shift 7632-7028 7477-6587 2869-2999 24 Hour Total   I  N  T  A  K  E   P.O. 240   240    I.V.  (mL/kg) 1500  (12.4)   1500  (12.4)    Shift Total  (mL/kg) 1740  (14.4)   1740  (14.4)   O  U  T  P  U  T   Urine  (mL/kg/hr) 650   650    Shift Total  (mL/kg) 650  (5.4)   650  (5.4)   Weight (kg) 120.7 120.7 120.7 120.7                   Arterial Sheath 09/28/17 0800 Right (Active)   Closure Device Angio seal 9/28/2017  8:51 AM       Physical Exam:  Vitals reviewed.    Constitutional: He appears well-developed and well-nourished. He is not diaphoretic. No distress.     Eyes: Pupils are equal, round, and reactive to light. Right eye exhibits no discharge. Left eye exhibits no discharge.     Cardiovascular: Normal rate.     Abdominal: Soft.     Skin: Skin displays no rash on trunk and no rash on extremities.     Psych/Behavior: He is alert. He has a normal mood and affect.   AOx1     Musculoskeletal:        Neck: Range of motion is full. There is no tenderness. Tone is normal.        Right Upper Extremities: Range of motion is full. There is no tenderness. Muscle strength is 5/5. Tone is normal.        Left Upper Extremities: Range of motion is full. There is no tenderness. Muscle strength is 5/5. Tone is normal.       Right Lower Extremities: Range of motion is full. There is no tenderness. Muscle strength is 5/5. Tone is normal.        Left Lower Extremities: Range of motion is full. There is no tenderness. Muscle strength is 5/5. Tone is normal.     Neurological:        Sensory: There is no sensory deficit in the trunk. There is no sensory deficit in the extremities.        Cranial nerves: Cranial nerve(s) II, III, IV, V, VI, VII, VIII, IX, X, XI and XII are intact.       Significant Labs:    Recent Labs  Lab 09/29/17  0538   GLU 93      K 3.5      CO2 22*   BUN 5*   CREATININE 0.8   CALCIUM 8.9   MG 1.9       Recent Labs  Lab  09/29/17  0538   WBC 6.31   HGB 10.5*   HCT 32.0*   *     No results for input(s): LABPT, INR, APTT in the last 48 hours.  Microbiology Results (last 7 days)     Procedure Component Value Units Date/Time    CSF culture [[id number]] Collected:  09/28/17 1716    Order Status:  Completed Specimen:  CSF (Spinal Fluid) from CSF Tap, Tube 4 Updated:  09/30/17 0774     CSF CULTURE No Growth to date     Gram Stain Result Rare WBC's      No organisms seen        All pertinent labs from the last 24 hours have been reviewed.    Significant Diagnostics:  I have reviewed all pertinent imaging results/findings within the past 24 hours.    Assessment/Plan:     Encephalitis and encephalomyelitis, unspecified    61M who presents from OSH with presenting sx on 9/10 of AMS and tonic-clonic seizure for further work-up.    -- s/p diagnostic cerebral angiogram: narrowing of left distal MCA arteries possibly d/t vasculitis or secondary to cerebral edema   -starting dex4q6  -- Neurology arranged lumbar puncture for infectious workup per ID and paraneoplastic panel   -Follow neurology recs  -- will continue to follow for possible biopsy on Tuesday if CSF studies non-diagnostic   --Continue MNGT per IM            Agustin Henderson MD  Neurosurgery  [facility]

## 2017-09-30 NOTE — PROGRESS NOTES
[facility]  Neurosurgery  Progress Note    Subjective:     History of Present Illness: Patient is a transfer from [facility] who presented for tonic-clonic seizure and AMS on 9/10 per OSH. CTH and follow-up MRI demonstrated L posterior temporal and occipital lobe lesion. 2x LP with analysis were negative for HSV, VZV, syphillis and HHV-6 were all negative and differential was unremarkable. Thorough autoimmune and infectious testing was also completed and returned without result. [place] [facility] per NSGY and neurology. He remains altered on mentation with otherwise non-focal exam.    Post-Op Info:  Procedure(s) (LRB):  ANGIOGRAM-CEREBRAL for Dr. Brennan Lynn (Bilateral)         Interval History: No acute events overnight. Diagnostic cerebral angiogram shows possible vasculitis; starting on dex4q6    Medications:  Continuous Infusions:   sodium chloride 0.9% 125 mL/hr at 09/27/17 1440     Scheduled Meds:   acyclovir  900 mg Intravenous Q8H    atorvastatin  20 mg Oral QHS    carvedilol  25 mg Oral BID WM    dexamethasone  4 mg Intravenous Q6H    enoxaparin  40 mg Subcutaneous Daily    lacosamide  200 mg Oral BID    lidocaine HCL 10 mg/ml (1%)  5 mL Other Once    quetiapine  25 mg Oral QHS    thiamine  100 mg Oral Daily     PRN Meds:dextrose 50%, dextrose 50%, glucagon (human recombinant), glucose, glucose     Review of Systems    Objective:     Weight: 121.8 kg (268 lb 8 oz)  Body mass index is 31.03 kg/m².  Vital Signs (Most Recent):  Temp: 98.1 °F (36.7 °C) (09/29/17 1600)  Pulse: 82 (09/29/17 1600)  Resp: 20 (09/29/17 1600)  BP: 123/74 (09/29/17 1600)  SpO2: 98 % (09/29/17 1600) Vital Signs (24h Range):  Temp:  [97.2 °F (36.2 °C)-98.8 °F (37.1 °C)] 98.1 °F (36.7 °C)  Pulse:  [75-97] 82  Resp:  [17-20] 20  SpO2:  [96 %-98 %] 98 %  BP: (102-154)/(56-86) 123/74       Neurosurgery Physical Exam    Vital signs: reviewed above.    Constitutional: well-developed, no apparent distress  Abdomen soft, non-tender  Cardiovascular: regular rate and rhythm  Respiratory: non-distressed on room air   Extremities: groin soft, no e/o hematoma. Dorsalis pedis 2/4  Neurological  GCS 15  AOx1  Speech/Language: expressive aphasia  Head: normocephalic, atraumatic   PERRL, EOMI, Visual fields intact.   Facial expression symmetric  Tongue midline  Spontaneously moves all extremities;   Strength       RUE: 5/5       RLE: 5/5       LUE: 5/5        LLE: 5/5  Tone: no spasticity, no rigidity, no clonus, no atrophy  Pronator Drift: neg   DTRs 2/4 in all extremities   Sensation to Light touch: intact  Coordination: Finger-to-nose intact    Significant Labs:    Recent Labs  Lab 09/28/17 0300 09/29/17  0538   GLU 87 93    139   K 3.5 3.5    107   CO2 26 22*   BUN 6 5*   CREATININE 0.8 0.8   CALCIUM 9.0 8.9   MG 1.5* 1.9       Recent Labs  Lab 09/28/17 0300 09/29/17  0538   WBC 5.82 6.31   HGB 10.2* 10.5*   HCT 30.3* 32.0*   * 357*     All pertinent labs from the last 24 hours have been reviewed.    Significant Diagnostics:  Narrowing of small arteries involving the posterior left MCA territory.  It is unclear whether is this a primary process or sequelae of swelling noted on MRI.  If it is to be a considered a primary process then vasculitis could be questioned, however no other region of vascular abnormality is identified to support this.  Diego Rivero  Date: 09/28/17  Time: 12:05    Assessment/Plan:     Encephalitis and encephalomyelitis, unspecified    61M who presents from OSH with presenting sx on 9/10 of AMS and tonic-clonic seizure for further work-up.    -- s/p diagnostic cerebral angiogram: narrowing of left distal MCA arteries possibly d/t vasculitis or secondary to cerebral edema   -starting dex4q6  -- Neurology arranged lumbar puncture for infectious workup per ID and paraneoplastic panel   -Continue to follow neurology reccs  --  will continue to follow for possible biopsy on Tuesday if CSF studies non-diagnostic   --Continue MNGT per IM            Gabino Rich MD  Neurosurgery  [facility]

## 2017-09-30 NOTE — PROGRESS NOTES
"[facility]  Infectious Disease  Progress Note    Patient Name: Tommy Way  MRN: [id number]  Admission Date: 9/26/2017  Length of Stay: 4 days  Attending Physician: Gregor Abbott MD  Primary Care Provider: Adan Torres MD    Isolation Status: No active isolations  Assessment/Plan:      Encephalitis and encephalomyelitis, unspecified    Patient with extensive work up done for encephalitis. MRI suggestive for HSV as etiology with "significant increase in the T2 signal in the left temporal lobe particularly increased in the left posterior parietal lobe with some serpiginous meningeal enhancement suspicious for encephalitis. Punctate areas of diffusion signal in the left posterior parietal lobe suspicious for punctate areas of acute ischemia versus" So far HSV PCR done on the 9/11/17 LP was negative this study is highly sensitive but false negatives can occur. [place] IgM and IgG also done with negative results. Patient also had HIV, ANCA, Hepatitis panel, VDRL, Arbovirus panel with negative results. Only positive results quantiferon gold and Influenza virus not explaining MRI findings or current neurological state. Patient CSF from both LPs with mildly elevated protein, normal glucose and no significant WBC, non diagnostic. Dificult to determine source of encephalopathy at this time. Will recommend repeat LP prior to brain biopsy. Along with routine serology for CSF order repeat HSV PCR, get TB PCR from CSF, enterovirus panel and repeat arbovirus panel. Would also recommend FTA to evaluate if past syphilis exposure. Depending on results obtained from new LP sample would make recommendation for brain biopsy. Continue acyclovir since can't rule out HSV at this time. All blood cultures and CSF cultures negative to date for growth.      Plan  - Will follow up with LP results of CSF: HSV PCR, arboviral panel, TB PCR, enterovirus panel, FTA, TB PCR  - continue acyclovir   - given that " encephalopathy has not improved significantly, this patient is likely going to require a brain biopsy.  Recommend holding steroids prior to biopsy.            Anticipated Disposition: as above    Thank you for your consult. We will follow-up with patient. Please contact us if you have any additional questions.    Sandra Young MD  Infectious Disease  [facility]    Subjective:     Principal Problem:Encephalopathy    HPI: 62 y/o male PMHX arthritis, AHTN, admitted on 9/26/17 transferred from [facility]. Patient originally presented to OSH due to 2 episodes of witnessed seizures at home. Patient was at that time intubated in the ED due to desaturation and AMS for airway protection. On 9/11 MRI done showed temporal lobe abnormalities consistent with encephalitis. Patient had LP done as well non diagnostic. Patient at that time started on acyclovir. 9/14/17 patient was extubated. Had been on a course of vancomycin, zosyn for aspiration pneumonia and acyclovir for suspicion of HSV meningitis. 9/19/17 LP repeated non diagnostic once more. Patient had extensive work up done with HSV PCR that was negative, VDRL, HIV and arbovirus panel with no positive results. Quantiferon gold was positive as well as influenza panel. ID consulted at [facility] this time after transfer for further recommendations, patient had been followed by Dr. Reyes at [facility]. Wife denies recent travel outside of the country, fevers, chills, recent viral illness or new rashes. Denied pets in the house. Mentions having contact with small children that have not been reported ill at this time.          Review of Systems   Unable to perform ROS: Mental status change     Objective:     Vital Signs (Most Recent):  Temp: 96.8 °F (36 °C) (09/30/17 0800)  Pulse: 91 (09/30/17 0800)  Resp: 18 (09/30/17 0800)  BP: 132/65 (09/30/17 0800)  SpO2: (!) 92 % (09/30/17 0800) Vital Signs (24h Range):  Temp:  [96.8 °F (36 °C)-98.8 °F (37.1 °C)] 96.8 °F  (36 °C)  Pulse:  [75-91] 91  Resp:  [18-20] 18  SpO2:  [92 %-98 %] 92 %  BP: (121-147)/(56-87) 132/65     Weight: 120.7 kg (266 lb)  Body mass index is 30.74 kg/m².    Estimated Creatinine Clearance: 143.2 mL/min (based on SCr of 0.8 mg/dL).    Physical Exam   Constitutional: He appears well-developed.   HENT:   Head: Normocephalic and atraumatic.   Eyes: Conjunctivae and EOM are normal. Right eye exhibits no discharge. Left eye exhibits no discharge.   Neck: Normal range of motion.   Cardiovascular: Normal rate.    No murmur heard.  Pulmonary/Chest: Effort normal. No respiratory distress.   Abdominal: Soft. Bowel sounds are normal.   Musculoskeletal: Normal range of motion.   Neurological: He is alert.   Skin: Skin is warm and dry.       Significant Labs: All pertinent labs within the past 24 hours have been reviewed.    Significant Imaging: I have reviewed all pertinent imaging results/findings within the past 24 hours.

## 2017-09-30 NOTE — SUBJECTIVE & OBJECTIVE
Review of Systems   Unable to perform ROS: Mental status change     Objective:     Vital Signs (Most Recent):  Temp: 96.8 °F (36 °C) (09/30/17 0800)  Pulse: 91 (09/30/17 0800)  Resp: 18 (09/30/17 0800)  BP: 132/65 (09/30/17 0800)  SpO2: (!) 92 % (09/30/17 0800) Vital Signs (24h Range):  Temp:  [96.8 °F (36 °C)-98.8 °F (37.1 °C)] 96.8 °F (36 °C)  Pulse:  [75-91] 91  Resp:  [18-20] 18  SpO2:  [92 %-98 %] 92 %  BP: (121-147)/(56-87) 132/65     Weight: 120.7 kg (266 lb)  Body mass index is 30.74 kg/m².    Estimated Creatinine Clearance: 143.2 mL/min (based on SCr of 0.8 mg/dL).    Physical Exam   Constitutional: He appears well-developed.   HENT:   Head: Normocephalic and atraumatic.   Eyes: Conjunctivae and EOM are normal. Right eye exhibits no discharge. Left eye exhibits no discharge.   Neck: Normal range of motion.   Cardiovascular: Normal rate.    No murmur heard.  Pulmonary/Chest: Effort normal. No respiratory distress.   Abdominal: Soft. Bowel sounds are normal.   Musculoskeletal: Normal range of motion.   Neurological: He is alert.   Skin: Skin is warm and dry.       Significant Labs: All pertinent labs within the past 24 hours have been reviewed.    Significant Imaging: I have reviewed all pertinent imaging results/findings within the past 24 hours.

## 2017-09-30 NOTE — SUBJECTIVE & OBJECTIVE
Interval History: patient was started on steroids last night by neurosurgery; still having confusion and confabulation; will continue today and get a continuous EEG; NSGY planning for brain biopsy on tuesday    Review of Systems   Constitutional: Negative for activity change and appetite change.   HENT: Negative for drooling and facial swelling.    Eyes: Negative for discharge and itching.   Respiratory: Negative for cough, shortness of breath and wheezing.    Cardiovascular: Negative for chest pain and palpitations.   Gastrointestinal: Negative for abdominal pain and nausea.   Genitourinary: Negative for difficulty urinating and dysuria.   Skin: Negative for color change and pallor.   Neurological: Negative for dizziness and numbness.   Psychiatric/Behavioral: Negative for behavioral problems and confusion.     Objective:     Vital Signs (Most Recent):  Temp: 98 °F (36.7 °C) (09/30/17 1510)  Pulse: 94 (09/30/17 1510)  Resp: 18 (09/30/17 1510)  BP: 130/65 (09/30/17 1614)  SpO2: 95 % (09/30/17 1510) Vital Signs (24h Range):  Temp:  [96.8 °F (36 °C)-98 °F (36.7 °C)] 98 °F (36.7 °C)  Pulse:  [75-94] 94  Resp:  [18] 18  SpO2:  [92 %-95 %] 95 %  BP: (114-147)/(44-87) 130/65     Weight: 120.7 kg (266 lb)  Body mass index is 30.74 kg/m².    Intake/Output Summary (Last 24 hours) at 09/30/17 1646  Last data filed at 09/30/17 0700   Gross per 24 hour   Intake             1740 ml   Output             2051 ml   Net             -311 ml      Physical Exam   Constitutional: He is oriented to person, place, and time. He appears well-developed and well-nourished.   HENT:   Head: Normocephalic and atraumatic.   Eyes: Conjunctivae are normal. Pupils are equal, round, and reactive to light.   Neck: Normal range of motion. No thyromegaly present.   Cardiovascular: Normal rate, regular rhythm and normal heart sounds.    Pulmonary/Chest: Effort normal and breath sounds normal.   Abdominal: Soft. He exhibits no distension. There is no  tenderness.   Neurological: He is alert and oriented to person, place, and time. Coordination normal.   wernickes aphasia   Skin: No rash noted. No erythema.       MELD-Na score: 8 at 8/12/2017  4:50 AM  MELD score: 8 at 8/12/2017  4:50 AM  Calculated from:  Serum Creatinine: 0.80 mg/dL (Rounded to 1) at 8/12/2017  4:50 AM  Serum Sodium: 139 mmol/L (Rounded to 137) at 8/12/2017  4:50 AM  Total Bilirubin: 1.2 mg/dL at 8/12/2017  4:50 AM  INR(ratio): 1.1 at 8/11/2017  4:10 PM  Age: 61 years    Significant Labs:  CBC:    Recent Labs  Lab 09/29/17  0538   WBC 6.31   HGB 10.5*   HCT 32.0*   *     CMP:    Recent Labs  Lab 09/29/17  0538      K 3.5      CO2 22*   GLU 93   BUN 5*   CREATININE 0.8   CALCIUM 8.9   ANIONGAP 10   EGFRNONAA >60.0     PTINR:  No results for input(s): INR in the last 48 hours.    Significant Procedures:   Dobutamine Stress Test with Color Flow: No results found for this or any previous visit.

## 2017-09-30 NOTE — ASSESSMENT & PLAN NOTE
61M who presents from OSH with presenting sx on 9/10 of AMS and tonic-clonic seizure for further work-up.    -- s/p diagnostic cerebral angiogram: narrowing of left distal MCA arteries possibly d/t vasculitis or secondary to cerebral edema   -starting dex4q6  -- Neurology arranged lumbar puncture for infectious workup per ID and paraneoplastic panel   -Follow neurology recs  -- will continue to follow for possible biopsy on Tuesday if CSF studies non-diagnostic   --Continue MNGT per IM

## 2017-09-30 NOTE — ASSESSMENT & PLAN NOTE
61M who presents from OSH with presenting sx on 9/10 of AMS and tonic-clonic seizure for further work-up.    -- s/p diagnostic cerebral angiogram: narrowing of left distal MCA arteries possibly d/t vasculitis or secondary to cerebral edema   -starting dex4q6  -- Neurology arranged lumbar puncture for infectious workup per ID and paraneoplastic panel   -Continue to follow neurology reccs  -- will continue to follow for possible biopsy on Tuesday if CSF studies non-diagnostic   --Continue MNGT per IM

## 2017-09-30 NOTE — ASSESSMENT & PLAN NOTE
- cont acyclovir; ID and neurosurgery on board; planning for brain angiogram and LP in the AM    9/28- angiogram done today, LP to be done as well; cont acyclovir; appreciate recs    9/29- LP completed, follow up results; cont acyclovir; patient still having confusion

## 2017-09-30 NOTE — ASSESSMENT & PLAN NOTE
"Patient with extensive work up done for encephalitis. MRI suggestive for HSV as etiology with "significant increase in the T2 signal in the left temporal lobe particularly increased in the left posterior parietal lobe with some serpiginous meningeal enhancement suspicious for encephalitis. Punctate areas of diffusion signal in the left posterior parietal lobe suspicious for punctate areas of acute ischemia versus" So far HSV PCR done on the 9/11/17 LP was negative this study is highly sensitive but false negatives can occur. [place] IgM and IgG also done with negative results. Patient also had HIV, ANCA, Hepatitis panel, VDRL, Arbovirus panel with negative results. Only positive results quantiferon gold and Influenza virus not explaining MRI findings or current neurological state. Patient CSF from both LPs with mildly elevated protein, normal glucose and no significant WBC, non diagnostic. Dificult to determine source of encephalopathy at this time. Will recommend repeat LP prior to brain biopsy. Along with routine serology for CSF order repeat HSV PCR, get TB PCR from CSF, enterovirus panel and repeat arbovirus panel. Would also recommend FTA to evaluate if past syphilis exposure. Depending on results obtained from new LP sample would make recommendation for brain biopsy. Continue acyclovir since can't rule out HSV at this time. All blood cultures and CSF cultures negative to date for growth.      Plan  - Will follow up with LP results of CSF: HSV PCR, arboviral panel, TB PCR, enterovirus panel, FTA, TB PCR  - continue acyclovir   - given that encephalopathy has not improved significantly, this patient is likely going to require a brain biopsy.  Recommend holding steroids prior to biopsy.  "

## 2017-09-30 NOTE — PLAN OF CARE
Problem: Physical Therapy Goal  Goal: Physical Therapy Goal  Goals to be met by: 7 days (10/6)    Patient will increase functional independence with mobility by performin. Supine to sit with Set-up [place]  2. Sit to supine with Set-up [place]  3. Sit to stand transfer with Contact Guard Assistance-met   Revised: sit to stand transfer with Supervision   4. Gait  x 100 feet with Contact Guard Assistance using Rolling Walker.   5. Stand for 5 minutes with Stand-by Assistance using Rolling Walker  6. Lower extremity exercise program x30 reps per handout, with independence to improve muscular strength and endurance.      Outcome: Ongoing (interventions implemented as appropriate)  Pt progressing towards goals.     CHELSEA VALLEJO, PT  2017

## 2017-09-30 NOTE — ASSESSMENT & PLAN NOTE
- cont acyclovir; ID and neurosurgery on board; planning for brain angiogram and LP in the AM    9/28- angiogram done today, LP to be done as well; cont acyclovir; appreciate recs    9/29- LP completed, follow up results; cont acyclovir; patient still having confusion     9/30- started on decadron by NSAGUSTÍN, not much improvement so far, will give it another day; concern for vasculitis; will get EEG today continuous; planning on possible brain biopsy on tuesday

## 2017-09-30 NOTE — PROGRESS NOTES
[facility] Hospital Medicine  Progress Note    Patient Name: Tommy Way  MRN: [id number]  Patient Class: IP- Inpatient   Admission Date: 9/26/2017  Length of Stay: 4 days  Attending Physician: Gregor Abbott MD  Primary Care Provider: Adan Torres MD    [facility] Medicine Team: [facility] MED A Gregor Abbott MD    Subjective:     Principal Problem:Encephalopathy    HPI:  History is limited as patient is unable to provide given altered mentation. A 61-year-old man with recently diagnosed tonic clonic seizure disorder,  HTN and dyslipidemia admitted to [facility] after 5 witnessed seizures 9/10/17. He was initially intubated on presentation for low sats and inability to protect airway, extubated 9/14. 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 He was treated with Acyclovir and ultimately underwent 2 lumbar punctures which have been unrevealing. HSV PCR from first LP is negative. After he failed to improve significantly, diagnostic imaging was done and showed expansion of the meningeal enhancement as well as possible pathologic spinal compression fractures. [facility] discussed case with Dr. Sweeney for concern of possible malignancy and need for tissue diagnosis. On interview, patient is alert but oriented to self only. Attempts to answer questions but inappropriately and is confused. Follows commands. Denies any complaints.     Hospital Course:  No notes on file    Interval History: patient was started on steroids last night by neurosurgery; still having confusion and confabulation; will continue today and get a continuous EEG; NSGY planning for brain biopsy on tuesday    Review of Systems   Constitutional: Negative for activity change and appetite change.   HENT: Negative for drooling and facial swelling.    Eyes: Negative for discharge and itching.   Respiratory: Negative for cough, shortness of breath and  wheezing.    Cardiovascular: Negative for chest pain and palpitations.   Gastrointestinal: Negative for abdominal pain and nausea.   Genitourinary: Negative for difficulty urinating and dysuria.   Skin: Negative for color change and pallor.   Neurological: Negative for dizziness and numbness.   Psychiatric/Behavioral: Negative for behavioral problems and confusion.     Objective:     Vital Signs (Most Recent):  Temp: 98 °F (36.7 °C) (09/30/17 1510)  Pulse: 94 (09/30/17 1510)  Resp: 18 (09/30/17 1510)  BP: 130/65 (09/30/17 1614)  SpO2: 95 % (09/30/17 1510) Vital Signs (24h Range):  Temp:  [96.8 °F (36 °C)-98 °F (36.7 °C)] 98 °F (36.7 °C)  Pulse:  [75-94] 94  Resp:  [18] 18  SpO2:  [92 %-95 %] 95 %  BP: (114-147)/(44-87) 130/65     Weight: 120.7 kg (266 lb)  Body mass index is 30.74 kg/m².    Intake/Output Summary (Last 24 hours) at 09/30/17 1646  Last data filed at 09/30/17 0700   Gross per 24 hour   Intake             1740 ml   Output             2051 ml   Net             -311 ml      Physical Exam   Constitutional: He is oriented to person, place, and time. He appears well-developed and well-nourished.   HENT:   Head: Normocephalic and atraumatic.   Eyes: Conjunctivae are normal. Pupils are equal, round, and reactive to light.   Neck: Normal range of motion. No thyromegaly present.   Cardiovascular: Normal rate, regular rhythm and normal heart sounds.    Pulmonary/Chest: Effort normal and breath sounds normal.   Abdominal: Soft. He exhibits no distension. There is no tenderness.   Neurological: He is alert and oriented to person, place, and time. Coordination normal.   wernickes aphasia   Skin: No rash noted. No erythema.       MELD-Na score: 8 at 8/12/2017  4:50 AM  MELD score: 8 at 8/12/2017  4:50 AM  Calculated from:  Serum Creatinine: 0.80 mg/dL (Rounded to 1) at 8/12/2017  4:50 AM  Serum Sodium: 139 mmol/L (Rounded to 137) at 8/12/2017  4:50 AM  Total Bilirubin: 1.2 mg/dL at 8/12/2017  4:50 AM  INR(ratio): 1.1  at 8/11/2017  4:10 PM  Age: 61 years    Significant Labs:  CBC:    Recent Labs  Lab 09/29/17  0538   WBC 6.31   HGB 10.5*   HCT 32.0*   *     CMP:    Recent Labs  Lab 09/29/17  0538      K 3.5      CO2 22*   GLU 93   BUN 5*   CREATININE 0.8   CALCIUM 8.9   ANIONGAP 10   EGFRNONAA >60.0     PTINR:  No results for input(s): INR in the last 48 hours.    Significant Procedures:   Dobutamine Stress Test with Color Flow: No results found for this or any previous visit.      Assessment/Plan:      * Encephalopathy    Pt alert but oriented to self only. Follows commands but use of words not appropriate. 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 He was treated with acyclovir and ultimately underwent 2 lumbar punctures which have been unrevealing. HSV PCR from first LP is negative. Additional viral studies apparently unremarkable. After he failed to improve significantly, diagnostic imaging was done and showed expansion of the meningeal enhancement as well as possible pathologic spnal compression fractures. C discussed case with Dr. Sweeney for concern of possible malignancy and need for tissue diagnosis. Dr Ware requested HSV PCR on 2nd LP sample if available. Continue acyclovir. Consult Neuro. Consult Neurosurgery for consideration of biopsy for tissue diagnosis. CT head with & w/o contrast ordered. Seizure precautions. Neuro checks.         T11 vertebral fracture    OSH MRI with acute compression fracture at T11. Mild compression fracture of L4. Old compression fractures at L1, T4, T5. No neuro deficits noted on exam other than altered mentation. Neurosurgery consult as above.           Aspiration pneumonia of both lower lobes due to vomit    Treated with Zosyn for 11 days at OSH; will not continue abx. Respiratory status, VS stable.           Encephalitis and encephalomyelitis, unspecified    - cont acyclovir; ID and  neurosurgery on board; planning for brain angiogram and LP in the AM    9/28- angiogram done today, LP to be done as well; cont acyclovir; appreciate recs    9/29- LP completed, follow up results; cont acyclovir; patient still having confusion     9/30- started on decadron by NSGY, not much improvement so far, will give it another day; concern for vasculitis; will get EEG today continuous; planning on possible brain biopsy on tuesday        Status epilepticus, generalized convulsive    Likely secondary to encephalitis. No further seizures since initial presentation. Pt had recently been dx'ed with seizure d/o prior to admission. Started on lacosamide at OSH, will resume. Seizure precautions. Neuro consult.         Hypertension    's. Resume home carvedilol           VTE Risk Mitigation         Ordered     enoxaparin injection 40 mg  Daily     Route:  Subcutaneous        09/27/17 0029     Medium Risk of VTE  Once      09/27/17 0029              Gregor Abbott MD  Department of Hospital Medicine   [facility]

## 2017-09-30 NOTE — PLAN OF CARE
Problem: Fall Risk (Adult)  Goal: Identify Related Risk Factors and Signs and Symptoms  Related risk factors and signs and symptoms are identified upon initiation of Human Response Clinical Practice Guideline (CPG)   Plan of care reviewed with pt, fall precautions maintained, call bell within reach, family at bedside, pt confused, pt attempt to get out of bed, pt does follow commands, pt ate most of meals, VSS, no distress noted, will continue to monitor.

## 2017-09-30 NOTE — ASSESSMENT & PLAN NOTE
61 y.o male with hx of HTN transferred from [facility] 9/26/17. Patient originally presented to OSH after witnessed seizures at home. Intubated for airway protection. 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 LP done at OSH x 2 with benign CSF besides elevated protein 77. On course of vancomycin, zosyn for aspiration pneumonia as well. LP repeated 9/19/17. HSV PCR, VDRL, HIV and arbovirus panel negative. Quantiferon gold and influenza panel positive. Followed by Dr. Reyes (ID) and Osmany (Neuro) in [place].     9/27- patient encephalopathic with aphasia, but able to follow some simple commands  EEG showed structural dysfunction in the left hemisphere, maximum in the temporal area. No evidence for an irritative process    9/28- cerebral angiogram performed without clear evidence of vasculitis  9/29- initiated on steroids. persisting concerns of global aphasia     Recommendations:  - Continue steroids as per NSGY   - Consideration for biopsy. Plan to perform on Tuesday by Neurosurgery onboard.   - F/u ACE and Paraneoplastic panel   - Consider MR spectroscopy with perfusion - which may (not always) give us an idea of infection vs tumor pathology.   - Follow CSF lab   - Currently on empiric IV acyclovir while HSV is pending  - Continue Vimpat 200 mg BID

## 2017-10-01 NOTE — PLAN OF CARE
Problem: Fall Risk (Adult)  Goal: Identify Related Risk Factors and Signs and Symptoms  Related risk factors and signs and symptoms are identified upon initiation of Human Response Clinical Practice Guideline (CPG)   Outcome: Ongoing (interventions implemented as appropriate)  Plan of care reviewed with pt, fall precautions maintained, call bell within reach, VSS, bed in lowest position, wife at bedside, pt ate most of meals, pt confused, pt not combative, no distress noted, will continue to monitor.

## 2017-10-01 NOTE — ASSESSMENT & PLAN NOTE
61M who presents from OSH with presenting sx on 9/10 of AMS and tonic-clonic seizure for further work-up.    -- s/p diagnostic cerebral angiogram: narrowing of left distal MCA arteries possibly d/t vasculitis or secondary to cerebral edema   -dex4q6  -- Neurology arranged lumbar puncture for infectious workup per ID and paraneoplastic panel   -Follow neurology recs  -- will continue to follow for possible biopsy on Tuesday if CSF studies non-diagnostic   --Continue MNGT per IM

## 2017-10-01 NOTE — PROGRESS NOTES
[facility]  Infectious Disease  Progress Note    Patient Name: Tommy Way  MRN: [id number]  Admission Date: 9/26/2017  Length of Stay: 5 days  Attending Physician: Gregor Abbott MD  Primary Care Provider: Adan Torres MD    Isolation Status: No active isolations  Assessment/Plan:      Encephalitis and encephalomyelitis, unspecified    Case of 60 y/o male being followed for encephalopathy of unknown origin. Extensive ID work up done with no positive results to attribute current clinical presentation. Repeated HSV PCR negative. Safe to discontinue acyclovir at this time unlikely HSV as source patient also completed 21 days that is usual length of therapy. Patient was started on steroids but minimal improvement has been observed. Patient will have repeat MRI done tomorrow and possible brain biopsy on Tuesday. At this time no additional recommendations from ID stand point. Brain biopsy will give more information to determine etiology of changes in temporal region.Will sign off case. Please reconsult if brain biopsy results offer more information regarding infectious etiologies.     - Discontinue acyclovir   - Will sign off case   - please reconsult if brain biopsy yields infectious process               Anticipated Disposition:     Thank you for your consult. I will sign off. Please contact us if you have any additional questions.    Lalo Banerjee MD  Infectious Disease  [facility]    Subjective:     Principal Problem:Encephalopathy    HPI: 60 y/o male PMHX arthritis, AHTN, admitted on 9/26/17 transferred from [facility]. Patient originally presented to OSH due to 2 episodes of witnessed seizures at home. Patient was at that time intubated in the ED due to desaturation and AMS for airway protection. On 9/11 MRI done showed temporal lobe abnormalities consistent with encephalitis. Patient had LP done as well non diagnostic. Patient at that time started on acyclovir.  9/14/17 patient was extubated. Had been on a course of vancomycin, zosyn for aspiration pneumonia and acyclovir for suspicion of HSV meningitis. 9/19/17 LP repeated non diagnostic once more. Patient had extensive work up done with HSV PCR that was negative, VDRL, HIV and arbovirus panel with no positive results. Quantiferon gold was positive as well as influenza panel. ID consulted at [facility] this time after transfer for further recommendations, patient had been followed by Dr. Reyes at [facility]. Wife denies recent travel outside of the country, fevers, chills, recent viral illness or new rashes. Denied pets in the house. Mentions having contact with small children that have not been reported ill at this time.      Interval History: Evaluated at bedside found in no acute distress. Patient continues dysarthric with mild improvement and oriented to person only.    Review of Systems   Constitutional: Negative for activity change, appetite change, chills, fatigue and fever.   Respiratory: Negative for cough and shortness of breath.    Gastrointestinal: Negative for abdominal distention, abdominal pain, diarrhea, nausea and vomiting.   Skin: Negative for rash.   Neurological: Positive for speech difficulty. Negative for tremors, seizures, light-headedness and headaches.   Psychiatric/Behavioral: Positive for behavioral problems, confusion, decreased concentration and hallucinations.     Objective:     Vital Signs (Most Recent):  Temp: 96 °F (35.6 °C) (10/01/17 1200)  Pulse: 62 (10/01/17 1200)  Resp: 16 (10/01/17 1200)  BP: (!) 149/94 (10/01/17 1200)  SpO2: (!) 93 % (10/01/17 1200) Vital Signs (24h Range):  Temp:  [96 °F (35.6 °C)-98 °F (36.7 °C)] 96 °F (35.6 °C)  Pulse:  [62-94] 62  Resp:  [16-18] 16  SpO2:  [93 %-94 %] 93 %  BP: (139-149)/(72-94) 149/94     Weight: 120.7 kg (266 lb)  Body mass index is 30.74 kg/m².    Estimated Creatinine Clearance: 127.3 mL/min (based on SCr of 0.9 mg/dL).    Physical Exam    Constitutional: He appears well-developed and well-nourished.   HENT:   Head: Normocephalic and atraumatic.   Eyes: EOM are normal. Pupils are equal, round, and reactive to light.   Neck: Normal range of motion. Neck supple.   Cardiovascular: Normal rate, regular rhythm and normal heart sounds.    Pulmonary/Chest: Effort normal and breath sounds normal.   Abdominal: Soft. Bowel sounds are normal. He exhibits no distension. There is no tenderness. There is no guarding.   Musculoskeletal: Normal range of motion. He exhibits no edema.   Neurological: He is alert.   Skin: No rash noted.       Significant Labs:   Blood Culture:   Recent Labs  Lab 09/09/17  2040   LABBLOO No growth after 5 days.     BMP:   Recent Labs  Lab 10/01/17  0338   *      K 3.5   *   CO2 23   BUN 8   CREATININE 0.9   CALCIUM 8.9   MG 1.6     CBC:   Recent Labs  Lab 10/01/17  0338   WBC 9.54   HGB 10.2*   HCT 31.5*   *     Microbiology Results (last 7 days)     Procedure Component Value Units Date/Time    CSF culture [[id number]] Collected:  09/28/17 1718    Order Status:  Completed Specimen:  CSF (Spinal Fluid) from CSF Tap, Tube 4 Updated:  10/01/17 0711     CSF CULTURE No Growth to date     Gram Stain Result Rare WBC's      No organisms seen          Significant Imaging: I have reviewed all pertinent imaging results/findings within the past 24 hours.

## 2017-10-01 NOTE — PROGRESS NOTES
[facility]  Neurosurgery  Progress Note    Subjective:     History of Present Illness: Patient is a transfer from [facility] who presented for tonic-clonic seizure and AMS on 9/10 per OSH. CTH and follow-up MRI demonstrated L posterior temporal and occipital lobe lesion. 2x LP with analysis were negative for HSV, VZV, syphillis and HHV-6 were all negative and differential was unremarkable. Thorough autoimmune and infectious testing was also completed and returned without result. [place] [facility] [place] per NSGY and neurology. He remains altered on mentation with otherwise non-focal exam.    Post-Op Info:  Procedure(s) (LRB):  ANGIOGRAM-CEREBRAL for Dr. Brennan Lynn (Bilateral)         Interval History: Dex4q6 started 2 days ago. No significant improvement with pt's speech. Several episodes of agitation overnight.     Medications:  Continuous Infusions:   sodium chloride 0.9% 125 mL/hr at 10/01/17 0547     Scheduled Meds:   acyclovir  900 mg Intravenous Q8H    atorvastatin  20 mg Oral QHS    carvedilol  25 mg Oral BID WM    dexamethasone  4 mg Intravenous Q6H    enoxaparin  40 mg Subcutaneous Daily    lacosamide  200 mg Oral BID    lidocaine HCL 10 mg/ml (1%)  5 mL Other Once    quetiapine  25 mg Oral QHS    quetiapine  25 mg Oral Once    ramelteon  8 mg Oral Once    thiamine  100 mg Oral Daily     PRN Meds:dextrose 50%, dextrose 50%, glucagon (human recombinant), glucose, glucose     Review of Systems  Objective:     Weight: 120.7 kg (266 lb)  Body mass index is 30.74 kg/m².  Vital Signs (Most Recent):  Temp: 97.9 °F (36.6 °C) (10/01/17 0721)  Pulse: 80 (10/01/17 0721)  Resp: 18 (10/01/17 0721)  BP: (!) 146/72 (10/01/17 0721)  SpO2: (!) 93 % (10/01/17 0721) Vital Signs (24h Range):  Temp:  [97.9 °F (36.6 °C)-98 °F (36.7 °C)] 97.9 °F (36.6 °C)  Pulse:  [80-94] 80  Resp:  [18] 18  SpO2:  [93 %-95 %] 93 %  BP: (114-146)/(44-72) 146/72                       Arterial Sheath 09/28/17 0800 Right (Active)   Closure Device Angio seal 9/28/2017  8:51 AM       Neurosurgery Physical Exam  Vital signs: reviewed above.   Constitutional: well-developed, no apparent distress  Abdomen soft, non-tender  Cardiovascular: regular rate and rhythm  Respiratory: non-distressed on room air   Extremities: groin soft, no e/o hematoma. Dorsalis pedis 2/4  Neurological  GCS 15  Alert and oriented  Speech/Language: expressive aphasia  Head: normocephalic, atraumatic   PERRL, EOMI, Visual fields intact.   Facial expression symmetric  Tongue midline  Moves all extremities with good strength  Tone: no spasticity, no rigidity, no clonus, no atrophy  Pronator Drift: neg   DTRs 2/4 in all extremities   Sensation to Light touch: intact  Coordination: Finger-to-nose intact  Significant Labs:    Recent Labs  Lab 10/01/17  0338   *      K 3.5   *   CO2 23   BUN 8   CREATININE 0.9   CALCIUM 8.9   MG 1.6       Recent Labs  Lab 10/01/17  0338   WBC 9.54   HGB 10.2*   HCT 31.5*   *     No results for input(s): LABPT, INR, APTT in the last 48 hours.  Microbiology Results (last 7 days)     Procedure Component Value Units Date/Time    CSF culture [[id number]] Collected:  09/28/17 1718    Order Status:  Completed Specimen:  CSF (Spinal Fluid) from CSF Tap, Tube 4 Updated:  10/01/17 0711     CSF CULTURE No Growth to date     Gram Stain Result Rare WBC's      No organisms seen        All pertinent labs from the last 24 hours have been reviewed.    Significant Diagnostics:  CT: No results found in the last 24 hours.  MRI: No results found in the last 24 hours.    Assessment/Plan:     Encephalitis and encephalomyelitis, unspecified    61M who presents from OSH with presenting sx on 9/10 of AMS and tonic-clonic seizure for further work-up.    -- s/p diagnostic cerebral angiogram: narrowing of left distal MCA arteries possibly d/t vasculitis or secondary to cerebral  edema   -dex4q6  -- Neurology arranged lumbar puncture for infectious workup per ID and paraneoplastic panel   -Follow neurology recs  -- will continue to follow for possible biopsy on Tuesday if CSF studies non-diagnostic   --Continue MNGT per IM            Tu Guerrero D.O.  Neurological Surgery  [facility]

## 2017-10-01 NOTE — SUBJECTIVE & OBJECTIVE
Interval History: Evaluated at bedside found in no acute distress. Patient continues dysarthric with mild improvement and oriented to person only.    Review of Systems   Constitutional: Negative for activity change, appetite change, chills, fatigue and fever.   Respiratory: Negative for cough and shortness of breath.    Gastrointestinal: Negative for abdominal distention, abdominal pain, diarrhea, nausea and vomiting.   Skin: Negative for rash.   Neurological: Positive for speech difficulty. Negative for tremors, seizures, light-headedness and headaches.   Psychiatric/Behavioral: Positive for behavioral problems, confusion, decreased concentration and hallucinations.     Objective:     Vital Signs (Most Recent):  Temp: 96 °F (35.6 °C) (10/01/17 1200)  Pulse: 62 (10/01/17 1200)  Resp: 16 (10/01/17 1200)  BP: (!) 149/94 (10/01/17 1200)  SpO2: (!) 93 % (10/01/17 1200) Vital Signs (24h Range):  Temp:  [96 °F (35.6 °C)-98 °F (36.7 °C)] 96 °F (35.6 °C)  Pulse:  [62-94] 62  Resp:  [16-18] 16  SpO2:  [93 %-94 %] 93 %  BP: (139-149)/(72-94) 149/94     Weight: 120.7 kg (266 lb)  Body mass index is 30.74 kg/m².    Estimated Creatinine Clearance: 127.3 mL/min (based on SCr of 0.9 mg/dL).    Physical Exam   Constitutional: He appears well-developed and well-nourished.   HENT:   Head: Normocephalic and atraumatic.   Eyes: EOM are normal. Pupils are equal, round, and reactive to light.   Neck: Normal range of motion. Neck supple.   Cardiovascular: Normal rate, regular rhythm and normal heart sounds.    Pulmonary/Chest: Effort normal and breath sounds normal.   Abdominal: Soft. Bowel sounds are normal. He exhibits no distension. There is no tenderness. There is no guarding.   Musculoskeletal: Normal range of motion. He exhibits no edema.   Neurological: He is alert.   Skin: No rash noted.       Significant Labs:   Blood Culture:   Recent Labs  Lab 09/09/17 2040   LABBLOO No growth after 5 days.     BMP:   Recent Labs  Lab  10/01/17  0338   *      K 3.5   *   CO2 23   BUN 8   CREATININE 0.9   CALCIUM 8.9   MG 1.6     CBC:   Recent Labs  Lab 10/01/17  0338   WBC 9.54   HGB 10.2*   HCT 31.5*   *     Microbiology Results (last 7 days)     Procedure Component Value Units Date/Time    CSF culture [[id number]] Collected:  09/28/17 1713    Order Status:  Completed Specimen:  CSF (Spinal Fluid) from CSF Tap, Tube 4 Updated:  10/01/17 0711     CSF CULTURE No Growth to date     Gram Stain Result Rare WBC's      No organisms seen          Significant Imaging: I have reviewed all pertinent imaging results/findings within the past 24 hours.

## 2017-10-01 NOTE — SUBJECTIVE & OBJECTIVE
Interval History: Dex4q6 started 2 days ago. No significant improvement with pt's speech. Several episodes of agitation overnight.     Medications:  Continuous Infusions:   sodium chloride 0.9% 125 mL/hr at 10/01/17 0547     Scheduled Meds:   acyclovir  900 mg Intravenous Q8H    atorvastatin  20 mg Oral QHS    carvedilol  25 mg Oral BID WM    dexamethasone  4 mg Intravenous Q6H    enoxaparin  40 mg Subcutaneous Daily    lacosamide  200 mg Oral BID    lidocaine HCL 10 mg/ml (1%)  5 mL Other Once    quetiapine  25 mg Oral QHS    quetiapine  25 mg Oral Once    ramelteon  8 mg Oral Once    thiamine  100 mg Oral Daily     PRN Meds:dextrose 50%, dextrose 50%, glucagon (human recombinant), glucose, glucose     Review of Systems  Objective:     Weight: 120.7 kg (266 lb)  Body mass index is 30.74 kg/m².  Vital Signs (Most Recent):  Temp: 97.9 °F (36.6 °C) (10/01/17 0721)  Pulse: 80 (10/01/17 0721)  Resp: 18 (10/01/17 0721)  BP: (!) 146/72 (10/01/17 0721)  SpO2: (!) 93 % (10/01/17 0721) Vital Signs (24h Range):  Temp:  [97.9 °F (36.6 °C)-98 °F (36.7 °C)] 97.9 °F (36.6 °C)  Pulse:  [80-94] 80  Resp:  [18] 18  SpO2:  [93 %-95 %] 93 %  BP: (114-146)/(44-72) 146/72                      Arterial Sheath 09/28/17 0800 Right (Active)   Closure Device Angio seal 9/28/2017  8:51 AM       Neurosurgery Physical Exam  Vital signs: reviewed above.   Constitutional: well-developed, no apparent distress  Abdomen soft, non-tender  Cardiovascular: regular rate and rhythm  Respiratory: non-distressed on room air   Extremities: groin soft, no e/o hematoma. Dorsalis pedis 2/4  Neurological  GCS 15  Alert and oriented  Speech/Language: expressive aphasia  Head: normocephalic, atraumatic   PERRL, EOMI, Visual fields intact.   Facial expression symmetric  Tongue midline  Moves all extremities with good strength  Tone: no spasticity, no rigidity, no clonus, no atrophy  Pronator Drift: neg   DTRs 2/4 in all extremities   Sensation to Light  touch: intact  Coordination: Finger-to-nose intact  Significant Labs:    Recent Labs  Lab 10/01/17  0338   *      K 3.5   *   CO2 23   BUN 8   CREATININE 0.9   CALCIUM 8.9   MG 1.6       Recent Labs  Lab 10/01/17  0338   WBC 9.54   HGB 10.2*   HCT 31.5*   *     No results for input(s): LABPT, INR, APTT in the last 48 hours.  Microbiology Results (last 7 days)     Procedure Component Value Units Date/Time    CSF culture [[id number]] Collected:  09/28/17 1718    Order Status:  Completed Specimen:  CSF (Spinal Fluid) from CSF Tap, Tube 4 Updated:  10/01/17 0711     CSF CULTURE No Growth to date     Gram Stain Result Rare WBC's      No organisms seen        All pertinent labs from the last 24 hours have been reviewed.    Significant Diagnostics:  CT: No results found in the last 24 hours.  MRI: No results found in the last 24 hours.

## 2017-10-01 NOTE — SUBJECTIVE & OBJECTIVE
Subjective:     Interval History:   Patient was agitated again overnight, but otherwise seems to have had mild improvement with more clear intervals with coherent speech and being oriented to person and place.  Discussed plan for longer duration EEG with patient's wife at bedside. Otherwise will continue dexamethasone and acyclovir.  ID team consulted.       Current Neurological Medications: Acyclovir, Dexamethasone    Current Facility-Administered Medications   Medication Dose Route Frequency Provider Last Rate Last Dose    0.9%  NaCl infusion   Intravenous Continuous Gregor Abbott  mL/hr at 10/01/17 0547      acyclovir (ZOVIRAX) 900 mg in dextrose 5 % 250 mL IVPB  900 mg Intravenous Q8H Venessa Reyes  mL/hr at 10/01/17 0933 900 mg at 10/01/17 0933    atorvastatin tablet 20 mg  20 mg Oral QHS Maren Li MD   20 mg at 09/30/17 2148    carvedilol tablet 25 mg  25 mg Oral BID WM Maren Li MD   25 mg at 10/01/17 0825    dexamethasone injection 4 mg  4 mg Intravenous Q6H Gregor Abbott MD        dextrose 50% injection 12.5 g  12.5 g Intravenous PRN Maren Li MD        dextrose 50% injection 25 g  25 g Intravenous PRN Maren Li MD        enoxaparin injection 40 mg  40 mg Subcutaneous Daily Maren Li MD   40 mg at 09/30/17 1614    glucagon (human recombinant) injection 1 mg  1 mg Intramuscular PRN Maren Li MD        glucose chewable tablet 16 g  16 g Oral PRN aMren Li MD        glucose chewable tablet 24 g  24 g Oral PRN Maren Li MD        lacosamide tablet 200 mg  200 mg Oral BID Maren Li MD   200 mg at 10/01/17 0825    lidocaine HCL 10 mg/ml (1%) injection 5 mL  5 mL Other Once Gregor Abbott MD   Stopped at 09/27/17 2330    olanzapine injection 7.5 mg  7.5 mg Intramuscular Once PRN Gregor Abbott MD        quetiapine tablet 25 mg  25 mg Oral Once Mishel Vargas PA-C        quetiapine tablet 50 mg  50 mg Oral QHS Gregor Abbott MD         ramelteon tablet 8 mg  8 mg Oral Once Mishel Vargas PA-C        thiamine tablet 100 mg  100 mg Oral Daily Maren Li MD   100 mg at 10/01/17 0825     Review of Systems   Unable to perform ROS: Mental status change     Objective:     Vital Signs (Most Recent):  Temp: 96 °F (35.6 °C) (10/01/17 1200)  Pulse: 62 (10/01/17 1200)  Resp: 16 (10/01/17 1200)  BP: (!) 149/94 (10/01/17 1200)  SpO2: (!) 93 % (10/01/17 1200) Vital Signs (24h Range):  Temp:  [96 °F (35.6 °C)-98 °F (36.7 °C)] 96 °F (35.6 °C)  Pulse:  [62-94] 62  Resp:  [16-18] 16  SpO2:  [93 %-94 %] 93 %  BP: (130-149)/(65-94) 149/94     Weight: 120.7 kg (266 lb)  Body mass index is 30.74 kg/m².    Physical Exam  General:  Well-developed, well-nourished, nad  HEENT:  NCAT, PERRLA, EOMI, oropharygneal membranes non-erythematous/without exudate  Neck:  Supple, no palpable lad, no nuchal rigidity  Resp:  Symmetric expansion, no increased wob  CVS:  No LE edema, peripheral pulses 2+ (radial, dorsalis pedis)  GI:  Abd soft, non-distended, non-tender to palpation     Neurologic Exam:  Mental Status:  Awake, alert, oriented to self and wife.  States year is 2007, incoherent speech in response to asking where patient is.  Aphasia is intermittent with patient having very clear, understandable sentences and switching to incoherent speech 30 s-1 min.  Aphasia seems primarily expressive with paraphasias noted, able to follow most commands.  Cranial Nerves:  VFs intact on blinking to threat bilaterally.  PERRLA, EOMI.  Facial movements and sensation intact, symmetric.  Palate raises symmetrically, tongue protrudes midline.  SCM/Trapezius 5/5.  Motor:  Normal bulk and tone.  Strength diffusely 5/5, symmetric.  Sensory:  Intact to light touch, temperature at all extremities.    Reflexes:  Biceps, brachioradialis, patellar 2+ bilaterally.  No ankle clonus.  Downgoing toes bilaterally.  Coordination:  FTN, BRENDA, HTS with no ataxia, no dysmetria, no  dysdiadochokinesia.  Gait:  Deferred 2/2 EEG placed.    Significant Labs:     Recent Labs  Lab 10/01/17  0338   WBC 9.54   RBC 3.07*   HGB 10.2*   HCT 31.5*   *   *   MCH 33.2*   MCHC 32.4       Recent Labs  Lab 10/01/17  0338   CALCIUM 8.9      K 3.5   CO2 23   *   BUN 8   CREATININE 0.9     Significant Imagin17 IR angiogram carotid cerebral bilateral inc arch:  POSTOPERATIVE DIAGNOSIS(ES):    Narrowing / beating appearance of small distal inferior-posterior left MCA territory.  Note: It is unclear whether is this a primary process or sequelae of swelling noted on MRI.  If it is to be a considered a primary process then vasculitis could be questioned, however no other region of vascular abnormality is identified to support this.    17 MRI Lumbar Spine:  1.  Old mild compression of L1  2.  Abnormal bone marrow signal and enhancement of the L4 vertebral body with mild decreased height suggesting a mild compression fracture there is fluid and this cannot exclude infection or pathologic fracture  3.  Degenerative changes as above    17 MRI Thoracic Spine:  1. Bone marrow edema and enhancement of the vertebral body of T11 with loss of body height of 30% suggesting a probable acute compression fracture.  Cannot exclude this pathologic or infection due to clinical history  2.  Degenerative changes as above greatest T7-8.  3.  Old compression fractures of L1-, T4 and T5    17 MRI Cervical Spine:   Degenerative changes as above.  Mild faint enhancement along cord probably normal physiologic with no definite focal enhancing lesion    17 MRI Brain w/ w/o contrast:  1.  Significant increase in the T2 signal in the left temporal lobe particularly increased in the left posterior parietal lobe with some serpiginous meningeal enhancement suspicious for encephalitis.  2.  Punctate areas of diffusion signal in the left posterior parietal lobe suspicious for punctate areas  of acute ischemia versus less likely shine through recommend followup

## 2017-10-01 NOTE — PROGRESS NOTES
[facility]  Neurology  Progress Note    Patient Name: Tommy Way  MRN: [id number]  Admission Date: 9/26/2017  Hospital Length of Stay: 5 days  Code Status: Full Code   Attending Provider: Gregor Abbott MD  Primary Care Physician: Adan Torres MD   Principal Problem:Encephalopathy    Subjective:     Interval History:   Patient was agitated again overnight, but otherwise seems to have had mild improvement with more clear intervals with coherent speech and being oriented to person and place.  Discussed plan for longer duration EEG with patient's wife at bedside. Otherwise will continue dexamethasone and acyclovir.  ID team consulted.       Current Neurological Medications: Acyclovir, Dexamethasone    Current Facility-Administered Medications   Medication Dose Route Frequency Provider Last Rate Last Dose    0.9%  NaCl infusion   Intravenous Continuous Gregor Abbott  mL/hr at 10/01/17 0547      acyclovir (ZOVIRAX) 900 mg in dextrose 5 % 250 mL IVPB  900 mg Intravenous Q8H Venessa Reyes  mL/hr at 10/01/17 0933 900 mg at 10/01/17 0933    atorvastatin tablet 20 mg  20 mg Oral QHS Maren Li MD   20 mg at 09/30/17 2148    carvedilol tablet 25 mg  25 mg Oral BID WM Maren Li MD   25 mg at 10/01/17 0825    dexamethasone injection 4 mg  4 mg Intravenous Q6H Gregor Abbott MD        dextrose 50% injection 12.5 g  12.5 g Intravenous PRN Maren Li MD        dextrose 50% injection 25 g  25 g Intravenous PRN Maren Li MD        enoxaparin injection 40 mg  40 mg Subcutaneous Daily Maren Li MD   40 mg at 09/30/17 1614    glucagon (human recombinant) injection 1 mg  1 mg Intramuscular PRN Maren Li MD        glucose chewable tablet 16 g  16 g Oral PRN Maren Li MD        glucose chewable tablet 24 g  24 g Oral PRN Maren Li MD        lacosamide tablet 200 mg  200 mg Oral BID Maren Li MD   200 mg at 10/01/17 0825    lidocaine HCL 10 mg/ml  (1%) injection 5 mL  5 mL Other Once Gregor Abbott MD   Stopped at 09/27/17 2330    olanzapine injection 7.5 mg  7.5 mg Intramuscular Once PRN Gregor Abbott MD        quetiapine tablet 25 mg  25 mg Oral Once Mishel Vargas PA-C        quetiapine tablet 50 mg  50 mg Oral QHS Gregor Abbott MD        ramelteon tablet 8 mg  8 mg Oral Once Mishel Vargas PA-C        thiamine tablet 100 mg  100 mg Oral Daily Maren Li MD   100 mg at 10/01/17 0825     Review of Systems   Unable to perform ROS: Mental status change     Objective:     Vital Signs (Most Recent):  Temp: 96 °F (35.6 °C) (10/01/17 1200)  Pulse: 62 (10/01/17 1200)  Resp: 16 (10/01/17 1200)  BP: (!) 149/94 (10/01/17 1200)  SpO2: (!) 93 % (10/01/17 1200) Vital Signs (24h Range):  Temp:  [96 °F (35.6 °C)-98 °F (36.7 °C)] 96 °F (35.6 °C)  Pulse:  [62-94] 62  Resp:  [16-18] 16  SpO2:  [93 %-94 %] 93 %  BP: (130-149)/(65-94) 149/94     Weight: 120.7 kg (266 lb)  Body mass index is 30.74 kg/m².    Physical Exam  General:  Well-developed, well-nourished, nad  HEENT:  NCAT, PERRLA, EOMI, oropharygneal membranes non-erythematous/without exudate  Neck:  Supple, no palpable lad, no nuchal rigidity  Resp:  Symmetric expansion, no increased wob  CVS:  No LE edema, peripheral pulses 2+ (radial, dorsalis pedis)  GI:  Abd soft, non-distended, non-tender to palpation     Neurologic Exam:  Mental Status:  Awake, alert, oriented to self and wife.  States year is 2007, incoherent speech in response to asking where patient is.  Aphasia is intermittent with patient having very clear, understandable sentences and switching to incoherent speech 30 s-1 min.  Aphasia seems primarily expressive with paraphasias noted, able to follow most commands.  Cranial Nerves:  VFs intact on blinking to threat bilaterally.  PERRLA, EOMI.  Facial movements and sensation intact, symmetric.  Palate raises symmetrically, tongue protrudes midline.  SCM/Trapezius 5/5.  Motor:  Normal  bulk and tone.  Strength diffusely 5/5, symmetric.  Sensory:  Intact to light touch, temperature at all extremities.    Reflexes:  Biceps, brachioradialis, patellar 2+ bilaterally.  No ankle clonus.  Downgoing toes bilaterally.  Coordination:  FTN, BRENDA, HTS with no ataxia, no dysmetria, no dysdiadochokinesia.  Gait:  Deferred 2/2 EEG placed.    Significant Labs:     Recent Labs  Lab 10/01/17  0338   WBC 9.54   RBC 3.07*   HGB 10.2*   HCT 31.5*   *   *   MCH 33.2*   MCHC 32.4       Recent Labs  Lab 10/01/17  0338   CALCIUM 8.9      K 3.5   CO2 23   *   BUN 8   CREATININE 0.9     Significant Imagin17 IR angiogram carotid cerebral bilateral inc arch:  POSTOPERATIVE DIAGNOSIS(ES):    Narrowing / beating appearance of small distal inferior-posterior left MCA territory.  Note: It is unclear whether is this a primary process or sequelae of swelling noted on MRI.  If it is to be a considered a primary process then vasculitis could be questioned, however no other region of vascular abnormality is identified to support this.    17 MRI Lumbar Spine:  1.  Old mild compression of L1  2.  Abnormal bone marrow signal and enhancement of the L4 vertebral body with mild decreased height suggesting a mild compression fracture there is fluid and this cannot exclude infection or pathologic fracture  3.  Degenerative changes as above    17 MRI Thoracic Spine:  1. Bone marrow edema and enhancement of the vertebral body of T11 with loss of body height of 30% suggesting a probable acute compression fracture.  Cannot exclude this pathologic or infection due to clinical history  2.  Degenerative changes as above greatest T7-8.  3.  Old compression fractures of L1-, T4 and T5    17 MRI Cervical Spine:   Degenerative changes as above.  Mild faint enhancement along cord probably normal physiologic with no definite focal enhancing lesion    17 MRI Brain w/ w/o contrast:  1.  Significant  increase in the T2 signal in the left temporal lobe particularly increased in the left posterior parietal lobe with some serpiginous meningeal enhancement suspicious for encephalitis.  2.  Punctate areas of diffusion signal in the left posterior parietal lobe suspicious for punctate areas of acute ischemia versus less likely shine through recommend followup    Assessment and Plan:     * Encephalopathy    61 y.o male with hx of HTN transferred from [facility] 9/26/17. Patient originally presented to OSH after witnessed seizures at home. Intubated for airway protection. 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 LP done at OSH x 2 with benign CSF besides elevated protein 77. On course of vancomycin, zosyn for aspiration pneumonia as well. LP repeated 9/19/17. HSV PCR, VDRL, HIV and arbovirus panel negative. Quantiferon gold and influenza panel positive. Followed by Dr. Reyes (ID) and Osmany (Neuro) in [place].     9/27- encephalopathy w/ aphasia, follows simple commands.  EEG showed structural dysfunction in the left hemisphere, maximum in the temporal area. No evidence for an irritative process.  9/28- cerebral angiogram performed without clear evidence of vasculitis  9/29- dexamethasone started, persistent aphasia  9/30- agitation overnight, continuing dexamethasone  10/1- agitation again overnight but family noting improved cognition/speech this am    Recommendations:  -Continue dexamethasone, empiric acyclovir   -Plan for brain biopsy with Neurosurgery 10/03/17  -Follow up CSF studies, ACE and Paraneoplastic panel pending  -Consider MR spectroscopy with perfusion--may clarify infection vs tumor pathology.   -Continue Vimpat 200 mg BID.  Repeat EEG pending, will follow up results.      Complex partial seizure evolving to generalized seizure    -As per above, continue vimpat     VTE Risk Mitigation         Ordered     enoxaparin injection 40 mg  Daily      Route:  Subcutaneous        09/27/17 0029     Medium Risk of VTE  Once      09/27/17 0029        Nuvia Garcia MD  Neurology  [facility] [place]

## 2017-10-01 NOTE — ASSESSMENT & PLAN NOTE
Case of 62 y/o male being followed for encephalopathy of unknown origin. Extensive ID work up done with no positive results to attribute current clinical presentation. Repeated HSV PCR negative. Safe to discontinue acyclovir at this time unlikely HSV as source patient also completed 21 days that is usual length of therapy. Patient was started on steroids but minimal improvement has been observed. Patient will have repeat MRI done tomorrow and possible brain biopsy on Tuesday. At this time no additional recommendations from ID stand point. Brain biopsy will give more information to determine etiology of changes in temporal region.Will sign off case. Please reconsult if brain biopsy results offer more information regarding infectious etiologies.     - Discontinue acyclovir   - Will sign off case   - please reconsult if brain biopsy yields infectious process

## 2017-10-01 NOTE — PT/OT/SLP PROGRESS
Occupational Therapy  Treatment    Tommy Way   MRN: [id number]   Admitting Diagnosis: Encephalopathy    OT Date of Treatment: 10/01/17   OT Start Time: 1530  OT Stop Time: 1608  OT Total Time (min): 38 min    Billable Minutes:  Therapeutic Activity 38    General Precautions: Standard, fall  Orthopedic Precautions: N/A  Braces: N/A    Do you have any cultural, spiritual, Amish conflicts, given your current situation?: no issues    Subjective:  Communicated with RN prior to session.    Objective:  Patient found with: peripheral IV, nugent catheter (EEG)     Functional Mobility:  Bed Mobility:  Supine to Sit: Minimum Assistance, WIth side rail  Sit to Supine: Stand by Assistance    Transfers:  Sit <> Stand Assistance: Contact Guard Assistance from EOB x 3 trials and from bedside chair x 3 trials  Sit <> Stand Assistive Device: Rolling Walker    Bed <> Chair Technique: Stand Pivot EOB<->bedside chair   Bed <> Chair Transfer Assistance: Minimum Assistance; max cues for sequencing/safety  Bed <> Chair Assistive Device: Rolling Walker    Functional Ambulation: OT attempted to have pt take side steps at EOB, but pt became confused with sequencing steps and managing RW; the coordination demands of this task frustrated the pt and led to slightly increased agitation requiring return to seated position EOB for safety    **Once up in bedside chair, pt repeatedly attempted to get up from chair despite cues to remain seated; OT determined that due to impulsivity (despite family being present) that this pt was not safe to remain up in chair today    Therapeutic Activities and Exercises:  Pt's spouse reported that the pt has been becoming agitated with use of the bed pan for bowel movements; OT educated spouse on the option of using a BSC versus bed pan now that pt is able to safely complete transfers (with assist).  Pt's spouse agreeable to BSC t/f training in next OT session.      AM-PAC 6 CLICK ADL   How much help from  "another person does this patient currently need?   1 = Unable, Total/Dependent Assistance  2 = A lot, Maximum/Moderate Assistance  3 = A little, Minimum/Contact Guard/Supervision  4 = None, Modified Pillager/Independent    Putting on and taking off regular lower body clothing? : 2  Bathing (including washing, rinsing, drying)?: 2  Toileting, which includes using toilet, bedpan, or urinal? : 2  Putting on and taking off regular upper body clothing?: 2  Taking care of personal grooming such as brushing teeth?: 3  Eating meals?: 3  Total Score: 14     AM-PAC Raw Score CMS "G-Code Modifier Level of Impairment Assistance   6 % Total / Unable   7 - 8 CM 80 - 100% Maximal Assist   9-13 CL 60 - 80% Moderate Assist   14 - 19 CK 40 - 60% Moderate Assist   20 - 22 CJ 20 - 40% Minimal Assist   23 CI 1-20% SBA / CGA   24 CH 0% Independent/ Mod I       Patient left supine with all lines intact, call button in reach, bed alarm on and wife/family present    ASSESSMENT:  Tommy Way is a 61 y.o. male with a medical diagnosis of Encephalopathy and presents complete functional transfers today with Min A and requiring max verbal cues for sequencing/safety.  Pt requires simple directions due to overstimulation and increased frustration with overly demanding tasks.  He is able to follow simple commands but is inconsistent and often does not follow directions (either due to decreased comprehension or by choice).  Due to these factors, this pt is a high fall risk and requires 24/7 SPV for safety with all tasks.  IP rehab recommended at d/c.      Rehab identified problem list/impairments: Rehab identified problem list/impairments: weakness, impaired endurance, impaired functional mobilty, impaired self care skills, gait instability, impaired cognition, impaired balance, decreased coordination, decreased safety awareness    Rehab potential is good.    Activity tolerance: Good    Discharge recommendations: Discharge " Facility/Level Of Care Needs: rehabilitation facility     Barriers to discharge: Barriers to Discharge: Decreased caregiver support    Equipment recommendations: wheelchair, bath bench     GOALS:    Occupational Therapy Goals        Problem: Occupational Therapy Goal    Goal Priority Disciplines Outcome Interventions   Occupational Therapy Goal     OT, PT/OT Revised    Description:  Goals to be met by 10/13/2017:    1.  Brush teeth with min cues for the steps  2.  Don robe with min a and min cues  3.  Transfer bed-chair with min a and minimal cues for sequencing/safety  4.  Toilet self with mod a from BSC  5.  BSC transfer with min a and minimal cues for sequencing/safety                       Plan:  Patient to be seen 5 x/week to address the above listed problems via self-care/home management, therapeutic activities, therapeutic exercises, neuromuscular re-education, cognitive retraining  Plan of Care expires: 10/29/17  Plan of Care reviewed with: patient, spouse         PABLO Casas  10/01/2017

## 2017-10-01 NOTE — ASSESSMENT & PLAN NOTE
61 y.o male with hx of HTN transferred from [facility] 9/26/17. Patient originally presented to OSH after witnessed seizures at home. Intubated for airway protection. 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 LP done at OSH x 2 with benign CSF besides elevated protein 77. On course of vancomycin, zosyn for aspiration pneumonia as well. LP repeated 9/19/17. HSV PCR, VDRL, HIV and arbovirus panel negative. Quantiferon gold and influenza panel positive. Followed by Dr. Reyes (ID) and Osmany (Neuro) in [place].     9/27- encephalopathy w/ aphasia, follows simple commands.  EEG showed structural dysfunction in the left hemisphere, maximum in the temporal area. No evidence for an irritative process.  9/28- cerebral angiogram performed without clear evidence of vasculitis  9/29- dexamethasone started, persistent aphasia  9/30- agitation overnight, continuing dexamethasone  10/1- agitation again overnight but family noting improved cognition/speech this am    Recommendations:  -Continue dexamethasone, empiric acyclovir   -Plan for brain biopsy with Neurosurgery 10/03/17  -Follow up CSF studies, ACE and Paraneoplastic panel pending  -Consider MR spectroscopy with perfusion--may clarify infection vs tumor pathology.   -Continue Vimpat 200 mg BID.  Repeat EEG pending, will follow up results.

## 2017-10-01 NOTE — PLAN OF CARE
Problem: Occupational Therapy Goal  Goal: Occupational Therapy Goal  Goals to be met by 10/13/2017:    1.  Brush teeth with min cues for the steps  2.  Don robe with min a and min cues  3.  Transfer bed-chair with min a and minimal cues for sequencing/safety  4.  Toilet self with mod a from BSC  5.  BSC transfer with min a and minimal cues for sequencing/safety     Outcome: Revised  Goals remain appropriate.  PABLO Casas  10/1/2017

## 2017-10-02 PROBLEM — E83.42 HYPOMAGNESEMIA: Status: ACTIVE | Noted: 2017-01-01

## 2017-10-02 NOTE — PLAN OF CARE
Problem: Infection, Risk/Actual (Adult)  Intervention: Prevent Infection/Maximize Resistance  Plan of care followed for treatment of patient. No distress noted. Patient has been more coherent today, speak at times if garbled. Patient is able to follow commands and remember tasks and events such as having an mri. Pt is having an mri in hopes of having a brain bx tomorrow. Patient will be npo after midnight. Consent forms are on the chart. Will continue to monitor the patient.

## 2017-10-02 NOTE — PLAN OF CARE
Problem: Physical Therapy Goal  Goal: Physical Therapy Goal  Goals to be met by: 7 days (10/6)    Patient will increase functional independence with mobility by performin. Supine to sit with Set-up Almond  2. Sit to supine with Set-up Almond  3. Sit to stand transfer with Contact Guard Assistance-met   Revised: sit to stand transfer with Supervision   4. Gait  x 100 feet with Contact Guard Assistance using Rolling Walker.   5. Stand for 5 minutes with Stand-by Assistance using Rolling Walker  6. Lower extremity exercise program x30 reps per handout, with independence to improve muscular strength and endurance.       Outcome: Ongoing (interventions implemented as appropriate)  No goals met today, poc remains appropriate    RONEN Jama  10/2/2017

## 2017-10-02 NOTE — SUBJECTIVE & OBJECTIVE
Subjective:     Interval History:   Patient had 1 episode of agitation overnight around 4 am, was somewhat easier to calm down and did well after this.  Otherwise, discussed plan for MRI stealth/spect today with brain bx tomorrow, 10/3. Continues to have intermittent aphasia with more lucid episodes than previously.  Also discussed possible side effect of agitation with steroid use.      Current Neurological Medications: Acyclovir, Dexamethasone    Current Facility-Administered Medications   Medication Dose Route Frequency Provider Last Rate Last Dose    atorvastatin tablet 20 mg  20 mg Oral QHS Maren Li MD   20 mg at 10/01/17 2054    carvedilol tablet 25 mg  25 mg Oral BID  Maren Li MD   25 mg at 10/02/17 0859    [START ON 10/3/2017] cefTRIAXone (ROCEPHIN) 2 g in dextrose 5 % 50 mL IVPB  2 g Intravenous Once Pre-Op Brenda Goldstein PA-C        dexamethasone injection 4 mg  4 mg Intravenous Q6H Gregor Abbott MD   4 mg at 10/02/17 1354    dextrose 50% injection 12.5 g  12.5 g Intravenous PRN Maren Li MD        dextrose 50% injection 25 g  25 g Intravenous PRN Maren Li MD        enoxaparin injection 40 mg  40 mg Subcutaneous Daily Maren Li MD   40 mg at 10/01/17 1704    glucagon (human recombinant) injection 1 mg  1 mg Intramuscular PRN Maren Li MD        glucose chewable tablet 16 g  16 g Oral PRN Maren Li MD        glucose chewable tablet 24 g  24 g Oral PRN Maren Li MD        hydrALAZINE injection 10 mg  10 mg Intravenous Q6H PRN Brenda Goldstein PA-C        lacosamide tablet 200 mg  200 mg Oral BID Maren Li MD   200 mg at 10/02/17 0859    lidocaine HCL 10 mg/ml (1%) injection 5 mL  5 mL Other Once Gregor Abbott MD   Stopped at 09/27/17 2330    quetiapine tablet 25 mg  25 mg Oral Once Mishel Vargas PA-C        quetiapine tablet 50 mg  50 mg Oral QHS Gregor Abbott MD   50 mg at 10/01/17 2055    ramelteon tablet 8 mg  8 mg Oral Once  Mishel Vargas PA-C        thiamine tablet 100 mg  100 mg Oral Daily Maren Li MD   100 mg at 10/02/17 0859     Review of Systems   Unable to perform ROS: Mental status change     Objective:     Vital Signs (Most Recent):  Temp: 96.6 °F (35.9 °C) (10/02/17 1202)  Pulse: 63 (10/02/17 1202)  Resp: 18 (10/02/17 1202)  BP: (!) 175/88 (10/02/17 1202)  SpO2: 95 % (10/02/17 1202) Vital Signs (24h Range):  Temp:  [96.6 °F (35.9 °C)-98 °F (36.7 °C)] 96.6 °F (35.9 °C)  Pulse:  [63-82] 63  Resp:  [18-20] 18  SpO2:  [94 %-98 %] 95 %  BP: (128-190)/(60-95) 175/88     Weight: 120.7 kg (266 lb)  Body mass index is 30.74 kg/m².    Physical Exam  General:  Well-developed, well-nourished, nad  HEENT:  NCAT, PERRLA, EOMI, oropharygneal membranes non-erythematous/without exudate  Neck:  Supple, no palpable lad, no nuchal rigidity  Resp:  Symmetric expansion, no increased wob  CVS:  No LE edema, peripheral pulses 2+ (radial, dorsalis pedis)  GI:  Abd soft, non-distended, non-tender to palpation     Neurologic Exam:  Mental Status:  Awake, alert, oriented to self and wife.  States year is 2007, incoherent speech in response to asking where patient is.  Aphasia is intermittent with patient having very clear, understandable sentences and switching to incoherent speech 30 s-1 min.  Aphasia seems primarily expressive with paraphasias noted, able to follow most commands.  Cranial Nerves:  VFs intact on blinking to threat bilaterally.  PERRLA, EOMI.  Facial movements and sensation intact, symmetric.  Palate raises symmetrically, tongue protrudes midline.  SCM/Trapezius 5/5.  Motor:  Normal bulk and tone.  Strength diffusely 5/5, symmetric.  Sensory:  Intact to light touch, temperature at all extremities.    Reflexes:  Biceps, brachioradialis, patellar 2+ bilaterally.  No ankle clonus.  Downgoing toes bilaterally.  Coordination:  FTN, BRENDA, HTS with no ataxia, no dysmetria, no dysdiadochokinesia.  Gait:  Deferred 2/2 EEG  placed.    Significant Labs:     Recent Labs  Lab 10/02/17  0514   WBC 9.17   RBC 2.98*   HGB 10.1*   HCT 30.7*   *   *   MCH 33.9*   MCHC 32.9       Recent Labs  Lab 10/02/17  0514   CALCIUM 8.8      K 3.4*   CO2 26   *   BUN 9   CREATININE 0.9     Significant Imagin17 IR angiogram carotid cerebral bilateral inc arch:  POSTOPERATIVE DIAGNOSIS(ES):    Narrowing / beating appearance of small distal inferior-posterior left MCA territory.  Note: It is unclear whether is this a primary process or sequelae of swelling noted on MRI.  If it is to be a considered a primary process then vasculitis could be questioned, however no other region of vascular abnormality is identified to support this.    17 MRI Lumbar Spine:  1.  Old mild compression of L1  2.  Abnormal bone marrow signal and enhancement of the L4 vertebral body with mild decreased height suggesting a mild compression fracture there is fluid and this cannot exclude infection or pathologic fracture  3.  Degenerative changes as above    17 MRI Thoracic Spine:  1. Bone marrow edema and enhancement of the vertebral body of T11 with loss of body height of 30% suggesting a probable acute compression fracture.  Cannot exclude this pathologic or infection due to clinical history  2.  Degenerative changes as above greatest T7-8.  3.  Old compression fractures of L1-, T4 and T5    17 MRI Cervical Spine:   Degenerative changes as above.  Mild faint enhancement along cord probably normal physiologic with no definite focal enhancing lesion    17 MRI Brain w/ w/o contrast:  1.  Significant increase in the T2 signal in the left temporal lobe particularly increased in the left posterior parietal lobe with some serpiginous meningeal enhancement suspicious for encephalitis.  2.  Punctate areas of diffusion signal in the left posterior parietal lobe suspicious for punctate areas of acute ischemia versus less likely shine  through recommend followup

## 2017-10-02 NOTE — PROGRESS NOTES
[facility]  Neurosurgery  Progress Note    Subjective:     History of Present Illness: Patient is a transfer from [facility] who presented for tonic-clonic seizure and AMS on 9/10 per OSH. CTH and follow-up MRI demonstrated L posterior temporal and occipital lobe lesion. 2x LP with analysis were negative for HSV, VZV, syphillis and HHV-6 were all negative and differential was unremarkable. Thorough autoimmune and infectious testing was also completed and returned without result. Patient [place] [facility] [place] diagnostic work-up per NSGY and neurology. He remains altered on mentation with otherwise non-focal exam.    Post-Op Info:  Procedure(s) (LRB):  ANGIOGRAM-CEREBRAL for Dr. Brennan Lynn (Bilateral)         Interval History: NAEON. Patient with improvement of his agitation overnight. Wife reports improvement in his speech and motor skills with the steroids. MRI brain with stealth today     Medications:  Continuous Infusions:     Scheduled Meds:   atorvastatin  20 mg Oral QHS    carvedilol  25 mg Oral BID WM    dexamethasone  4 mg Intravenous Q6H    enoxaparin  40 mg Subcutaneous Daily    lacosamide  200 mg Oral BID    lidocaine HCL 10 mg/ml (1%)  5 mL Other Once    quetiapine  25 mg Oral Once    quetiapine  50 mg Oral QHS    ramelteon  8 mg Oral Once    thiamine  100 mg Oral Daily     PRN Meds:dextrose 50%, dextrose 50%, glucagon (human recombinant), glucose, glucose, hydrALAZINE     Review of Systems    Objective:     Weight: 120.7 kg (266 lb)  Body mass index is 30.74 kg/m².  Vital Signs (Most Recent):  Temp: 98 °F (36.7 °C) (10/02/17 0750)  Pulse: 64 (10/02/17 0750)  Resp: 20 (10/02/17 0750)  BP: (!) 190/95 (10/02/17 0750)  SpO2: 98 % (10/02/17 0750) Vital Signs (24h Range):  Temp:  [96 °F (35.6 °C)-98 °F (36.7 °C)] 98 °F (36.7 °C)  Pulse:  [62-82] 64  Resp:  [16-20] 20  SpO2:  [93 %-98 %] 98 %  BP: (128-190)/(60-95) 190/95         Arterial Sheath 09/28/17 0800  Right (Active)   Closure Device Angio seal 9/28/2017  8:51 AM       Neurosurgery Physical Exam    Constitutional: well-developed, no apparent distress  Abdomen soft, non-tender  Respiratory: non-distressed on room air   Extremities: groin soft, no e/o hematoma. Dorsalis pedis 2/4  Neurological  GCS 15  Alert and oriented  Speech/Language: expressive aphasia and receptive aphasia   Head: normocephalic, atraumatic   PERRL, EOMI, Visual fields intact.   Follows some simple commands  Facial expression symmetric   Tongue midline  Moves all extremities with good strength  Tone: no spasticity, no rigidity, no clonus, no atrophy  Pronator Drift: neg   DTRs 2/4 in all extremities   Sensation to Light touch: intact  Coordination: Finger-to-nose intact    Significant Labs:    Recent Labs  Lab 10/01/17  0338 10/02/17  0514   * 114*    144   K 3.5 3.4*   * 112*   CO2 23 26   BUN 8 9   CREATININE 0.9 0.9   CALCIUM 8.9 8.8   MG 1.6 1.5*       Recent Labs  Lab 10/01/17  0338 10/02/17  0514   WBC 9.54 9.17   HGB 10.2* 10.1*   HCT 31.5* 30.7*   * 368*     No results for input(s): LABPT, INR, APTT in the last 48 hours.  Microbiology Results (last 7 days)     Procedure Component Value Units Date/Time    CSF culture [[id number]] Collected:  09/28/17 1718    Order Status:  Completed Specimen:  CSF (Spinal Fluid) from CSF Tap, Tube 4 Updated:  10/02/17 0729     CSF CULTURE No Growth to date     Gram Stain Result Rare WBC's      No organisms seen        All pertinent labs from the last 24 hours have been reviewed.    Significant Diagnostics:  CT: No results found in the last 24 hours.  MRI: No results found in the last 24 hours.    Assessment/Plan:     Encephalitis and encephalomyelitis, unspecified    61M who presents from OSH with presenting sx on 9/10 of AMS and tonic-clonic seizure for further work-up.    -- s/p diagnostic cerebral angiogram: narrowing of left distal MCA arteries possibly d/t vasculitis or  secondary to cerebral edema  -- Continue dex4q6  -- Neurology arranged lumbar puncture for infectious workup per ID and paraneoplastic panel   -CSF culture NG, viral culture neg, ACE and Paraneoplastic panel pending   -Finished course of acyclovir   -- MRI brain with Stealth today    -- Plan for biopsy tomorrow. Case booked.   --Would appreciate medical clearance in the chart  --Pre-op orders placed  --Hold lovenox  --Pt seen and discussed with Dr. Zahra Goldstein [email or web address]  Neurosurgery  [facility]

## 2017-10-02 NOTE — PLAN OF CARE
Problem: Occupational Therapy Goal  Goal: Occupational Therapy Goal  Goals to be met by 10/13/2017:    1.  Brush teeth with min cues for the steps  2.  Don robe with min a and min cues  3.  Transfer bed-chair with min a and minimal cues for sequencing/safety  4.  Toilet self with mod a from BSC  5.  BSC transfer with min a and minimal cues for sequencing/safety      Outcome: Ongoing (interventions implemented as appropriate)  Goals remain appropriate. PABLO Hernandez 10/2/2017

## 2017-10-02 NOTE — PLAN OF CARE
Problem: SLP Goal  Goal: SLP Goal  Speech Language Pathology  Goals expected to be met by 10/6:  1. Pt will tolerate regular consistency diet and thin liquids with no overt s/s aspiration.   2. Pt will complete confrontational naming tasks with 70% accuracy given mod A.   3. Given a model, pt will complete rote speech tasks with 70% accuracy.   4. Pt will follow 1-step directions with 70% accuracy given mod A.   5. Pt will complete basic sentence completion tasks with 70% accuracy given mod A.    Outcome: Ongoing (interventions implemented as appropriate)  Recommend continue regular consistency diet and thin liquids.     FILIPE Ying, CCC-SLP  [phone]  10/2/2017

## 2017-10-02 NOTE — ASSESSMENT & PLAN NOTE
- cont acyclovir; ID and neurosurgery on board; planning for brain angiogram and LP in the AM    9/28- angiogram done today, LP to be done as well; cont acyclovir; appreciate recs    9/29- LP completed, follow up results; cont acyclovir; patient still having confusion     9/30- started on decadron by NSGY, not much improvement so far, will give it another day; concern for vasculitis; will get EEG today continuous; planning on possible brain biopsy on Tuesday    10/1- will continue steroids for another day; planning for MRI brain spectroscopy tomorrow; and will likely need brain biopsy on Tuesday; acyclovir has been stopped; HSV unlikely cause of patient's symptoms and has already received 21 days of therapy

## 2017-10-02 NOTE — PROGRESS NOTES
[facility] Hospital Medicine  Progress Note    Patient Name: Tommy Way  MRN: [id number]  Patient Class: IP- Inpatient   Admission Date: 9/26/2017  Length of Stay: 6 days  Attending Physician: Gregor Abbott MD  Primary Care Provider: Adan Torres MD    [facility] Medicine Team: [facility] MED A Gregor Abbott MD    Subjective:     Principal Problem:Encephalopathy    HPI:  History is limited as patient is unable to provide given altered mentation. A 61-year-old man with recently diagnosed tonic clonic seizure disorder,  HTN and dyslipidemia admitted to [facility] after 5 witnessed seizures 9/10/17. He was initially intubated on presentation for low sats and inability to protect airway, extubated 9/14. 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 He was treated with Acyclovir and ultimately underwent 2 lumbar punctures which have been unrevealing. HSV PCR from first LP is negative. After he failed to improve significantly, diagnostic imaging was done and showed expansion of the meningeal enhancement as well as possible pathologic spinal compression fractures. [facility] discussed case with Dr. Sweeney for concern of possible malignancy and need for tissue diagnosis. On interview, patient is alert but oriented to self only. Attempts to answer questions but inappropriately and is confused. Follows commands. Denies any complaints.     Hospital Course:  No notes on file    Interval History: patient does seem to have some improvement with his mentation and confabulation today with the steroids; pre-baptiste EEG results show no focal seizures; MRI spect and stealth pending for today  - planning for brain biopsy tomorrow, NPO after midnight   - patient had some improvement in his sun downing with increased seroquel; prn zyprexa    Review of Systems   Constitutional: Negative for activity change and appetite change.   HENT: Negative  for drooling and facial swelling.    Eyes: Negative for discharge and itching.   Respiratory: Negative for cough, shortness of breath and wheezing.    Cardiovascular: Negative for chest pain and palpitations.   Gastrointestinal: Negative for abdominal pain and nausea.   Genitourinary: Negative for difficulty urinating and dysuria.   Skin: Negative for color change and pallor.   Neurological: Negative for dizziness and numbness.   Psychiatric/Behavioral: Positive for confusion. Negative for behavioral problems.     Objective:     Vital Signs (Most Recent):  Temp: 98 °F (36.7 °C) (10/02/17 1633)  Pulse: 72 (10/02/17 1633)  Resp: 18 (10/02/17 1633)  BP: (!) 143/79 (10/02/17 1633)  SpO2: 95 % (10/02/17 1633) Vital Signs (24h Range):  Temp:  [96.6 °F (35.9 °C)-98 °F (36.7 °C)] 98 °F (36.7 °C)  Pulse:  [63-72] 72  Resp:  [18-20] 18  SpO2:  [95 %-98 %] 95 %  BP: (143-190)/(79-95) 143/79     Weight: 120.7 kg (266 lb)  Body mass index is 30.74 kg/m².    Intake/Output Summary (Last 24 hours) at 10/02/17 1732  Last data filed at 10/01/17 1805   Gross per 24 hour   Intake                0 ml   Output              950 ml   Net             -950 ml      Physical Exam   Constitutional: He is oriented to person, place, and time. He appears well-developed and well-nourished.   HENT:   Head: Normocephalic and atraumatic.   Eyes: Conjunctivae are normal. Pupils are equal, round, and reactive to light.   Neck: Normal range of motion. No thyromegaly present.   Cardiovascular: Normal rate, regular rhythm and normal heart sounds.    Pulmonary/Chest: Effort normal and breath sounds normal.   Abdominal: Soft. He exhibits no distension. There is no tenderness.   Neurological: He is alert and oriented to person, place, and time. Coordination normal.   wernickes aphasia   Skin: No rash noted. No erythema.       MELD-Na score: 8 at 8/12/2017  4:50 AM  MELD score: 8 at 8/12/2017  4:50 AM  Calculated from:  Serum Creatinine: 0.80 mg/dL (Rounded to  1) at 8/12/2017  4:50 AM  Serum Sodium: 139 mmol/L (Rounded to 137) at 8/12/2017  4:50 AM  Total Bilirubin: 1.2 mg/dL at 8/12/2017  4:50 AM  INR(ratio): 1.1 at 8/11/2017  4:10 PM  Age: 61 years    Significant Labs:  CBC:    Recent Labs  Lab 10/01/17  0338 10/02/17  0514   WBC 9.54 9.17   HGB 10.2* 10.1*   HCT 31.5* 30.7*   * 368*     CMP:    Recent Labs  Lab 10/01/17  0338 10/02/17  0514    144   K 3.5 3.4*   * 112*   CO2 23 26   * 114*   BUN 8 9   CREATININE 0.9 0.9   CALCIUM 8.9 8.8   ANIONGAP 10 6*   EGFRNONAA >60.0 >60.0     PTINR:  No results for input(s): INR in the last 48 hours.    Significant Procedures:   Dobutamine Stress Test with Color Flow: No results found for this or any previous visit.      Assessment/Plan:      * Encephalopathy    Pt alert but oriented to self only. Follows commands but use of words not appropriate. 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 He was treated with acyclovir and ultimately underwent 2 lumbar punctures which have been unrevealing. HSV PCR from first LP is negative. Additional viral studies apparently unremarkable. After he failed to improve significantly, diagnostic imaging was done and showed expansion of the meningeal enhancement as well as possible pathologic spnal compression fractures. C discussed case with Dr. Sweeney for concern of possible malignancy and need for tissue diagnosis. Dr Ware requested HSV PCR on 2nd LP sample if available. Continue acyclovir. Consult Neuro. Consult Neurosurgery for consideration of biopsy for tissue diagnosis. CT head with & w/o contrast ordered. Seizure precautions. Neuro checks.         Hypomagnesemia    - replace          T11 vertebral fracture    OSH MRI with acute compression fracture at T11. Mild compression fracture of L4. Old compression fractures at L1, T4, T5. No neuro deficits noted on exam other than altered  mentation. Neurosurgery consult as above.           Aspiration pneumonia of both lower lobes due to vomit    Treated with Zosyn for 11 days at OSH; will not continue abx. Respiratory status, VS stable.           Hypokalemia    replace          Encephalitis and encephalomyelitis, unspecified    - cont acyclovir; ID and neurosurgery on board; planning for brain angiogram and LP in the AM    9/28- angiogram done today, LP to be done as well; cont acyclovir; appreciate recs    9/29- LP completed, follow up results; cont acyclovir; patient still having confusion     9/30- started on decadron by NSGY, not much improvement so far, will give it another day; concern for vasculitis; will get EEG today continuous; planning on possible brain biopsy on Tuesday    10/1- will continue steroids for another day; planning for MRI brain spectroscopy tomorrow; and will likely need brain biopsy on Tuesday; acyclovir has been stopped; HSV unlikely cause of patient's symptoms and has already received 21 days of therapy    10/2 - patient seems to be having improved mentation today; cont decadron; EEG pre-baptiste shows no seizure activity; MRI spec and stealth ordered today and plan for brain biopsy tomorrow; concern for possible vasculitis         Status epilepticus, generalized convulsive    Likely secondary to encephalitis. No further seizures since initial presentation. Pt had recently been dx'ed with seizure d/o prior to admission. Started on lacosamide at OSH, will resume. Seizure precautions. Neuro consult.         Hypertension    - cont coreg 25 mg PO BID for now          VTE Risk Mitigation         Ordered     enoxaparin injection 40 mg  Daily     Route:  Subcutaneous        09/27/17 0029     Medium Risk of VTE  Once      09/27/17 0029              Gregor Abbott MD  Department of Hospital Medicine   [facility]

## 2017-10-02 NOTE — PROCEDURES
DATE OF PROCEDURE:  10/01/2017    ICU EEG/VIDEO MONITORING REPORT     METHODOLOGY:  Electroencephalographic (EEG) is recorded with electrodes placed   according to the International 10-20 placement system.  Thirty two (32) channels   of digital signal (sampling rate of 512/sec), including T1 and T2, were   simultaneously recorded from the scalp and may include EKG, EMG, and/or eye   monitors.  Recording band pass was 0.1 to 512 Hz.  Digital video recording of   the patient is simultaneously recorded with the EEG.  The patient is instructed   to report clinical symptoms which may occur during the recording session.  EEG   and video recording are stored and archived in digital format.  Activation   procedures, which include photic stimulation, hyperventilation and instructing   patients to perform simple tasks, are done in selected patients  The EEG is displayed on a monitor screen and can be reviewed using different   montages.  Computer-assisted analysis is employed to detect spike and   electrographic seizure activity.   The entire record is submitted for computer   analysis.  The entire recording is visually reviewed, and the times identified   by computer analysis as being spikes or seizures are reviewed again.    Compressed spectral analysis (CSA) is also performed on the activity recorded   from each individual channel.  This is displayed as a power display of   frequencies from 0 to 30 Hz over time.   The CSA is reviewed looking for   asymmetries in power between homologous areas of the scalp, then compared with   the original EEG recording.    Neutral [employer] software was also utilized in the review of this study.  This software   suite analyzes the EEG recording in multiple domains.  Coherence and rhythmicity   are computed to identify EEG sections which may contain organized seizures.    Each channel undergoes analysis to detect the presence of spike and sharp waves   which have special and morphological  characteristics of epileptic activity.  The   routine EEG recording is converted from special into frequency domain.  This is   then displayed comparing homologous areas to identify areas of significant   asymmetry.  Algorithm to identify non-cortically generated artifact is used to   separate artifact from the EEG.      RECORDING TIMES:  The study begins on 10/01/2017 at 10:56 and ends on 10/02/2017   at 07:00.    Duration is 20 hours and 2 minutes.    EEG FINDINGS:  This study begins with the patient awake and a mix of frequency   seen on EEG with predominantly theta activity in the 4 to 6 Hz range with   superimposed delta activity at times in the 2 to 4 Hz range as well.  There is   also abundant fast activity seen at times, particularly centrally and frontally.    There is good variability in the study.  There is eye blink artifact and the   study is roughly symmetric.  There are significant number of artifacts from the   beginning and artifacts worsen as the study progresses.    There are no epileptiform discharges seen at any point.  There are no   electrographic seizures.  There are no clinical events.  The patient does appear   agitated on the accompanying video at times.    Sleep is captured evidenced by a loss of all faster activity and low-amplitude   delta with central beta activity, likely representing rudimentary sleep   spindles.    During wakefulness, a well-formed posterior dominant rhythm is not identified,   although alpha frequencies are seen sporadically.    INTERPRETATION:  Mildly abnormal long-term EEG due to disorganization and   slowing during wakefulness.  The presence of relatively normal sleep   architecture implies a lesser degree of encephalopathy, however.  There was no   evidence of seizures or a focal cerebral dysfunction.      NBB/HN  dd: 10/02/2017 09:48:00 (CDT)  td: 10/02/2017 14:25:27 (CDT)  Doc ID   #[id number]  Job ID #[id number]    CC:

## 2017-10-02 NOTE — ASSESSMENT & PLAN NOTE
61 y.o male with hx of HTN transferred from [facility] 9/26/17. Patient originally presented to OSH after witnessed seizures at home. Intubated for airway protection. 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 LP done at OSH x 2 with benign CSF besides elevated protein 77. On course of vancomycin, zosyn for aspiration pneumonia as well. LP repeated 9/19/17. HSV PCR, VDRL, HIV and arbovirus panel negative. Quantiferon gold and influenza panel positive. Followed by Dr. Reyes (ID) and Osmany (Neuro) in [place].     9/27- encephalopathy w/ aphasia, follows simple commands.  EEG showed structural dysfunction in the left hemisphere, maximum in the temporal area. No evidence for an irritative process.  9/28- cerebral angiogram performed without clear evidence of vasculitis  9/29- dexamethasone started, persistent aphasia  9/30- agitation overnight, continuing dexamethasone  10/1- agitation again overnight but family noting improved cognition/speech this am    Recommendations:  -Continue dexamethasone, empiric acyclovir   -Plan for brain biopsy with Neurosurgery 10/03/17  -Follow up CSF studies, ACE and Paraneoplastic panel pending  -Consider MR spectroscopy with perfusion--may clarify infection vs tumor pathology.   -Continue Vimpat 200 mg BID.  Repeat EEG pending, will follow up results.

## 2017-10-02 NOTE — PT/OT/SLP PROGRESS
Physical Therapy  Treatment    Tommy Way   MRN: [id number]   Admitting Diagnosis: Encephalopathy    PT Received On: 10/02/17  PT Start Time: 0853     PT Stop Time: 0918    PT Total Time (min): 25 min       Billable Minutes:  Gait [id number] and Therapeutic Activity 15    Treatment Type: Treatment  PT/PTA: PT     PTA Visit Number: 0       General Precautions: Standard, fall, seizure  Orthopedic Precautions: N/A   Braces: N/A         Subjective:  Communicated with nsg prior to session.  Pt agreeable to session    Pain/Comfort  Pain Rating 1: 0/10  Pain Rating Post-Intervention 1: 0/10    Objective:   Patient found with: nugent catheter, telemetry, supine in bed c/ wife at bedside    Functional Mobility:  Bed Mobility:   Rolling/Turning to Left: Contact guard assistance  Scooting/Bridging: Contact Guard Assistance  Supine to Sit: Contact Guard Assistance    Transfers:  Sit <> Stand Assistance: Contact Guard Assistance  Sit <> Stand Assistive Device: Rolling Walker    Gait:   Gait Distance: 50 feet within pt's room using CGA and RW.  Verbal cues given for safety  Assistance 1: Contact Guard Assistance  Gait Assistive Device: Rolling walker  Gait Pattern: reciprocal  Gait Deviation(s): increased time in double stance, decreased kirstin, decreased velocity of limb motion, decreased step length, decreased stride length  - PT provided education on proper gait sequencing c/ RW  - Ambulation occurred only in pt room 2/2 pt cognition    Balance:   Static Sit: GOOD: Takes MODERATE challenges from all directions  Dynamic Sit: GOOD: Maintains balance through MODERATE excursions of active trunk movement  Static Stand: GOOD: Takes MODERATE challenges from all directions  Dynamic stand: FAIR+: Needs CLOSE SUPERVISION during gait and is able to right self with minor LOB     Therapeutic Activities and Exercises:  Education on  - safety while performing EOB,OOB and ambulation activities    PT consulted c/ NSG about ordering bedside  commode and that pt is safe to t/f to and from [facility] c/ 1 person assist    White board updated, nsg notified safe to transfer c/ NSG c/ 1 person assist       AM-PAC 6 CLICK MOBILITY  How much help from another person does this patient currently need?   1 = Unable, Total/Dependent Assistance  2 = A lot, Maximum/Moderate Assistance  3 = A little, Minimum/Contact Guard/Supervision  4 = None, Modified [place]/Independent    Turning over in bed (including adjusting bedclothes, sheets and blankets)?: 3  Sitting down on and standing up from a chair with arms (e.g., wheelchair, bedside commode, etc.): 3  Moving from lying on back to sitting on the side of the bed?: 3  Moving to and from a bed to a chair (including a wheelchair)?: 3  Need to walk in hospital room?: 3  Climbing 3-5 steps with a railing?: 2  Total Score: 17    AM-PAC Raw Score CMS G-Code Modifier Level of Impairment Assistance   6 % Total / Unable   7 - 9 CM 80 - 100% Maximal Assist   10 - 14 CL 60 - 80% Moderate Assist   15 - 19 CK 40 - 60% Moderate Assist   20 - 22 CJ 20 - 40% Minimal Assist   23 CI 1-20% SBA / CGA   24 CH 0% Independent/ Mod I     Patient left up in chair with all lines intact, call button in reach, nsg notified and wife present.    Assessment:  Tommy Way is a 61 y.o. male with a medical diagnosis of Encephalopathy and presents with deficits in following multi-step commands given therapist during today's session.  Pt would initially follow commands but needed continuous reminders to stay on task.  Pt shows impr functional mobility while performing ambulation c/ RW in session. Pt remains appropriate for continued skilled services and discharge to postacute location to address the below deficits and improve pt return to PLOF.        Rehab identified problem list/impairments: Rehab identified problem list/impairments: weakness, impaired endurance, impaired functional mobilty, impaired cognition, decreased coordination,  decreased upper extremity function, decreased lower extremity function, impaired balance, impaired self care skills, impaired coordination, decreased safety awareness    Rehab potential is good.    Activity tolerance: Good    Discharge recommendations: Discharge Facility/Level Of Care Needs: rehabilitation facility     Barriers to discharge: Barriers to Discharge: Decreased caregiver support    Equipment recommendations: Equipment Needed After Discharge: wheelchair, bath bench     GOALS:    Physical Therapy Goals        Problem: Physical Therapy Goal    Goal Priority Disciplines Outcome Goal Variances Interventions   Physical Therapy Goal     PT/OT, PT Ongoing (interventions implemented as appropriate)     Description:  Goals to be met by: 7 days (10/6)    Patient will increase functional independence with mobility by performin. Supine to sit with Set-up Lewis  2. Sit to supine with Set-up Lewis  3. Sit to stand transfer with Contact Guard Assistance-met   Revised: sit to stand transfer with Supervision   4. Gait  x 100 feet with Contact Guard Assistance using Rolling Walker.   5. Stand for 5 minutes with Stand-by Assistance using Rolling Walker  6. Lower extremity exercise program x30 reps per handout, with independence to improve muscular strength and endurance.                        PLAN:    Patient to be seen 5 x/week  to address the above listed problems via gait training, therapeutic activities, therapeutic exercises, neuromuscular re-education  Plan of Care expires: 10/29/17  Plan of Care reviewed with: patient, spouse         Buddy Tabor, SPT  10/02/2017

## 2017-10-02 NOTE — SUBJECTIVE & OBJECTIVE
Interval History: NAEON. Patient with improvement of his agitation overnight. Wife reports improvement in his speech and motor skills with the steroids. MRI brain with stealth today     Medications:  Continuous Infusions:     Scheduled Meds:   atorvastatin  20 mg Oral QHS    carvedilol  25 mg Oral BID WM    dexamethasone  4 mg Intravenous Q6H    enoxaparin  40 mg Subcutaneous Daily    lacosamide  200 mg Oral BID    lidocaine HCL 10 mg/ml (1%)  5 mL Other Once    quetiapine  25 mg Oral Once    quetiapine  50 mg Oral QHS    ramelteon  8 mg Oral Once    thiamine  100 mg Oral Daily     PRN Meds:dextrose 50%, dextrose 50%, glucagon (human recombinant), glucose, glucose, hydrALAZINE     Review of Systems    Objective:     Weight: 120.7 kg (266 lb)  Body mass index is 30.74 kg/m².  Vital Signs (Most Recent):  Temp: 98 °F (36.7 °C) (10/02/17 0750)  Pulse: 64 (10/02/17 0750)  Resp: 20 (10/02/17 0750)  BP: (!) 190/95 (10/02/17 0750)  SpO2: 98 % (10/02/17 0750) Vital Signs (24h Range):  Temp:  [96 °F (35.6 °C)-98 °F (36.7 °C)] 98 °F (36.7 °C)  Pulse:  [62-82] 64  Resp:  [16-20] 20  SpO2:  [93 %-98 %] 98 %  BP: (128-190)/(60-95) 190/95         Arterial Sheath 09/28/17 0800 Right (Active)   Closure Device Angio seal 9/28/2017  8:51 AM       Neurosurgery Physical Exam    Constitutional: well-developed, no apparent distress  Abdomen soft, non-tender  Respiratory: non-distressed on room air   Extremities: groin soft, no e/o hematoma. Dorsalis pedis 2/4  Neurological  GCS 15  Alert and oriented  Speech/Language: expressive aphasia and receptive aphasia   Head: normocephalic, atraumatic   PERRL, EOMI, Visual fields intact.   Follows some simple commands  Facial expression symmetric   Tongue midline  Moves all extremities with good strength  Tone: no spasticity, no rigidity, no clonus, no atrophy  Pronator Drift: neg   DTRs 2/4 in all extremities   Sensation to Light touch: intact  Coordination: Finger-to-nose  intact    Significant Labs:    Recent Labs  Lab 10/01/17  0338 10/02/17  0514   * 114*    144   K 3.5 3.4*   * 112*   CO2 23 26   BUN 8 9   CREATININE 0.9 0.9   CALCIUM 8.9 8.8   MG 1.6 1.5*       Recent Labs  Lab 10/01/17  0338 10/02/17  0514   WBC 9.54 9.17   HGB 10.2* 10.1*   HCT 31.5* 30.7*   * 368*     No results for input(s): LABPT, INR, APTT in the last 48 hours.  Microbiology Results (last 7 days)     Procedure Component Value Units Date/Time    CSF culture [[id number]] Collected:  09/28/17 1718    Order Status:  Completed Specimen:  CSF (Spinal Fluid) from CSF Tap, Tube 4 Updated:  10/02/17 0729     CSF CULTURE No Growth to date     Gram Stain Result Rare WBC's      No organisms seen        All pertinent labs from the last 24 hours have been reviewed.    Significant Diagnostics:  CT: No results found in the last 24 hours.  MRI: No results found in the last 24 hours.

## 2017-10-02 NOTE — PT/OT/SLP PROGRESS
"Speech Language Pathology  Treatment    Tommy Way   MRN: [id number]   Admitting Diagnosis: Encephalopathy    Diet recommendations: Solid Diet Level: Regular  Liquid Diet Level: Thin     SLP Treatment Date: 10/02/17  Speech Start Time: 1207     Speech Stop Time: 1246     Speech Total (min): 39 min       TREATMENT BILLABLE MINUTES:  Speech Therapy Individual 23 and [facility]/Home Management Training 16    Has the patient been evaluated by SLP for swallowing? : Yes  Keep patient NPO?: No   General Precautions: Standard, fall, seizure  Current Respiratory Status:  (room air)       Subjective:  "Black, digital..." Pt observed to circumlocute by describing an object during confrontational naming task.     Pain/Comfort  Pain Rating 1: 0/10  Pain Rating Post-Intervention 1: 0/10    Objective:   Pt awake and alert upon entry with wife at b/s. No PO trials administered this date 2/2 [facility] report of pt NPO for MRI. Pt completed confrontational naming task with 30% ind'ly and 100% given contextual cues on 1/7 trials and phonemic cues on 6/7 trials. During task, pt observed to ind'ly circumlocute in an effort to generate the name. Pt followed 1-step directions with 80% ind'ly and 100% cues, and 2-step directions with 25% ind'ly and 50% given model, despite extensive verbal and tactile cues and models. Pt completed rote speech task to count to 5 given visual cue and to generate NENA given model. Pt and wife provided with extensive education re: strategies to aid the pt's word finding ability and ability to understand concrete, relevant information, as well as ways to optimize communication with him. White board updated. No further questions.       Tommy Way is a 61 y.o. male with a medical diagnosis of Encephalopathy and presents with cognitive-linguistic deficits.     Discharge recommendations: Discharge Facility/Level Of Care Needs: rehabilitation facility     Goals:    SLP Goals        Problem: SLP Goal    Goal Priority " Disciplines Outcome   SLP Goal     SLP Ongoing (interventions implemented as appropriate)   Description:  Speech Language Pathology  Goals expected to be met by 10/6:  1. Pt will tolerate regular consistency diet and thin liquids with no overt s/s aspiration.   2. Pt will complete confrontational naming tasks with 70% accuracy given mod A.   3. Given a model, pt will complete rote speech tasks with 70% accuracy.   4. Pt will follow 1-step directions with 70% accuracy given mod A.   5. Pt will complete basic sentence completion tasks with 70% accuracy given mod A.                      Plan:   Patient to be seen Therapy Frequency: 5 x/week   Plan of Care expires: 10/28/17  Plan of Care reviewed with: patient, spouse  SLP Follow-up?: Yes           FILIPE Ying, CCC-SLP  [phone]  10/2/2017

## 2017-10-02 NOTE — ASSESSMENT & PLAN NOTE
61M who presents from OSH with presenting sx on 9/10 of AMS and tonic-clonic seizure for further work-up.    -- s/p diagnostic cerebral angiogram: narrowing of left distal MCA arteries possibly d/t vasculitis or secondary to cerebral edema  -- Continue [id number]  -- Neurology arranged lumbar puncture for infectious workup per ID and paraneoplastic panel   -CSF culture NG, viral culture neg, ACE and Paraneoplastic panel pending   -Finished course of acyclovir   -- MRI brain with Stealth today    -- Plan for biopsy tomorrow. Case booked.   --Would appreciate medical clearance in the chart  --Pre-op orders placed  --Pt seen and discussed with Dr. Sweeney

## 2017-10-02 NOTE — ASSESSMENT & PLAN NOTE
- cont acyclovir; ID and neurosurgery on board; planning for brain angiogram and LP in the AM    9/28- angiogram done today, LP to be done as well; cont acyclovir; appreciate recs    9/29- LP completed, follow up results; cont acyclovir; patient still having confusion     9/30- started on decadron by NSGY, not much improvement so far, will give it another day; concern for vasculitis; will get EEG today continuous; planning on possible brain biopsy on Tuesday    10/1- will continue steroids for another day; planning for MRI brain spectroscopy tomorrow; and will likely need brain biopsy on Tuesday; acyclovir has been stopped; HSV unlikely cause of patient's symptoms and has already received 21 days of therapy    10/2 - patient seems to be having improved mentation today; cont decadron; EEG pre-baptiste shows no seizure activity; MRI spec and stealth ordered today and plan for brain biopsy tomorrow; concern for possible vasculitis

## 2017-10-02 NOTE — PROGRESS NOTES
[facility] Hospital Medicine  Progress Note    Patient Name: Tommy Way  MRN: [id number]  Patient Class: IP- Inpatient   Admission Date: 9/26/2017  Length of Stay: 5 days  Attending Physician: Gregor Abbott MD  Primary Care Provider: Adan Torres MD    [facility] Medicine Team: [facility] MED A Gregor Abbott MD    Subjective:     Principal Problem:Encephalopathy    HPI:  History is limited as patient is unable to provide given altered mentation. A 61-year-old man with recently diagnosed tonic clonic seizure disorder,  HTN and dyslipidemia admitted to [facility] after 5 witnessed seizures 9/10/17. He was initially intubated on presentation for low sats and inability to protect airway, extubated 9/14. 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 He was treated with Acyclovir and ultimately underwent 2 lumbar punctures which have been unrevealing. HSV PCR from first LP is negative. After he failed to improve significantly, diagnostic imaging was done and showed expansion of the meningeal enhancement as well as possible pathologic spinal compression fractures. [facility] discussed case with Dr. Sweeney for concern of possible malignancy and need for tissue diagnosis. On interview, patient is alert but oriented to self only. Attempts to answer questions but inappropriately and is confused. Follows commands. Denies any complaints.     Hospital Course:  No notes on file    Interval History: patient did have confusion and agitation over night and this AM; as per wife, she has noticed some improvement with steroids; steroids likely causing more agitation however will keep them on for another day; continuous EEG still going; plan for MRI spectroscopy tomorrow;   - acyclovir has been stopped; planning for brain biopsy on tuesday    Review of Systems   Constitutional: Negative for activity change and appetite change.   HENT:  Negative for drooling and facial swelling.    Eyes: Negative for discharge and itching.   Respiratory: Negative for cough, shortness of breath and wheezing.    Cardiovascular: Negative for chest pain and palpitations.   Gastrointestinal: Negative for abdominal pain and nausea.   Genitourinary: Negative for difficulty urinating and dysuria.   Skin: Negative for color change and pallor.   Neurological: Negative for dizziness and numbness.   Psychiatric/Behavioral: Negative for behavioral problems and confusion.     Objective:     Vital Signs (Most Recent):  Temp: 96.7 °F (35.9 °C) (10/01/17 1702)  Pulse: 82 (10/01/17 1702)  Resp: 18 (10/01/17 1702)  BP: 128/60 (10/01/17 1702)  SpO2: (!) 94 % (10/01/17 1702) Vital Signs (24h Range):  Temp:  [96 °F (35.6 °C)-98 °F (36.7 °C)] 96.7 °F (35.9 °C)  Pulse:  [62-94] 82  Resp:  [16-18] 18  SpO2:  [93 %-94 %] 94 %  BP: (128-149)/(60-94) 128/60     Weight: 120.7 kg (266 lb)  Body mass index is 30.74 kg/m².    Intake/Output Summary (Last 24 hours) at 10/01/17 1955  Last data filed at 10/01/17 1805   Gross per 24 hour   Intake                0 ml   Output              950 ml   Net             -950 ml      Physical Exam   Constitutional: He is oriented to person, place, and time. He appears well-developed and well-nourished.   HENT:   Head: Normocephalic and atraumatic.   Eyes: Conjunctivae are normal. Pupils are equal, round, and reactive to light.   Neck: Normal range of motion. No thyromegaly present.   Cardiovascular: Normal rate, regular rhythm and normal heart sounds.    Pulmonary/Chest: Effort normal and breath sounds normal.   Abdominal: Soft. He exhibits no distension. There is no tenderness.   Neurological: He is alert and oriented to person, place, and time. Coordination normal.   wernickes aphasia   Skin: No rash noted. No erythema.       MELD-Na score: 8 at 8/12/2017  4:50 AM  MELD score: 8 at 8/12/2017  4:50 AM  Calculated from:  Serum Creatinine: 0.80 mg/dL (Rounded  to 1) at 8/12/2017  4:50 AM  Serum Sodium: 139 mmol/L (Rounded to 137) at 8/12/2017  4:50 AM  Total Bilirubin: 1.2 mg/dL at 8/12/2017  4:50 AM  INR(ratio): 1.1 at 8/11/2017  4:10 PM  Age: 61 years    Significant Labs:  CBC:    Recent Labs  Lab 10/01/17  0338   WBC 9.54   HGB 10.2*   HCT 31.5*   *     CMP:    Recent Labs  Lab 10/01/17  0338      K 3.5   *   CO2 23   *   BUN 8   CREATININE 0.9   CALCIUM 8.9   ANIONGAP 10   EGFRNONAA >60.0     PTINR:  No results for input(s): INR in the last 48 hours.    Significant Procedures:   Dobutamine Stress Test with Color Flow: No results found for this or any previous visit.      Assessment/Plan:      * Encephalopathy    Pt alert but oriented to self only. Follows commands but use of words not appropriate. 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 He was treated with acyclovir and ultimately underwent 2 lumbar punctures which have been unrevealing. HSV PCR from first LP is negative. Additional viral studies apparently unremarkable. After he failed to improve significantly, diagnostic imaging was done and showed expansion of the meningeal enhancement as well as possible pathologic spnal compression fractures. [facility] discussed case with Dr. Sweeney for concern of possible malignancy and need for tissue diagnosis. Dr Ware requested HSV PCR on 2nd LP sample if available. Continue acyclovir. Consult Neuro. Consult Neurosurgery for consideration of biopsy for tissue diagnosis. CT head with & w/o contrast ordered. Seizure precautions. Neuro checks.         T11 vertebral fracture    OSH MRI with acute compression fracture at T11. Mild compression fracture of L4. Old compression fractures at L1, T4, T5. No neuro deficits noted on exam other than altered mentation. Neurosurgery consult as above.           Aspiration pneumonia of both lower lobes due to vomit    Treated with  Zosyn for 11 days at OSH; will not continue abx. Respiratory status, VS stable.           Encephalitis and encephalomyelitis, unspecified    - cont acyclovir; ID and neurosurgery on board; planning for brain angiogram and LP in the AM    9/28- angiogram done today, LP to be done as well; cont acyclovir; appreciate recs    9/29- LP completed, follow up results; cont acyclovir; patient still having confusion     9/30- started on decadron by NSGY, not much improvement so far, will give it another day; concern for vasculitis; will get EEG today continuous; planning on possible brain biopsy on Tuesday    10/1- will continue steroids for another day; planning for MRI brain spectroscopy tomorrow; and will likely need brain biopsy on Tuesday; acyclovir has been stopped; HSV unlikely cause of patient's symptoms and has already received 21 days of therapy        Status epilepticus, generalized convulsive    Likely secondary to encephalitis. No further seizures since initial presentation. Pt had recently been dx'ed with seizure d/o prior to admission. Started on lacosamide at OSH, will resume. Seizure precautions. Neuro consult.         Hypertension    's. Resume home carvedilol           VTE Risk Mitigation         Ordered     enoxaparin injection 40 mg  Daily     Route:  Subcutaneous        09/27/17 0029     Medium Risk of VTE  Once      09/27/17 0029              Gregor Abbott MD  Department of Hospital Medicine   [facility]

## 2017-10-02 NOTE — SUBJECTIVE & OBJECTIVE
Interval History: patient does seem to have some improvement with his mentation and confabulation today with the steroids; pre-baptiste EEG results show no focal seizures; MRI spect and stealth pending for today  - planning for brain biopsy tomorrow, NPO after midnight   - patient had some improvement in his sun downing with increased seroquel; prn zyprexa    Review of Systems   Constitutional: Negative for activity change and appetite change.   HENT: Negative for drooling and facial swelling.    Eyes: Negative for discharge and itching.   Respiratory: Negative for cough, shortness of breath and wheezing.    Cardiovascular: Negative for chest pain and palpitations.   Gastrointestinal: Negative for abdominal pain and nausea.   Genitourinary: Negative for difficulty urinating and dysuria.   Skin: Negative for color change and pallor.   Neurological: Negative for dizziness and numbness.   Psychiatric/Behavioral: Positive for confusion. Negative for behavioral problems.     Objective:     Vital Signs (Most Recent):  Temp: 98 °F (36.7 °C) (10/02/17 1633)  Pulse: 72 (10/02/17 1633)  Resp: 18 (10/02/17 1633)  BP: (!) 143/79 (10/02/17 1633)  SpO2: 95 % (10/02/17 1633) Vital Signs (24h Range):  Temp:  [96.6 °F (35.9 °C)-98 °F (36.7 °C)] 98 °F (36.7 °C)  Pulse:  [63-72] 72  Resp:  [18-20] 18  SpO2:  [95 %-98 %] 95 %  BP: (143-190)/(79-95) 143/79     Weight: 120.7 kg (266 lb)  Body mass index is 30.74 kg/m².    Intake/Output Summary (Last 24 hours) at 10/02/17 1732  Last data filed at 10/01/17 1805   Gross per 24 hour   Intake                0 ml   Output              950 ml   Net             -950 ml      Physical Exam   Constitutional: He is oriented to person, place, and time. He appears well-developed and well-nourished.   HENT:   Head: Normocephalic and atraumatic.   Eyes: Conjunctivae are normal. Pupils are equal, round, and reactive to light.   Neck: Normal range of motion. No thyromegaly present.   Cardiovascular: Normal  rate, regular rhythm and normal heart sounds.    Pulmonary/Chest: Effort normal and breath sounds normal.   Abdominal: Soft. He exhibits no distension. There is no tenderness.   Neurological: He is alert and oriented to person, place, and time. Coordination normal.   wernickes aphasia   Skin: No rash noted. No erythema.       MELD-Na score: 8 at 8/12/2017  4:50 AM  MELD score: 8 at 8/12/2017  4:50 AM  Calculated from:  Serum Creatinine: 0.80 mg/dL (Rounded to 1) at 8/12/2017  4:50 AM  Serum Sodium: 139 mmol/L (Rounded to 137) at 8/12/2017  4:50 AM  Total Bilirubin: 1.2 mg/dL at 8/12/2017  4:50 AM  INR(ratio): 1.1 at 8/11/2017  4:10 PM  Age: 61 years    Significant Labs:  CBC:    Recent Labs  Lab 10/01/17  0338 10/02/17  0514   WBC 9.54 9.17   HGB 10.2* 10.1*   HCT 31.5* 30.7*   * 368*     CMP:    Recent Labs  Lab 10/01/17  0338 10/02/17  0514    144   K 3.5 3.4*   * 112*   CO2 23 26   * 114*   BUN 8 9   CREATININE 0.9 0.9   CALCIUM 8.9 8.8   ANIONGAP 10 6*   EGFRNONAA >60.0 >60.0     PTINR:  No results for input(s): INR in the last 48 hours.    Significant Procedures:   Dobutamine Stress Test with Color Flow: No results found for this or any previous visit.

## 2017-10-02 NOTE — PROGRESS NOTES
[facility]  Neurology  Progress Note    Patient Name: Tommy Way  MRN: [id number]  Admission Date: 9/26/2017  Hospital Length of Stay: 6 days  Code Status: Full Code   Attending Provider: Gregor Abbott MD  Primary Care Physician: Adan Torres MD   Principal Problem:Encephalopathy    Subjective:     Interval History:   Patient had 1 episode of agitation overnight around 4 am, was somewhat easier to calm down and did well after this.  Otherwise, discussed plan for MRI stealth/spect today with brain bx tomorrow, 10/3. Continues to have intermittent aphasia with more lucid episodes than previously.  Also discussed possible side effect of agitation with steroid use.      Current Neurological Medications: Acyclovir, Dexamethasone    Current Facility-Administered Medications   Medication Dose Route Frequency Provider Last Rate Last Dose    atorvastatin tablet 20 mg  20 mg Oral QHS Maren Li MD   20 mg at 10/01/17 2054    carvedilol tablet 25 mg  25 mg Oral BID WM Maren Li MD   25 mg at 10/02/17 0859    [START ON 10/3/2017] cefTRIAXone (ROCEPHIN) 2 g in dextrose 5 % 50 mL IVPB  2 g Intravenous Once Pre-Op Brenda Goldstein PA-C        dexamethasone injection 4 mg  4 mg Intravenous Q6H Gregor Abbott MD   4 mg at 10/02/17 1354    dextrose 50% injection 12.5 g  12.5 g Intravenous PRN Maren Li MD        dextrose 50% injection 25 g  25 g Intravenous PRN Maren Li MD        enoxaparin injection 40 mg  40 mg Subcutaneous Daily Maren Li MD   40 mg at 10/01/17 1704    glucagon (human recombinant) injection 1 mg  1 mg Intramuscular PRN Maren Li MD        glucose chewable tablet 16 g  16 g Oral PRN Maren Li MD        glucose chewable tablet 24 g  24 g Oral PRN Maren Li MD        hydrALAZINE injection 10 mg  10 mg Intravenous Q6H PRN Brenda Goldstein PA-C        lacosamide tablet 200 mg  200 mg Oral BID Maren Li MD   200 mg at 10/02/17 0859    lidocaine  HCL 10 mg/ml (1%) injection 5 mL  5 mL Other Once Gregor Abbott MD   Stopped at 09/27/17 2330    quetiapine tablet 25 mg  25 mg Oral Once Mishel Vargas PA-C        quetiapine tablet 50 mg  50 mg Oral QHS Gregor Abbott MD   50 mg at 10/01/17 2055    ramelteon tablet 8 mg  8 mg Oral Once Mishel Vargas PA-C        thiamine tablet 100 mg  100 mg Oral Daily Maren Li MD   100 mg at 10/02/17 0859     Review of Systems   Unable to perform ROS: Mental status change     Objective:     Vital Signs (Most Recent):  Temp: 96.6 °F (35.9 °C) (10/02/17 1202)  Pulse: 63 (10/02/17 1202)  Resp: 18 (10/02/17 1202)  BP: (!) 175/88 (10/02/17 1202)  SpO2: 95 % (10/02/17 1202) Vital Signs (24h Range):  Temp:  [96.6 °F (35.9 °C)-98 °F (36.7 °C)] 96.6 °F (35.9 °C)  Pulse:  [63-82] 63  Resp:  [18-20] 18  SpO2:  [94 %-98 %] 95 %  BP: (128-190)/(60-95) 175/88     Weight: 120.7 kg (266 lb)  Body mass index is 30.74 kg/m².    Physical Exam  General:  Well-developed, well-nourished, nad  HEENT:  NCAT, PERRLA, EOMI, oropharygneal membranes non-erythematous/without exudate  Neck:  Supple, no palpable lad, no nuchal rigidity  Resp:  Symmetric expansion, no increased wob  CVS:  No LE edema, peripheral pulses 2+ (radial, dorsalis pedis)  GI:  Abd soft, non-distended, non-tender to palpation     Neurologic Exam:  Mental Status:  Awake, alert, oriented to self and wife. Incoherent speech in response to asking where patient is.  Aphasia is intermittent with patient having very clear, understandable sentences and switching to incoherent speech within matter of seconds.  Aphasia seems primarily expressive with paraphasias noted, able to follow most commands.  Cranial Nerves:  VFs intact on blinking to threat bilaterally.  PERRLA, EOMI.  Facial movements and sensation intact, symmetric.  Palate raises symmetrically, tongue protrudes midline.  SCM/Trapezius 5/5.  Motor:  Normal bulk and tone.  Strength diffusely 5/5,  symmetric.  Sensory:  Intact to light touch, temperature at all extremities.    Reflexes:  Biceps, brachioradialis, patellar 2+ bilaterally.  No ankle clonus.  Downgoing toes bilaterally.  Coordination:  FTN, BRENDA, HTS with no ataxia, no dysmetria, no dysdiadochokinesia.  Gait:  Deferred 2/2 EEG placed.    Significant Labs:   Recent Labs  Lab 10/02/17  0514   WBC 9.17   RBC 2.98*   HGB 10.1*   HCT 30.7*   *   *   MCH 33.9*   MCHC 32.9       Recent Labs  Lab 10/02/17  0514   CALCIUM 8.8      K 3.4*   CO2 26   *   BUN 9   CREATININE 0.9     Significant Imagin17 IR angiogram carotid cerebral bilateral inc arch:  POSTOPERATIVE DIAGNOSIS(ES):    Narrowing / beating appearance of small distal inferior-posterior left MCA territory.  Note: It is unclear whether is this a primary process or sequelae of swelling noted on MRI.  If it is to be a considered a primary process then vasculitis could be questioned, however no other region of vascular abnormality is identified to support this.    17 MRI Lumbar Spine:  1.  Old mild compression of L1  2.  Abnormal bone marrow signal and enhancement of the L4 vertebral body with mild decreased height suggesting a mild compression fracture there is fluid and this cannot exclude infection or pathologic fracture  3.  Degenerative changes as above    17 MRI Thoracic Spine:  1. Bone marrow edema and enhancement of the vertebral body of T11 with loss of body height of 30% suggesting a probable acute compression fracture.  Cannot exclude this pathologic or infection due to clinical history  2.  Degenerative changes as above greatest T7-8.  3.  Old compression fractures of L1-, T4 and T5    17 MRI Cervical Spine:   Degenerative changes as above.  Mild faint enhancement along cord probably normal physiologic with no definite focal enhancing lesion    17 MRI Brain w/ w/o contrast:  1.  Significant increase in the T2 signal in the left  temporal lobe particularly increased in the left posterior parietal lobe with some serpiginous meningeal enhancement suspicious for encephalitis.  2.  Punctate areas of diffusion signal in the left posterior parietal lobe suspicious for punctate areas of acute ischemia versus less likely shine through recommend followup    Assessment and Plan:     * Encephalopathy    61 y.o male with hx of HTN transferred from [facility] 9/26/17. Patient originally presented to OSH after witnessed seizures at home. Intubated for airway protection. 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 LP done at OSH x 2 with benign CSF besides elevated protein 77. On course of vancomycin, zosyn for aspiration pneumonia as well. LP repeated 9/19/17. HSV PCR, VDRL, HIV and arbovirus panel negative. Quantiferon gold and influenza panel positive. Followed by Dr. Reyes (ID) and Osmany (Neuro) in [place].     9/27- encephalopathy w/ aphasia, follows simple commands.  EEG showed structural dysfunction in the left hemisphere, maximum in the temporal area. No evidence for an irritative process.  9/28- cerebral angiogram performed without clear evidence of vasculitis  9/29- dexamethasone started, persistent aphasia  9/30- agitation overnight, continuing dexamethasone  10/1- agitation again overnight but family noting improved cognition/speech this am    Recommendations:  -Continue dexamethasone, empiric acyclovir   -Plan for brain biopsy with Neurosurgery 10/03/17  -Follow up CSF studies, ACE and Paraneoplastic panel pending  -Consider MR spectroscopy with perfusion--may clarify infection vs tumor pathology. Will follow up these results.  -Continue Vimpat 200 mg BID.  Repeat EEG pending--preliminary results with no seizures or focal cerebral dysfunction and mildly abnormal due to disorganization/slowing during wakefulness.      Complex partial seizure evolving to generalized seizure    -As  per above, continue vimpat      Encephalitis and encephalomyelitis, unspecified    -See section above      VTE Risk Mitigation         Ordered     enoxaparin injection 40 mg  Daily     Route:  Subcutaneous        09/27/17 0029     Medium Risk of VTE  Once      09/27/17 0029        Nuvia Garcia MD  Neurology  [facility]

## 2017-10-02 NOTE — ANESTHESIA PREPROCEDURE EVALUATION
[facility]  Anesthesia Pre-Operative Evaluation         Patient Name: Tommy Way  YOB: 1955  MRN: [id number]    SUBJECTIVE:     Pre-operative evaluation for Procedure(s) (LRB):  BIOPSY-BRAIN-STEREOTACTIC with stealth (Left)     10/02/2017    Tommy Way is a 61 y.o. male w/ a significant PMHx of recently diagnosed tonic clonic seizure disorder,  HTN and dyslipidemia admitted to [facility] after 5 witnessed seizures 9/10/17. He was initially intubated on presentation for low sats and inability to protect airway, extubated 9/14, found to be encephalopathic with concern for possible malignancy who presents for the above procedure.      LDA:        PICC Double Lumen 09/12/17 0907 (Active)   Site Assessment Clean;Dry;Intact 10/1/2017  7:15 AM   Lumen 1 Status Normal saline locked 9/29/2017  7:47 AM   Lumen 2 Status Normal saline locked 9/29/2017  7:47 AM   Dressing Type Transparent 9/29/2017  7:47 AM   Dressing Status Biopatch in place 9/29/2017  7:47 AM   Dressing Intervention Dressing reinforced 9/28/2017  7:10 AM   Dressing Change Due 10/03/17 9/28/2017  7:10 AM   Daily Line Review Performed 9/28/2017  7:10 AM   Number of days: 19       Arterial Sheath 09/28/17 0800 Right (Active)   Closure Device Angio seal 9/28/2017  8:51 AM   Number of days: 3       Prev airway: Placement Date: 09/15/17; Placement Time: 1252 (created via procedure documentation); Method of Intubation: Direct laryngoscopy; Airway Device: Other (Comment); Mask Ventilation: Easy; Intubated: Postinduction; Blade: Bryant #3; Airway Device Size: 8.0; Style: Cuffed; Cuff Inflation: Minimal occlusive pressure; Placement Verified By: Auscultation; Grade: Grade I; Complicating Factors: None; Intubation Findings: Positive EtCO2, Bilateral breath sounds, Atraumatic/Condition of teeth unchanged;  Depth of Insertion: 25; Securment: Lips; Complications: None; Insertion Attempts: 1; Removal Date: 09/19/17;   Removal Time: 1317; Removal Indication & Assessment: removed per order; Name of Person who Removed: Chel Melgoza, ALEXSANDER and Hector Stephens, RN     Drips:  None documented.       Patient Active Problem List   Diagnosis    Chronic pain of right knee    Hypertension    GERD (gastroesophageal reflux disease)    Hyperlipidemia    Inadequate dietary intake    Anemia    Complex partial seizure evolving to generalized seizure    Postictal state    Seizure    Status epilepticus, generalized convulsive    Encephalitis and encephalomyelitis, unspecified    Aspiration pneumonia of both lower lobes due to vomit    T11 vertebral fracture    Encephalopathy    Encephalitis       Review of patient's allergies indicates:  No Known Allergies    Current Inpatient Medications:   atorvastatin  20 mg Oral QHS    carvedilol  25 mg Oral BID WM    dexamethasone  4 mg Intravenous Q6H    enoxaparin  40 mg Subcutaneous Daily    lacosamide  200 mg Oral BID    lidocaine HCL 10 mg/ml (1%)  5 mL Other Once    quetiapine  25 mg Oral Once    quetiapine  50 mg Oral QHS    ramelteon  8 mg Oral Once    thiamine  100 mg Oral Daily       No current facility-administered medications on file prior to encounter.      Current Outpatient Prescriptions on File Prior to Encounter   Medication Sig Dispense Refill    aspirin (ECOTRIN) 81 MG EC tablet Take 81 mg by mouth once daily.      atorvastatin (LIPITOR) 20 MG tablet Take 20 mg by mouth every evening.      carvedilol (COREG) 25 MG tablet Take 25 mg by mouth 2 (two) times daily with meals.      cyclobenzaprine (FLEXERIL) 10 MG tablet Take 10 mg by mouth 3 (three) times daily.      irbesartan-hydrochlorothiazide (AVALIDE) 150-12.5 mg per tablet Take 1 tablet by mouth once daily.       levetiracetam (KEPPRA) 500 MG Tab Take 500 mg by mouth 2 (two) times daily.      niacin (NIASPAN) 1000 MG CR tablet Take 1,000 mg by mouth once daily.      omeprazole (PRILOSEC) 40 MG capsule  Take 40 mg by mouth once daily.         Past Surgical History:   Procedure Laterality Date    JOINT REPLACEMENT Right 04/20/2017    uneventful recovery    KNEE ARTHROPLASTY Right 02/2007    WISDOM TOOTH EXTRACTION         Social History     Social History    Marital status:      Spouse name: N/A    Number of children: N/A    Years of education: N/A     Occupational History    Not on file.     Social History Main Topics    Smoking status: Former Smoker    Smokeless tobacco: Never Used      Comment: quit 17 yrs ago    Alcohol use 16.2 oz/week     3 Glasses of wine, 24 Cans of beer per week    Drug use: No    Sexual activity: Yes     Partners: Female     Birth control/ protection: Other-see comments      Comment: same partner many years; wife is postmenopausal     Other Topics Concern    Not on file     Social History Narrative    No narrative on file       OBJECTIVE:     Vital Signs Range (Last 24H):  Temp:  [35.6 °C (96 °F)-36.6 °C (97.9 °F)]   Pulse:  [62-82]   Resp:  [16-18]   BP: (128-149)/(60-94)   SpO2:  [93 %-94 %]       CBC:   Recent Labs      10/01/17   0338  10/02/17   0514   WBC  9.54  9.17   RBC  3.07*  2.98*   HGB  10.2*  10.1*   HCT  31.5*  30.7*   PLT  404*  368*   MCV  103*  103*   MCH  33.2*  33.9*   MCHC  32.4  32.9       CMP:   Recent Labs      10/01/17   0338  10/02/17   0514   NA  144  144   K  3.5  3.4*   CL  111*  112*   CO2  23  26   BUN  8  9   CREATININE  0.9  0.9   GLU  129*  114*   MG  1.6  1.5*   PHOS  3.2  3.0   CALCIUM  8.9  8.8       INR:  No results for input(s): INR, PROTIME, APTT in the last 72 hours.    Invalid input(s): PT    Diagnostic Studies:   9/22/17 MRI lumbar spine    1.  Old mild compression of L1  2.  Abnormal bone marrow signal and enhancement of the L4 vertebral body with mild decreased height suggesting a mild compression fracture there is fluid and this cannot exclude infection or pathologic fracture  3.  Degenerative changes as above    9/22/17  MRI thoracic spine  1.. bone marrow edema and enhancement of the vertebral body of T11 with loss of body height of 30% suggesting a probable acute compression fracture.  Cannot exclude this pathologic or infection due to clinical history  2.  Degenerative changes as above greatest T7-8.  3.  Old compression fractures of L1-, T4 and T5    17 MRI cervical spine   Degenerative changes as above.  Mild faint enhancement along cord probably normal physiologic with no definite focal enhancing lesion    17 MRI brain   1.  Significant increase in the T2 signal in the left temporal lobe particularly increased in the left posterior parietal lobe with some serpiginous meningeal enhancement suspicious for encephalitis.  2.  Punctate areas of diffusion signal in the left posterior parietal lobe suspicious for punctate areas of acute ischemia versus less likely shine through recommend followup    EK/9/17   Vent. Rate : 114 BPM     Atrial Rate : 114 BPM     P-R Int : 160 ms          QRS Dur : 086 ms      QT Int : 348 ms       P-R-T Axes : 046 -07 054 degrees     QTc Int : 479 ms    Sinus tachycardia  Otherwise normal ECG  When compared with ECG of 11-AUG-2017 16:37,  Criteria for Septal infarct are no longer Present  ST no longer elevated in Anterior leads  Confirmed by Manolo Serrato MD ([id number]) on 9/10/2017 10:52:03 AM    2D ECHO:  No results found for this or any previous visit.      ASSESSMENT/PLAN:         Anesthesia Evaluation    I have reviewed the Patient Summary Reports.    I have reviewed the Nursing Notes.   I have reviewed the Medications.     Review of Systems  Anesthesia Hx:  No problems with previous Anesthesia  History of prior surgery of interest to airway management or planning: Denies Family Hx of Anesthesia complications.   Denies Personal Hx of Anesthesia complications.   Social:  Former Smoker    Hematology/Oncology:         -- Anemia:   Cardiovascular:   Hypertension    Pulmonary:   Pneumonia:  aspiration. Denies Asthma.    Renal/:  Renal/ Normal     Hepatic/GI:   GERD    Musculoskeletal:   Arthritis     Neurological:   Neuromuscular Disease, Seizures    Endocrine:  Endocrine Normal        Physical Exam  General:  Obesity    Airway/Jaw/Neck:  Airway Findings: Mouth Opening: Normal Tongue: Normal  General Airway Assessment: Adult  Mallampati: II  Jaw/Neck Findings:  Neck ROM: Normal ROM     Eyes/Ears/Nose:  EYES/EARS/NOSE FINDINGS: Normal   Dental:  Dental Findings: In tact    Chest/Lungs:  Chest/Lungs Clear    Heart/Vascular:  Heart Findings: Normal Heart murmur: negative       Mental Status:  Mental Status Findings:  Cooperative         Anesthesia Plan  Type of Anesthesia, risks & benefits discussed:  Anesthesia Type:  general  Patient's Preference:   Intra-op Monitoring Plan: standard ASA monitors  Intra-op Monitoring Plan Comments:   Post Op Pain Control Plan: per primary service following discharge from PACU  Post Op Pain Control Plan Comments:   Induction:   IV  Beta Blocker:  Patient is on a Beta-Blocker and has received one dose within the past 24 hours (No further documentation required).       Informed Consent: Patient understands risks and agrees with Anesthesia plan.  Questions answered. Anesthesia consent signed with patient.  ASA Score: 3     Day of Surgery Review of History & Physical: I have interviewed and examined the patient. I have reviewed the patient's H&P dated:  There are no significant changes.  H&P update referred to the surgeon.         Ready For Surgery From Anesthesia Perspective.     Vitals - 1 value per visit 4/7/2017 4/20/2017 4/22/2017 8/11/2017 8/12/2017   SYSTOLIC 135  123  109   DIASTOLIC 72  66  72   PULSE 97  115  96     Vitals - 1 value per visit 9/11/2017 9/21/2017 9/26/2017 9/30/2017   SYSTOLIC   125    DIASTOLIC   69    PULSE   109      Vitals - 1 value per visit 10/3/2017   SYSTOLIC 153   DIASTOLIC 94   PULSE 65

## 2017-10-02 NOTE — PT/OT/SLP PROGRESS
Occupational Therapy  Treatment    Tommy Way   MRN: [id number]   Admitting Diagnosis: Encephalopathy    OT Date of Treatment: 10/02/17   OT Start Time: 1033  OT Stop Time: 1109  OT Total Time (min): 36 min    Billable Minutes:  Therapeutic Activity 36    General Precautions: Standard, fall, seizure (delirium)  Orthopedic Precautions: N/A  Braces: N/A    Do you have any cultural, spiritual, Mandaen conflicts, given your current situation?: no issues    Subjective:  Communicated with RN prior to session.  Pt's wife and friend at bedside. Pt agreeable to session.   Pain/Comfort  Pain Rating 1: 0/10  Pain Rating Post-Intervention 1: 0/10    Objective:  Patient found with: nugent catheter, telemetry (avsys)     Functional Mobility:  Bed Mobility: Not completed; Pt sitting up in chair--- Completed task in sitting    Activities of Daily Living:   UE Dressing Level of Assistance: Requires assistance 2/2 poor sequencing and perception with snaps and ties. Demo poor bilateral coordination completion for executive functioning for tie (in bow like shoes)   Grooming: Demo ideomotor apraxia with items; difficulty sequencing for initiation and task completion     Therapeutic Activities:  -Pt alert with eyes open throughout; Pt with word salad and dysarthria noted   -Pt requires all external stimuli removed from environment   -Attempted table top task for clock draw- Pt able to form [place] with fair+ in L hand coordination for pen use. He required 2 cues for shape of clock with visual cue in room. Pt then attempted to form # and completed 14 urbina marks, unable to self correct  -Attempted reciprocal table top task for simple tic-tack-toe: Pt demo general understanding of concept with cue for turns, able to complete game 1/2 trials  -Pt/family edu on sensory and self modulation techniques   -Communication board updated    AM-PAC 6 CLICK ADL   How much help from another person does this patient currently need?   1 = Unable,  "Total/Dependent Assistance  2 = A lot, Maximum/Moderate Assistance  3 = A little, Minimum/Contact Guard/Supervision  4 = None, Modified [place]/Independent    Putting on and taking off regular lower body clothing? : 2  Bathing (including washing, rinsing, drying)?: 2  Toileting, which includes using toilet, bedpan, or urinal? : 2  Putting on and taking off regular upper body clothing?: 2  Taking care of personal grooming such as brushing teeth?: 3  Eating meals?: 3  Total Score: 14     AM-PAC Raw Score CMS "G-Code Modifier Level of Impairment Assistance   6 % Total / Unable   7 - 8 CM 80 - 100% Maximal Assist   9-13 CL 60 - 80% Moderate Assist   14 - 19 CK 40 - 60% Moderate Assist   20 - 22 CJ 20 - 40% Minimal Assist   23 CI 1-20% SBA / CGA   24 CH 0% Independent/ Mod I       Patient left up in chair with all lines intact, call button in reach, RN notified and family present    ASSESSMENT:  Tommy Way is a 61 y.o. male with a medical diagnosis of Encephalopathy and presents with T11 fx. Pt s/p seizure at home with aspiration pneumonia noted. Pt demo cognitive deficits with attention (sustained/divided), problem solving, impaired perception, sequencing, safety awareness, impulsivity, disorientation and overall decline in PLOF with cognitive task. Pt displays decline in occupational performance with functional task with decrease in ability to carry out appropriate interactions, sequencing of ADLs/self care and performance of prior tasks. He demo word salad at times with speech. All of his pervious roles, routines, and self completion of functional daily tasks and activities have been affected by his current status. He will cont to benefit from skilled OT services at rehab level.     Rehab identified problem list/impairments: Rehab identified problem list/impairments: weakness, impaired endurance, impaired self care skills, impaired functional mobilty, impaired balance, gait instability, decreased " safety awareness, impaired cognition, impaired coordination    Rehab potential is good.    Activity tolerance: Fair    Discharge recommendations: Discharge Facility/Level Of Care Needs: rehabilitation facility     Barriers to discharge: Barriers to Discharge: Decreased caregiver support    Equipment recommendations: bath bench, wheelchair     GOALS:    Occupational Therapy Goals        Problem: Occupational Therapy Goal    Goal Priority Disciplines Outcome Interventions   Occupational Therapy Goal     OT, PT/OT Ongoing (interventions implemented as appropriate)    Description:  Goals to be met by 10/13/2017:    1.  Brush teeth with min cues for the steps  2.  Don robe with min a and min cues  3.  Transfer bed-chair with min a and minimal cues for sequencing/safety  4.  Toilet self with mod a from BSC  5.  BSC transfer with min a and minimal cues for sequencing/safety                       Plan:  Patient to be seen 5 x/week to address the above listed problems via self-care/home management, therapeutic activities, therapeutic exercises, neuromuscular re-education, cognitive retraining  Plan of Care expires: 10/29/17  Plan of Care reviewed with: patient, spouse    PABLO Hernandez  10/02/2017

## 2017-10-02 NOTE — SUBJECTIVE & OBJECTIVE
Interval History: patient did have confusion and agitation over night and this AM; as per wife, she has noticed some improvement with steroids; steroids likely causing more agitation however will keep them on for another day; continuous EEG still going; plan for MRI spectroscopy tomorrow;   - acyclovir has been stopped; planning for brain biopsy on tuesday    Review of Systems   Constitutional: Negative for activity change and appetite change.   HENT: Negative for drooling and facial swelling.    Eyes: Negative for discharge and itching.   Respiratory: Negative for cough, shortness of breath and wheezing.    Cardiovascular: Negative for chest pain and palpitations.   Gastrointestinal: Negative for abdominal pain and nausea.   Genitourinary: Negative for difficulty urinating and dysuria.   Skin: Negative for color change and pallor.   Neurological: Negative for dizziness and numbness.   Psychiatric/Behavioral: Negative for behavioral problems and confusion.     Objective:     Vital Signs (Most Recent):  Temp: 96.7 °F (35.9 °C) (10/01/17 1702)  Pulse: 82 (10/01/17 1702)  Resp: 18 (10/01/17 1702)  BP: 128/60 (10/01/17 1702)  SpO2: (!) 94 % (10/01/17 1702) Vital Signs (24h Range):  Temp:  [96 °F (35.6 °C)-98 °F (36.7 °C)] 96.7 °F (35.9 °C)  Pulse:  [62-94] 82  Resp:  [16-18] 18  SpO2:  [93 %-94 %] 94 %  BP: (128-149)/(60-94) 128/60     Weight: 120.7 kg (266 lb)  Body mass index is 30.74 kg/m².    Intake/Output Summary (Last 24 hours) at 10/01/17 1955  Last data filed at 10/01/17 1805   Gross per 24 hour   Intake                0 ml   Output              950 ml   Net             -950 ml      Physical Exam   Constitutional: He is oriented to person, place, and time. He appears well-developed and well-nourished.   HENT:   Head: Normocephalic and atraumatic.   Eyes: Conjunctivae are normal. Pupils are equal, round, and reactive to light.   Neck: Normal range of motion. No thyromegaly present.   Cardiovascular: Normal rate,  regular rhythm and normal heart sounds.    Pulmonary/Chest: Effort normal and breath sounds normal.   Abdominal: Soft. He exhibits no distension. There is no tenderness.   Neurological: He is alert and oriented to person, place, and time. Coordination normal.   wernickes aphasia   Skin: No rash noted. No erythema.       MELD-Na score: 8 at 8/12/2017  4:50 AM  MELD score: 8 at 8/12/2017  4:50 AM  Calculated from:  Serum Creatinine: 0.80 mg/dL (Rounded to 1) at 8/12/2017  4:50 AM  Serum Sodium: 139 mmol/L (Rounded to 137) at 8/12/2017  4:50 AM  Total Bilirubin: 1.2 mg/dL at 8/12/2017  4:50 AM  INR(ratio): 1.1 at 8/11/2017  4:10 PM  Age: 61 years    Significant Labs:  CBC:    Recent Labs  Lab 10/01/17  0338   WBC 9.54   HGB 10.2*   HCT 31.5*   *     CMP:    Recent Labs  Lab 10/01/17  0338      K 3.5   *   CO2 23   *   BUN 8   CREATININE 0.9   CALCIUM 8.9   ANIONGAP 10   EGFRNONAA >60.0     PTINR:  No results for input(s): INR in the last 48 hours.    Significant Procedures:   Dobutamine Stress Test with Color Flow: No results found for this or any previous visit.

## 2017-10-03 NOTE — BRIEF OP NOTE
Neurosurgery Brief Operative Note    SUMMARY     Surgery Date: 10/3/2017     Surgeon(s) and Role:     * Kevon Varner MD - Primary    Assistant: Selma Goldstein PA-C. No qualified resident was available for this case.     Pre-op Diagnosis:  Encephalitis and encephalomyelitis, unspecified [G04.90]    Post-op Diagnosis:  Encephalitis and encephalomyelitis, unspecified [G04.90]    Procedure(s) (LRB):  BIOPSY-BRAIN - WITH STEALTH (Left)    Anesthesia: General endotracheal anesthesia    Findings/Key Components:  Abnormal brain.     Estimated Blood Loss: Minimal

## 2017-10-03 NOTE — H&P
[facility]  Neurocritical Care  H    Admit Date: 9/26/2017  Service Date: 10/03/2017  Length of Stay: 7    Subjective:     Chief Complaint: Encephalopathy    History of Present Illness: Patient is a transfer from [facility] who presented for tonic-clonic seizure and AMS on 9/10 per OSH. CTH and follow-up MRI demonstrated L posterior temporal and occipital lobe lesion. 2x LP with analysis were negative for HSV, VZV, syphillis and HHV-6 were all negative and differential was unremarkable. Thorough autoimmune and infectious testing was also completed and returned without result. Patient is transferred to [facility] for further diagnostic work-up per NSGY and neurology. Non conclusive left temporal edema workup. Possible vasculitis VS infection VS neoplasm. Will FU MRI spectroscopy. Pt is now POD0 s/p brain bx with NSGY. Pt admitted to [facility] for higher level of care.     Hospital Course:  10/2: Pt admitted to [facility] s/p brain bx with NSGY. SBP<160, seizure ppx    Past Medical History:   Diagnosis Date    Acid reflux     Arthritis     Elevated cholesterol     Hypertension      Past Surgical History:   Procedure Laterality Date    JOINT REPLACEMENT Right 04/20/2017    uneventful recovery    KNEE ARTHROPLASTY Right 02/2007    WISDOM TOOTH EXTRACTION       Family History   Problem Relation Age of Onset    Cancer Mother     Diabetes Father      Social History   Substance Use Topics    Smoking status: Former Smoker    Smokeless tobacco: Never Used      Comment: quit 17 yrs ago    Alcohol use 16.2 oz/week     3 Glasses of wine, 24 Cans of beer per week     Review of patient's allergies indicates:   Allergen Reactions    Keppra [levetiracetam] Other (See Comments)     How trouble speaking , agitation     Review of Systems:   Constitutional: Denies fevers or chills.  Pulmonary: Denies shortness of breath or cough.  Cardiology: Denies chest pain or palpitations.  GI: Denies abdominal pain or  constipation.  Neurologic: Denies new weakness,  headache, or paresthesias.    Vitals:   Temp: 97.7 °F (36.5 °C) (10/03/17 0553)  Pulse: 65 (10/03/17 0553)  Resp: 16 (10/03/17 0553)  BP: (!) 153/94 (10/03/17 0553)  SpO2: 99 % (10/03/17 0553)    Temp:  [96.6 °F (35.9 °C)-98 °F (36.7 °C)] 97.7 °F (36.5 °C)  Pulse:  [63-76] 65  Resp:  [16-18] 16  SpO2:  [94 %-99 %] 99 %  BP: (143-175)/(79-99) 153/94        10/02 0701 - 10/03 0700  In: 700 [P.O.:700]  Out: 1600 [Urine:1600]     Examination:   Constitutional: Well-nourished and -developed. No apparent distress.   Eyes: Conjunctiva clear, anicteric. Lids no lesions.  Head/Ears/Nose/Mouth/Throat/Neck: Moist mucous membranes. External ears, nose atraumatic.   Cardiovascular: Regular rhythm. No murmurs. No leg edema.  Respiratory: Comfortable respirations. Clear to auscultation.  Gastrointestinal: No hernia. Soft, nondistended, nontender. + bowel sounds.    Neurologic:  -GCS [id number]  -Alert. Oriented to person. +Aphasic. Follows commands. At baseline per wife.   -Cranial nerves II-XII grossly intact   -Motor 5/5 BUE BLE  -Sensation to light touch intact throughout       Medications:   Continuous  sodium chloride 0.9% Last Rate: 50 mL/hr at 10/03/17 0918   Scheduled  atorvastatin 20 mg QHS   carvedilol 25 mg BID WM   dexamethasone 4 mg Q6H   lacosamide 200 mg BID   pantoprazole 40 mg Daily   polyethylene glycol 17 g Daily   quetiapine 50 mg QHS   ramelteon 8 mg Once   thiamine 100 mg Daily   PRN  acetaminophen 650 mg Q6H PRN   bisacodyl 10 mg Daily PRN   dextrose 50% 12.5 g PRN   dextrose 50% 25 g PRN   gadobutrol 10 mL ONCE PRN   glucagon (human recombinant) 1 mg PRN   glucose 16 g PRN   glucose 24 g PRN   hydrALAZINE 10 mg Q6H PRN   HYDROmorphone 0.2 mg Q5 Min PRN   labetalol 10 mg Q6H PRN   ondansetron 8 mg Q8H PRN   ondansetron 4 mg Daily PRN   promethazine (PHENERGAN) IVPB 6.25 mg Q10 Min PRN   sodium chloride 0.9% 3 mL PRN      Today I independently reviewed pertinent  medications, lines/drains/airways, imaging, cardiology, lab results, microbiology results, notably: meds reviewed, airway secure, HDS, VSS, labs stable    Assessment/Plan:     Cardiac/Vascular   Hyperlipidemia    -atorvastatin 20 mg qhs        Hypertension    -SBP<160  -labetalol, hydralazine prn  -carvidilol 25 mg BID         ID   Encephalitis and encephalomyelitis, unspecified    -POD0 s/p brain bx per NSGY  -Non conclusive left temporal edema workup. Possible vasculitis VS infection VS neoplasm VS encephalomyelitis. 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

## 2017-10-03 NOTE — PLAN OF CARE
Problem: Patient Care Overview  Goal: Plan of Care Review  POC reviewed with pt and family at 1400. Pt's family verbalized understanding. Questions and concerns addressed. No acute events today. Pt progressing toward goals. Will continue to monitor. See flowsheets for full assessment and VS info.

## 2017-10-03 NOTE — PLAN OF CARE
Problem: Patient Care Overview  Goal: Plan of Care Review  Outcome: Ongoing (interventions implemented as appropriate)  POC reviewed with patient and spouse at bedside, understanding verbalized by spouse, no evidence of learning from patient. MRI completed without issue, patient pleasant but remains confused. Only oriented to self, rambling incomprehensible speech. Able to follow commands, able to stand and pivot with RN assist x1. Very unsteady walking short distances. NPO since 0000. Spouse at bedside is very supportive and actively participates in care, replaces patients condom cath and gave patient full bath 10/2. Verbalizes understanding of patients procedure in AM. VSS, all safety precautions maintained, will continue to monitor.

## 2017-10-03 NOTE — SUBJECTIVE & OBJECTIVE
Subjective:     Interval History:   Brain biopsy done today, patient seemed to tolerate it well.  Patient's wife notes that he seems slightly more oriented today and she is pleasantly surprised as she thought the brain biopsy would cause him to be more confused for a day or two.  Still with intermittent aphasia, but able to understand her most of the time and some remote memory intact.  Otherwise discussed plan to continue steroids, follow up imaging, and follow up brain biopsy results.  Some headache s/p procedure but RN in Neuro ICU notified and getting prn medication.    Current Neurological Medications: Acyclovir, Dexamethasone    Current Facility-Administered Medications   Medication Dose Route Frequency Provider Last Rate Last Dose    0.9%  NaCl infusion   Intravenous Continuous Gabino Rich MD 50 mL/hr at 10/03/17 1300      acetaminophen tablet 650 mg  650 mg Oral Q6H PRN Khoi Hinojosa PA-C   650 mg at 10/03/17 1051    atorvastatin tablet 20 mg  20 mg Oral QHS Maren Li MD   20 mg at 10/01/17 2054    bisacodyl suppository 10 mg  10 mg Rectal Daily PRN Gabino Rich MD        carvedilol tablet 25 mg  25 mg Oral BID WM Maren Li MD   25 mg at 10/03/17 0959    dexamethasone injection 4 mg  4 mg Intravenous Q6H Gregor Abbott MD   4 mg at 10/03/17 1311    dextrose 50% injection 12.5 g  12.5 g Intravenous PRN Maren Li MD        dextrose 50% injection 25 g  25 g Intravenous PRN Maren Li MD        gadobutrol 10 mL  10 mL Intravenous ONCE PRN Gregor Abbott MD        glucagon (human recombinant) injection 1 mg  1 mg Intramuscular PRN Maren Li MD        glucose chewable tablet 16 g  16 g Oral PRN Maren Li MD        glucose chewable tablet 24 g  24 g Oral PRN Maren Li MD        hydrALAZINE injection 10 mg  10 mg Intravenous Q6H PRN Gabino Rich MD   10 mg at 10/03/17 1003    HYDROmorphone injection 0.2 mg  0.2 mg Intravenous Q5 Min PRN Peyman Holder,  MD        labetalol injection 10 mg  10 mg Intravenous Q6H PRN Gbaino Rich MD   10 mg at 10/03/17 1405    lacosamide tablet 200 mg  200 mg Oral BID Maren Li MD   200 mg at 10/03/17 0959    ondansetron disintegrating tablet 8 mg  8 mg Oral Q8H PRN Gabino Rich MD        ondansetron injection 4 mg  4 mg Intravenous Daily PRN Peyman Holder MD        pantoprazole injection 40 mg  40 mg Intravenous Daily Gabino Rich MD   40 mg at 10/03/17 0959    polyethylene glycol packet 17 g  17 g Oral Daily Gabino Rich MD        promethazine (PHENERGAN) 6.25 mg in dextrose 5 % 50 mL IVPB  6.25 mg Intravenous Q10 Min PRN Peyman Holder MD        quetiapine tablet 50 mg  50 mg Oral QHS Gregor Abbott MD   50 mg at 10/02/17 2123    ramelteon tablet 8 mg  8 mg Oral Once Mishel Vargas PA-C        sodium chloride 0.9% flush 3 mL  3 mL Intravenous PRN Peyman Holder MD        thiamine tablet 100 mg  100 mg Oral Daily Maren Li MD   100 mg at 10/03/17 0959     Review of Systems   Unable to perform ROS: Mental status change     Objective:     Vital Signs (Most Recent):  Temp: 97.8 °F (36.6 °C) (10/03/17 1105)  Pulse: 68 (10/03/17 1300)  Resp: 16 (10/03/17 1300)  BP: (!) 141/75 (10/03/17 1300)  SpO2: 98 % (10/03/17 1300) Vital Signs (24h Range):  Temp:  [97.4 °F (36.3 °C)-98 °F (36.7 °C)] 97.8 °F (36.6 °C)  Pulse:  [61-76] 68  Resp:  [16-41] 16  SpO2:  [94 %-100 %] 98 %  BP: (141-179)/() 141/75     Weight: 120.7 kg (266 lb)  Body mass index is 30.74 kg/m².    Physical Exam  General:  Well-developed, well-nourished, nad  HEENT:  NCAT, PERRLA, EOMI, oropharygneal membranes non-erythematous/without exudate  Neck:  Supple, no palpable lad, no nuchal rigidity  Resp:  Symmetric expansion, no increased wob  CVS:  No LE edema, peripheral pulses 2+ (radial, dorsalis pedis)  GI:  Abd soft, non-distended, non-tender to palpation     Neurologic Exam:  Mental Status:  Awake, alert, oriented to  self and wife.  Less aphasia on exam today than previously, some trouble naming objects and paraphasias.  Responds appropriately to most questions, able to follow complex commands.  Cranial Nerves:  VFs intact on blinking to threat bilaterally.  PERRLA, EOMI.  Facial movements and sensation intact, symmetric.  Palate raises symmetrically, tongue protrudes midline.  SCM/Trapezius 5/5.  Motor:  Normal bulk and tone.  Strength diffusely 5/5, symmetric.  Sensory:  Intact to light touch, temperature at all extremities.    Reflexes:  Biceps, brachioradialis, patellar 2+ bilaterally.  No ankle clonus.  Downgoing toes bilaterally.  Coordination:  FTN, BRENDA, HTS with no ataxia, no dysmetria, no dysdiadochokinesia.  Gait:  Deferred 2/2 EEG placed.    Significant Labs:     Recent Labs  Lab 10/03/17  0443   WBC 8.08   RBC 3.08*   HGB 10.5*   HCT 31.7*   *   *   MCH 34.1*   MCHC 33.1       Recent Labs  Lab 10/03/17  0443   CALCIUM 8.9      K 3.9   CO2 25      BUN 13   CREATININE 0.9     Significant Imaging:   10/03/17 MRI Spectroscopy, Brain Stealth:  MRI brain demonstrates stable left posterior temporal/parietal edema with associated sulcal vascular prominence and swelling of the left temporal lobe when compared to MR examination 09/25/2017, however progressed when compared to 09/10/2017. Findings suggest encephalitis (such as herpes), other inflammatory etiology, or seizure-related edema.  Tumor is less likely, however cannot be excluded.  Time pattern are does not follow a course that would suggest ischemia/infarction.  Stealth protocol MRI performed for purposes of procedural localization.  MR spectroscopy of the area of interest in the left temporoparietal lobe is overall nonspecific and nondiagnostic.    09/28/17 IR angiogram carotid cerebral bilateral inc arch:  POSTOPERATIVE DIAGNOSIS(ES):    Narrowing / beating appearance of small distal inferior-posterior left MCA territory.  Note: It is  unclear whether is this a primary process or sequelae of swelling noted on MRI.  If it is to be a considered a primary process then vasculitis could be questioned, however no other region of vascular abnormality is identified to support this.    09/22/17 MRI Lumbar Spine:  1.  Old mild compression of L1  2.  Abnormal bone marrow signal and enhancement of the L4 vertebral body with mild decreased height suggesting a mild compression fracture there is fluid and this cannot exclude infection or pathologic fracture  3.  Degenerative changes as above    09/22/17 MRI Thoracic Spine:  1. Bone marrow edema and enhancement of the vertebral body of T11 with loss of body height of 30% suggesting a probable acute compression fracture.  Cannot exclude this pathologic or infection due to clinical history  2.  Degenerative changes as above greatest T7-8.  3.  Old compression fractures of L1-, T4 and T5    09/22/17 MRI Cervical Spine:   Degenerative changes as above.  Mild faint enhancement along cord probably normal physiologic with no definite focal enhancing lesion    09/22/17 MRI Brain w/ w/o contrast:  1.  Significant increase in the T2 signal in the left temporal lobe particularly increased in the left posterior parietal lobe with some serpiginous meningeal enhancement suspicious for encephalitis.  2.  Punctate areas of diffusion signal in the left posterior parietal lobe suspicious for punctate areas of acute ischemia versus less likely shine through recommend followup

## 2017-10-03 NOTE — ASSESSMENT & PLAN NOTE
61M who presents from OSH with presenting sx on 9/10 of AMS and tonic-clonic seizure for further work-up.    Neuro  -- s/p diagnostic cerebral angiogram: narrowing of left distal MCA arteries possibly d/t vasculitis or secondary to cerebral edema  -- Continue dex4q6  -- Neurology arranged lumbar puncture for infectious workup per ID and paraneoplastic panel   -CSF culture NG, viral culture neg, ACE and Paraneoplastic panel pending   -Continue to follow neuro reccs  -- Underwent bx today   -Pain control with PO/IV tylenol   -CTH in AM for post-op eval   -re-start Vimpat for seizure ppx  -- May continue scheduled seroquel for aggitation  -- Continue ramelteon    CVS:  -- SBP strict <160 on Cardene (hydralazine and labetalol PRN for breakthrough)    Pulm:  -Aggressive pulmonary management    Heme/onc/ID  -- CBC/CMP/Coags QD    F/E/GI:  -- Start PPI  -- Laxatives  -- Progress diet as tolerated    Ppx:  -- PPI  -- DVT ppx (TEDs/SCDS; resume enoxaparin 48h post-op)    Dispo:  -- PT/OT/OOB  -- Speech eval and treat

## 2017-10-03 NOTE — PROGRESS NOTES
Pt arrived to room, VSS, patient oriented to person - aphasic and cooperative/following commands. Wife, Cassie, with patient and brought all belongings to room. BG = 117, temp = 97.9 oral. CHRISTOS screen performed, patient passed. NCC team notified of patient's arrival.

## 2017-10-03 NOTE — TRANSFER OF CARE
"Anesthesia Transfer of Care Note    Patient: Tommy Way    Procedure(s) Performed: Procedure(s) (LRB):  BIOPSY-BRAIN - WITH STEALTH (Left)    Patient location: ICU    Anesthesia Type: general    Transport from OR: Transported from OR on room air with adequate spontaneous ventilation    Post pain: adequate analgesia    Post assessment: no apparent anesthetic complications and tolerated procedure well    Post vital signs: stable    Level of consciousness: awake and alert    Nausea/Vomiting: no nausea/vomiting    Complications: none    Transfer of care protocol was followed      Last vitals:   Visit Vitals  BP (!) 153/94 (BP Location: Left arm, Patient Position: Lying)   Pulse 65   Temp 36.5 °C (97.7 °F) (Oral)   Resp 16   Ht 6' 6" (1.981 m)   Wt 120.7 kg (266 lb)   SpO2 99%   BMI 30.74 kg/m²     "

## 2017-10-03 NOTE — ASSESSMENT & PLAN NOTE
-POD0 s/p brain bx per NSGY  -Non conclusive left temporal edema workup. Possible vasculitis VS infection VS neoplasm VS encephalomyelitis. 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

## 2017-10-03 NOTE — PROGRESS NOTES
Ok to remove R PICC line per verbal order from Ashish RITCHIE on NCC team. Also advanced patient's diet to adult regular, NCC team aware. Will continue to monitor.

## 2017-10-03 NOTE — ASSESSMENT & PLAN NOTE
"61 y.o male with hx of HTN transferred from [facility] 9/26/17. Patient originally presented to OSH after witnessed seizures at home. Intubated for airway protection. 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 LP done at OSH x 2 with benign CSF besides elevated protein 77. On course of vancomycin, zosyn for aspiration pneumonia as well. LP repeated 9/19/17. HSV PCR, VDRL, HIV and arbovirus panel negative. Quantiferon gold and influenza panel positive. Followed by Dr. Reyes (ID) and Osmany (Neuro) in [place].     Recommendations:  -Continue dexamethasone, empiric acyclovir   -Plan for brain biopsy with Neurosurgery 10/03/17--completed  -MR Spectroscopy and Stealth non-specific, concerning more for infectious or seizure-related edema than tumor but non-diagnostic.  -Follow up CSF studies, ACE and Paraneoplastic panel pending  -Continue Vimpat 200 mg po bid.  EEG "mildly abnormal due to disorganization and slowing during wakefulness; relatively normal sleep   architecture implies a lesser degree of encephalopathy; no evidence of seizures or a focal cerebral dysfunction."  "

## 2017-10-03 NOTE — PLAN OF CARE
Problem: Physical Therapy Goal  Goal: Physical Therapy Goal  Goals to be met by: 7 days (10/6)    Patient will increase functional independence with mobility by performin. Supine to sit with Set-up [place]  2. Sit to supine with Set-up [place]  3. Sit to stand transfer with Contact Guard Assistance-met   Revised: sit to stand transfer with Supervision   4. Gait  x 100 feet with Contact Guard Assistance using Rolling Walker.   5. Stand for 5 minutes with Stand-by Assistance using Rolling Walker  6. Lower extremity exercise program x30 reps per handout, with independence to improve muscular strength and endurance.       Outcome: Outcome(s) achieved Date Met: 10/03/17  D/C PT 2* ICU transfer after brain biopsy with stealth. Re-consult when medically appropriate.     CHELSEA VALLEJO, PT  10/3/2017

## 2017-10-03 NOTE — PT/OT/SLP PROGRESS
Speech Language Pathology  Discharge Summary      Tommy Melendezn   [id number]    MRN: [id number]    Patient not seen today secondary to Other (Comment) (Pt transferred to ICU s/p brain biopsy. SLP orders d/c'd.). Please re-consult as warranted when medically stable.    FILIPE Ying, CCC-SLP  [phone]  10/3/2017

## 2017-10-03 NOTE — PROGRESS NOTES
Certification of Assistant at Surgery       Surgery Date: 10/3/2017     Participating Surgeons:  Surgeon(s) and Role:     * Kevon Varner MD - Primary    Procedures:  Procedure(s) (LRB):  BIOPSY-BRAIN - WITH STEALTH (Left)    Assistant Surgeon's Certification of Necessity:  I understand that section 1842 (b) (6) (d) of the Social Security Act generally prohibits Medicare Part B reasonable charge payment for the services of assistants at surgery in teaching hospitals when qualified residents are available to furnish such services. I certify that the services for which payment is claimed were medically necessary, and that no qualified resident was available to perform the services. I further understand that these services are subject to post-payment review by the Medicare carrier.      Brenda Goldstein PA-C    10/03/2017  12:55 PM

## 2017-10-03 NOTE — HOSPITAL COURSE
10/3: Pt admitted to [facility] s/p brain bx of L posterior temperol and occipitol lobe lesion with NSGY. SBP<160, seizure ppx. CTH post biopsy trace volume  postoperative pneumocephalus and fluid underlying craniotomy site. Stable parenchymal edema within the left temporoparietal region. No significant postoperative hemorrhage present.  10/5 ID reconsulted  recs appreciated. Started vancomycin, cefepime and flagyl. Pan cultured prior to ABX.  CRP, Sed rate.   10/6 Added AFB culture and smear to brain tissue in lab quatiferon gold sent.. Patient restless agitated. Trying to get out of bed. precedex started. Also not sleeping at night will try ramelteon Qhs. If transfers to floor needs sitter.  10.07: POD 5  S/P L-lesion bx. Apparently had infectious characteristics. WBC jumped.. Prior pro calcitonin was normal. Clinical exam improving.  Slept well during the night. Delirium resolved.

## 2017-10-03 NOTE — PROGRESS NOTES
[facility]  Neurology  Progress Note    Patient Name: Tommy Way  MRN: [id number]  Admission Date: 9/26/2017  Hospital Length of Stay: 7 days  Code Status: Full Code   Attending Provider: Mauro Dunham MD  Primary Care Physician: Adan Torres MD   Principal Problem:Encephalopathy    Subjective:     Interval History:   Brain biopsy done today, patient seemed to tolerate it well.  Patient's wife notes that he seems slightly more oriented today and she is pleasantly surprised as she thought the brain biopsy would cause him to be more confused for a day or two.  Still with intermittent aphasia, but able to understand her most of the time and some remote memory intact.  Otherwise discussed plan to continue steroids, follow up imaging, and follow up brain biopsy results.  Some headache s/p procedure but RN in Neuro ICU notified and getting prn medication.    Current Neurological Medications: Acyclovir, Dexamethasone    Current Facility-Administered Medications   Medication Dose Route Frequency Provider Last Rate Last Dose    0.9%  NaCl infusion   Intravenous Continuous Gabino Rich MD 50 mL/hr at 10/03/17 1300      acetaminophen tablet 650 mg  650 mg Oral Q6H PRN Khoi Hinojosa PA-C   650 mg at 10/03/17 1051    atorvastatin tablet 20 mg  20 mg Oral QHS Maren Li MD   20 mg at 10/01/17 2054    bisacodyl suppository 10 mg  10 mg Rectal Daily PRN Gabino Rich MD        carvedilol tablet 25 mg  25 mg Oral BID WM Maren Li MD   25 mg at 10/03/17 0959    dexamethasone injection 4 mg  4 mg Intravenous Q6H Gregor Abbott MD   4 mg at 10/03/17 1311    dextrose 50% injection 12.5 g  12.5 g Intravenous PRN Maren Li MD        dextrose 50% injection 25 g  25 g Intravenous PRN Maren Li MD        gadobutrol 10 mL  10 mL Intravenous ONCE PRN Gregor Abbott MD        glucagon (human recombinant) injection 1 mg  1 mg Intramuscular PRN Maren Li MD        glucose  chewable tablet 16 g  16 g Oral PRN Maren Li MD        glucose chewable tablet 24 g  24 g Oral PRN Maren Li MD        hydrALAZINE injection 10 mg  10 mg Intravenous Q6H PRN Gabino Rich MD   10 mg at 10/03/17 1003    HYDROmorphone injection 0.2 mg  0.2 mg Intravenous Q5 Min PRN Peyman Holder MD        labetalol injection 10 mg  10 mg Intravenous Q6H PRN Gabino Rich MD   10 mg at 10/03/17 1405    lacosamide tablet 200 mg  200 mg Oral BID Maren Li MD   200 mg at 10/03/17 0959    ondansetron disintegrating tablet 8 mg  8 mg Oral Q8H PRN Gabino Rich MD        ondansetron injection 4 mg  4 mg Intravenous Daily PRN Peyman Holder MD        pantoprazole injection 40 mg  40 mg Intravenous Daily Gabino Rich MD   40 mg at 10/03/17 0959    polyethylene glycol packet 17 g  17 g Oral Daily Gabino Rich MD        promethazine (PHENERGAN) 6.25 mg in dextrose 5 % 50 mL IVPB  6.25 mg Intravenous Q10 Min PRN Peyman Holder MD        quetiapine tablet 50 mg  50 mg Oral QHS Gregor Abbott MD   50 mg at 10/02/17 2123    ramelteon tablet 8 mg  8 mg Oral Once Mishel Vargas PA-C        sodium chloride 0.9% flush 3 mL  3 mL Intravenous PRN Peyman Holder MD        thiamine tablet 100 mg  100 mg Oral Daily Maren Li MD   100 mg at 10/03/17 0959     Review of Systems   Unable to perform ROS: Mental status change     Objective:     Vital Signs (Most Recent):  Temp: 97.8 °F (36.6 °C) (10/03/17 1105)  Pulse: 68 (10/03/17 1300)  Resp: 16 (10/03/17 1300)  BP: (!) 141/75 (10/03/17 1300)  SpO2: 98 % (10/03/17 1300) Vital Signs (24h Range):  Temp:  [97.4 °F (36.3 °C)-98 °F (36.7 °C)] 97.8 °F (36.6 °C)  Pulse:  [61-76] 68  Resp:  [16-41] 16  SpO2:  [94 %-100 %] 98 %  BP: (141-179)/() 141/75     Weight: 120.7 kg (266 lb)  Body mass index is 30.74 kg/m².    Physical Exam  General:  Well-developed, well-nourished, nad  HEENT:  NCAT, PERRLA, EOMI, oropharygneal membranes  non-erythematous/without exudate  Neck:  Supple, no palpable lad, no nuchal rigidity  Resp:  Symmetric expansion, no increased wob  CVS:  No LE edema, peripheral pulses 2+ (radial, dorsalis pedis)  GI:  Abd soft, non-distended, non-tender to palpation     Neurologic Exam:  Mental Status:  Awake, alert, oriented to self and wife.  Less aphasia on exam today than previously, some trouble naming objects and paraphasias.  Responds appropriately to most questions, able to follow complex commands.  Cranial Nerves:  VFs intact on blinking to threat bilaterally.  PERRLA, EOMI.  Facial movements and sensation intact, symmetric.  Palate raises symmetrically, tongue protrudes midline.  SCM/Trapezius 5/5.  Motor:  Normal bulk and tone.  Strength diffusely 5/5, symmetric.  Sensory:  Intact to light touch, temperature at all extremities.    Reflexes:  Biceps, brachioradialis, patellar 2+ bilaterally.  No ankle clonus.  Downgoing toes bilaterally.  Coordination:  FTN, BRENDA, HTS with no ataxia, no dysmetria, no dysdiadochokinesia.  Gait:  Deferred 2/2 EEG placed.    Significant Labs:     Recent Labs  Lab 10/03/17  0443   WBC 8.08   RBC 3.08*   HGB 10.5*   HCT 31.7*   *   *   MCH 34.1*   MCHC 33.1       Recent Labs  Lab 10/03/17  0443   CALCIUM 8.9      K 3.9   CO2 25      BUN 13   CREATININE 0.9     Significant Imaging:   10/03/17 MRI Spectroscopy, Brain Stealth:  MRI brain demonstrates stable left posterior temporal/parietal edema with associated sulcal vascular prominence and swelling of the left temporal lobe when compared to MR examination 09/25/2017, however progressed when compared to 09/10/2017. Findings suggest encephalitis (such as herpes), other inflammatory etiology, or seizure-related edema.  Tumor is less likely, however cannot be excluded.  Time pattern are does not follow a course that would suggest ischemia/infarction.  Stealth protocol MRI performed for purposes of procedural  localization.  MR spectroscopy of the area of interest in the left temporoparietal lobe is overall nonspecific and nondiagnostic.    09/28/17 IR angiogram carotid cerebral bilateral inc arch:  POSTOPERATIVE DIAGNOSIS(ES):    Narrowing / beating appearance of small distal inferior-posterior left MCA territory.  Note: It is unclear whether is this a primary process or sequelae of swelling noted on MRI.  If it is to be a considered a primary process then vasculitis could be questioned, however no other region of vascular abnormality is identified to support this.    09/22/17 MRI Lumbar Spine:  1.  Old mild compression of L1  2.  Abnormal bone marrow signal and enhancement of the L4 vertebral body with mild decreased height suggesting a mild compression fracture there is fluid and this cannot exclude infection or pathologic fracture  3.  Degenerative changes as above    09/22/17 MRI Thoracic Spine:  1. Bone marrow edema and enhancement of the vertebral body of T11 with loss of body height of 30% suggesting a probable acute compression fracture.  Cannot exclude this pathologic or infection due to clinical history  2.  Degenerative changes as above greatest T7-8.  3.  Old compression fractures of L1-, T4 and T5    09/22/17 MRI Cervical Spine:   Degenerative changes as above.  Mild faint enhancement along cord probably normal physiologic with no definite focal enhancing lesion    09/22/17 MRI Brain w/ w/o contrast:  1.  Significant increase in the T2 signal in the left temporal lobe particularly increased in the left posterior parietal lobe with some serpiginous meningeal enhancement suspicious for encephalitis.  2.  Punctate areas of diffusion signal in the left posterior parietal lobe suspicious for punctate areas of acute ischemia versus less likely shine through recommend followup    Assessment and Plan:     * Encephalopathy    61 y.o male with hx of HTN transferred from [facility] 9/26/17. Patient originally presented  "to OSH after witnessed seizures at home. Intubated for airway protection. 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 LP done at OSH x 2 with benign CSF besides elevated protein 77. On course of vancomycin, zosyn for aspiration pneumonia as well. LP repeated 9/19/17. HSV PCR, VDRL, HIV and arbovirus panel negative. Quantiferon gold and influenza panel positive. Followed by Dr. Reyes (ID) and Osmany (Neuro) in [place].     Recommendations:  -Continue dexamethasone, empiric acyclovir   -Plan for brain biopsy with Neurosurgery 10/03/17--completed  -MR Spectroscopy and Stealth non-specific, concerning more for infectious or seizure-related edema than tumor but non-diagnostic.  -Follow up CSF studies, ACE and Paraneoplastic panel pending  -Continue Vimpat 200 mg po bid.  EEG "mildly abnormal due to disorganization and slowing during wakefulness; relatively normal sleep   architecture implies a lesser degree of encephalopathy; no evidence of seizures or a focal cerebral dysfunction."      Complex partial seizure evolving to generalized seizure    -As per above, continue vimpat    EEG INTERPRETATION:  Mildly abnormal long-term EEG due to disorganization and   slowing during wakefulness.  The presence of relatively normal sleep   architecture implies a lesser degree of encephalopathy, however.  There was no   evidence of seizures or a focal cerebral dysfunction     VTE Risk Mitigation         Ordered     Medium Risk of VTE  Once      10/03/17 0835     Place JHONY hose  Until discontinued      10/03/17 0835     Place sequential compression device  Until discontinued      10/03/17 0835        Nuvia Garcia MD  Neurology  [facility]  "

## 2017-10-03 NOTE — PT/OT/SLP DISCHARGE
Occupational Therapy Discharge Summary    Tommy Way  MRN: [id number]   Encephalopathy   Patient Discharged from acute Occupational Therapy on 10/3/2017.  Please refer to prior OT note dated on 10/2/2017 for functional status.     Assessment:   Patient was discharge unexpectedly.  Information required to complete and accurate discharge summary is unknown.  Refer to therapy initial evaluation and last progress note for initial and most recent functional status and goal achievement.  Recommendations made may be found in medical record.  GOALS:    Occupational Therapy Goals     Not on file          Multidisciplinary Problems (Resolved)        Problem: Occupational Therapy Goal    Goal Priority Disciplines Outcome Interventions   Occupational Therapy Goal   (Resolved)     OT, PT/OT Outcome(s) achieved    Description:  Goals to be met by 10/13/2017:    1.  Brush teeth with min cues for the steps  2.  Don robe with min a and min cues  3.  Transfer bed-chair with min a and minimal cues for sequencing/safety  4.  Toilet self with mod a from BSC  5.  BSC transfer with min a and minimal cues for sequencing/safety                     Reasons for Discontinuation of Therapy Services  Transfer to alternate level of care (NCC); Pt s/p brain biopsy with stealth with t/f to NCC from step down. Will require new orders.      Plan:  Patient will require new orders when appropriate for re-eval and treatment.     PABLO Hernandez 10/3/2017

## 2017-10-03 NOTE — PROGRESS NOTES
[facility]  Neurosurgery  Progress Note    Subjective:     History of Present Illness: Patient is a transfer from [facility] who presented for tonic-clonic seizure and AMS on 9/10 per OSH. CTH and follow-up MRI demonstrated L posterior temporal and occipital lobe lesion. 2x LP with analysis were negative for HSV, VZV, syphillis and HHV-6 were all negative and differential was unremarkable. Thorough autoimmune and infectious testing was also completed and returned without result. Patient is transferred to [facility] for further diagnostic work-up per NSGY and neurology. He remains altered on mentation with otherwise non-focal exam.    Post-Op Info:  Procedure(s) (LRB):  BIOPSY-BRAIN - WITH STEALTH (Left)   Day of Surgery     Interval History: NAEON. Patient underwent Brain bx today and was admitted to [facility] for post-op surveillance for 24h     Medications:  Continuous Infusions:   sodium chloride 0.9% 50 mL/hr at 10/03/17 0918     Scheduled Meds:   atorvastatin  20 mg Oral QHS    carvedilol  25 mg Oral BID WM    dexamethasone  4 mg Intravenous Q6H    lacosamide  200 mg Oral BID    pantoprazole  40 mg Intravenous Daily    polyethylene glycol  17 g Oral Daily    quetiapine  50 mg Oral QHS    ramelteon  8 mg Oral Once    thiamine  100 mg Oral Daily     PRN Meds:acetaminophen, bisacodyl, dextrose 50%, dextrose 50%, gadobutrol, glucagon (human recombinant), glucose, glucose, hydrALAZINE, HYDROmorphone, labetalol, ondansetron, ondansetron, promethazine (PHENERGAN) IVPB, sodium chloride 0.9%     Review of Systems    Objective:     Weight: 120.7 kg (266 lb)  Body mass index is 30.74 kg/m².  Vital Signs (Most Recent):  Temp: 97.7 °F (36.5 °C) (10/03/17 0930)  Pulse: 71 (10/03/17 1030)  Resp: (!) 25 (10/03/17 1030)  BP: (!) 153/93 (10/03/17 1030)  SpO2: 100 % (10/03/17 1030) Vital Signs (24h Range):  Temp:  [96.6 °F (35.9 °C)-98 °F (36.7 °C)] 97.7 °F (36.5 °C)  Pulse:  [62-76] 71  Resp:  [16-41]  25  SpO2:  [94 %-100 %] 100 %  BP: (143-179)/() 153/93         Arterial Sheath 09/28/17 0800 Right (Active)   Closure Device Angio seal 9/28/2017  8:51 AM       Neurosurgery Physical Exam    Constitutional: well-developed, no apparent distress  Abdomen soft, non-tender  Cardiovascular: Normal rate & rhythm  Respiratory: non-distressed on room air   Neurological  GCS 15  AOx1, incoherent speech at times  Speech/Language: expressive aphasia and receptive aphasia   Head: normocephalic, atraumatic   PERRL, EOMI, Visual fields intact.   Follows some simple commands  Facial expression symmetric   Tongue midline  Moves all extremities with good strength  Tone: no spasticity, no rigidity, no clonus, no atrophy  Pronator Drift: neg   DTRs 2/4 in all extremities   Sensation to Light touch: intact  Coordination: Finger-to-nose intact    Significant Labs:    Recent Labs  Lab 10/02/17  0514 10/03/17  0443   * 129*    142   K 3.4* 3.9   * 109   CO2 26 25   BUN 9 13   CREATININE 0.9 0.9   CALCIUM 8.8 8.9   MG 1.5* 2.2       Recent Labs  Lab 10/02/17  0514 10/03/17  0443   WBC 9.17 8.08   HGB 10.1* 10.5*   HCT 30.7* 31.7*   * 356*       Recent Labs  Lab 10/02/17  1950   INR 1.1   APTT <21.0     Microbiology Results (last 7 days)     Procedure Component Value Units Date/Time    CSF culture [[id number]] Collected:  09/28/17 1718    Order Status:  Completed Specimen:  CSF (Spinal Fluid) from CSF Tap, Tube 4 Updated:  10/03/17 0712     CSF CULTURE No Growth     Gram Stain Result Rare WBC's      No organisms seen        All pertinent labs from the last 24 hours have been reviewed.    Significant Diagnostics:  MRI spect and MRI stealth 10/2:   MRI brain demonstrates stable left posterior temporal/parietal edema with associated sulcal vascular prominence and swelling of the left temporal lobe when compared to MR examination 09/25/2017, however progressed when compared to 09/10/2017.  Findings suggest  encephalitis (such as herpes), other inflammatory etiology, or seizure.  Tumor is less likely, however cannot be excluded.  Time pattern are does not follow a course that would suggest ischemia/infarction. Stealth protocol MRI performed for purposes of procedural localization.    MR spectroscopy of the area of interest in the left temporoparietal lobe is overall nondiagnostic.     Assessment/Plan:     Encephalitis and encephalomyelitis, unspecified    61M who presents from OSH with presenting sx on 9/10 of AMS and tonic-clonic seizure for further work-up.    Neuro  -- s/p diagnostic cerebral angiogram: narrowing of left distal MCA arteries possibly d/t vasculitis or secondary to cerebral edema  -- Continue dex4q6  -- Neurology arranged lumbar puncture for infectious workup per ID and paraneoplastic panel   -CSF culture NG, viral culture neg, ACE and Paraneoplastic panel pending   -Continue to follow neuro reccs  -- Underwent bx today   -Pain control with PO/IV tylenol   -CTH in AM for post-op eval   -re-start Vimpat for seizure ppx  -- May continue scheduled seroquel for aggitation  -- Continue ramelteon    CVS:  -- SBP strict <160 on Cardene (hydralazine and labetalol PRN for breakthrough)    Pulm:  -Aggressive pulmonary management    Heme/onc/ID  -- CBC/CMP/Coags QD    F/E/GI:  -- Start PPI  -- Laxatives  -- Progress diet as tolerated    Ppx:  -- PPI  -- DVT ppx (TEDs/SCDS; resume enoxaparin 48h post-op)    Dispo:  -- PT/OT/OOB  -- Speech eval and treat            Gabino Rich MD  Neurosurgery  [facility]

## 2017-10-03 NOTE — SUBJECTIVE & OBJECTIVE
Interval History: NAEON. Patient underwent Brain bx today and was admitted to [facility] for post-op surveillance for 24h     Medications:  Continuous Infusions:   sodium chloride 0.9% 50 mL/hr at 10/03/17 0918     Scheduled Meds:   atorvastatin  20 mg Oral QHS    carvedilol  25 mg Oral BID WM    dexamethasone  4 mg Intravenous Q6H    lacosamide  200 mg Oral BID    pantoprazole  40 mg Intravenous Daily    polyethylene glycol  17 g Oral Daily    quetiapine  50 mg Oral QHS    ramelteon  8 mg Oral Once    thiamine  100 mg Oral Daily     PRN Meds:acetaminophen, bisacodyl, dextrose 50%, dextrose 50%, gadobutrol, glucagon (human recombinant), glucose, glucose, hydrALAZINE, HYDROmorphone, labetalol, ondansetron, ondansetron, promethazine (PHENERGAN) IVPB, sodium chloride 0.9%     Review of Systems    Objective:     Weight: 120.7 kg (266 lb)  Body mass index is 30.74 kg/m².  Vital Signs (Most Recent):  Temp: 97.7 °F (36.5 °C) (10/03/17 0930)  Pulse: 71 (10/03/17 1030)  Resp: (!) 25 (10/03/17 1030)  BP: (!) 153/93 (10/03/17 1030)  SpO2: 100 % (10/03/17 1030) Vital Signs (24h Range):  Temp:  [96.6 °F (35.9 °C)-98 °F (36.7 °C)] 97.7 °F (36.5 °C)  Pulse:  [62-76] 71  Resp:  [16-41] 25  SpO2:  [94 %-100 %] 100 %  BP: (143-179)/() 153/93         Arterial Sheath 09/28/17 0800 Right (Active)   Closure Device Angio seal 9/28/2017  8:51 AM       Neurosurgery Physical Exam    Constitutional: well-developed, no apparent distress  Abdomen soft, non-tender  Cardiovascular: Normal rate & rhythm  Respiratory: non-distressed on room air   Neurological  GCS 15  AOx1, incoherent speech at times  Speech/Language: expressive aphasia and receptive aphasia   Head: normocephalic, atraumatic   PERRL, EOMI, Visual fields intact.   Follows some simple commands  Facial expression symmetric   Tongue midline  Moves all extremities with good strength  Tone: no spasticity, no rigidity, no clonus, no atrophy  Pronator Drift: neg   DTRs 2/4 in all  extremities   Sensation to Light touch: intact  Coordination: Finger-to-nose intact    Significant Labs:    Recent Labs  Lab 10/02/17  0514 10/03/17  0443   * 129*    142   K 3.4* 3.9   * 109   CO2 26 25   BUN 9 13   CREATININE 0.9 0.9   CALCIUM 8.8 8.9   MG 1.5* 2.2       Recent Labs  Lab 10/02/17  0514 10/03/17  0443   WBC 9.17 8.08   HGB 10.1* 10.5*   HCT 30.7* 31.7*   * 356*       Recent Labs  Lab 10/02/17  1950   INR 1.1   APTT <21.0     Microbiology Results (last 7 days)     Procedure Component Value Units Date/Time    CSF culture [[id number]] Collected:  09/28/17 1718    Order Status:  Completed Specimen:  CSF (Spinal Fluid) from CSF Tap, Tube 4 Updated:  10/03/17 0712     CSF CULTURE No Growth     Gram Stain Result Rare WBC's      No organisms seen        All pertinent labs from the last 24 hours have been reviewed.    Significant Diagnostics:  MRI spect and MRI stealth 10/2:   MRI brain demonstrates stable left posterior temporal/parietal edema with associated sulcal vascular prominence and swelling of the left temporal lobe when compared to MR examination 09/25/2017, however progressed when compared to 09/10/2017.  Findings suggest encephalitis (such as herpes), other inflammatory etiology, or seizure.  Tumor is less likely, however cannot be excluded.  Time pattern are does not follow a course that would suggest ischemia/infarction. Stealth protocol MRI performed for purposes of procedural localization.    MR spectroscopy of the area of interest in the left temporoparietal lobe is overall nondiagnostic.

## 2017-10-03 NOTE — OP NOTE
DATE OF PROCEDURE: 10/3/2017     PREOPERATIVE DIAGNOSES:   Encephalitis and encephalomyelitis, unspecified [G04.90]    POSTOPERATIVE DIAGNOSES:   Encephalitis and encephalomyelitis, unspecified [G04.90]    Surgeon(s) and Role:     * Kevon Varner MD - Primary    Assistant: Selma Goldstein PA-C (There was no available qualified resident for this case.     PROCEDURES PERFORMED:   - Navigation craniotomy.   - Dura mater biopsy  - Open supratentorial brain biopsy.   - Microscopic technique  - Duraplasty with duragen.    ANESTHESIA: General endotracheal anesthesia.    ESTIMATED BLOOD LOSS: Minimal    IVF: 700 cc crystalloids.     CLINICAL HISTORY AND INDICATIONS FOR SURGERY: Tommy Way is a 61 y.o. male who developed signs and symptoms of aphasia and new onset seizures. Preoperative imaging demonstrated left temporal parietal diffuse edema, and enhancing blood vessels, suggesting cerebritis versus vasculitis. Given the progression of the symptoms, surgery (brain biopsy) was offered and after discussing all the attendant risks, benefits, and potential complication of surgery. Family  wanted to proceed with surgery and consented to surgery.     OPERATIVE PROCEDURE: The patient was brought into the Operating Room,   identified and general anesthesia was induced using endotracheal intubation. We   set up for intraoperative neuromonitoring and baseline parameters   were obtained. IV lines, nugent catheter and an arterial line were placed. The patient's head was positioned with the head turn to the right side. The navigation system was set up and the patient's anatomy was merged with the navigation system. The area of the incision was marked using the navigation system, to do a temporoparietal craniotomy over the area of the enhancing vessels/brain. The hair over the area of the incision was shaved, then the skin was prepped and draped under sterile conditions.     Intraoperative time-out was carried. Preoperative  antibiotics were given. The area of the incision was infiltrated using 10 ml of lidocaine with epinephrine.    A number 10 blade knife was used to create the incision. Fascia and temporalis muscle were opened using a Bovie cautery. Periosteal elevator was used to elevate periosteum. Weitlaner retractor was placed to retract soft tissue. A Bur hole was created using a high speed drill. The high speed craniotomy was used to turn a craniotomy flap. The dura was  from the bone using a Penfield 3 elevator. Dura was opened using a #15 blade and Metzenbaum. Dura was retracted using 4-0 Nurolons. The area of the biopsy (enhancing vessels and brain) was identified using the neuronavigation system.  The brain had musky appearance, with a small petechial hemorrhages.    Using microscopic technique with an 11 blade knife, a piece of parietal gyrus was incised, and removed en bloc for biopsy.  At this point, the area over the enhancing vessel was identified with the navigation system.  There was a small cortical vein, and an artery over a sulci that had a musky appearance.  A #11 blade was used to incise these area, along with brain cortex to be send for pathology.    The areas of the biopsies where formulated using bipolar to create hemostasis.  A small [place] of Gelfoam was placed over the biopsy brain.      A piece of the dura mater, containing a small meningeal artery, was also cut and sent for pathology.     Dura was approximated using 4-0 Nurolons. A sheath of DuraGen was placed over the dura. The bone was plated using a CGTrader system with a bur hole cover, a square, and 4 mm screws. Galea was closed in layers using 3-0 Vicryls. The skin was closed using using staples and the wound was dressed with antibiotic ointment and telfa.     All instrument and needle counts were confirmed x3. The patient was awaken from anesthesia and was in baseline neurological condition. The patient was transferred to the Neuro-ICU in  stable condition.     Dr. Sweeney was present during entire procedure.

## 2017-10-03 NOTE — H&P (VIEW-ONLY)
[facility]  Neurosurgery  Consult Note    Consults  Subjective:     Chief Complaint/Reason for Admission: AMS with brain lesion    History of Present Illness: Patient is a transfer from [facility] who presented for tonic-clonic seizure and AMS on 9/10 per OSH. CTH and follow-up MRI demonstrated L posterior temporal and occipital lobe lesion. 2x LP with analysis were negative for HSV, VZV, syphillis and HHV-6 were all negative and differential was unremarkable. Thorough autoimmune and infectious testing was also completed and returned without result. Patient is [place] [facility] [place] diagnostic work-up per NSGY and neurology. He remains altered on mentation with otherwise non-focal exam.    Prescriptions Prior to Admission   Medication Sig Dispense Refill Last Dose    aspirin (ECOTRIN) 81 MG EC tablet Take 81 mg by mouth once daily.   9/9/2017    atorvastatin (LIPITOR) 20 MG tablet Take 20 mg by mouth every evening.   9/9/2017    carvedilol (COREG) 25 MG tablet Take 25 mg by mouth 2 (two) times daily with meals.   9/9/2017    cyclobenzaprine (FLEXERIL) 10 MG tablet Take 10 mg by mouth 3 (three) times daily.   Unknown at Unknown time    irbesartan-hydrochlorothiazide (AVALIDE) 150-12.5 mg per tablet Take 1 tablet by mouth once daily.    9/9/2017    levetiracetam (KEPPRA) 500 MG Tab Take 500 mg by mouth 2 (two) times daily.       niacin (NIASPAN) 1000 MG CR tablet Take 1,000 mg by mouth once daily.   9/9/2017    omeprazole (PRILOSEC) 40 MG capsule Take 40 mg by mouth once daily.   9/9/2017       Review of patient's allergies indicates:  No Known Allergies    Past Medical History:   Diagnosis Date    Acid reflux     Arthritis     Elevated cholesterol     Hypertension      Past Surgical History:   Procedure Laterality Date    JOINT REPLACEMENT Right 04/20/2017    uneventful recovery    KNEE ARTHROPLASTY Right 02/2007    WISDOM TOOTH EXTRACTION       Family History      Problem Relation (Age of Onset)    Cancer Mother    Diabetes Father        Social History Main Topics    Smoking status: Former Smoker    Smokeless tobacco: Never Used      Comment: quit 17 yrs ago    Alcohol use 16.2 oz/week     3 Glasses of wine, 24 Cans of beer per week    Drug use: No    Sexual activity: Yes     Partners: Female     Birth control/ protection: Other-see comments      Comment: same partner many years; wife is postmenopausal     Review of Systems   Unable to perform ROS: Mental status change     Objective:     Weight: 121.8 kg (268 lb 8 oz)  Body mass index is 31.03 kg/m².  Vital Signs (Most Recent):  Temp: 98.4 °F (36.9 °C) (09/27/17 0740)  Pulse: 87 (09/27/17 0740)  Resp: 20 (09/27/17 0740)  BP: (!) 153/93 (09/27/17 0740)  SpO2: 96 % (09/27/17 0740) Vital Signs (24h Range):  Temp:  [97.2 °F (36.2 °C)-98.5 °F (36.9 °C)] 98.4 °F (36.9 °C)  Pulse:  [] 87  Resp:  [18-20] 20  SpO2:  [96 %-97 %] 96 %  BP: (116-153)/(69-94) 153/93                 Oxygen Concentration (%):  [94-97] 97         Physical Exam:  Nursing note and vitals reviewed.    Constitutional: He appears well-developed and well-nourished.     Eyes: Pupils are equal, round, and reactive to light. Conjunctivae and EOM are normal.     Cardiovascular: Normal rate, regular rhythm and normal pulses.     Abdominal: Soft. Bowel sounds are normal.     Psych/Behavior: He is alert. He is oriented to person, place, and time. He has a normal mood and affect.     Neurosurgery Exam:  General: AOx1, GCS15, confused speech during exam  CNII-XII: Fine exam demonstrated no CN deficits; PERRL; No pronator  Extremities: 5/5 motor in all distributions; sensorium intact; DTRs 2+; coordination intact    Significant Labs:    Recent Labs  Lab 09/26/17  0540 09/27/17  0436    99    140   K 3.8 4.1    106   CO2 26 26   BUN 7* 6   CREATININE 1.00 0.9   CALCIUM 8.9 9.3       Recent Labs  Lab 09/26/17  0540 09/27/17  0436   WBC 8.70 7.53    HGB 10.1* 10.1*   HCT 29.8* 31.3*   * 401*     No results for input(s): LABPT, INR, APTT in the last 48 hours.  Microbiology Results (last 7 days)     ** No results found for the last 168 hours. **        ABGs: No results for input(s): PH, PCO2, PO2, HCO3, POCSATURATED, BE in the last 48 hours.  Cardiac markers: No results for input(s): CKMB, CPKMB, TROPONINT, TROPONINI, MYOGLOBIN in the last 48 hours.  CMP:   Recent Labs  Lab 09/26/17  0540 09/27/17  0436    99   CALCIUM 8.9 9.3   ALBUMIN 3.2* 2.5*   PROT 5.9* 6.9    140   K 3.8 4.1   CO2 26 26    106   BUN 7* 6   CREATININE 1.00 0.9   ALKPHOS 146* 158*   ALT 54* 27   AST 21 18   BILITOT 0.7 0.4     CRP: No results for input(s): CRP in the last 48 hours.  ESR: No results for input(s): POCESR, ERYTHROCYTES in the last 48 hours.  LFTs:   Recent Labs  Lab 09/26/17  0540 09/27/17  0436   ALT 54* 27   AST 21 18   ALKPHOS 146* 158*   BILITOT 0.7 0.4   PROT 5.9* 6.9   ALBUMIN 3.2* 2.5*     Procalcitonin: No results for input(s): PROCAL in the last 48 hours.    Significant Diagnostics:  No new diagnostic results in the last 24h    Assessment/Plan:     Encephalitis and encephalomyelitis, unspecified    61M who presents from OSH with presenting sx on 9/10 of AMS and tonic-clonic seizure for further work-up.    --Admitted to medicine  --Patient discussed at radiology conference this AM   -To undergo cerebral angiogram today  --Possible future brain bx pending IR results  --Consult neurology for recommendations concerning further workup  --Continue NPO  --Continue MNGT per IM            Thank you for your consult. I will follow-up with patient. Please contact us if you have any additional questions.    Gabino Rich MD  Neurosurgery  [facility]

## 2017-10-03 NOTE — ASSESSMENT & PLAN NOTE
-As per above, continue vimpat    EEG INTERPRETATION:  Mildly abnormal long-term EEG due to disorganization and   slowing during wakefulness.  The presence of relatively normal sleep   architecture implies a lesser degree of encephalopathy, however.  There was no   evidence of seizures or a focal cerebral dysfunction

## 2017-10-03 NOTE — HPI
Patient is a transfer from [facility] who presented for tonic-clonic seizure and AMS on 9/10 per OSH. CTH and follow-up MRI demonstrated L posterior temporal and occipital lobe lesion. 2x LP with analysis were negative for HSV, VZV, syphillis and HHV-6 were all negative and differential was unremarkable. Thorough autoimmune and infectious testing was also completed and returned without result. Patient is transferred to [facility] [place] for further diagnostic work-up per NSGY and neurology. Non conclusive left temporal edema workup. Possible vasculitis VS infection VS neoplasm. Will FU MRI spectroscopy. Pt is now POD0 s/p brain bx with NSGY. Pt admitted to [facility] for higher level of care.

## 2017-10-03 NOTE — INTERVAL H&P NOTE
The patient has been examined and the H&P has been reviewed:    I concur with the findings and no changes have occurred since H&P was written.    Anesthesia/Surgery risks, benefits and alternative options discussed and understood by patient/family.          Active Hospital Problems    Diagnosis  POA    *Encephalopathy [G93.40]  Yes    Hypomagnesemia [E83.42]  Yes    Encephalitis [G04.90]  Yes    T11 vertebral fracture [S22.089A]  Yes    Aspiration pneumonia of both lower lobes due to vomit [J69.0]  Yes    Encephalitis and encephalomyelitis, unspecified [G04.90]  Yes    Hypokalemia [E87.6]  Yes    Status epilepticus, generalized convulsive [G40.901]  Yes    Complex partial seizure evolving to generalized seizure [G40.209]  Yes    Hypertension [I10]  Yes    Hyperlipidemia [E78.5]  Yes      Resolved Hospital Problems    Diagnosis Date Resolved POA   No resolved problems to display.

## 2017-10-03 NOTE — PT/OT/SLP DISCHARGE
Physical Therapy Discharge Summary    [address]my [address]  MRN: [id number]   Encephalopathy   Patient Discharged from acute Physical Therapy on 10/03/2017.  Please refer to prior PT noted date on 2017 for functional status.     Assessment:   Patient has not met goals.  GOALS:    Physical Therapy Goals     Not on file          Multidisciplinary Problems (Resolved)        Problem: Physical Therapy Goal    Goal Priority Disciplines Outcome Goal Variances Interventions   Physical Therapy Goal   (Resolved)     PT/OT, PT Outcome(s) achieved     Description:  Goals to be met by: 7 days (10/6)    Patient will increase functional independence with mobility by performin. Supine to sit with Set-up [place]  2. Sit to supine with Set-up [place]  3. Sit to stand transfer with Contact Guard Assistance-met   Revised: sit to stand transfer with Supervision   4. Gait  x 100 feet with Contact Guard Assistance using Rolling Walker.   5. Stand for 5 minutes with Stand-by Assistance using Rolling Walker  6. Lower extremity exercise program x30 reps per handout, with independence to improve muscular strength and endurance.                      Reasons for Discontinuation of Therapy Services  Therapist determines that the patient will no longer benefit from therapy services.      Plan:  Patient Discharged to: transfer to ICU s/p brain biopsy with stealth . Re-consult when medically appropriate.     CHELSEA VALLEJO, PT  10/3/2017

## 2017-10-04 NOTE — PROGRESS NOTES
[facility]  Neurocritical Care  Progress Note    Admit Date: 9/26/2017  Service Date: 10/04/2017  Length of Stay: 8    Subjective:     Chief Complaint: Encephalopathy    History of Present Illness: Patient is a transfer from [facility] who presented for tonic-clonic seizure and AMS on 9/10 per OSH. CTH and follow-up MRI demonstrated L posterior temporal and occipital lobe lesion. 2x LP with analysis were negative for HSV, VZV, syphillis and HHV-6 were all negative and differential was unremarkable. Thorough autoimmune and infectious testing was also completed and returned without result. [place] [facility] per NSGY and neurology. Non conclusive left temporal edema workup. Possible vasculitis VS infection VS neoplasm. Will FU MRI spectroscopy. Pt is now POD0 s/p brain bx with NSGY. Pt admitted to [facility] for higher level of care.     Hospital Course: 10/3: Pt admitted to [facility] s/p brain bx of L posterior temperol and occipitol lobe lesion with NSGY. SBP<160, seizure ppx. CTH post biopsy trace volume  postoperative pneumocephalus and fluid underlying craniotomy site. Stable parenchymal edema within the left temporoparietal region. No significant postoperative hemorrhage present.      Interval History: BP elevated overnight. Required prn antihypertensives one dose po labetalol 100mg given early a.m. BP now <160.    Review of Systems:   Constitutional: Denies fevers or chills.  Pulmonary: Denies shortness of breath or cough.  Cardiology: Denies chest pain or palpitations.  GI: Denies abdominal pain or constipation.  Neurologic: Denies new weakness,  headache, or paresthesias.      Vitals:   Temp: 98.9 °F (37.2 °C) (10/04/17 1105)  Pulse: 79 (10/04/17 1200)  Resp: 20 (10/04/17 1200)  BP: (!) 142/90 (10/04/17 1200)  SpO2: 100 % (10/04/17 1200)    Temp:  [97.4 °F (36.3 °C)-98.9 °F (37.2 °C)] 98.9 °F (37.2 °C)  Pulse:  [58-89] 79  Resp:  [8-46] 20  SpO2:  [93  %-100 %] 100 %  BP: (122-198)/() 142/90              10/03 0701 - 10/04 0700  In: 1385 [I.V.:1385]  Out: 2925 [Urine:2925]     Examination:   Constitutional: Well-nourished and -developed. No apparent distress.   Eyes: Conjunctiva clear, anicteric. Lids no lesions.  Head/Ears/Nose/Mouth/Throat/Neck: Moist mucous membranes. External ears, nose atraumatic.   Cardiovascular: Regular rhythm. No murmurs. No leg edema.  Respiratory: Comfortable respirations. Clear to auscultation.  Gastrointestinal: No hernia. Soft, nondistended, nontender. + bowel sounds.    Neurologic:  -GCS [id number]  -Alert. Oriented to person. +aphasia. Follows commands.  -Cranial nerves II-XII grossly intact PERRL 3+  -Motor 5/5 BUE/BLE  -Sensation bilat intact to light touch      Medications:   Continuous  sodium chloride 0.9% Last Rate: 50 mL/hr at 10/04/17 1200   Scheduled  atorvastatin 20 mg QHS   carvedilol 25 mg BID WM   dexamethasone 4 mg Q6H   lacosamide 200 mg BID   pantoprazole 40 mg Daily   polyethylene glycol 17 g Daily   quetiapine 50 mg QHS   ramelteon 8 mg Once   thiamine 100 mg Daily   PRN  acetaminophen 650 mg Q6H PRN   bisacodyl 10 mg Daily PRN   dextrose 50% 12.5 g PRN   dextrose 50% 25 g PRN   gadobutrol 10 mL ONCE PRN   glucagon (human recombinant) 1 mg PRN   glucose 16 g PRN   glucose 24 g PRN   hydrALAZINE 10 mg Q4H PRN   HYDROmorphone 0.2 mg Q5 Min PRN   insulin aspart 1-10 Units QID (AC + HS) PRN   labetalol 10 mg Q4H PRN   magnesium oxide 800 mg PRN   magnesium oxide 800 mg PRN   ondansetron 8 mg Q8H PRN   ondansetron 4 mg Daily PRN   potassium chloride 10% 40 mEq PRN   potassium chloride 10% 40 mEq PRN   potassium chloride 10% 60 mEq PRN   potassium, sodium phosphates 2 packet PRN   potassium, sodium phosphates 2 packet PRN   potassium, sodium phosphates 2 packet PRN   promethazine (PHENERGAN) IVPB 6.25 mg Q10 Min PRN   sodium chloride 0.9% 3 mL PRN      Today I independently reviewed pertinent medications,  lines/drains/airways, imaging, lab results, microbiology results,    Assessment/Plan:     Cardiac/Vascular   Hyperlipidemia    -atorvastatin 20 mg qhs        Hypertension    - goal SBP<160  -labetalol, hydralazine prn  -carvidilol 25 mg BID         ID   Encephalitis and encephalomyelitis, unspecified    -POD1 s/p brain bx per NSGY  -Non conclusive left temporal edema workup. Possible vasculitis VS infection VS neoplasm VS encephalomyelitis. 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

## 2017-10-04 NOTE — PLAN OF CARE
Problem: Patient Care Overview  Goal: Plan of Care Review  Outcome: Ongoing (interventions implemented as appropriate)  POC reviewed with pt at 0400. Pt unable to verbalize understanding. Questions and concerns addressed. No acute events overnight. Pt progressing toward goals. Pt CT scan complete. Will continue to monitor. See flowsheets for full assessment and VS info

## 2017-10-04 NOTE — PROGRESS NOTES
[facility]  Neurosurgery  Progress Note    Subjective:     History of Present Illness: Patient is a transfer from [facility] who presented for tonic-clonic seizure and AMS on 9/10 per OSH. CTH and follow-up MRI demonstrated L posterior temporal and occipital lobe lesion. 2x LP with analysis were negative for HSV, VZV, syphillis and HHV-6 were all negative and differential was unremarkable. Thorough autoimmune and infectious testing was also completed and returned without result. Patient is transferred to [facility] [place] [place] for further diagnostic work-up per NSGY and neurology. He remains altered on mentation with otherwise non-focal exam.    Post-Op Info:  Procedure(s) (LRB):  BIOPSY-BRAIN - WITH STEALTH (Left)   1 Day Post-Op     Interval History: NAEON. Patient will step down to the floor today    Medications:  Continuous Infusions:   sodium chloride 0.9% 50 mL/hr at 10/04/17 1105     Scheduled Meds:   atorvastatin  20 mg Oral QHS    carvedilol  25 mg Oral BID WM    dexamethasone  4 mg Intravenous Q6H    lacosamide  200 mg Oral BID    pantoprazole  40 mg Intravenous Daily    polyethylene glycol  17 g Oral Daily    quetiapine  50 mg Oral QHS    ramelteon  8 mg Oral Once    thiamine  100 mg Oral Daily     PRN Meds:acetaminophen, bisacodyl, dextrose 50%, dextrose 50%, gadobutrol, glucagon (human recombinant), glucose, glucose, hydrALAZINE, HYDROmorphone, insulin aspart, labetalol, magnesium oxide, magnesium oxide, ondansetron, ondansetron, potassium chloride 10%, potassium chloride 10%, potassium chloride 10%, potassium, sodium phosphates, potassium, sodium phosphates, potassium, sodium phosphates, promethazine (PHENERGAN) IVPB, sodium chloride 0.9%     Review of Systems    Objective:     Weight: 120.7 kg (266 lb)  Body mass index is 30.74 kg/m².  Vital Signs (Most Recent):  Temp: 97.4 °F (36.3 °C) (10/04/17 0705)  Pulse: 79 (10/04/17 1100)  Resp: (!) 41 (10/04/17 1100)  BP: 132/80 (10/04/17  1100)  SpO2: 99 % (10/04/17 1100) Vital Signs (24h Range):  Temp:  [97.4 °F (36.3 °C)-98.6 °F (37 °C)] 97.4 °F (36.3 °C)  Pulse:  [58-89] 79  Resp:  [8-46] 41  SpO2:  [93 %-100 %] 99 %  BP: (122-198)/() 132/80         Arterial Sheath 09/28/17 0800 Right (Active)   Closure Device Angio seal 9/28/2017  8:51 AM       Neurosurgery Physical Exam    Constitutional: well-developed, no apparent distress  Abdomen soft, non-tender  Cardiovascular: Normal rate & rhythm  Respiratory: non-distressed on room air   Neurological  GCS 15  AOx1, incoherent speech at times  Speech/Language: expressive aphasia and receptive aphasia   Head: normocephalic, atraumatic   PERRL, EOMI, Visual fields intact.   Follows some simple commands  Facial expression symmetric   Tongue midline  Moves all extremities with good strength  Tone: no spasticity, no rigidity, no clonus, no atrophy  Pronator Drift: neg   DTRs 2/4 in all extremities   Sensation to Light touch: intact  Coordination: Finger-to-nose intact    Significant Labs:    Recent Labs  Lab 10/03/17  0443 10/04/17  0149   * 152*  152*  152*  152*    142  142  142  142   K 3.9 4.1  4.1  4.1  4.1    110  110  110  110   CO2 25 22*  22*  22*  22*   BUN 13 15  15  15  15   CREATININE 0.9 1.0  1.0  1.0  1.0   CALCIUM 8.9 8.7  8.7  8.7  8.7   MG 2.2 1.6  1.6  1.6       Recent Labs  Lab 10/03/17  0443 10/04/17  0149   WBC 8.08 12.35  12.35  12.35  12.35   HGB 10.5* 11.1*  11.1*  11.1*  11.1*   HCT 31.7* 34.0*  34.0*  34.0*  34.0*   * 326  326  326  326       Recent Labs  Lab 10/02/17  1950   INR 1.1   APTT <21.0     Microbiology Results (last 7 days)     Procedure Component Value Units Date/Time    CSF culture [[id number]] Collected:  09/28/17 1718    Order Status:  Completed Specimen:  CSF (Spinal Fluid) from CSF Tap, Tube 4 Updated:  10/03/17 0712     CSF CULTURE No Growth     Gram Stain Result Rare WBC's      No organisms seen         All pertinent labs from the last 24 hours have been reviewed.    Significant Diagnostics:  CTH 10/4:    Postoperative changes with interval left parietal craniotomy for open biopsy of the left temporoparietal region. Trace volume of postoperative pneumocephalus and fluid is noted underlying the craniotomy site. No evidence of significant intracranial hemorrhage, midline shift, or hydrocephalus. Stable appearing nonspecific abnormal region of parenchymal edema within the left temporoparietal region, similar in extent to prior MRI examination.    Assessment/Plan:     Encephalitis and encephalomyelitis, unspecified    61M who presents from OSH with presenting sx on 9/10 of AMS and tonic-clonic seizure for further work-up.    Neuro  -- cerebral angiogram 9/28: narrowing of left distal MCA arteries possibly d/t vasculitis or secondary to cerebral edema  -- Continue dex4q6  -- Neurology arranged lumbar puncture for infectious workup per ID and paraneoplastic panel   -CSF culture NG, viral culture neg, ACE and Paraneoplastic panel pending   -Continue to follow neuro reccs  -- Underwent bx 10/3   -Pain control with PO/IV tylenol   -CTH in AM for post-op eval   -Continue Vimpat for seizure ppx   -Follow-up final Path when avaliable  -- May continue scheduled seroquel for aggitation  -- Continue ramelteon  -- Patient stable for TTF; return to primary service    CVS:  -- SBP strict <160 on Cardene (hydralazine and labetalol PRN for breakthrough)    Pulm:  -Aggressive pulmonary management    Heme/onc/ID  -- CBC/CMP/Coags QD    F/E/GI:  -- Start PPI  -- Laxatives  -- Progress diet as tolerated    Ppx:  -- PPI  -- DVT ppx (TEDs/SCDS; resume enoxaparin 48h post-op)    Dispo:  -- PT/OT/OOB  -- Speech eval and treat            Gabino Rich MD  Neurosurgery  [facility]

## 2017-10-04 NOTE — PROGRESS NOTES
[facility]  Neurology  Progress Note    Patient Name: Tommy Way  MRN: [id number]  Admission Date: 9/26/2017  Hospital Length of Stay: 8 days  Code Status: Full Code   Attending Provider: Mauro Dunham MD  Primary Care Physician: Adan Torres MD   Principal Problem:Encephalopathy    Subjective:     Interval History:   Patient had increased agitation and restlessness overnight.  Wife is concerned about lack of sleep and would like to increase some of patient's qHS medications and possibly give them earlier as the patient normally goes to bed around 8 pm and wakes up around 4 am.  Otherwise, notes no changes.  Discussed following up brain biopsy results.      Current Neurological Medications: Acyclovir, Dexamethasone    Current Facility-Administered Medications   Medication Dose Route Frequency Provider Last Rate Last Dose    0.9%  NaCl infusion   Intravenous Continuous Gabino Rich MD 50 mL/hr at 10/04/17 1700      acetaminophen tablet 650 mg  650 mg Oral Q6H PRN Khoi Hinojosa PA-C   650 mg at 10/03/17 1749    atorvastatin tablet 20 mg  20 mg Oral QHS Maren Li MD   20 mg at 10/03/17 2011    bisacodyl suppository 10 mg  10 mg Rectal Daily PRN Gabino Rich MD        carvedilol tablet 25 mg  25 mg Oral BID WM Maren Li MD   25 mg at 10/04/17 1701    dexamethasone injection 4 mg  4 mg Intravenous Q6H Gregor Abbott MD   4 mg at 10/04/17 1701    dextrose 50% injection 12.5 g  12.5 g Intravenous PRN Maren Li MD        dextrose 50% injection 25 g  25 g Intravenous PRN Maren Li MD        gadobutrol 10 mL  10 mL Intravenous ONCE PRN Gregor Abbott MD        glucagon (human recombinant) injection 1 mg  1 mg Intramuscular PRN Maren Li MD        glucose chewable tablet 16 g  16 g Oral PRN Maren Li MD        glucose chewable tablet 24 g  24 g Oral PRN Maren Li MD        hydrALAZINE injection 10 mg  10 mg Intravenous Q4H PRN Gene Wheelre NP         HYDROmorphone injection 0.2 mg  0.2 mg Intravenous Q5 Min PRN Peyman Holder MD        insulin aspart pen 1-10 Units  1-10 Units Subcutaneous QID (AC + HS) PRN Khoi Hinojosa PA-C   2 Units at 10/03/17 1748    labetalol injection 10 mg  10 mg Intravenous Q4H PRN Gene Wheeler NP        lacosamide tablet 200 mg  200 mg Oral BID Maren Li MD   200 mg at 10/04/17 0800    magnesium oxide tablet 800 mg  800 mg Oral PRN Minna Conley PA-C   800 mg at 10/04/17 0757    magnesium oxide tablet 800 mg  800 mg Oral PRN Minna Conley PA-C        ondansetron disintegrating tablet 8 mg  8 mg Oral Q8H PRN Gabino Rich MD        ondansetron injection 4 mg  4 mg Intravenous Daily PRN Peyman Holder MD        pantoprazole injection 40 mg  40 mg Intravenous Daily Gabino Rich MD   40 mg at 10/04/17 0858    polyethylene glycol packet 17 g  17 g Oral Daily Gabino Rich MD        potassium chloride 10% solution 40 mEq  40 mEq Oral PRN Minna Conley PA-C        potassium chloride 10% solution 40 mEq  40 mEq Oral PRN Minna CulottaJACOB        potassium chloride 10% solution 60 mEq  60 mEq Oral PRN Minna Conley PA-C        potassium, sodium phosphates 280-160-250 mg packet 2 packet  2 packet Oral PRN Minna Conley PA-C   2 packet at 10/04/17 0757    potassium, sodium phosphates 280-160-250 mg packet 2 packet  2 packet Oral PRN Minna CulottaJACOB        potassium, sodium phosphates 280-160-250 mg packet 2 packet  2 packet Oral PRN Minna JACOB Conley        promethazine (PHENERGAN) 6.25 mg in dextrose 5 % 50 mL IVPB  6.25 mg Intravenous Q10 Min PRN Peyman Holder MD        quetiapine tablet 50 mg  50 mg Oral QHS Gregor Abbott MD   50 mg at 10/03/17 2011    ramelteon tablet 8 mg  8 mg Oral Once Mishel Vargas PA-C        sodium chloride 0.9% flush 3 mL  3 mL Intravenous PRN Peyman Holder MD        thiamine tablet 100 mg  100 mg Oral Daily Maren Li MD   100 mg  at 10/04/17 0800     Review of Systems   Unable to perform ROS: Mental status change     Objective:     Vital Signs (Most Recent):  Temp: 99.1 °F (37.3 °C) (10/04/17 1505)  Pulse: 72 (10/04/17 1700)  Resp: 20 (10/04/17 1700)  BP: (!) 146/96 (10/04/17 1700)  SpO2: 99 % (10/04/17 1700) Vital Signs (24h Range):  Temp:  [97.4 °F (36.3 °C)-99.1 °F (37.3 °C)] 99.1 °F (37.3 °C)  Pulse:  [58-89] 72  Resp:  [8-41] 20  SpO2:  [93 %-100 %] 99 %  BP: (122-198)/() 146/96     Weight: 120.7 kg (266 lb)  Body mass index is 30.74 kg/m².    Physical Exam  General:  Well-developed, well-nourished, nad  HEENT:  NCAT, PERRLA, EOMI, oropharygneal membranes non-erythematous/without exudate  Neck:  Supple, no palpable lad, no nuchal rigidity  Resp:  Symmetric expansion, no increased wob  CVS:  No LE edema, peripheral pulses 2+ (radial, dorsalis pedis)  GI:  Abd soft, non-distended, non-tender to palpation     Neurologic Exam:  Mental Status:  Awake, alert, oriented to self and wife.  Occasional aphasia, some trouble naming objects and paraphasias.  Responds appropriately to most questions, able to follow complex commands.  Cranial Nerves:  VFs intact on blinking to threat bilaterally.  PERRLA, EOMI.  Facial movements and sensation intact, symmetric.  Palate raises symmetrically, tongue protrudes midline.  SCM/Trapezius 5/5.  Motor:  Normal bulk and tone.  Strength diffusely 5/5, symmetric.  Sensory:  Intact to light touch, temperature at all extremities.    Reflexes:  Biceps, brachioradialis, patellar 2+ bilaterally.  No ankle clonus.  Downgoing toes bilaterally.  Coordination:  FTN, BRENDA, HTS with no ataxia, no dysmetria, no dysdiadochokinesia.  Gait:  Deferred 2/2 ICU s/p brain bx.    Significant Labs:     Recent Labs  Lab 10/04/17  0149   WBC 12.35  12.35  12.35  12.35   RBC 3.31*  3.31*  3.31*  3.31*   HGB 11.1*  11.1*  11.1*  11.1*   HCT 34.0*  34.0*  34.0*  34.0*     326  326  326   *  103*  103*   103*   MCH 33.5*  33.5*  33.5*  33.5*   MCHC 32.6  32.6  32.6  32.6       Recent Labs  Lab 10/04/17  0149   CALCIUM 8.7  8.7  8.7  8.7     142  142  142   K 4.1  4.1  4.1  4.1   CO2 22*  22*  22*  22*     110  110  110   BUN 15  15  15  15   CREATININE 1.0  1.0  1.0  1.0     Significant Imaging:   10/04/17 CT head w/o contrast:  Postoperative changes with interval left parietal craniotomy for open biopsy of the left temporoparietal region. Trace volume of postoperative pneumocephalus and fluid is noted underlying the craniotomy site. No evidence of significant intracranial hemorrhage, midline shift, or hydrocephalus.   Stable appearing nonspecific abnormal region of parenchymal edema within the left temporoparietal region, similar in extent to prior MRI examination.    10/03/17 MRI Spectroscopy, Brain Stealth:  MRI brain demonstrates stable left posterior temporal/parietal edema with associated sulcal vascular prominence and swelling of the left temporal lobe when compared to MR examination 09/25/2017, however progressed when compared to 09/10/2017. Findings suggest encephalitis (such as herpes), other inflammatory etiology, or seizure-related edema.  Tumor is less likely, however cannot be excluded.  Time pattern are does not follow a course that would suggest ischemia/infarction.  Stealth protocol MRI performed for purposes of procedural localization.  MR spectroscopy of the area of interest in the left temporoparietal lobe is overall nonspecific and nondiagnostic.    09/28/17 IR angiogram carotid cerebral bilateral inc arch:  POSTOPERATIVE DIAGNOSIS(ES):    Narrowing / beating appearance of small distal inferior-posterior left MCA territory.  Note: It is unclear whether is this a primary process or sequelae of swelling noted on MRI.  If it is to be a considered a primary process then vasculitis could be questioned, however no other region of vascular abnormality is  identified to support this.    09/22/17 MRI Lumbar Spine:  1.  Old mild compression of L1  2.  Abnormal bone marrow signal and enhancement of the L4 vertebral body with mild decreased height suggesting a mild compression fracture there is fluid and this cannot exclude infection or pathologic fracture  3.  Degenerative changes as above    09/22/17 MRI Thoracic Spine:  1. Bone marrow edema and enhancement of the vertebral body of T11 with loss of body height of 30% suggesting a probable acute compression fracture.  Cannot exclude this pathologic or infection due to clinical history  2.  Degenerative changes as above greatest T7-8.  3.  Old compression fractures of L1-, T4 and T5    09/22/17 MRI Cervical Spine:   Degenerative changes as above.  Mild faint enhancement along cord probably normal physiologic with no definite focal enhancing lesion    09/22/17 MRI Brain w/ w/o contrast:  1.  Significant increase in the T2 signal in the left temporal lobe particularly increased in the left posterior parietal lobe with some serpiginous meningeal enhancement suspicious for encephalitis.  2.  Punctate areas of diffusion signal in the left posterior parietal lobe suspicious for punctate areas of acute ischemia versus less likely shine through recommend followup    Assessment and Plan:     * Encephalopathy    61 y.o male with hx of HTN transferred from [facility] 9/26/17. Patient originally presented to OSH after witnessed seizures at home. Intubated for airway protection. 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 LP done at OSH x 2 with benign CSF besides elevated protein 77. On course of vancomycin, zosyn for aspiration pneumonia as well. LP repeated 9/19/17. HSV PCR, VDRL, HIV and arbovirus panel negative. Quantiferon gold and influenza panel positive. Followed by Dr. Reyes (ID) and Osmany (Neuro) in [place].     Recommendations:  -Continue dexamethasone,  "empiric acyclovir   -Plan for brain biopsy with Neurosurgery 10/03/17--completed, will follow up results  -MR Spectroscopy/Stealth non-specific, possible infectious/seizure-related edema rather than tumor. Non-diagnostic.  -Follow up CSF studies--ACE and Paraneoplastic panel pending.  -Continue Vimpat 200 mg po bid. EEG "mildly abnormal due to disorganization, slowing during wakefulness; relatively normal sleep architecture implies lesser degree of encephalopathy; no evidence of seizure/focal cerebral dysfunction."  -Continue delirium precautions.  Increase seroquel to optimize sleep and try to give a few hours earlier.  Would resume ramelteon 8 mg qHS as well and minimize disruptions overnight.      Complex partial seizure evolving to generalized seizure    -As per above, continue vimpat    EEG INTERPRETATION:  Mildly abnormal long-term EEG due to disorganization and   slowing during wakefulness.  The presence of relatively normal sleep   architecture implies a lesser degree of encephalopathy, however.  There was no   evidence of seizures or a focal cerebral dysfunction     VTE Risk Mitigation         Ordered     Medium Risk of VTE  Once      10/03/17 0835     Place JHONY hose  Until discontinued      10/03/17 0835     Place sequential compression device  Until discontinued      10/03/17 0835        Nuvia Garcia MD  Neurology  [facility]  "

## 2017-10-04 NOTE — ASSESSMENT & PLAN NOTE
61M who presents from OSH with presenting sx on 9/10 of AMS and tonic-clonic seizure for further work-up.    Neuro  -- cerebral angiogram 9/28: narrowing of left distal MCA arteries possibly d/t vasculitis or secondary to cerebral edema  -- Continue dex4q6  -- Neurology arranged lumbar puncture for infectious workup per ID and paraneoplastic panel   -CSF culture NG, viral culture neg, ACE and Paraneoplastic panel pending   -Continue to follow neuro reccs  -- Underwent bx 10/3   -Pain control with PO/IV tylenol   -CTH in AM for post-op eval   -Continue Vimpat for seizure ppx   -Follow-up final Path when avaliable  -- May continue scheduled seroquel for aggitation  -- Continue ramelteon  -- Patient stable for TTF; return to primary service    CVS:  -- SBP strict <160 on Cardene (hydralazine and labetalol PRN for breakthrough)    Pulm:  -Aggressive pulmonary management    Heme/onc/ID  -- CBC/CMP/Coags QD    F/E/GI:  -- Start PPI  -- Laxatives  -- Progress diet as tolerated    Ppx:  -- PPI  -- DVT ppx (TEDs/SCDS; resume enoxaparin 48h post-op)    Dispo:  -- PT/OT/OOB  -- Speech eval and treat

## 2017-10-04 NOTE — PLAN OF CARE
Problem: Patient Care Overview  Goal: Plan of Care Review  POC reviewed with pt and family at 1600. Pt's wife, Cassie, verbalized understanding. Questions and concerns addressed. No acute events today. Pt progressing toward goals. Will continue to monitor. See flowsheets for full assessment and VS info.

## 2017-10-04 NOTE — ASSESSMENT & PLAN NOTE
"61 y.o male with hx of HTN transferred from [facility] 9/26/17. Patient originally presented to OSH after witnessed seizures at home. Intubated for airway protection. 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 LP done at OSH x 2 with benign CSF besides elevated protein 77. On course of vancomycin, zosyn for aspiration pneumonia as well. LP repeated 9/19/17. HSV PCR, VDRL, HIV and arbovirus panel negative. Quantiferon gold and influenza panel positive. Followed by Dr. Reyes (ID) and Osmany (Neuro) in [place].     Recommendations:  -Continue dexamethasone, empiric acyclovir   -Plan for brain biopsy with Neurosurgery 10/03/17--completed, will follow up results  -MR Spectroscopy/Stealth non-specific, possible infectious/seizure-related edema rather than tumor but non-diagnostic.  -Follow up CSF studies--ACE and Paraneoplastic panel pending.  -Continue Vimpat 200 mg po bid.  EEG "mildly abnormal due to disorganization and slowing during wakefulness; relatively normal sleep   architecture implies a lesser degree of encephalopathy; no evidence of seizures or a focal cerebral dysfunction."  -Continue delirium precautions.  Increase seroquel to optimize sleep and try to give a few hours earlier.  Would resume ramelteon 8 mg qHS as well and minimize disruptions overnight.  "

## 2017-10-04 NOTE — PLAN OF CARE
Transfer of Care Acceptance Note    To Do List:    -Management of blood pressure   -Management of encephalopathy   -Follow up pathology for further management   -Sitter at bedside   -Disposition planning    Update:   -transfer delayed due to delirium   -Medications optimized      Medicine will accept patient for transfer to a hospital medicine service but patient not to be transferred to medicine team until tomorrow, 10/05/17 at 7:00 am. I spoke with primary service who is requesting transfer and informed them they are responsible for patient's care until tomorrow morning. Patient to be assigned to a medicine team by nocturnist tonight and to be temporarily placed on IMT list for reassignment in the morning.       Thank you and please call if you have any further questions.

## 2017-10-04 NOTE — SUBJECTIVE & OBJECTIVE
Interval History: NAEON. Patient will step down to the floor today    Medications:  Continuous Infusions:   sodium chloride 0.9% 50 mL/hr at 10/04/17 1105     Scheduled Meds:   atorvastatin  20 mg Oral QHS    carvedilol  25 mg Oral BID WM    dexamethasone  4 mg Intravenous Q6H    lacosamide  200 mg Oral BID    pantoprazole  40 mg Intravenous Daily    polyethylene glycol  17 g Oral Daily    quetiapine  50 mg Oral QHS    ramelteon  8 mg Oral Once    thiamine  100 mg Oral Daily     PRN Meds:acetaminophen, bisacodyl, dextrose 50%, dextrose 50%, gadobutrol, glucagon (human recombinant), glucose, glucose, hydrALAZINE, HYDROmorphone, insulin aspart, labetalol, magnesium oxide, magnesium oxide, ondansetron, ondansetron, potassium chloride 10%, potassium chloride 10%, potassium chloride 10%, potassium, sodium phosphates, potassium, sodium phosphates, potassium, sodium phosphates, promethazine (PHENERGAN) IVPB, sodium chloride 0.9%     Review of Systems    Objective:     Weight: 120.7 kg (266 lb)  Body mass index is 30.74 kg/m².  Vital Signs (Most Recent):  Temp: 97.4 °F (36.3 °C) (10/04/17 0705)  Pulse: 79 (10/04/17 1100)  Resp: (!) 41 (10/04/17 1100)  BP: 132/80 (10/04/17 1100)  SpO2: 99 % (10/04/17 1100) Vital Signs (24h Range):  Temp:  [97.4 °F (36.3 °C)-98.6 °F (37 °C)] 97.4 °F (36.3 °C)  Pulse:  [58-89] 79  Resp:  [8-46] 41  SpO2:  [93 %-100 %] 99 %  BP: (122-198)/() 132/80         Arterial Sheath 09/28/17 0800 Right (Active)   Closure Device Angio seal 9/28/2017  8:51 AM       Neurosurgery Physical Exam    Constitutional: well-developed, no apparent distress  Abdomen soft, non-tender  Cardiovascular: Normal rate & rhythm  Respiratory: non-distressed on room air   Neurological  GCS 15  AOx1, incoherent speech at times  Speech/Language: expressive aphasia and receptive aphasia   Head: normocephalic, atraumatic   PERRL, EOMI, Visual fields intact.   Follows some simple commands  Facial expression symmetric    Tongue midline  Moves all extremities with good strength  Tone: no spasticity, no rigidity, no clonus, no atrophy  Pronator Drift: neg   DTRs 2/4 in all extremities   Sensation to Light touch: intact  Coordination: Finger-to-nose intact    Significant Labs:    Recent Labs  Lab 10/03/17  0443 10/04/17  0149   * 152*  152*  152*  152*    142  142  142  142   K 3.9 4.1  4.1  4.1  4.1    110  110  110  110   CO2 25 22*  22*  22*  22*   BUN 13 15  15  15  15   CREATININE 0.9 1.0  1.0  1.0  1.0   CALCIUM 8.9 8.7  8.7  8.7  8.7   MG 2.2 1.6  1.6  1.6       Recent Labs  Lab 10/03/17  0443 10/04/17  0149   WBC 8.08 12.35  12.35  12.35  12.35   HGB 10.5* 11.1*  11.1*  11.1*  11.1*   HCT 31.7* 34.0*  34.0*  34.0*  34.0*   * 326  326  326  326       Recent Labs  Lab 10/02/17  1950   INR 1.1   APTT <21.0     Microbiology Results (last 7 days)     Procedure Component Value Units Date/Time    CSF culture [[id number]] Collected:  09/28/17 1718    Order Status:  Completed Specimen:  CSF (Spinal Fluid) from CSF Tap, Tube 4 Updated:  10/03/17 0712     CSF CULTURE No Growth     Gram Stain Result Rare WBC's      No organisms seen        All pertinent labs from the last 24 hours have been reviewed.    Significant Diagnostics:  CTH 10/4:    Postoperative changes with interval left parietal craniotomy for open biopsy of the left temporoparietal region. Trace volume of postoperative pneumocephalus and fluid is noted underlying the craniotomy site. No evidence of significant intracranial hemorrhage, midline shift, or hydrocephalus. Stable appearing nonspecific abnormal region of parenchymal edema within the left temporoparietal region, similar in extent to prior MRI examination.

## 2017-10-04 NOTE — PROGRESS NOTES
Called NCC on call concerning PT B/P not responding to PRN medications. New order given for PO labetalol. Will continue to monitor.

## 2017-10-04 NOTE — SUBJECTIVE & OBJECTIVE
Subjective:     Interval History:   Patient had increased agitation and restlessness overnight.  Wife is concerned about lack of sleep and would like to increase some of patient's qHS medications and possibly give them earlier as the patient normally goes to bed around 8 pm and wakes up around 4 am.  Otherwise, notes no changes.  Discussed following up brain biopsy results.      Current Neurological Medications: Acyclovir, Dexamethasone    Current Facility-Administered Medications   Medication Dose Route Frequency Provider Last Rate Last Dose    0.9%  NaCl infusion   Intravenous Continuous Gabino Rich MD 50 mL/hr at 10/04/17 1700      acetaminophen tablet 650 mg  650 mg Oral Q6H PRN Khoi Hinojosa PA-C   650 mg at 10/03/17 1749    atorvastatin tablet 20 mg  20 mg Oral QHS Maren Li MD   20 mg at 10/03/17 2011    bisacodyl suppository 10 mg  10 mg Rectal Daily PRN Gabino Rich MD        carvedilol tablet 25 mg  25 mg Oral BID WM Maren Li MD   25 mg at 10/04/17 1701    dexamethasone injection 4 mg  4 mg Intravenous Q6H Gregor Abbott MD   4 mg at 10/04/17 1701    dextrose 50% injection 12.5 g  12.5 g Intravenous PRN Maren Li MD        dextrose 50% injection 25 g  25 g Intravenous PRN Maren Li MD        gadobutrol 10 mL  10 mL Intravenous ONCE PRN Gregor Abbott MD        glucagon (human recombinant) injection 1 mg  1 mg Intramuscular PRN Maren Li MD        glucose chewable tablet 16 g  16 g Oral PRN Maren Li MD        glucose chewable tablet 24 g  24 g Oral PRN Maren Li MD        hydrALAZINE injection 10 mg  10 mg Intravenous Q4H PRN Gene Wheeler NP        HYDROmorphone injection 0.2 mg  0.2 mg Intravenous Q5 Min PRN Peyman Holder MD        insulin aspart pen 1-10 Units  1-10 Units Subcutaneous QID (AC + HS) PRN Khoi Hinojosa PA-C   2 Units at 10/03/17 1748    labetalol injection 10 mg  10 mg Intravenous Q4H PRN Gene Wheeler NP         lacosamide tablet 200 mg  200 mg Oral BID Maren Li MD   200 mg at 10/04/17 0800    magnesium oxide tablet 800 mg  800 mg Oral PRN Minna Culotta, PA-C   800 mg at 10/04/17 0757    magnesium oxide tablet 800 mg  800 mg Oral PRN Minna Culotta, PA-C        ondansetron disintegrating tablet 8 mg  8 mg Oral Q8H PRN Gabino Rich MD        ondansetron injection 4 mg  4 mg Intravenous Daily PRN Peyman Holder MD        pantoprazole injection 40 mg  40 mg Intravenous Daily Gabino Rich MD   40 mg at 10/04/17 0858    polyethylene glycol packet 17 g  17 g Oral Daily Gabino Rich MD        potassium chloride 10% solution 40 mEq  40 mEq Oral PRN Minna Culotta, PA-C        potassium chloride 10% solution 40 mEq  40 mEq Oral PRN Minna Culotta, PA-C        potassium chloride 10% solution 60 mEq  60 mEq Oral PRN Minna Culotta, PA-C        potassium, sodium phosphates 280-160-250 mg packet 2 packet  2 packet Oral PRN Minna Culotta, PA-C   2 packet at 10/04/17 0757    potassium, sodium phosphates 280-160-250 mg packet 2 packet  2 packet Oral PRN Minna Culotta, PA-C        potassium, sodium phosphates 280-160-250 mg packet 2 packet  2 packet Oral PRN Minna Culotta, PA-C        promethazine (PHENERGAN) 6.25 mg in dextrose 5 % 50 mL IVPB  6.25 mg Intravenous Q10 Min PRN Peyman Holder MD        quetiapine tablet 50 mg  50 mg Oral QHS Gregor Abbott MD   50 mg at 10/03/17 2011    ramelteon tablet 8 mg  8 mg Oral Once Mishel Vargas PA-C        sodium chloride 0.9% flush 3 mL  3 mL Intravenous PRN Peyman Holder MD        thiamine tablet 100 mg  100 mg Oral Daily Maren Li MD   100 mg at 10/04/17 0800     Review of Systems   Unable to perform ROS: Mental status change     Objective:     Vital Signs (Most Recent):  Temp: 99.1 °F (37.3 °C) (10/04/17 1505)  Pulse: 72 (10/04/17 1700)  Resp: 20 (10/04/17 1700)  BP: (!) 146/96 (10/04/17 1700)  SpO2: 99 % (10/04/17 1700) Vital Signs (24h  Range):  Temp:  [97.4 °F (36.3 °C)-99.1 °F (37.3 °C)] 99.1 °F (37.3 °C)  Pulse:  [58-89] 72  Resp:  [8-41] 20  SpO2:  [93 %-100 %] 99 %  BP: (122-198)/() 146/96     Weight: 120.7 kg (266 lb)  Body mass index is 30.74 kg/m².    Physical Exam  General:  Well-developed, well-nourished, nad  HEENT:  NCAT, PERRLA, EOMI, oropharygneal membranes non-erythematous/without exudate  Neck:  Supple, no palpable lad, no nuchal rigidity  Resp:  Symmetric expansion, no increased wob  CVS:  No LE edema, peripheral pulses 2+ (radial, dorsalis pedis)  GI:  Abd soft, non-distended, non-tender to palpation     Neurologic Exam:  Mental Status:  Awake, alert, oriented to self and wife.  Occasional aphasia, some trouble naming objects and paraphasias.  Responds appropriately to most questions, able to follow complex commands.  Cranial Nerves:  VFs intact on blinking to threat bilaterally.  PERRLA, EOMI.  Facial movements and sensation intact, symmetric.  Palate raises symmetrically, tongue protrudes midline.  SCM/Trapezius 5/5.  Motor:  Normal bulk and tone.  Strength diffusely 5/5, symmetric.  Sensory:  Intact to light touch, temperature at all extremities.    Reflexes:  Biceps, brachioradialis, patellar 2+ bilaterally.  No ankle clonus.  Downgoing toes bilaterally.  Coordination:  FTN, BRENDA, HTS with no ataxia, no dysmetria, no dysdiadochokinesia.  Gait:  Deferred 2/2 ICU s/p brain bx.    Significant Labs:     Recent Labs  Lab 10/04/17  0149   WBC 12.35  12.35  12.35  12.35   RBC 3.31*  3.31*  3.31*  3.31*   HGB 11.1*  11.1*  11.1*  11.1*   HCT 34.0*  34.0*  34.0*  34.0*     326  326  326   *  103*  103*  103*   MCH 33.5*  33.5*  33.5*  33.5*   MCHC 32.6  32.6  32.6  32.6       Recent Labs  Lab 10/04/17  0149   CALCIUM 8.7  8.7  8.7  8.7     142  142  142   K 4.1  4.1  4.1  4.1   CO2 22*  22*  22*  22*     110  110  110   BUN 15  15  15  15   CREATININE 1.0  1.0   1.0  1.0     Significant Imaging:   10/04/17 CT head w/o contrast:  Postoperative changes with interval left parietal craniotomy for open biopsy of the left temporoparietal region. Trace volume of postoperative pneumocephalus and fluid is noted underlying the craniotomy site. No evidence of significant intracranial hemorrhage, midline shift, or hydrocephalus.   Stable appearing nonspecific abnormal region of parenchymal edema within the left temporoparietal region, similar in extent to prior MRI examination.    10/03/17 MRI Spectroscopy, Brain Stealth:  MRI brain demonstrates stable left posterior temporal/parietal edema with associated sulcal vascular prominence and swelling of the left temporal lobe when compared to MR examination 09/25/2017, however progressed when compared to 09/10/2017. Findings suggest encephalitis (such as herpes), other inflammatory etiology, or seizure-related edema.  Tumor is less likely, however cannot be excluded.  Time pattern are does not follow a course that would suggest ischemia/infarction.  Stealth protocol MRI performed for purposes of procedural localization.  MR spectroscopy of the area of interest in the left temporoparietal lobe is overall nonspecific and nondiagnostic.    09/28/17 IR angiogram carotid cerebral bilateral inc arch:  POSTOPERATIVE DIAGNOSIS(ES):    Narrowing / beating appearance of small distal inferior-posterior left MCA territory.  Note: It is unclear whether is this a primary process or sequelae of swelling noted on MRI.  If it is to be a considered a primary process then vasculitis could be questioned, however no other region of vascular abnormality is identified to support this.    09/22/17 MRI Lumbar Spine:  1.  Old mild compression of L1  2.  Abnormal bone marrow signal and enhancement of the L4 vertebral body with mild decreased height suggesting a mild compression fracture there is fluid and this cannot exclude infection or pathologic fracture  3.   Degenerative changes as above    09/22/17 MRI Thoracic Spine:  1. Bone marrow edema and enhancement of the vertebral body of T11 with loss of body height of 30% suggesting a probable acute compression fracture.  Cannot exclude this pathologic or infection due to clinical history  2.  Degenerative changes as above greatest T7-8.  3.  Old compression fractures of L1-, T4 and T5    09/22/17 MRI Cervical Spine:   Degenerative changes as above.  Mild faint enhancement along cord probably normal physiologic with no definite focal enhancing lesion    09/22/17 MRI Brain w/ w/o contrast:  1.  Significant increase in the T2 signal in the left temporal lobe particularly increased in the left posterior parietal lobe with some serpiginous meningeal enhancement suspicious for encephalitis.  2.  Punctate areas of diffusion signal in the left posterior parietal lobe suspicious for punctate areas of acute ischemia versus less likely shine through recommend followup

## 2017-10-04 NOTE — PROGRESS NOTES
Neuro Critical Care Transfer of Care note    Date of Admit: 9/26/2017  Date of Transfer / Stepdown: 10/4/2017    Brief History of Present Illness:    Patient is a transfer from [facility] who presented for tonic-clonic seizure and AMS on 9/10 per OSH. CTH and follow-up MRI demonstrated L posterior temporal and occipital lobe lesion. 2x LP with analysis were negative for HSV, VZV, syphillis and HHV-6 were all negative and differential was unremarkable. Thorough autoimmune and infectious testing was also completed and returned without result. [place] [facility] per NSGY and neurology. Non conclusive left temporal edema workup. Possible vasculitis VS infection VS neoplasm. Will FU MRI spectroscopy. Pt is now POD0 s/p brain bx with NSGY. Pt admitted to [facility] for higher level of care.      Hospital Course: 10/3: Pt admitted to [facility] s/p brain bx of L posterior temperol and occipitol lobe lesion with NSGY. SBP<160, seizure ppx. CTH post biopsy trace volume  postoperative pneumocephalus and fluid underlying craniotomy site. Stable parenchymal edema within the left temporoparietal region. No significant postoperative hemorrhage present. Will transfer back to primary team. Was on team A with Dr SUSAN Roper.   10/5 ID reconsulted  recs appreciated. Started vancomycin, cefepime and flagyl. Pan cultured prior to ABX.  CRP, Sed rate  10/6 10/6 Added AFB culture and smear to brain tissue in lab quatiferon gold sent.. Patient restless agitated. Trying to get out of bed. precedex started. Also not sleeping at night will try ramelteon Qhs. If transfers to floor needs sitter  10/7  Patient was started on scheduled Seroquel and weaned off of Precedex.  This AM found to be calm and cooperative.  Continues to have Wernicke's aphasia but improving.      Hospital Course By Problem with Pertinent Findings:     1.   Cardiac/Vascular   Hyperlipidemia     -atorvastatin 20 mg qhs       Hypertension      - goal SBP<160  -labetalol, hydralazine prn  -carvidilol 25 mg BID   Echo: 1 - No wall motion abnormalities.     2 - Normal left ventricular systolic function (EF 55-60%).     3 - Upper limit of normal aortic root.     4 - Normal left ventricular diastolic function.     5 - Normal right ventricular systolic function .     6 - Trivial aortic regurgitation.      2.   ID   Encephalitis and encephalomyelitis, unspecified     -POD3 s/p brain bx per NSGY  -Non conclusive left temporal edema workup. Possible vasculitis VS infection VS neoplasm VS encephalomyelitis. 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  pancultured  Will check quantiferon gold and added AFB smear and culture to brain biopsy tissue  ABXs started  Vancomycin 2g iv x1 then vanc 1500mg  Cefepime 2g q8h  Flagyl 500mg Q8h  ID recs: to dc cefepime               Start ceftriaxone 2g q12               Continue vancomycin, goal trough 15-20               Check with NSGY  If purulent drainage seen at time of biopsy               F/u path reports     3. Prophylaxis:  Venous Thromboembolism: mechanical  Stress Ulcer: PPI  Ventilator Pneumonia: not applicable         Consultants and Procedures:     Consultants:  YSABEL VARGAS  Pulmonology  Neurology  PT/OT/SLP      Procedures:    Biopsy with NSGY    Transfer Information:     Diet:  regular    Physical Activity:  OOB with assist  PT/OT/SLP    To Do / Pending Studies / Follow ups:  May need further follow up for BP control        Toby Sharp MD   Resident Physician  Neuro Critical Care  [facility]

## 2017-10-04 NOTE — ASSESSMENT & PLAN NOTE
-POD1 s/p brain bx per NSGY  -Non conclusive left temporal edema workup. Possible vasculitis VS infection VS neoplasm VS encephalomyelitis. 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

## 2017-10-05 NOTE — SUBJECTIVE & OBJECTIVE
Interval History: NAEON    Medications:  Continuous Infusions:   Scheduled Meds:   atorvastatin  20 mg Oral QHS    carvedilol  25 mg Oral BID WM    ceFEPime (MAXIPIME) IVPB  2 g Intravenous Q8H    dexamethasone  4 mg Intravenous Q6H    lacosamide  200 mg Oral BID    metronidazole  500 mg Intravenous Q8H    pantoprazole  40 mg Intravenous Daily    polyethylene glycol  17 g Oral Daily    quetiapine  50 mg Oral QHS    ramelteon  8 mg Oral Once    thiamine  100 mg Oral Daily    vancomycin (VANCOCIN) IVPB  2,000 mg Intravenous Once    Followed by    [START ON 10/6/2017] vancomycin (VANCOCIN) IVPB  1,500 mg Intravenous Q12H     PRN Meds:acetaminophen, bisacodyl, dextrose 50%, dextrose 50%, gadobutrol, glucagon (human recombinant), glucose, glucose, hydrALAZINE, HYDROmorphone, insulin aspart, labetalol, magnesium oxide, magnesium oxide, ondansetron, ondansetron, potassium chloride 10%, potassium chloride 10%, potassium chloride 10%, potassium, sodium phosphates, potassium, sodium phosphates, potassium, sodium phosphates, promethazine (PHENERGAN) IVPB, sodium chloride 0.9%     Review of Systems   Unable to perform ROS: Mental status change     Objective:     Weight: 120.7 kg (266 lb)  Body mass index is 30.74 kg/m².  Vital Signs (Most Recent):  Temp: 97.9 °F (36.6 °C) (10/05/17 1500)  Pulse: 84 (10/05/17 1500)  Resp: (!) 24 (10/05/17 1500)  BP: 126/69 (10/05/17 1500)  SpO2: 99 % (10/05/17 1500) Vital Signs (24h Range):  Temp:  [97.5 °F (36.4 °C)-98.9 °F (37.2 °C)] 97.9 °F (36.6 °C)  Pulse:  [60-95] 84  Resp:  [13-47] 24  SpO2:  [93 %-100 %] 99 %  BP: (107-185)/() 126/69       Date 10/05/17 0700 - 10/06/17 0659   Shift 1344-3877 7086-2805 0051-8821 24 Hour Total   I  N  T  A  K  E   I.V.  (mL/kg) 300  (2.5)   300  (2.5)    IV Piggyback 650   650    Shift Total  (mL/kg) 950  (7.9)   950  (7.9)   O  U  T  P  U  T   Urine  (mL/kg/hr) 810  (0.8)   810    Shift Total  (mL/kg) 810  (6.7)   810  (6.7)   Weight  (kg) 120.7 120.7 120.7 120.7                   Male External Urinary Catheter 10/03/17 0952 Medium (Active)   Collection Container Urimeter 10/5/2017  7:01 AM   Securement Method secured to top of thigh w/ adhesive device 10/5/2017  7:01 AM   Skin no redness;no breakdown 10/5/2017  7:01 AM   Tolerance no signs/symptoms of discomfort 10/5/2017  7:01 AM   Output (mL) 50 mL 10/5/2017  2:00 PM   Catheter Change Date 10/05/17 10/5/2017  7:01 AM   Catheter Change Time 1200 10/5/2017  7:01 AM       Arterial Sheath 09/28/17 0800 Right (Active)   Closure Device Angio seal 9/28/2017  8:51 AM       Physical Exam:  Vitals reviewed.    Constitutional: He appears well-developed and well-nourished. He is not diaphoretic. No distress.     Eyes: Pupils are equal, round, and reactive to light.     Neurological:   AOx1  [id number]       Significant Labs:    Recent Labs  Lab 10/04/17  0149 10/04/17  1152 10/05/17  0532   *  152*  152*  152*  --  121*     142  142  142  --  142   K 4.1  4.1  4.1  4.1  --  3.8     110  110  110  --  108   CO2 22*  22*  22*  22*  --  25   BUN 15  15  15  15  --  18   CREATININE 1.0  1.0  1.0  1.0  --  0.8   CALCIUM 8.7  8.7  8.7  8.7  --  9.1   MG 1.6  1.6  1.6 1.9 1.7       Recent Labs  Lab 10/04/17  0149 10/05/17  0532   WBC 12.35  12.35  12.35  12.35 10.41   HGB 11.1*  11.1*  11.1*  11.1* 11.5*   HCT 34.0*  34.0*  34.0*  34.0* 34.9*     326  326  326 290     No results for input(s): LABPT, INR, APTT in the last 48 hours.  Microbiology Results (last 7 days)     Procedure Component Value Units Date/Time    Culture, Respiratory with Gram Stain [[id number]] Collected:  10/05/17 1429    Order Status:  Sent Specimen:  Respiratory from Sputum, Expectorated Updated:  10/05/17 1430    Blood culture [[id number]] Collected:  10/05/17 1300    Order Status:  Sent Specimen:  Blood from Peripheral, Forearm, Left Updated:  10/05/17 1416    Blood culture  [[id number]] Collected:  10/05/17 1235    Order Status:  Sent Specimen:  Blood from Peripheral, Upper Arm, Left Updated:  10/05/17 1415    CSF culture [[id number]] Collected:  09/28/17 1718    Order Status:  Completed Specimen:  CSF (Spinal Fluid) from CSF Tap, Tube 4 Updated:  10/03/17 0712     CSF CULTURE No Growth     Gram Stain Result Rare WBC's      No organisms seen        Recent Lab Results       10/05/17  1314 10/05/17  1248 10/05/17  0818 10/05/17  0540 10/05/17  0532      Procalcitonin  <0.02  Comment:  A concentration < 0.25 ng/mL represents a low risk bacterial   infection.  Procalcitonin may not be accurate among patients with localized   infection, recent trauma or major surgery, immunosuppressed state,   invasive fungal infection, renal dysfunction. Decisions regarding   initiation or continuation of antibiotic therapy should not be based   solely on procalcitonin levels.          Anion Gap     9     Baso #     0.00     Basophil%     0.0     BUN, Bld     18     Calcium     9.1     Chloride     108     Cholesterol 160  Comment:  The National Cholesterol Education Program (NCEP) has set the  following guidelines (reference ranges) for Cholesterol:  Optimal.....................<200 mg/dL  Borderline High.............200-239 mg/dL  High........................> or = 240 mg/dL           CO2     25     Creatinine     0.8     Differential Method     Automated     eGFR if      >60.0     eGFR if non      >60.0  Comment:  Calculation used to obtain the estimated glomerular filtration  rate (eGFR) is the CKD-EPI equation. Since race is unknown   in our information system, the eGFR values for   -American and Non--American patients are given   for each creatinine result.       Eos #     0.0     Eosinophil%     0.0     Glucose     121(H)     Gran #     8.6(H)     Gran%     82.9(H)     HDL 29  Comment:  The National Cholesterol Education Program (NCEP) has set  the  following guidelines (reference values) for HDL Cholesterol:  Low...............<40 mg/dL  Optimal...........>60 mg/dL  (L)         HDL/Chol Ratio 18.1(L)         Hematocrit     34.9(L)     Hemoglobin     11.5(L)     LDL Cholesterol 104.8  Comment:  The National Cholesterol Education Program (NCEP) has set the  following guidelines (reference values) for LDL Cholesterol:  Optimal.......................<130 mg/dL  Borderline High...............130-159 mg/dL  High..........................160-189 mg/dL  Very High.....................>190 mg/dL           Lymph #     1.0     Lymph%     9.1(L)     Magnesium     1.7     MCH     33.6(H)     MCHC     33.0     MCV     102(H)     Mono #     0.8     Mono%     7.4     MPV     10.7     Non-HDL Cholesterol 131  Comment:  Risk category and Non-HDL cholesterol goals:  Coronary heart disease (CHD)or equivalent (10-year risk of CHD >20%):  Non-HDL cholesterol goal     <130 mg/dL  Two or more CHD risk factors and 10-year risk of CHD <= 20%:  Non-HDL cholesterol goal     <160 mg/dL  0 to 1 CHD risk factor:  Non-HDL cholesterol goal     <190 mg/dL           Phosphorus     2.8     Platelets     290     POCT Glucose   114(H) 129(H)      Potassium     3.8     RBC     3.42(L)     RDW     13.8     Sodium     142     Total Cholesterol/HDL Ratio 5.5(H)         Triglycerides 131  Comment:  The National Cholesterol Education Program (NCEP) has set the  following guidelines (reference values) for triglycerides:  Normal......................<150 mg/dL  Borderline High.............150-199 mg/dL  High........................200-499 mg/dL           WBC     10.41                 10/04/17  2216 10/04/17  1701      Procalcitonin       Anion Gap       Baso #       Basophil%       BUN, Bld       Calcium       Chloride       Cholesterol       CO2       Creatinine       Differential Method       eGFR if        eGFR if non        Eos #       Eosinophil%       Glucose        Gran #       Gran%       HDL       HDL/Chol Ratio       Hematocrit       Hemoglobin       LDL Cholesterol       Lymph #       Lymph%       Magnesium       MCH       MCHC       MCV       Mono #       Mono%       MPV       Non-HDL Cholesterol       Phosphorus       Platelets       POCT Glucose 136(H) 109     Potassium       RBC       RDW       Sodium       Total Cholesterol/HDL Ratio       Triglycerides       WBC           All pertinent labs from the last 24 hours have been reviewed.    Significant Diagnostics:  CT: No results found in the last 24 hours.  MRI: No results found in the last 24 hours.  I have reviewed all pertinent imaging results/findings within the past 24 hours.

## 2017-10-05 NOTE — CONSULTS
[facility]  Infectious Disease  Consult Note    Patient Name: Tommy Way  MRN: [id number]  Admission Date: 9/26/2017  Hospital Length of Stay: 9 days  Attending Physician: Mauro Dunham MD  Primary Care Provider: Adan Torres MD     Isolation Status: No active isolations    Patient information was obtained from patient, relative(s), past medical records and ER records.      Inpatient consult to Infectious Diseases  Consult performed by: CASEY BERMUDEZ  Consult ordered by: DARRELL RODRIGUEZ        Assessment/Plan:     Encephalitis and encephalomyelitis, unspecified    62 y/o male admitted with temporal lobe encephalitis per brain imaging.  Extensive studies on CSF fluid failed to yield a diagnosis.  He was started on steroids.  I met the patient last week and I am meeting him again today.  He is still not fully oriented but he does appear slightly better than he did last week.  His sister who was present at the time of my evaluation feels that once he was started on steroids he has made some improvement.  The operative report from his brain biopsy suggested that the affected brain tissue had a 'musky appearance' to it.  No samples were obtained for culture.  I am still not sure that the patient symptoms are related to infection.  I will send a message to the surgeon to clarify what was observed.  For now, okay to continue vancomycin and metronidazole.  Discontinue cefepime and start ceftriaxone 2 grams IV q 12 hours.  Recs as follows;    -Discontinue cefepime  -Start ceftriaxone 2 grams IV q 12 hours  -continue vancomycin (goal trough of 15-20)  -continue metronidazole  -touch base with neurosurgery to see if any purulent material was seen at the time of biopsy  -follow up on pathology results.            Thank you for your consult. I will follow-up with patient. Please contact us if you have any additional questions.    Casey Bermudez MD  Infectious Disease  [facility] [address] [facility]    Subjective:     Principal Problem: Encephalopathy    HPI: 60 y/o male PMHX arthritis, AHTN, admitted on 9/26/17 transferred from [facility]. Patient originally presented to OSH due to 2 episodes of witnessed seizures at home. Patient was at that time intubated in the ED due to desaturation and AMS for airway protection. On 9/11 MRI done showed temporal lobe abnormalities consistent with encephalitis. Patient had lumbar punctures X 2. Multiple CSF studies were ordered and all were negative.  He was started on steroids empirically about 5 days ago.  He ultimately underwent brain biopsy on October 3.  We are re-consulted to assist with his care.     Past Medical History:   Diagnosis Date    Acid reflux     Arthritis     Elevated cholesterol     Hypertension        Past Surgical History:   Procedure Laterality Date    JOINT REPLACEMENT Right 04/20/2017    uneventful recovery    KNEE ARTHROPLASTY Right 02/2007    WISDOM TOOTH EXTRACTION         Review of patient's allergies indicates:   Allergen Reactions    Keppra [levetiracetam] Other (See Comments)     How trouble speaking , agitation       Medications:  Prescriptions Prior to Admission   Medication Sig    carvedilol (COREG) 25 MG tablet Take 25 mg by mouth 2 (two) times daily with meals.    aspirin (ECOTRIN) 81 MG EC tablet Take 81 mg by mouth once daily.    atorvastatin (LIPITOR) 20 MG tablet Take 20 mg by mouth every evening.    cyclobenzaprine (FLEXERIL) 10 MG tablet Take 10 mg by mouth 3 (three) times daily.    irbesartan-hydrochlorothiazide (AVALIDE) 150-12.5 mg per tablet Take 1 tablet by mouth once daily.     levetiracetam (KEPPRA) 500 MG Tab Take 500 mg by mouth 2 (two) times daily.    niacin (NIASPAN) 1000 MG CR tablet Take 1,000 mg by mouth once daily.    omeprazole (PRILOSEC) 40 MG capsule Take 40 mg by mouth once daily.     Antibiotics     Start     Stop Route Frequency Ordered    10/06/17 0100  vancomycin 1.5 g in 5 %  dextrose 250 mL IVPB      -- IV Every 12 hours (non-standard times) 10/05/17 1249    10/05/17 1245  ceFEPIme in dextrose 5% 2 gram/50 mL IVPB 2 g      10/13 1244 IV Every 8 hours (non-standard times) 10/05/17 1132    10/05/17 1245  metronidazole IVPB 500 mg      10/13 1244 IV Every 8 hours (non-standard times) 10/05/17 1132        Antifungals     None        Antivirals     None             There is no immunization history on file for this patient.    Family History     Problem Relation (Age of Onset)    Cancer Mother    Diabetes Father        Social History     Social History    Marital status:      Spouse name: N/A    Number of children: N/A    Years of education: N/A     Social History Main Topics    Smoking status: Former Smoker    Smokeless tobacco: Never Used      Comment: quit 17 yrs ago    Alcohol use 16.2 oz/week     3 Glasses of wine, 24 Cans of beer per week    Drug use: No    Sexual activity: Yes     Partners: Female     Birth control/ protection: Other-see comments      Comment: same partner many years; wife is postmenopausal     Other Topics Concern    None     Social History Narrative    None     Review of Systems   Unable to perform ROS: Mental status change     Objective:     Vital Signs (Most Recent):  Temp: 97.9 °F (36.6 °C) (10/05/17 1500)  Pulse: 84 (10/05/17 1500)  Resp: (!) 24 (10/05/17 1500)  BP: 126/69 (10/05/17 1500)  SpO2: 99 % (10/05/17 1500) Vital Signs (24h Range):  Temp:  [97.5 °F (36.4 °C)-98.9 °F (37.2 °C)] 97.9 °F (36.6 °C)  Pulse:  [60-95] 84  Resp:  [13-47] 24  SpO2:  [93 %-100 %] 99 %  BP: (107-185)/() 126/69     Weight: 120.7 kg (266 lb)  Body mass index is 30.74 kg/m².    Estimated Creatinine Clearance: 143.2 mL/min (based on SCr of 0.8 mg/dL).    Physical Exam   Constitutional: He appears well-developed and well-nourished.   HENT:   Right Ear: External ear normal.   Left Ear: External ear normal.   Nose: Nose normal.   Mouth/Throat: Oropharyngeal exudate  present.   Clean surgical scar in left parietal region of the head with staples present   Eyes: Pupils are equal, round, and reactive to light. Right eye exhibits no discharge. Left eye exhibits no discharge. No scleral icterus.   Neck: Normal range of motion. Neck supple.   Cardiovascular: Normal rate, regular rhythm, normal heart sounds and intact distal pulses.  Exam reveals no friction rub.    No murmur heard.  Pulmonary/Chest: Effort normal and breath sounds normal. No respiratory distress. He has no wheezes.   Abdominal: Soft. Bowel sounds are normal. He exhibits no distension. There is no tenderness. There is no guarding.   Musculoskeletal: Normal range of motion.   Neurological: He is alert.   Follows commands.  Oriented to person.   Skin: Skin is warm and dry. No rash noted. No erythema. No pallor.   Nursing note and vitals reviewed.      Significant Labs:   Blood Culture:   Recent Labs  Lab 09/09/17  2040   LABBLOO No growth after 5 days.     CBC:   Recent Labs  Lab 10/04/17  0149 10/05/17  0532   WBC 12.35  12.35  12.35  12.35 10.41   HGB 11.1*  11.1*  11.1*  11.1* 11.5*   HCT 34.0*  34.0*  34.0*  34.0* 34.9*     326  326  326 290       Significant Imaging: I have reviewed all pertinent imaging results/findings within the past 24 hours.

## 2017-10-05 NOTE — ASSESSMENT & PLAN NOTE
61M who presents from OSH with presenting sx on 9/10 of AMS and tonic-clonic seizure for further work-up.    Neuro  -- cerebral angiogram 9/28: narrowing of left distal MCA arteries possibly d/t vasculitis or secondary to cerebral edema  -- Continue [id number]  -- Neurology arranged lumbar puncture for infectious workup per ID and paraneoplastic panel   -CSF culture NG, viral culture neg, ACE and Paraneoplastic panel pending   -Continue to follow neuro reccs  -- Underwent bx 10/3   -Pain control with PO/IV tylenol   -Post op CTH acceptable   -Continue Vimpat for seizure ppx   -Follow-up final Path when avaliable  -- May continue scheduled seroquel for aggitation  -- Continue ramelteon  -- Patient stable for TTF; return to primary service   -Neurosurgery will continue to follow peripherally    CVS:  -- SBP strict <160 on Cardene    -hydralazine and labetalol PRN for breakthrough    Pulm:  -Aggressive pulmonary management    Heme/onc/ID  -- CBC/CMP/Coags QD    F/E/GI:  -- Start PPI  -- Laxatives  -- Progress diet as tolerated    Ppx:  -- PPI  -- DVT ppx     Dispo:  -- Transitioned back to Neurology

## 2017-10-05 NOTE — ASSESSMENT & PLAN NOTE
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  Extensive studies on CSF fluid failed to yield a diagnosis.  He was started on steroids.  I met the patient last week and I am meeting him again today.  He is still not fully oriented but he does appear slightly better than he did last week.  His sister who was present at the time of my evaluation feels that once he was started on steroids he has made some improvement.  The operative report from his brain biopsy suggested that the affected brain tissue had a 'musky appearance' to it.  No samples were obtained for culture.  I am still not sure that the patient symptoms are related to infection.  I will send a message to the surgeon to clarify what was observed.  For now, okay to continue vancomycin and metronidazole.  Discontinue cefepime and start ceftriaxone 2 grams IV q 12 hours.  Recs as follows;    -Discontinue cefepime  -Start ceftriaxone 2 grams IV q 12 hours  -continue vancomycin (goal trough of 15-20)  -continue metronidazole  -touch base with neurosurgery to see if any purulent material was seen at the time of biopsy  -follow up on pathology results.

## 2017-10-05 NOTE — ASSESSMENT & PLAN NOTE
"61 y.o male with hx of HTN transferred from [facility] 9/26/17. Patient originally presented to OSH after witnessed seizures at home. Intubated for airway protection. 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 LP done at OSH x 2 with benign CSF besides elevated protein 77. On course of vancomycin, zosyn for aspiration pneumonia as well. LP repeated 9/19/17. HSV PCR, VDRL, HIV and arbovirus panel negative. Quantiferon gold and influenza panel positive. Followed by Dr. Reyes (ID) and Osmany (Neuro) in [place].     Recommendations:  -Continue dexamethasone  -Plan for brain biopsy with Neurosurgery 10/03/17--completed, will follow up results  -MR Spectroscopy/Stealth non-specific, possible infectious/seizure-related edema rather than tumor but non-diagnostic.  -Follow up CSF studies--ACE and Paraneoplastic panel pending.  -Continue Vimpat 200 mg po bid.  EEG "mildly abnormal due to disorganization and slowing during wakefulness; relatively normal sleep   architecture implies a lesser degree of encephalopathy; no evidence of seizures or a focal cerebral dysfunction."  -Continue delirium precautions.  Increase seroquel to optimize sleep and try to give a few hours earlier.  Would resume ramelteon 8 mg qHS as well and minimize disruptions overnight.  -Recommend PT/OT daily  "

## 2017-10-05 NOTE — ANESTHESIA POSTPROCEDURE EVALUATION
"Anesthesia Post Evaluation    Patient: Tommy Way    Procedure(s) Performed: Procedure(s) (LRB):  BIOPSY-BRAIN - WITH STEALTH (Left)    Final Anesthesia Type: general  Patient location during evaluation: ICU  Patient participation: Yes- Able to Participate (some expressive aphasia)  Level of consciousness: awake  Post-procedure vital signs: reviewed and stable  Pain management: adequate  Airway patency: patent  PONV status at discharge: No PONV  Anesthetic complications: no      Cardiovascular status: blood pressure returned to baseline  Respiratory status: unassisted  Hydration status: euvolemic  Follow-up not needed.        Visit Vitals  BP (!) 159/102 (BP Location: Left arm, Patient Position: Lying)   Pulse 71   Temp 36.6 °C (97.9 °F) (Oral)   Resp 20   Ht 6' 6" (1.981 m)   Wt 120.7 kg (266 lb)   SpO2 99%   BMI 30.74 kg/m²       Pain/Messi Score: Pain Assessment Performed: Yes (10/5/2017  5:00 PM)  Presence of Pain: denies (10/5/2017  5:00 PM)      "

## 2017-10-05 NOTE — PROGRESS NOTES
[facility]  Neurology  Progress Note    Patient Name: Tommy Way  MRN: [id number]  Admission Date: 9/26/2017  Hospital Length of Stay: 9 days  Code Status: Full Code   Attending Provider: Mauro Dunham MD  Primary Care Physician: Adan Torres MD   Principal Problem:Encephalopathy    Subjective:     Interval History:   Patient did well overnight with some agitation approx 3 am, but able to be calmed down and returned to sleep.  Largely coherent this am with occasional tangential speech.  Discussed plan with wife at bedside for continued monitoring, steroids, PT/OT, and delirium precautions with additional sleep medications as needed while awaiting brain bx results.  No questions/concerns at this time.    Current Neurological Medications: Dexamethasone    Current Facility-Administered Medications   Medication Dose Route Frequency Provider Last Rate Last Dose    acetaminophen tablet 650 mg  650 mg Oral Q6H PRN Khoi Hinojosa PA-C   650 mg at 10/03/17 1749    atorvastatin tablet 20 mg  20 mg Oral QHS Maren Li MD   20 mg at 10/04/17 2038    bisacodyl suppository 10 mg  10 mg Rectal Daily PRN Gabino Rich MD        carvedilol tablet 25 mg  25 mg Oral BID WM Maren Li MD   25 mg at 10/05/17 0749    ceFEPIme in dextrose 5% 2 gram/50 mL IVPB 2 g  2 g Intravenous Q8H Norma Negrete  mL/hr at 10/05/17 1325 2 g at 10/05/17 1325    dexamethasone injection 4 mg  4 mg Intravenous Q6H Gregor Abbott MD   4 mg at 10/05/17 1325    dextrose 50% injection 12.5 g  12.5 g Intravenous PRN Maren Li MD        dextrose 50% injection 25 g  25 g Intravenous PRN Maren Li MD        gadobutrol 10 mL  10 mL Intravenous ONCE PRN Gregor Abbott MD        glucagon (human recombinant) injection 1 mg  1 mg Intramuscular PRN Maren Li MD        glucose chewable tablet 16 g  16 g Oral PRN Maren Li MD        glucose chewable tablet 24 g  24 g Oral PRN Maren Li MD         hydrALAZINE injection 10 mg  10 mg Intravenous Q4H PRN Gene Wheeler NP   10 mg at 10/05/17 0749    HYDROmorphone injection 0.2 mg  0.2 mg Intravenous Q5 Min PRN Peyman Holder MD        insulin aspart pen 1-10 Units  1-10 Units Subcutaneous QID (AC + HS) PRN Khoi Hinojosa PA-C   2 Units at 10/03/17 1748    labetalol injection 10 mg  10 mg Intravenous Q4H PRN Gene Wheeler NP   10 mg at 10/05/17 0626    lacosamide tablet 200 mg  200 mg Oral BID Maren Li MD   200 mg at 10/05/17 0906    magnesium oxide tablet 800 mg  800 mg Oral PRN Minna Conley PA-C   800 mg at 10/04/17 0757    magnesium oxide tablet 800 mg  800 mg Oral PRN Minna Conley PA-C        metronidazole IVPB 500 mg  500 mg Intravenous Q8H Norma Negrete  mL/hr at 10/05/17 1325 500 mg at 10/05/17 1325    ondansetron disintegrating tablet 8 mg  8 mg Oral Q8H PRN Gabino Rich MD        ondansetron injection 4 mg  4 mg Intravenous Daily PRN Peyman Holder MD        pantoprazole injection 40 mg  40 mg Intravenous Daily Gabino Rich MD   40 mg at 10/05/17 0906    polyethylene glycol packet 17 g  17 g Oral Daily Gabino Rich MD   17 g at 10/05/17 0906    potassium chloride 10% solution 40 mEq  40 mEq Oral PRN Minna Culotta, PA-C        potassium chloride 10% solution 40 mEq  40 mEq Oral PRN Minna Culotta, PA-C        potassium chloride 10% solution 60 mEq  60 mEq Oral PRN Minna Culotta, PA-C        potassium, sodium phosphates 280-160-250 mg packet 2 packet  2 packet Oral PRN Minna Kamerona, PA-C   2 packet at 10/04/17 0757    potassium, sodium phosphates 280-160-250 mg packet 2 packet  2 packet Oral PRN Minna Culotta, PA-C        potassium, sodium phosphates 280-160-250 mg packet 2 packet  2 packet Oral PRN Minnasuni Meloa, PA-C        promethazine (PHENERGAN) 6.25 mg in dextrose 5 % 50 mL IVPB  6.25 mg Intravenous Q10 Min PRN Peyman Holder MD        quetiapine tablet 50 mg  50 mg Oral QHS Gregor  MD Scar   50 mg at 10/04/17 2038    ramelteon tablet 8 mg  8 mg Oral Once Mishel Vargas PA-C        sodium chloride 0.9% flush 3 mL  3 mL Intravenous PRN Peyman Holder MD        thiamine tablet 100 mg  100 mg Oral Daily Maren Li MD   100 mg at 10/05/17 0906    vancomycin (VANCOCIN) 2,000 mg in dextrose 5 % 500 mL IVPB  2,000 mg Intravenous Once Norma Negrete  mL/hr at 10/05/17 1400 2,000 mg at 10/05/17 1400    Followed by    [START ON 10/6/2017] vancomycin 1.5 g in 5 % dextrose 250 mL IVPB  1,500 mg Intravenous Q12H Norma Negrete NP         Review of Systems   Unable to perform ROS: Mental status change     Objective:     Vital Signs (Most Recent):  Temp: 98.4 °F (36.9 °C) (10/05/17 1100)  Pulse: 85 (10/05/17 1400)  Resp: 19 (10/05/17 1400)  BP: 117/79 (10/05/17 1200)  SpO2: 99 % (10/05/17 1400) Vital Signs (24h Range):  Temp:  [97.5 °F (36.4 °C)-99.1 °F (37.3 °C)] 98.4 °F (36.9 °C)  Pulse:  [60-95] 85  Resp:  [13-47] 19  SpO2:  [93 %-100 %] 99 %  BP: (107-185)/() 117/79     Weight: 120.7 kg (266 lb)  Body mass index is 30.74 kg/m².    Physical Exam  General:  Well-developed, well-nourished, nad  HEENT:  NCAT, PERRLA, EOMI, oropharygneal membranes non-erythematous/without exudate  Neck:  Supple, no palpable lad, no nuchal rigidity  Resp:  Symmetric expansion, no increased wob  CVS:  No LE edema, peripheral pulses 2+ (radial, dorsalis pedis)  GI:  Abd soft, non-distended, non-tender to palpation     Neurologic Exam:  Mental Status:  Awake, alert, oriented to self and wife.  Occasional aphasia, some trouble naming objects and paraphasias.  Responds appropriately to most questions, able to follow complex commands.  Seems improved 10/5 compared to 10/4.  Cranial Nerves:  VFs intact on blinking to threat bilaterally.  PERRLA, EOMI.  Facial movements and sensation intact, symmetric.  Palate raises symmetrically, tongue protrudes midline.  SCM/Trapezius 5/5.  Motor:  Normal bulk  and tone.  Strength diffusely 5/5, symmetric.  Sensory:  Intact to light touch, temperature at all extremities.    Reflexes:  Biceps, brachioradialis, patellar 2+ bilaterally.  No ankle clonus.  Downgoing toes bilaterally.  Coordination:  FTN, BRENDA, HTS with no ataxia, no dysmetria, no dysdiadochokinesia.  Gait:  Deferred 2/2 ICU s/p brain bx.    Significant Labs:     Recent Labs  Lab 10/05/17  0532   WBC 10.41   RBC 3.42*   HGB 11.5*   HCT 34.9*      *   MCH 33.6*   MCHC 33.0       Recent Labs  Lab 10/05/17  0532   CALCIUM 9.1      K 3.8   CO2 25      BUN 18   CREATININE 0.8     Significant Imaging:   10/04/17 CT head w/o contrast:  Postoperative changes with interval left parietal craniotomy for open biopsy of the left temporoparietal region. Trace volume of postoperative pneumocephalus and fluid is noted underlying the craniotomy site. No evidence of significant intracranial hemorrhage, midline shift, or hydrocephalus.   Stable appearing nonspecific abnormal region of parenchymal edema within the left temporoparietal region, similar in extent to prior MRI examination.    10/03/17 MRI Spectroscopy, Brain Stealth:  MRI brain demonstrates stable left posterior temporal/parietal edema with associated sulcal vascular prominence and swelling of the left temporal lobe when compared to MR examination 09/25/2017, however progressed when compared to 09/10/2017. Findings suggest encephalitis (such as herpes), other inflammatory etiology, or seizure-related edema.  Tumor is less likely, however cannot be excluded.  Time pattern are does not follow a course that would suggest ischemia/infarction.  Stealth protocol MRI performed for purposes of procedural localization.  MR spectroscopy of the area of interest in the left temporoparietal lobe is overall nonspecific and nondiagnostic.    09/28/17 IR angiogram carotid cerebral bilateral inc arch:  POSTOPERATIVE DIAGNOSIS(ES):    Narrowing / beating  appearance of small distal inferior-posterior left MCA territory.  Note: It is unclear whether is this a primary process or sequelae of swelling noted on MRI.  If it is to be a considered a primary process then vasculitis could be questioned, however no other region of vascular abnormality is identified to support this.    09/22/17 MRI Lumbar Spine:  1.  Old mild compression of L1  2.  Abnormal bone marrow signal and enhancement of the L4 vertebral body with mild decreased height suggesting a mild compression fracture there is fluid and this cannot exclude infection or pathologic fracture  3.  Degenerative changes as above    09/22/17 MRI Thoracic Spine:  1. Bone marrow edema and enhancement of the vertebral body of T11 with loss of body height of 30% suggesting a probable acute compression fracture.  Cannot exclude this pathologic or infection due to clinical history  2.  Degenerative changes as above greatest T7-8.  3.  Old compression fractures of L1-, T4 and T5    09/22/17 MRI Cervical Spine:   Degenerative changes as above.  Mild faint enhancement along cord probably normal physiologic with no definite focal enhancing lesion    09/22/17 MRI Brain w/ w/o contrast:  1.  Significant increase in the T2 signal in the left temporal lobe particularly increased in the left posterior parietal lobe with some serpiginous meningeal enhancement suspicious for encephalitis.  2.  Punctate areas of diffusion signal in the left posterior parietal lobe suspicious for punctate areas of acute ischemia versus less likely shine through recommend followup    Assessment and Plan:     * Encephalopathy    61 y.o male with hx of HTN transferred from [facility] 9/26/17. Patient originally presented to OSH after witnessed seizures at home. Intubated for airway protection. 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 LP done at OSH x 2 with benign CSF besides elevated protein  "77. On course of vancomycin, zosyn for aspiration pneumonia as well. LP repeated 9/19/17. HSV PCR, VDRL, HIV and arbovirus panel negative. Quantiferon gold and influenza panel positive. Followed by Dr. Reyes (ID) and Osmany (Neuro) in [place].     Recommendations:  -Continue dexamethasone  -Plan for brain biopsy with Neurosurgery 10/03/17--completed, will follow up results  -MR Spectroscopy/Stealth non-specific, possible infectious/seizure-related edema rather than tumor but non-diagnostic.  -Follow up CSF studies--ACE and Paraneoplastic panel pending.  -Continue Vimpat 200 mg po bid.  EEG "mildly abnormal due to disorganization and slowing during wakefulness; relatively normal sleep architecture implies a lesser degree of encephalopathy; no evidence of seizures or focal cerebral dysfunction."  -Continue delirium precautions.  Increase seroquel to optimize sleep and try to give a few hours earlier.  Would resume ramelteon 8 mg qHS as well and minimize disruptions overnight.  -Recommend PT/OT daily      Complex partial seizure evolving to generalized seizure    -As per above, continue vimpat    EEG INTERPRETATION:  Mildly abnormal long-term EEG due to disorganization and   slowing during wakefulness.  The presence of relatively normal sleep   architecture implies a lesser degree of encephalopathy, however.  There was no   evidence of seizures or a focal cerebral dysfunction     VTE Risk Mitigation         Ordered     Medium Risk of VTE  Once      10/03/17 0835     Place JHONY hose  Until discontinued      10/03/17 0835     Place sequential compression device  Until discontinued      10/03/17 0835        Nuvia Garcia MD  Neurology  [facility]  "

## 2017-10-05 NOTE — PLAN OF CARE
Problem: Patient Care Overview  Goal: Plan of Care Review  Outcome: Ongoing (interventions implemented as appropriate)  POC reviewed with pt at 0500. Pt verbalized understanding. Questions and concerns addressed. No acute events overnight. Pt progressing toward goals. Will continue to monitor. See flowsheets for full assessment and VS info.  Pt still awaiting transfer from ICU to the floor.

## 2017-10-05 NOTE — PROGRESS NOTES
[facility]  Neurosurgery  Progress Note    Subjective:     History of Present Illness: Patient is a transfer from [facility] who presented for tonic-clonic seizure and AMS on 9/10 per OSH. CTH and follow-up MRI demonstrated L posterior temporal and occipital lobe lesion. 2x LP with analysis were negative for HSV, VZV, syphillis and HHV-6 were all negative and differential was unremarkable. Thorough autoimmune and infectious testing was also completed and returned without result. Patient is transferred to [facility] for further diagnostic work-up per NSGY and neurology. He remains altered on mentation with otherwise non-focal exam.    Post-Op Info:  Procedure(s) (LRB):  BIOPSY-BRAIN - WITH STEALTH (Left)   2 Days Post-Op     Interval History: NAEON    Medications:  Continuous Infusions:   Scheduled Meds:   atorvastatin  20 mg Oral QHS    carvedilol  25 mg Oral BID WM    ceFEPime (MAXIPIME) IVPB  2 g Intravenous Q8H    dexamethasone  4 mg Intravenous Q6H    lacosamide  200 mg Oral BID    metronidazole  500 mg Intravenous Q8H    pantoprazole  40 mg Intravenous Daily    polyethylene glycol  17 g Oral Daily    quetiapine  50 mg Oral QHS    ramelteon  8 mg Oral Once    thiamine  100 mg Oral Daily    vancomycin (VANCOCIN) IVPB  2,000 mg Intravenous Once    Followed by    [START ON 10/6/2017] vancomycin (VANCOCIN) IVPB  1,500 mg Intravenous Q12H     PRN Meds:acetaminophen, bisacodyl, dextrose 50%, dextrose 50%, gadobutrol, glucagon (human recombinant), glucose, glucose, hydrALAZINE, HYDROmorphone, insulin aspart, labetalol, magnesium oxide, magnesium oxide, ondansetron, ondansetron, potassium chloride 10%, potassium chloride 10%, potassium chloride 10%, potassium, sodium phosphates, potassium, sodium phosphates, potassium, sodium phosphates, promethazine (PHENERGAN) IVPB, sodium chloride 0.9%     Review of Systems   Unable to perform ROS: Mental status change     Objective:     Weight: 120.7  kg (266 lb)  Body mass index is 30.74 kg/m².  Vital Signs (Most Recent):  Temp: 97.9 °F (36.6 °C) (10/05/17 1500)  Pulse: 84 (10/05/17 1500)  Resp: (!) 24 (10/05/17 1500)  BP: 126/69 (10/05/17 1500)  SpO2: 99 % (10/05/17 1500) Vital Signs (24h Range):  Temp:  [97.5 °F (36.4 °C)-98.9 °F (37.2 °C)] 97.9 °F (36.6 °C)  Pulse:  [60-95] 84  Resp:  [13-47] 24  SpO2:  [93 %-100 %] 99 %  BP: (107-185)/() 126/69       Date 10/05/17 0700 - 10/06/17 0659   Shift 2721-9297 1434-5191 7443-4541 24 Hour Total   I  N  T  A  K  E   I.V.  (mL/kg) 300  (2.5)   300  (2.5)    IV Piggyback 650   650    Shift Total  (mL/kg) 950  (7.9)   950  (7.9)   O  U  T  P  U  T   Urine  (mL/kg/hr) 810  (0.8)   810    Shift Total  (mL/kg) 810  (6.7)   810  (6.7)   Weight (kg) 120.7 120.7 120.7 120.7                   Male External Urinary Catheter 10/03/17 0952 Medium (Active)   Collection Container Urimeter 10/5/2017  7:01 AM   Securement Method secured to top of thigh w/ adhesive device 10/5/2017  7:01 AM   Skin no redness;no breakdown 10/5/2017  7:01 AM   Tolerance no signs/symptoms of discomfort 10/5/2017  7:01 AM   Output (mL) 50 mL 10/5/2017  2:00 PM   Catheter Change Date 10/05/17 10/5/2017  7:01 AM   Catheter Change Time 1200 10/5/2017  7:01 AM       Arterial Sheath 09/28/17 0800 Right (Active)   Closure Device Angio seal 9/28/2017  8:51 AM       Physical Exam:  Vitals reviewed.    Constitutional: He appears well-developed and well-nourished. He is not diaphoretic. No distress.     Eyes: Pupils are equal, round, and reactive to light.     Neurological:   AOx1  [id number]       Significant Labs:    Recent Labs  Lab 10/04/17  0149 10/04/17  1152 10/05/17  0532   *  152*  152*  152*  --  121*     142  142  142  --  142   K 4.1  4.1  4.1  4.1  --  3.8     110  110  110  --  108   CO2 22*  22*  22*  22*  --  25   BUN 15  15  15  15  --  18   CREATININE 1.0  1.0  1.0  1.0  --  0.8   CALCIUM 8.7  8.7  8.7   8.7  --  9.1   MG 1.6  1.6  1.6 1.9 1.7       Recent Labs  Lab 10/04/17  0149 10/05/17  0532   WBC 12.35  12.35  12.35  12.35 10.41   HGB 11.1*  11.1*  11.1*  11.1* 11.5*   HCT 34.0*  34.0*  34.0*  34.0* 34.9*     326  326  326 290     No results for input(s): LABPT, INR, APTT in the last 48 hours.  Microbiology Results (last 7 days)     Procedure Component Value Units Date/Time    Culture, Respiratory with Gram Stain [[id number]] Collected:  10/05/17 1429    Order Status:  Sent Specimen:  Respiratory from Sputum, Expectorated Updated:  10/05/17 1430    Blood culture [[id number]] Collected:  10/05/17 1300    Order Status:  Sent Specimen:  Blood from Peripheral, Forearm, Left Updated:  10/05/17 1416    Blood culture [[id number]] Collected:  10/05/17 1235    Order Status:  Sent Specimen:  Blood from Peripheral, Upper Arm, Left Updated:  10/05/17 1415    CSF culture [[id number]] Collected:  09/28/17 1718    Order Status:  Completed Specimen:  CSF (Spinal Fluid) from CSF Tap, Tube 4 Updated:  10/03/17 0712     CSF CULTURE No Growth     Gram Stain Result Rare WBC's      No organisms seen        Recent Lab Results       10/05/17  1314 10/05/17  1248 10/05/17  0818 10/05/17  0540 10/05/17  0532      Procalcitonin  <0.02  Comment:  A concentration < 0.25 ng/mL represents a low risk bacterial   infection.  Procalcitonin may not be accurate among patients with localized   infection, recent trauma or major surgery, immunosuppressed state,   invasive fungal infection, renal dysfunction. Decisions regarding   initiation or continuation of antibiotic therapy should not be based   solely on procalcitonin levels.          Anion Gap     9     Baso #     0.00     Basophil%     0.0     BUN, Bld     18     Calcium     9.1     Chloride     108     Cholesterol 160  Comment:  The National Cholesterol Education Program (NCEP) has set the  following guidelines (reference ranges) for  Cholesterol:  Optimal.....................<200 mg/dL  Borderline High.............200-239 mg/dL  High........................> or = 240 mg/dL           CO2     25     Creatinine     0.8     Differential Method     Automated     eGFR if      >60.0     eGFR if non      >60.0  Comment:  Calculation used to obtain the estimated glomerular filtration  rate (eGFR) is the CKD-EPI equation. Since race is unknown   in our information system, the eGFR values for   -American and Non--American patients are given   for each creatinine result.       Eos #     0.0     Eosinophil%     0.0     Glucose     121(H)     Gran #     8.6(H)     Gran%     82.9(H)     HDL 29  Comment:  The National Cholesterol Education Program (NCEP) has set the  following guidelines (reference values) for HDL Cholesterol:  Low...............<40 mg/dL  Optimal...........>60 mg/dL  (L)         HDL/Chol Ratio 18.1(L)         Hematocrit     34.9(L)     Hemoglobin     11.5(L)     LDL Cholesterol 104.8  Comment:  The National Cholesterol Education Program (NCEP) has set the  following guidelines (reference values) for LDL Cholesterol:  Optimal.......................<130 mg/dL  Borderline High...............130-159 mg/dL  High..........................160-189 mg/dL  Very High.....................>190 mg/dL           Lymph #     1.0     Lymph%     9.1(L)     Magnesium     1.7     MCH     33.6(H)     MCHC     33.0     MCV     102(H)     Mono #     0.8     Mono%     7.4     MPV     10.7     Non-HDL Cholesterol 131  Comment:  Risk category and Non-HDL cholesterol goals:  Coronary heart disease (CHD)or equivalent (10-year risk of CHD >20%):  Non-HDL cholesterol goal     <130 mg/dL  Two or more CHD risk factors and 10-year risk of CHD <= 20%:  Non-HDL cholesterol goal     <160 mg/dL  0 to 1 CHD risk factor:  Non-HDL cholesterol goal     <190 mg/dL           Phosphorus     2.8     Platelets     290     POCT Glucose   114(H)  129(H)      Potassium     3.8     RBC     3.42(L)     RDW     13.8     Sodium     142     Total Cholesterol/HDL Ratio 5.5(H)         Triglycerides 131  Comment:  The National Cholesterol Education Program (NCEP) has set the  following guidelines (reference values) for triglycerides:  Normal......................<150 mg/dL  Borderline High.............150-199 mg/dL  High........................200-499 mg/dL           WBC     10.41                 10/04/17  2216 10/04/17  1701      Procalcitonin       Anion Gap       Baso #       Basophil%       BUN, Bld       Calcium       Chloride       Cholesterol       CO2       Creatinine       Differential Method       eGFR if        eGFR if non        Eos #       Eosinophil%       Glucose       Gran #       Gran%       HDL       HDL/Chol Ratio       Hematocrit       Hemoglobin       LDL Cholesterol       Lymph #       Lymph%       Magnesium       MCH       MCHC       MCV       Mono #       Mono%       MPV       Non-HDL Cholesterol       Phosphorus       Platelets       POCT Glucose 136(H) 109     Potassium       RBC       RDW       Sodium       Total Cholesterol/HDL Ratio       Triglycerides       WBC           All pertinent labs from the last 24 hours have been reviewed.    Significant Diagnostics:  CT: No results found in the last 24 hours.  MRI: No results found in the last 24 hours.  I have reviewed all pertinent imaging results/findings within the past 24 hours.    Assessment/Plan:     Encephalitis and encephalomyelitis, unspecified    61M who presents from OSH with presenting sx on 9/10 of AMS and tonic-clonic seizure for further work-up.    Neuro  -- cerebral angiogram 9/28: narrowing of left distal MCA arteries possibly d/t vasculitis or secondary to cerebral edema  -- Continue dex4q6  -- Neurology arranged lumbar puncture for infectious workup per ID and paraneoplastic panel   -CSF culture NG, viral culture neg, ACE and Paraneoplastic  panel pending   -Continue to follow neuro reccs  -- Underwent bx 10/3   -Pain control with PO/IV tylenol   -Post op CTH acceptable   -Continue Vimpat for seizure ppx   -Follow-up final Path when avaliable  -- May continue scheduled seroquel for aggitation  -- Continue ramelteon  -- Patient stable for TTF; return to primary service   -Neurosurgery will continue to follow peripherally    CVS:  -- SBP strict <160 on Cardene    -hydralazine and labetalol PRN for breakthrough    Pulm:  -Aggressive pulmonary management    Heme/onc/ID  -- CBC/CMP/Coags QD    F/E/GI:  -- Start PPI  -- Laxatives  -- Progress diet as tolerated    Ppx:  -- PPI  -- DVT ppx     Dispo:  -- Transitioned back to Neurology             Agustin Henderson MD  Neurosurgery  [facility]

## 2017-10-05 NOTE — SUBJECTIVE & OBJECTIVE
Subjective:     Interval History:   Patient did well overnight with some agitation approx 3 am, but able to be calmed down and returned to sleep.  Largely coherent this am with occasional tangential speech.  Discussed plan with wife at bedside for continued monitoring, steroids, PT/OT, and delirium precautions with additional sleep medications as needed while awaiting brain bx results.  No questions/concerns at this time.    Current Neurological Medications: Dexamethasone    Current Facility-Administered Medications   Medication Dose Route Frequency Provider Last Rate Last Dose    acetaminophen tablet 650 mg  650 mg Oral Q6H PRN Khoi Hinojosa PA-C   650 mg at 10/03/17 1749    atorvastatin tablet 20 mg  20 mg Oral QHS Maren Li MD   20 mg at 10/04/17 2038    bisacodyl suppository 10 mg  10 mg Rectal Daily PRN Gabino Rich MD        carvedilol tablet 25 mg  25 mg Oral BID WM Maren Li MD   25 mg at 10/05/17 0749    ceFEPIme in dextrose 5% 2 gram/50 mL IVPB 2 g  2 g Intravenous Q8H Norma Negrete  mL/hr at 10/05/17 1325 2 g at 10/05/17 1325    dexamethasone injection 4 mg  4 mg Intravenous Q6H Gregor Abbott MD   4 mg at 10/05/17 1325    dextrose 50% injection 12.5 g  12.5 g Intravenous PRN Maren Li MD        dextrose 50% injection 25 g  25 g Intravenous PRN Maren Li MD        gadobutrol 10 mL  10 mL Intravenous ONCE PRN Gregor Abbott MD        glucagon (human recombinant) injection 1 mg  1 mg Intramuscular PRN Maren Li MD        glucose chewable tablet 16 g  16 g Oral PRN Maren Li MD        glucose chewable tablet 24 g  24 g Oral PRN Maren Li MD        hydrALAZINE injection 10 mg  10 mg Intravenous Q4H PRN Gene Wheeler NP   10 mg at 10/05/17 0749    HYDROmorphone injection 0.2 mg  0.2 mg Intravenous Q5 Min PRN Peyman Holder MD        insulin aspart pen 1-10 Units  1-10 Units Subcutaneous QID (AC + HS) PRN Khoi Hinojosa PA-C   2 Units at  10/03/17 1748    labetalol injection 10 mg  10 mg Intravenous Q4H PRN Gene Wheeler NP   10 mg at 10/05/17 0626    lacosamide tablet 200 mg  200 mg Oral BID Maren Li MD   200 mg at 10/05/17 0906    magnesium oxide tablet 800 mg  800 mg Oral PRN Minna Conley PA-C   800 mg at 10/04/17 0757    magnesium oxide tablet 800 mg  800 mg Oral PRN Minna Conley PA-C        metronidazole IVPB 500 mg  500 mg Intravenous Q8H Norma Negrete  mL/hr at 10/05/17 1325 500 mg at 10/05/17 1325    ondansetron disintegrating tablet 8 mg  8 mg Oral Q8H PRN Gabino Rich MD        ondansetron injection 4 mg  4 mg Intravenous Daily PRN Peyman Holder MD        pantoprazole injection 40 mg  40 mg Intravenous Daily Gabino Rich MD   40 mg at 10/05/17 0906    polyethylene glycol packet 17 g  17 g Oral Daily Gabino Rich MD   17 g at 10/05/17 0906    potassium chloride 10% solution 40 mEq  40 mEq Oral PRN Minna Conley PA-C        potassium chloride 10% solution 40 mEq  40 mEq Oral PRN Minna Conley PA-C        potassium chloride 10% solution 60 mEq  60 mEq Oral PRN Minna Conley PA-C        potassium, sodium phosphates 280-160-250 mg packet 2 packet  2 packet Oral PRN FERNANDA CurryC   2 packet at 10/04/17 0757    potassium, sodium phosphates 280-160-250 mg packet 2 packet  2 packet Oral PRN Minna MeloaJACOB        potassium, sodium phosphates 280-160-250 mg packet 2 packet  2 packet Oral PRN Minna Conley PA-C        promethazine (PHENERGAN) 6.25 mg in dextrose 5 % 50 mL IVPB  6.25 mg Intravenous Q10 Min PRN Peyman Holder MD        quetiapine tablet 50 mg  50 mg Oral QHS Gregor Abbott MD   50 mg at 10/04/17 2038    ramelteon tablet 8 mg  8 mg Oral Once Mishel Vargas PA-C        sodium chloride 0.9% flush 3 mL  3 mL Intravenous PRN Peyman Holder MD        thiamine tablet 100 mg  100 mg Oral Daily Maren Li MD   100 mg at 10/05/17 0906    vancomycin (VANCOCIN)  2,000 mg in dextrose 5 % 500 mL IVPB  2,000 mg Intravenous Once Norma Negrete  mL/hr at 10/05/17 1400 2,000 mg at 10/05/17 1400    Followed by    [START ON 10/6/2017] vancomycin 1.5 g in 5 % dextrose 250 mL IVPB  1,500 mg Intravenous Q12H Norma Negrete NP         Review of Systems   Unable to perform ROS: Mental status change     Objective:     Vital Signs (Most Recent):  Temp: 98.4 °F (36.9 °C) (10/05/17 1100)  Pulse: 85 (10/05/17 1400)  Resp: 19 (10/05/17 1400)  BP: 117/79 (10/05/17 1200)  SpO2: 99 % (10/05/17 1400) Vital Signs (24h Range):  Temp:  [97.5 °F (36.4 °C)-99.1 °F (37.3 °C)] 98.4 °F (36.9 °C)  Pulse:  [60-95] 85  Resp:  [13-47] 19  SpO2:  [93 %-100 %] 99 %  BP: (107-185)/() 117/79     Weight: 120.7 kg (266 lb)  Body mass index is 30.74 kg/m².    Physical Exam  General:  Well-developed, well-nourished, nad  HEENT:  NCAT, PERRLA, EOMI, oropharygneal membranes non-erythematous/without exudate  Neck:  Supple, no palpable lad, no nuchal rigidity  Resp:  Symmetric expansion, no increased wob  CVS:  No LE edema, peripheral pulses 2+ (radial, dorsalis pedis)  GI:  Abd soft, non-distended, non-tender to palpation     Neurologic Exam:  Mental Status:  Awake, alert, oriented to self and wife.  Occasional aphasia, some trouble naming objects and paraphasias.  Responds appropriately to most questions, able to follow complex commands.  Seems improved 10/5 compared to 10/4.  Cranial Nerves:  VFs intact on blinking to threat bilaterally.  PERRLA, EOMI.  Facial movements and sensation intact, symmetric.  Palate raises symmetrically, tongue protrudes midline.  SCM/Trapezius 5/5.  Motor:  Normal bulk and tone.  Strength diffusely 5/5, symmetric.  Sensory:  Intact to light touch, temperature at all extremities.    Reflexes:  Biceps, brachioradialis, patellar 2+ bilaterally.  No ankle clonus.  Downgoing toes bilaterally.  Coordination:  FTN, BRENDA, HTS with no ataxia, no dysmetria, no  dysdiadochokinesia.  Gait:  Deferred 2/2 ICU s/p brain bx.    Significant Labs:     Recent Labs  Lab 10/05/17  0532   WBC 10.41   RBC 3.42*   HGB 11.5*   HCT 34.9*      *   MCH 33.6*   MCHC 33.0       Recent Labs  Lab 10/05/17  0532   CALCIUM 9.1      K 3.8   CO2 25      BUN 18   CREATININE 0.8     Significant Imaging:   10/04/17 CT head w/o contrast:  Postoperative changes with interval left parietal craniotomy for open biopsy of the left temporoparietal region. Trace volume of postoperative pneumocephalus and fluid is noted underlying the craniotomy site. No evidence of significant intracranial hemorrhage, midline shift, or hydrocephalus.   Stable appearing nonspecific abnormal region of parenchymal edema within the left temporoparietal region, similar in extent to prior MRI examination.    10/03/17 MRI Spectroscopy, Brain Stealth:  MRI brain demonstrates stable left posterior temporal/parietal edema with associated sulcal vascular prominence and swelling of the left temporal lobe when compared to MR examination 09/25/2017, however progressed when compared to 09/10/2017. Findings suggest encephalitis (such as herpes), other inflammatory etiology, or seizure-related edema.  Tumor is less likely, however cannot be excluded.  Time pattern are does not follow a course that would suggest ischemia/infarction.  Stealth protocol MRI performed for purposes of procedural localization.  MR spectroscopy of the area of interest in the left temporoparietal lobe is overall nonspecific and nondiagnostic.    09/28/17 IR angiogram carotid cerebral bilateral inc arch:  POSTOPERATIVE DIAGNOSIS(ES):    Narrowing / beating appearance of small distal inferior-posterior left MCA territory.  Note: It is unclear whether is this a primary process or sequelae of swelling noted on MRI.  If it is to be a considered a primary process then vasculitis could be questioned, however no other region of vascular abnormality  is identified to support this.    09/22/17 MRI Lumbar Spine:  1.  Old mild compression of L1  2.  Abnormal bone marrow signal and enhancement of the L4 vertebral body with mild decreased height suggesting a mild compression fracture there is fluid and this cannot exclude infection or pathologic fracture  3.  Degenerative changes as above    09/22/17 MRI Thoracic Spine:  1. Bone marrow edema and enhancement of the vertebral body of T11 with loss of body height of 30% suggesting a probable acute compression fracture.  Cannot exclude this pathologic or infection due to clinical history  2.  Degenerative changes as above greatest T7-8.  3.  Old compression fractures of L1-, T4 and T5    09/22/17 MRI Cervical Spine:   Degenerative changes as above.  Mild faint enhancement along cord probably normal physiologic with no definite focal enhancing lesion    09/22/17 MRI Brain w/ w/o contrast:  1.  Significant increase in the T2 signal in the left temporal lobe particularly increased in the left posterior parietal lobe with some serpiginous meningeal enhancement suspicious for encephalitis.  2.  Punctate areas of diffusion signal in the left posterior parietal lobe suspicious for punctate areas of acute ischemia versus less likely shine through recommend followup

## 2017-10-05 NOTE — SUBJECTIVE & OBJECTIVE
Past Medical History:   Diagnosis Date    Acid reflux     Arthritis     Elevated cholesterol     Hypertension        Past Surgical History:   Procedure Laterality Date    JOINT REPLACEMENT Right 04/20/2017    uneventful recovery    KNEE ARTHROPLASTY Right 02/2007    WISDOM TOOTH EXTRACTION         Review of patient's allergies indicates:   Allergen Reactions    Keppra [levetiracetam] Other (See Comments)     How trouble speaking , agitation       Medications:  Prescriptions Prior to Admission   Medication Sig    carvedilol (COREG) 25 MG tablet Take 25 mg by mouth 2 (two) times daily with meals.    aspirin (ECOTRIN) 81 MG EC tablet Take 81 mg by mouth once daily.    atorvastatin (LIPITOR) 20 MG tablet Take 20 mg by mouth every evening.    cyclobenzaprine (FLEXERIL) 10 MG tablet Take 10 mg by mouth 3 (three) times daily.    irbesartan-hydrochlorothiazide (AVALIDE) 150-12.5 mg per tablet Take 1 tablet by mouth once daily.     levetiracetam (KEPPRA) 500 MG Tab Take 500 mg by mouth 2 (two) times daily.    niacin (NIASPAN) 1000 MG CR tablet Take 1,000 mg by mouth once daily.    omeprazole (PRILOSEC) 40 MG capsule Take 40 mg by mouth once daily.     Antibiotics     Start     Stop Route Frequency Ordered    10/06/17 0100  vancomycin 1.5 g in 5 % dextrose 250 mL IVPB      -- IV Every 12 hours (non-standard times) 10/05/17 1249    10/05/17 1245  ceFEPIme in dextrose 5% 2 gram/50 mL IVPB 2 g      10/13 1244 IV Every 8 hours (non-standard times) 10/05/17 1132    10/05/17 1245  metronidazole IVPB 500 mg      10/13 1244 IV Every 8 hours (non-standard times) 10/05/17 1132        Antifungals     None        Antivirals     None             There is no immunization history on file for this patient.    Family History     Problem Relation (Age of Onset)    Cancer Mother    Diabetes Father        Social History     Social History    Marital status:      Spouse name: N/A    Number of children: N/A    Years of  education: N/A     Social History Main Topics    Smoking status: Former Smoker    Smokeless tobacco: Never Used      Comment: quit 17 yrs ago    Alcohol use 16.2 oz/week     3 Glasses of wine, 24 Cans of beer per week    Drug use: No    Sexual activity: Yes     Partners: Female     Birth control/ protection: Other-see comments      Comment: same partner many years; wife is postmenopausal     Other Topics Concern    None     Social History Narrative    None     Review of Systems   Unable to perform ROS: Mental status change     Objective:     Vital Signs (Most Recent):  Temp: 97.9 °F (36.6 °C) (10/05/17 1500)  Pulse: 84 (10/05/17 1500)  Resp: (!) 24 (10/05/17 1500)  BP: 126/69 (10/05/17 1500)  SpO2: 99 % (10/05/17 1500) Vital Signs (24h Range):  Temp:  [97.5 °F (36.4 °C)-98.9 °F (37.2 °C)] 97.9 °F (36.6 °C)  Pulse:  [60-95] 84  Resp:  [13-47] 24  SpO2:  [93 %-100 %] 99 %  BP: (107-185)/() 126/69     Weight: 120.7 kg (266 lb)  Body mass index is 30.74 kg/m².    Estimated Creatinine Clearance: 143.2 mL/min (based on SCr of 0.8 mg/dL).    Physical Exam   Constitutional: He appears well-developed and well-nourished.   HENT:   Right Ear: External ear normal.   Left Ear: External ear normal.   Nose: Nose normal.   Mouth/Throat: Oropharyngeal exudate present.   Clean surgical scar in left parietal region of the head with staples present   Eyes: Pupils are equal, round, and reactive to light. Right eye exhibits no discharge. Left eye exhibits no discharge. No scleral icterus.   Neck: Normal range of motion. Neck supple.   Cardiovascular: Normal rate, regular rhythm, normal heart sounds and intact distal pulses.  Exam reveals no friction rub.    No murmur heard.  Pulmonary/Chest: Effort normal and breath sounds normal. No respiratory distress. He has no wheezes.   Abdominal: Soft. Bowel sounds are normal. He exhibits no distension. There is no tenderness. There is no guarding.   Musculoskeletal: Normal range of  motion.   Neurological: He is alert.   Follows commands.  Oriented to person.   Skin: Skin is warm and dry. No rash noted. No erythema. No pallor.   Nursing note and vitals reviewed.      Significant Labs:   Blood Culture:   Recent Labs  Lab 09/09/17  2040   LABBLOO No growth after 5 days.     CBC:   Recent Labs  Lab 10/04/17  0149 10/05/17  0532   WBC 12.35  12.35  12.35  12.35 10.41   HGB 11.1*  11.1*  11.1*  11.1* 11.5*   HCT 34.0*  34.0*  34.0*  34.0* 34.9*     326  326  326 290       Significant Imaging: I have reviewed all pertinent imaging results/findings within the past 24 hours.

## 2017-10-05 NOTE — PLAN OF CARE
10/05/17 0904   Discharge Reassessment   Assessment Type Discharge Planning Reassessment   Provided patient/caregiver education on the expected discharge date and the discharge plan No   Do you have any problems affording any of your prescribed medications? No   Discharge Plan A Other  (to be re-evaluated when medically stable.)   Discharge Plan B Home with family;Home Health   Patient choice form signed by patient/caregiver No   Can the patient answer the patient profile reliably? Yes, cognitively intact   How does the patient rate their overall health at the present time? Fair   Describe the patient's ability to walk at the present time. Major restrictions/daily assistance from another person   How often would a person be available to care for the patient? Whenever needed   Number of comorbid conditions (as recorded on the chart) Five or more   During the past month, has the patient often been bothered by feeling down, depressed or hopeless? No   During the past month, has the patient often been bothered by little interest or pleasure in doing things? No     Discharge plan: to be re-evaluate when medically appropriate.     Giuliana Malave RN/MARIE  [phone]  [facility]

## 2017-10-05 NOTE — PROGRESS NOTES
Patient identified via spelling of name and date of birth. Patient denies blood transfusion reaction. Consent obtained for use of contrast. Optison 3ml IVP vorfrandy Alonzo. Saline lock flushed pre and post use in right wrist under aseptic technique. Optison 3ml IVP used as contrast for 2 d imaging.  Tolerated procedure well.

## 2017-10-05 NOTE — ASSESSMENT & PLAN NOTE
- goal SBP<160  -labetalol, hydralazine prn  -carvidilol 25 mg BID   Echo: 1 - No wall motion abnormalities.     2 - Normal left ventricular systolic function (EF 55-60%).     3 - Upper limit of normal aortic root.     4 - Normal left ventricular diastolic function.     5 - Normal right ventricular systolic function .     6 - Trivial aortic regurgitation.

## 2017-10-06 NOTE — PLAN OF CARE
ROSS met with Pt family at bedside. Discussed rehab recs and gave list. Family reported they would prefer [facility].     [facility]    Bridgett Martinez LMSW  Neurocritical Care   [facility]  [id number]

## 2017-10-06 NOTE — PT/OT/SLP EVAL
"Speech Language Pathology Re-Evaluation    Tommy Way   MRN: [id number]   Admitting Diagnosis: Encephalopathy    Diet recommendations: Solid Diet Level: Regular  Liquid Diet Level: Thin - not assessed by speech service diet advanced by medical team     SLP Treatment Date: 10/06/17  Speech Start Time: 0907     Speech Stop Time: 0932     Speech Total (min): 25 min       TREATMENT BILLABLE MINUTES:  Eval 25     Diagnosis: Encephalopathy    Past Medical History:   Diagnosis Date    Acid reflux     Arthritis     Elevated cholesterol     Hypertension      Past Surgical History:   Procedure Laterality Date    JOINT REPLACEMENT Right 04/20/2017    uneventful recovery    KNEE ARTHROPLASTY Right 02/2007    WISDOM TOOTH EXTRACTION         Has the patient been evaluated by SLP for swallowing? 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 [employer]     Subjective:  Awake, alert and calm  "it's not all peaches and cream" Pt referring to overall confusion     Pain/Comfort  Pain Rating 1: 0/10  Pain Rating Post-Intervention 1: 0/10     Objective:    Oral Musculature Evaluation  Oral Musculature: WFL  Dentition: present and adequate  Mucosal Quality: good  Mandibular Strength and Mobility: WFL  Oral Labial Strength and Mobility: WFL  Lingual Strength and Mobility: WFL  Velar Elevation: WFL  Buccal Strength and Mobility: WFL  Volitional Cough: WFL  Volitional Swallow: WFL  Voice Prior to PO Intake: Clear, dry     Cognitive Status:  Behavioral Observations: alert, appropriate, confused, cooperative and distractible-  Memory and Orientation: oriented to self, location; memory was unable to truly assess 2/2 underlying confusion   Attention: easily distractible warrants moderate verbal and visual cueing to maintain " "attention to task   Problem Solving: simple problems completed w 50% acc with min cues,   Pragmatics: WFL   Executive Function: insight/awareness, mental flexibility and organization- impairments evident    Language:     Auditory Comprehension: able to identify objects, Pt able to follow commands single step commands with verbal and visual cues with 60% acc, comprehension skills appear significantly impacted by underlying attention deficits.     Verbal Expression: automatic speech- 40% acc independently, Conversationally Pt using rote phrases and appears nazario to mask impairments.  Pt was able to label items independently with SLP semantic cues. Pt verbal responses comprised of phonemic paraphasias "[place]ill" for "ship." Pt intermittently perseverative in nature and using random letters and numbers in between true words to communicate wants and needs.     Motor Speech: WFL     Voice: WNL    Reading: single words WNL; ongoing assessment warranted     Writing: writing to dictation of single words was significantly impaired, Pt did write his first name legibly but continued to repeat writing his first name warranting moderate verbal cueing to write his last name as well     Visual-Spatial:unable to completed clock drawing task or complete simple drawing taks    Additional Treatment:  Education provided to Pt and Pt spouse re: cues to enhance communication skills such as phonemic and semantic cueing,     FIM:  Social Interaction: 3 Moderate Direction--The patient interacts appropriately 50 to 74% of the time.   Problem Solving: 3 Moderate Direction--The patient solves routine problems 50 to 74% of the time.   Comprehension: 3 Moderate Prompting--The patient understands directions and conversation about basid daily needs 50 to 74% of the time.   Expression: 4 Minimal Prompting--The patient expresses basic daily needs and ideas 75 to 90% of the time.   Memory: 2 Maximal Prompting--The patien recognizes and remembers 25 to 49% " of the time, and needs prompting more than half the time.     Assessment:  Tommy Way is a 61 y.o. male with a SLP diagnosis of Aphasia and Cognitive-Linguistic Impairment.      Discharge recommendations: Discharge Facility/Level Of Care Needs: rehabilitation facility     Goals:    SLP Goals        Problem: SLP Goal    Goal Priority Disciplines Outcome   SLP Goal     SLP Ongoing (interventions implemented as appropriate)   Description:  Speech Language Pathology  Goals expected to be met by 10/13:    1. Pt will participate in automatic speech tasks with 100% acc independently  2. Pt will provide 5 items within a concrete category with min cues   3. Pt will follow 1-step directions with 80% accuracy independently  4. Pt will complete simple problem solving tasks with 80% acc independently   5. Pt will complete simple writing tasks with 75% acc with min cues   6. Pt will complete cancellation tasks with 75% acc with min cues                        Plan:   Patient to be seen Therapy Frequency: 5 x/week   Plan of Care expires: 11/04/17  Plan of Care reviewed with: patient, spouse  SLP Follow-up?: Yes              Juliana Bourgeois CCC-SLP  10/06/2017

## 2017-10-06 NOTE — PROGRESS NOTES
"  [facility]  Adult Nutrition  Consult Note    SUMMARY     Recommendations    1. Continue current regular diet.   2. RD to monitor & follow-up.    Goals: PO intake >50%  Nutrition Goal Status: new, goal met  Communication of RD Recs: reviewed with RN    Reason for Assessment    Reason for Assessment: length of stay  Diagnosis: other (see comments) (Encephalopathy)  Relevent Medical History: HTN   Interdisciplinary Rounds: did not attend     General Information Comments: Pt with good appetite, consuming 100% of meals.  Nutrition Discharge Planning: Adequate PO intake    Nutrition Prescription Ordered    Current Diet Order: Regular    Nutrition/Diet History    Patient Reported Diet/Restrictions/Preferences: general     Factors Affecting Nutritional Intake: other (see comments) (None)    Labs/Tests/Procedures/Meds    Pertinent Labs Reviewed: reviewed, pertinent  Pertinent Labs Comments: Stable  Pertinent Medications Reviewed: reviewed, pertinent    Physical Findings    Overall Physical Appearance: overweight  Oral/Mouth Cavity: WDL  Skin: incision    Anthropometrics    Height: 6' 6" (198.1 cm)  Weight Method: Bed Scale  Weight: 120.7 kg (266 lb 1.5 oz)    Ideal Body Weight (IBW), Male: 214 lb  % Ideal Body Weight, Male (lb): 124.35 lb     BMI (Calculated): 30.8  BMI Grade: 30 - 34.9- obesity - grade I     Assessment and Plan    No nutritional dx at this time.    Monitor and Evaluation    Food and Nutrient Intake: energy intake, food and beverage intake  Food and Nutrient Adminstration: diet order     Physical Activity and Function: nutrition-related ADLs and IADLs  Anthropometric Measurements: weight change, weight, body mass index  Biochemical Data, Medical Tests and Procedures: lipid profile, inflammatory profile, glucose/endocrine profile, gastrointestinal profile, electrolyte and renal panel  Nutrition-Focused Physical Findings: overall appearance    Nutrition Risk    Level of Risk: other (see " comments) (1x/week)    Nutrition Follow-Up    RD Follow-up?: Yes

## 2017-10-06 NOTE — ASSESSMENT & PLAN NOTE
-POD2 s/p brain bx per NSGY  -Non conclusive left temporal edema workup. Possible vasculitis VS infection VS neoplasm VS encephalomyelitis. 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

## 2017-10-06 NOTE — PLAN OF CARE
Problem: SLP Goal  Goal: SLP Goal  Speech Language Pathology  Goals expected to be met by 10/13:    1. Pt will participate in automatic speech tasks with 100% acc independently  2. Pt will provide 5 items within a concrete category with min cues   3. Pt will follow 1-step directions with 80% accuracy independently  4. Pt will complete simple problem solving tasks with 80% acc independently   5. Pt will complete simple writing tasks with 75% acc with min cues   6. Pt will complete cancellation tasks with 75% acc with min cues      Pt participate in speech- cognitive re-evaluation. Pt with evident cognitive and communication impairments. Speech to continue to follow     Juliana Bourgeois MA, CCC-SLP  Speech Language Pathologist  Pager: [phone]  Date 10/6/2017

## 2017-10-06 NOTE — PT/OT/SLP RE-EVAL
Physical Therapy  Re-evaluation/ Treatment    Tommy Way   MRN: [id number]   Admitting Diagnosis: Encephalopathy s/p Biopsy brain c/ stealth    PT Received On: 10/06/17  PT Start Time: 0844     PT Stop Time: 0911    PT Total Time (min): 27 min       Billable Minutes:  Re-eval 15 and Therapeutic Exercise 12    Diagnosis: Encephalopathy s/p Biopsy brain c/ stealth  Co-reeval c/ OT    Past Medical History:   Diagnosis Date    Acid reflux     Arthritis     Elevated cholesterol     Hypertension       Past Surgical History:   Procedure Laterality Date    JOINT REPLACEMENT Right 04/20/2017    uneventful recovery    KNEE ARTHROPLASTY Right 02/2007    WISDOM TOOTH EXTRACTION         Referring physician: Norma Negrete NP  Date referred to PT: 10/5/17    General Precautions: Standard, fall, seizure  Orthopedic Precautions: N/A   Braces: N/A            Patient History:  Living Environment: Lives c/ wife in 2SH.  Able to live on 1st floor.  DME owned (not currently used): none    Previous Level of Function:  Ambulation Skills: independent  Transfer Skills: independent  ADL Skills: independent  Work/Leisure Activity: independent    Subjective:  Communicated with nsg prior to session.  Pt agreeable to session  Chief Complaint: Having to stay in bed  Patient goals: return home    Pain/Comfort  Pain Rating 1: 0/10  Pain Rating Post-Intervention 1: 0/10    Objective:   Patient found with: nugent catheter, telemetry, supine in bed     Cognitive Exam:  Oriented to: Person, Place, Time and Situation    Follows Commands/attention: Follows one-step commands  Communication: trouble c/ word finding  Safety awareness/insight to disability: impaired    Physical Exam:  Postural examination/scapula alignment: No postural abnormalities identified    Skin integrity: Visible skin intact  Edema: None noted     Sensation:   Intact  light/touch BLE    Lower Extremity Range of Motion:  Right Lower Extremity: WNL  Left Lower Extremity:  WNL    Lower Extremity Strength:  Right Lower Extremity: 5/5 hip flex, knee ext/ flex, ank df/pf  Left Lower Extremity: 5/5 hip flex, knee ext/ flex, ank df/pf     Fine motor coordination:  Intact  RLE heel shin and LLE heel shin    Gross motor coordination: WFL    Functional Mobility:  Bed Mobility:  Rolling/Turning Right: Contact guard assistance  Scooting/Bridging: Contact Guard Assistance  Supine to Sit: Contact Guard Assistance  Sit to Supine: Contact Guard Assistance    Transfers:  Sit <> Stand Assistance: Contact Guard Assistance  Sit <> Stand Assistive Device: Rolling Walker    Gait:   Gait Distance: 10 feet in room c/ CGA and RW. verbal cues given for safety  Assistance 1: Contact Guard Assistance  Gait Assistive Device: Rolling walker  Gait Pattern: reciprocal  Gait Deviation(s): increased time in double stance, decreased velocity of limb motion, decreased step length, decreased stride length        Balance:   Static Sit: NORMAL: No deviations seen in posture held statically  Dynamic Sit: NORMAL: No deviations seen in posture held dynamically  Static Stand: GOOD: Takes MODERATE challenges from all directions  Dynamic stand: FAIR+: Needs CLOSE SUPERVISION during gait and is able to right self with minor LOB    Therapeutic Activities and Exercises:  Education on  - PT role and PT POC  - safety while peforming EOB,OOB and ambulation activities    Therex  - Seated h.s stretch [id number]  - Seated heel cord stretch [id number]  - Seated LAQ x10 BLE  - Seated marches x 10 BLE    White board updated,safe to t/f c/ nsg     AM-PAC 6 CLICK MOBILITY  How much help from another person does this patient currently need?   1 = Unable, Total/Dependent Assistance  2 = A lot, Maximum/Moderate Assistance  3 = A little, Minimum/Contact Guard/Supervision  4 = None, Modified [place]/Independent    Turning over in bed (including adjusting bedclothes, sheets and blankets)?: 3  Sitting down on and standing up from a chair with arms  (e.g., wheelchair, bedside commode, etc.): 3  Moving from lying on back to sitting on the side of the bed?: 3  Moving to and from a bed to a chair (including a wheelchair)?: 3  Need to walk in hospital room?: 3  Climbing 3-5 steps with a railing?: 2  Total Score: 17     AM-PAC Raw Score CMS G-Code Modifier Level of Impairment Assistance   6 % Total / Unable   7 - 9 CM 80 - 100% Maximal Assist   10 - 14 CL 60 - 80% Moderate Assist   15 - 19 CK 40 - 60% Moderate Assist   20 - 22 CJ 20 - 40% Minimal Assist   23 CI 1-20% SBA / CGA   24 CH 0% Independent/ Mod I     Patient left up in chair with all lines intact, call button in reach, nsg notified and wife present.    Assessment:   Tommy Way is a 61 y.o. male with a medical diagnosis of Encephalopathy s/p Biopsy brain c/ stealth and presents with impr strength compared to last session. Pt displays impulsive behaviors during session and decr safety awareness.  PT will continue to follow pt to work on balance and coordination to return to PLOF.   Pt does demo some deficits c/ dual tasking and word finding that are limiting return to PLOF.  Pt remains appropriate for continued skilled services and discharge to postacute location to address the below deficits and improve pt return to PLOF.        Rehab identified problem list/impairments: Rehab identified problem list/impairments: weakness, impaired endurance, impaired self care skills, impaired functional mobilty, gait instability, impaired coordination, impaired cognition, impaired balance, decreased safety awareness    Rehab potential is good.    Activity tolerance: Good    Discharge recommendations: Discharge Facility/Level Of Care Needs: rehabilitation facility     Barriers to discharge: Barriers to Discharge: Decreased caregiver support    Equipment recommendations: Equipment Needed After Discharge: bath bench     GOALS:    Physical Therapy Goals        Problem: Physical Therapy Goal    Goal Priority  Disciplines Outcome Goal Variances Interventions   Physical Therapy Goal     PT/OT, PT Ongoing (interventions implemented as appropriate)     Description:  Goals to be met by: 7 days (10/13)    Patient will increase functional independence with mobility by performin. Supine to sit with Set-up [place]  2. Sit to supine with Set-up [place]  3. Sit to stand transfer with Contact Guard Assistance-met   Revised: sit to stand transfer with Supervision   4. Gait  x 100 feet with Contact Guard Assistance using Rolling Walker.   5. Stand for 5 minutes with Stand-by Assistance using Rolling Walker  6. Lower extremity exercise program x30 reps per handout, with independence to improve muscular strength and endurance.                         PLAN:    Patient to be seen 5 x/week to address the above listed problems via gait training, therapeutic activities, therapeutic exercises, neuromuscular re-education  Plan of Care expires: 10/29/17  Plan of Care reviewed with: patient, spouse          Buddy Tabor, RONEN  10/06/2017

## 2017-10-06 NOTE — ASSESSMENT & PLAN NOTE
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  Extensive studies on CSF fluid failed to yield a diagnosis.  He was started on steroids.  He is still not fully oriented but he does appear slightly better than he did last week.  His sister feels that once he was started on steroids he has made some improvement.  The operative report from his brain biopsy suggested that the affected brain tissue had a 'musky appearance' to it.      -No samples were sent for culture.    -continue ceftriaxone 2 grams IV q 12 hours  -continue vancomycin (goal trough of 15-20)  -continue metronidazole  -follow up on pathology results

## 2017-10-06 NOTE — PT/OT/SLP RE-EVAL
Occupational Therapy  Re-evaluation    Tommy Way   MRN: [id number]   Admitting Diagnosis: Encephalopathy    OT Date of Treatment: 10/06/17   OT Start Time: 0842  OT Stop Time: 0912  OT Total Time (min): 30 min    Billable Minutes:  Re-eval 20  Therapeutic Activity 10    Diagnosis: Encephalopathy       Past Medical History:   Diagnosis Date    Acid reflux     Arthritis     Elevated cholesterol     Hypertension       Past Surgical History:   Procedure Laterality Date    JOINT REPLACEMENT Right 04/20/2017    uneventful recovery    KNEE ARTHROPLASTY Right 02/2007    WISDOM TOOTH EXTRACTION         Referring physician: 10/5/17  Date referred to OT: Norma Negrete NP    General Precautions: Standard, fall, seizure  Orthopedic Precautions: N/A  Braces: N/A    Do you have any cultural, spiritual, Jainism conflicts, given your current situation?: no issues     Patient History:  Living Environment  Lives With:  Pt lives with wife in [place] in a 2SH with bed/bath on first and second floor. No steps to enter and tub-shower combo.  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 Tub/shower.) PTA, pt reports being I with ADL tasks and functional mobility.   Transportation Available: family or friend will provide  Equipment Currently Used at Home: bedside commode, walker, rolling, cane, straight    Prior level of function:   Bed Mobility/Transfers: independent  Grooming: independent  Bathing: independent  Upper Body Dressing: independent  Lower Body Dressing: independent  Toileting: independent  Home Management Skills: independent    Dominant hand: left    Subjective:  Communicated with RN prior to session.  Pt agreeable to participate with therapy. Wife present throughout.   Chief Complaint: No complaints  Patient/Family stated goals: Return to PLOF.     Pain/Comfort  Pain Rating 1: 0/10  Pain Rating Post-Intervention 1: 0/10    Objective:  Patient found with: nugent catheter, telemetry    Cognitive Exam:  Oriented to:  Person  Follows Commands/attention: Easily distracted and Follows one-step commands-- difficulty with multi-step  Communication: Difficulty with word finding;  unintelligible at times  Memory:  Impaired  Safety awareness/insight to disability: impaired  Coping skills/emotional control: Appropriate to situation    Visual/perceptual:  Intact    Physical Exam:  Postural examination/scapula alignment: Rounded shoulder and Head forward  Skin integrity: Visible skin intact  Edema: None noted     Sensation:   Intact    Upper Extremity Range of Motion:  Right Upper Extremity: WFL  Left Upper Extremity: WFL    Upper Extremity Strength:  Right Upper Extremity: WFL  4+/5  Left Upper Extremity: WFL  4+/5   Strength: WFL    Fine motor coordination:   Impaired  Left hand, finger to nose , Right hand, finger to nose, Left hand thumb/finger opposition skills , Right hand thumb/finger opposition skills , Left hand, manipulation of objects , Right hand, manipulation of objects , Left hand, diadochokinesis skill  and Right hand, diadochokinesis skill     Gross motor coordination: Impaired -- ideomotor apraxia  Tremors when completing clock draw in Left hand    Functional Mobility:  Bed Mobility:  Rolling/Turning to Left: Supervision  Rolling/Turning Right: Supervision  Supine to Sit: Contact Guard Assistance  Sit to Supine:  (Pt left seated UIC)    Transfers:  Sit <> Stand Assistance: Contact Guard Assistance  Sit <> Stand Assistive Device: Rolling Walker  Bed <> Chair Technique: Stand Pivot (10-15 steps from EOB to chair)  Bed <> Chair Transfer Assistance: Contact Guard Assistance (required simple one-step commands)  Bed <> Chair Assistive Device: Rolling Walker    Functional Ambulation: Pt completed multiple steps from EOB to bedside chair using RW and CGA for balance and required mod cues for direction.     Activities of Daily Living:    UE Dressing Level of Assistance: Moderate assistance (to trevor gown like robe while seated  "EOB--pt required assistance for tie completion)    LE Dressing Level of Assistance: Maximum assistance (to trevor B socks while seated EOB)      Balance:   Static Sit: GOOD: Takes MODERATE challenges from all directions  Dynamic Sit: GOOD-: Maintains balance through MODERATE excursions of active trunk movement,     Static Stand: GOOD-: Takes MODERATE challenges from all directions inconsistently  Dynamic stand: FAIR: Needs CONTACT GUARD during gait    Therapeutic Activities:  -Pt edu on OT role/POC  -Importance of OOB activity with staff assistance  -Safety during functional t/f and mobility   -Communication board updated  -  Importance of family re-orienting pt; encouraging participation with self care skilld  Additional:   Identified 3/5 items  Impaired coordination, sequencing, and ideomotor apraxia during functional tasks  Oriented to person only   Difficulty with word finding      Clock Draw Assessment: Pt issued sheet of paper and asked to draw clock with all # located appropriately. Pt asked to model clock to report time as 10 past 11. Pt demo impaired executive functioning for time portion and poor fine motor skills for dominant hand.    AM-PAC 6 CLICK ADL  How much help from another person does this patient currently need?  1 = Unable, Total/Dependent Assistance  2 = A lot, Maximum/Moderate Assistance  3 = A little, Minimum/Contact Guard/Supervision  4 = None, Modified [place]/Independent    Putting on and taking off regular lower body clothing? : 2  Bathing (including washing, rinsing, drying)?: 2  Toileting, which includes using toilet, bedpan, or urinal? : 3  Putting on and taking off regular upper body clothing?: 2  Taking care of personal grooming such as brushing teeth?: 3  Eating meals?: 3  Total Score: 15    AM-PAC Raw Score CMS "G-Code Modifier Level of Impairment Assistance   6 % Total / Unable   7 - 9 CM 80 - 100% Maximal Assist   10 - 14 CL 60 - 80% Moderate Assist   15 - 19 CK 40 - " 60% Moderate Assist   20 - 22 CJ 20 - 40% Minimal Assist   23 CI 1-20% SBA / CGA   24 CH 0% Independent/ Mod I       Patient left up in chair with all lines intact, call button in reach and RN notified    Assessment:  Tommy Way is a 61 y.o. male with a medical diagnosis of Encephalopathy and presents with oriented x1. Pt demo cognitive deficits with attention (sustained/divided),  problem solving, impaired perception, sequencing, safety awareness, impulsivity, disorientation and overall decline in PLOF with cognitive task. Pt would benefit from continued skilled OT to address deficits listed below and maximize return to PLOF. OT recommending Rehab following d/c to increase overall independence and safety with ADL.     Rehab identified problem list/impairments: Rehab identified problem list/impairments: weakness, impaired endurance, impaired self care skills, impaired functional mobilty, gait instability, impaired fine motor, impaired coordination, impaired cognition, impaired balance    Rehab potential is good.    Activity tolerance: Good    Discharge recommendations: Discharge Facility/Level Of Care Needs: rehabilitation facility     Barriers to discharge: Barriers to Discharge: Decreased caregiver support    Equipment recommendations: bath bench     GOALS:    Occupational Therapy Goals        Problem: Occupational Therapy Goal    Goal Priority Disciplines Outcome Interventions   Occupational Therapy Goal     OT, PT/OT Ongoing (interventions implemented as appropriate)    Description:  Goals to be met by 10/13/2017:    1.  Brush teeth with min cues for the steps  2.  Don robe with tie completion with SBA and min cues  3.  Transfer bed-chair with min a and minimal cues for sequencing/safety  4.  Toilet self with mod a from BSC  5.  BSC transfer with CGA and minimal cues for sequencing/safety                        PLAN:  Patient to be seen 5 x/week to address the above listed problems via self-care/home  management, therapeutic activities, therapeutic exercises, neuromuscular re-education  Plan of Care expires: 11/05/17  Plan of Care reviewed with: patient, spouse         Hilary josé, OT  10/06/2017

## 2017-10-06 NOTE — PLAN OF CARE
Problem: Occupational Therapy Goal  Goal: Occupational Therapy Goal  Goals to be met by 10/13/2017:    1.  Brush teeth with min cues for the steps  2.  Don robe with tie completion with SBA and min cues  3.  Transfer bed-chair with min a and minimal cues for sequencing/safety  4.  Toilet self with mod a from BSC  5.  BSC transfer with CGA and minimal cues for sequencing/safety      Outcome: Ongoing (interventions implemented as appropriate)  Re- Eval completed. Initiate POC.   Hilary josé OT  10/6/2017

## 2017-10-06 NOTE — PLAN OF CARE
Problem: Patient Care Overview  Goal: Plan of Care Review  Outcome: Ongoing (interventions implemented as appropriate)  POC reviewed with pt at 0440. Pt and spouse verbalized understanding. Questions and concerns addressed. No acute events overnight. Pt has transfer orders. Pt progressing toward goals. Will continue to monitor. See flowsheets for full assessment and VS info

## 2017-10-06 NOTE — PROGRESS NOTES
[facility]  Neurocritical Care  Progress Note    Admit Date: 9/26/2017  Service Date: 10/05/2017  Length of Stay: 9    Subjective:     Chief Complaint: Encephalopathy    History of Present Illness: Patient is a transfer from [facility] who presented for tonic-clonic seizure and AMS on 9/10 per OSH. CTH and follow-up MRI demonstrated L posterior temporal and occipital lobe lesion. 2x LP with analysis were negative for HSV, VZV, syphillis and HHV-6 were all negative and differential was unremarkable. Thorough autoimmune and infectious testing was also completed and returned without result. [place] [facility] [place] diagnostic work-up per NSGY and neurology. Non conclusive left temporal edema workup. Possible vasculitis VS infection VS neoplasm. Will FU MRI spectroscopy. Pt is now POD0 s/p brain bx with NSGY. Pt admitted to [facility] for higher level of care.     Hospital Course: 10/3: Pt admitted to [facility] s/p brain bx of L posterior temperol and occipitol lobe lesion with NSGY. SBP<160, seizure ppx. CTH post biopsy trace volume  postoperative pneumocephalus and fluid underlying craniotomy site. Stable parenchymal edema within the left temporoparietal region. No significant postoperative hemorrhage present.  10/5 ID reconsulted  recs appreciated. Started vancomycin, cefepime and flagyl. Pan cultured prior to ABX.  CRP, Sed rate.     Interval History:  ID reconsulted  recs appreciated. Started vancomycin, cefepime and flagyl. Pan cultured prior to ABX.  CRP, Sed rate.    Review of Systems:   Constitutional: Denies fevers or chills.  Pulmonary: Denies shortness of breath or cough.  Cardiology: Denies chest pain or palpitations.  GI: Denies abdominal pain or constipation.  Neurologic: Denies new weakness,  headache, or paresthesias.      Vitals:   Temp: 97.9 °F (36.6 °C) (10/05/17 1500)  Pulse: 71 (10/05/17 1700)  Resp: 20 (10/05/17 1700)  BP: (!) 159/102 (10/05/17 1700)  SpO2: 99 %  (10/05/17 1700)    Temp:  [97.9 °F (36.6 °C)-98.9 °F (37.2 °C)] 97.9 °F (36.6 °C)  Pulse:  [60-95] 71  Resp:  [13-47] 20  SpO2:  [93 %-100 %] 99 %  BP: (107-185)/() 159/102              10/04 0701 - 10/05 0700  In: 1394.2 [P.O.:240; I.V.:1154.2]  Out: 2950 [Urine:2950]     Examination:   Constitutional: Well-nourished and -developed. No apparent distress.   Eyes: Conjunctiva clear, anicteric. Lids no lesions.  Head/Ears/Nose/Mouth/Throat/Neck: Moist mucous membranes. External ears, nose atraumatic.   Cardiovascular: Regular rhythm. No murmurs. No leg edema.  Respiratory: Comfortable respirations. Clear to auscultation.  Gastrointestinal: No hernia. Soft, nondistended, nontender. + bowel sounds.    Neurologic:  -GCS [id number]  -Alert. Oriented to person disoriented to, place, and time. Partial transcortical dysphagia. Follows commands.  --Cranial nerves II-XII grossly intact PERRL 3+  -Motor 5/5 BUE/BLE  -Sensation bilat intact to light touch    Medications:   Continuous Scheduled  atorvastatin 20 mg QHS   carvedilol 25 mg BID WM   ceFEPime (MAXIPIME) IVPB 2 g Q8H   dexamethasone 4 mg Q6H   heparin (porcine) 7,500 Units Q8H   lacosamide 200 mg BID   metronidazole 500 mg Q8H   pantoprazole 40 mg Daily   polyethylene glycol 17 g Daily   quetiapine 50 mg QHS   ramelteon 8 mg Once   thiamine 100 mg Daily   [START ON 10/6/2017] vancomycin (VANCOCIN) IVPB 1,500 mg Q12H   PRN  acetaminophen 650 mg Q6H PRN   bisacodyl 10 mg Daily PRN   dextrose 50% 12.5 g PRN   dextrose 50% 25 g PRN   gadobutrol 10 mL ONCE PRN   glucagon (human recombinant) 1 mg PRN   glucose 16 g PRN   glucose 24 g PRN   hydrALAZINE 10 mg Q4H PRN   HYDROmorphone 0.2 mg Q5 Min PRN   insulin aspart 1-10 Units QID (AC + HS) PRN   labetalol 10 mg Q4H PRN   magnesium oxide 800 mg PRN   magnesium oxide 800 mg PRN   ondansetron 8 mg Q8H PRN   ondansetron 4 mg Daily PRN   potassium chloride 10% 40 mEq PRN   potassium chloride 10% 40 mEq PRN   potassium chloride  10% 60 mEq PRN   potassium, sodium phosphates 2 packet PRN   potassium, sodium phosphates 2 packet PRN   potassium, sodium phosphates 2 packet PRN   promethazine (PHENERGAN) IVPB 6.25 mg Q10 Min PRN   sodium chloride 0.9% 3 mL PRN      Today I independently reviewed pertinent medications, lines/drains/airways, imaging, lab results, microbiology results, notably:   Assessment/Plan:     Cardiac/Vascular   Hyperlipidemia    -atorvastatin 20 mg qhs        Hypertension    - goal SBP<160  -labetalol, hydralazine prn  -carvidilol 25 mg BID   Echo: 1 - No wall motion abnormalities.     2 - Normal left ventricular systolic function (EF 55-60%).     3 - Upper limit of normal aortic root.     4 - Normal left ventricular diastolic function.     5 - Normal right ventricular systolic function .     6 - Trivial aortic regurgitation.         ID   Encephalitis    ID reconsulted, appreciate recs.  pancultured  ABXs started  Vancomycin 2g iv x1 then vanc 1500mg  Cefepime 2g q8h  Flagyl 500mg Q8h  ID recs: to dc cefepime               Start ceftriaxone 2g q12               Continue vancomycin, goal trough 15-20               Check with NSGY  If purulent drainage seen at time of biopsy               F/u path reports          Encephalitis and encephalomyelitis, unspecified    -POD2 s/p brain bx per NSGY  -Non conclusive left temporal edema workup. Possible vasculitis VS infection VS neoplasm VS encephalomyelitis. 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

## 2017-10-06 NOTE — SUBJECTIVE & OBJECTIVE
Past Medical History:   Diagnosis Date    Acid reflux     Arthritis     Elevated cholesterol     Hypertension        Past Surgical History:   Procedure Laterality Date    JOINT REPLACEMENT Right 04/20/2017    uneventful recovery    KNEE ARTHROPLASTY Right 02/2007    WISDOM TOOTH EXTRACTION         Review of patient's allergies indicates:   Allergen Reactions    Keppra [levetiracetam] Other (See Comments)     How trouble speaking , agitation       Medications:  Prescriptions Prior to Admission   Medication Sig    carvedilol (COREG) 25 MG tablet Take 25 mg by mouth 2 (two) times daily with meals.    aspirin (ECOTRIN) 81 MG EC tablet Take 81 mg by mouth once daily.    atorvastatin (LIPITOR) 20 MG tablet Take 20 mg by mouth every evening.    cyclobenzaprine (FLEXERIL) 10 MG tablet Take 10 mg by mouth 3 (three) times daily.    irbesartan-hydrochlorothiazide (AVALIDE) 150-12.5 mg per tablet Take 1 tablet by mouth once daily.     levetiracetam (KEPPRA) 500 MG Tab Take 500 mg by mouth 2 (two) times daily.    niacin (NIASPAN) 1000 MG CR tablet Take 1,000 mg by mouth once daily.    omeprazole (PRILOSEC) 40 MG capsule Take 40 mg by mouth once daily.     Antibiotics     Start     Stop Route Frequency Ordered    10/06/17 0100  vancomycin 1.5 g in 5 % dextrose 250 mL IVPB      -- IV Every 12 hours (non-standard times) 10/05/17 1249    10/05/17 2130  cefTRIAXone 2 gram/50 mL IVPB 2 g      -- IV Every 12 hours (non-standard times) 10/05/17 2026    10/05/17 1245  metronidazole IVPB 500 mg      10/13 1244 IV Every 8 hours (non-standard times) 10/05/17 1132        Antifungals     None        Antivirals     None             There is no immunization history on file for this patient.    Family History     Problem Relation (Age of Onset)    Cancer Mother    Diabetes Father        Social History     Social History    Marital status:      Spouse name: N/A    Number of children: N/A    Years of education: N/A      Social History Main Topics    Smoking status: Former Smoker    Smokeless tobacco: Never Used      Comment: quit 17 yrs ago    Alcohol use 16.2 oz/week     3 Glasses of wine, 24 Cans of beer per week    Drug use: No    Sexual activity: Yes     Partners: Female     Birth control/ protection: Other-see comments      Comment: same partner many years; wife is postmenopausal     Other Topics Concern    None     Social History Narrative    None     Review of Systems   Unable to perform ROS: Mental status change     Objective:     Vital Signs (Most Recent):  Temp: 98.2 °F (36.8 °C) (10/06/17 1500)  Pulse: 73 (10/06/17 1500)  Resp: (!) 24 (10/06/17 1500)  BP: (!) 148/83 (10/06/17 1500)  SpO2: 96 % (10/06/17 1500) Vital Signs (24h Range):  Temp:  [97.6 °F (36.4 °C)-98.4 °F (36.9 °C)] 98.2 °F (36.8 °C)  Pulse:  [] 73  Resp:  [16-51] 24  SpO2:  [89 %-100 %] 96 %  BP: (110-214)/() 148/83     Weight: 120.7 kg (266 lb 1.5 oz)  Body mass index is 30.75 kg/m².    Estimated Creatinine Clearance: 143.2 mL/min (based on SCr of 0.8 mg/dL).    Physical Exam   Constitutional: He appears well-developed and well-nourished.   HENT:   Right Ear: External ear normal.   Left Ear: External ear normal.   Nose: Nose normal.   Mouth/Throat: Oropharyngeal exudate present.   Clean surgical scar in left parietal region of the head with staples present   Eyes: Pupils are equal, round, and reactive to light. Right eye exhibits no discharge. Left eye exhibits no discharge. No scleral icterus.   Neck: Normal range of motion. Neck supple.   Cardiovascular: Normal rate, regular rhythm, normal heart sounds and intact distal pulses.  Exam reveals no friction rub.    No murmur heard.  Pulmonary/Chest: Effort normal and breath sounds normal. No respiratory distress. He has no wheezes.   Abdominal: Soft. Bowel sounds are normal. He exhibits no distension. There is no tenderness. There is no guarding.   Musculoskeletal: Normal range of  motion.   Neurological: He is alert.   Follows commands.  Oriented to person.   Skin: Skin is warm and dry. No rash noted. No erythema. No pallor.   Nursing note and vitals reviewed.      Significant Labs:   Blood Culture:     Recent Labs  Lab 09/09/17  2040 10/05/17  1235 10/05/17  1300   LABBLOO No growth after 5 days. No Growth to date No Growth to date     CBC:     Recent Labs  Lab 10/05/17  0532 10/06/17  0344   WBC 10.41 12.26   HGB 11.5* 12.2*   HCT 34.9* 36.2*    277       Significant Imaging: I have reviewed all pertinent imaging results/findings within the past 24 hours.

## 2017-10-06 NOTE — PLAN OF CARE
Problem: Physical Therapy Goal  Goal: Physical Therapy Goal  Goals to be met by: 7 days (10/13)    Patient will increase functional independence with mobility by performin. Supine to sit with Set-up [place]  2. Sit to supine with Set-up [place]  3. Sit to stand transfer with Contact Guard Assistance-met   Revised: sit to stand transfer with Supervision   4. Gait  x 100 feet with Contact Guard Assistance using Rolling Walker.   5. Stand for 5 minutes with Stand-by Assistance using Rolling Walker  6. Lower extremity exercise program x30 reps per handout, with independence to improve muscular strength and endurance.       Outcome: Ongoing (interventions implemented as appropriate)  PT re-eval complete,  POC inititiated      RONEN Jama  10/6/2017

## 2017-10-06 NOTE — ASSESSMENT & PLAN NOTE
ID reconsulted, appreciate recs.  pancultured  ABXs started  Vancomycin 2g iv x1 then vanc 1500mg  Cefepime 2g q8h  Flagyl 500mg Q8h  ID recs: to dc cefepime               Start ceftriaxone 2g q12               Continue vancomycin, goal trough 15-20               Check with NSGY  If purulent drainage seen at time of biopsy               F/u path reports

## 2017-10-06 NOTE — PROGRESS NOTES
[facility]  Infectious Disease  Progress Note    Patient Name: Tommy Way  MRN: [id number]  Admission Date: 9/26/2017  Length of Stay: 10 days  Attending Physician: Mauro Dunham MD  Primary Care Provider: Adan Torres MD    Isolation Status: No active isolations  Assessment/Plan:      Encephalitis and encephalomyelitis, unspecified    62 y/o male admitted with temporal lobe encephalitis per brain imaging.  Extensive studies on CSF fluid failed to yield a diagnosis.  He was started on steroids.  He is still not fully oriented but he does appear slightly better than he did last week.  His sister feels that once he was started on steroids he has made some improvement.  The operative report from his brain biopsy suggested that the affected brain tissue had a 'musky appearance' to it.      -No samples were sent for culture.    -continue ceftriaxone 2 grams IV q 12 hours  -continue vancomycin (goal trough of 15-20)  -continue metronidazole  -follow up on pathology results             Anticipated Disposition: as above    Thank you for your consult. We will follow-up with patient. Please contact us if you have any additional questions.    Sandra Young MD  Infectious Disease  [facility]    Subjective:     Principal Problem:Encephalopathy    HPI: 62 y/o male PMHX arthritis, AHTN, admitted on 9/26/17 transferred from [facility]. Patient originally presented to OSH due to 2 episodes of witnessed seizures at home. Patient was at that time intubated in the ED due to desaturation and AMS for airway protection. On 9/11 MRI done showed temporal lobe abnormalities consistent with encephalitis. Patient had lumbar punctures X 2. Multiple CSF studies were ordered and all were negative.  He was started on steroids empirically about 5 days ago.  He ultimately underwent brain biopsy on October 3.  We are re-consulted to assist with his care.   Past Medical History:   Diagnosis Date     Acid reflux     Arthritis     Elevated cholesterol     Hypertension        Past Surgical History:   Procedure Laterality Date    JOINT REPLACEMENT Right 04/20/2017    uneventful recovery    KNEE ARTHROPLASTY Right 02/2007    WISDOM TOOTH EXTRACTION         Review of patient's allergies indicates:   Allergen Reactions    Keppra [levetiracetam] Other (See Comments)     How trouble speaking , agitation       Medications:  Prescriptions Prior to Admission   Medication Sig    carvedilol (COREG) 25 MG tablet Take 25 mg by mouth 2 (two) times daily with meals.    aspirin (ECOTRIN) 81 MG EC tablet Take 81 mg by mouth once daily.    atorvastatin (LIPITOR) 20 MG tablet Take 20 mg by mouth every evening.    cyclobenzaprine (FLEXERIL) 10 MG tablet Take 10 mg by mouth 3 (three) times daily.    irbesartan-hydrochlorothiazide (AVALIDE) 150-12.5 mg per tablet Take 1 tablet by mouth once daily.     levetiracetam (KEPPRA) 500 MG Tab Take 500 mg by mouth 2 (two) times daily.    niacin (NIASPAN) 1000 MG CR tablet Take 1,000 mg by mouth once daily.    omeprazole (PRILOSEC) 40 MG capsule Take 40 mg by mouth once daily.     Antibiotics     Start     Stop Route Frequency Ordered    10/06/17 0100  vancomycin 1.5 g in 5 % dextrose 250 mL IVPB      -- IV Every 12 hours (non-standard times) 10/05/17 1249    10/05/17 2130  cefTRIAXone 2 gram/50 mL IVPB 2 g      -- IV Every 12 hours (non-standard times) 10/05/17 2026    10/05/17 1245  metronidazole IVPB 500 mg      10/13 1244 IV Every 8 hours (non-standard times) 10/05/17 1132        Antifungals     None        Antivirals     None             There is no immunization history on file for this patient.    Family History     Problem Relation (Age of Onset)    Cancer Mother    Diabetes Father        Social History     Social History    Marital status:      Spouse name: N/A    Number of children: N/A    Years of education: N/A     Social History Main Topics    Smoking  status: Former Smoker    Smokeless tobacco: Never Used      Comment: quit 17 yrs ago    Alcohol use 16.2 oz/week     3 Glasses of wine, 24 Cans of beer per week    Drug use: No    Sexual activity: Yes     Partners: Female     Birth control/ protection: Other-see comments      Comment: same partner many years; wife is postmenopausal     Other Topics Concern    None     Social History Narrative    None     Review of Systems   Unable to perform ROS: Mental status change     Objective:     Vital Signs (Most Recent):  Temp: 98.2 °F (36.8 °C) (10/06/17 1500)  Pulse: 73 (10/06/17 1500)  Resp: (!) 24 (10/06/17 1500)  BP: (!) 148/83 (10/06/17 1500)  SpO2: 96 % (10/06/17 1500) Vital Signs (24h Range):  Temp:  [97.6 °F (36.4 °C)-98.4 °F (36.9 °C)] 98.2 °F (36.8 °C)  Pulse:  [] 73  Resp:  [16-51] 24  SpO2:  [89 %-100 %] 96 %  BP: (110-214)/() 148/83     Weight: 120.7 kg (266 lb 1.5 oz)  Body mass index is 30.75 kg/m².    Estimated Creatinine Clearance: 143.2 mL/min (based on SCr of 0.8 mg/dL).    Physical Exam   Constitutional: He appears well-developed and well-nourished.   HENT:   Right Ear: External ear normal.   Left Ear: External ear normal.   Nose: Nose normal.   Mouth/Throat: Oropharyngeal exudate present.   Clean surgical scar in left parietal region of the head with staples present   Eyes: Pupils are equal, round, and reactive to light. Right eye exhibits no discharge. Left eye exhibits no discharge. No scleral icterus.   Neck: Normal range of motion. Neck supple.   Cardiovascular: Normal rate, regular rhythm, normal heart sounds and intact distal pulses.  Exam reveals no friction rub.    No murmur heard.  Pulmonary/Chest: Effort normal and breath sounds normal. No respiratory distress. He has no wheezes.   Abdominal: Soft. Bowel sounds are normal. He exhibits no distension. There is no tenderness. There is no guarding.   Musculoskeletal: Normal range of motion.   Neurological: He is alert.   Follows  commands.  Oriented to person.   Skin: Skin is warm and dry. No rash noted. No erythema. No pallor.   Nursing note and vitals reviewed.      Significant Labs:   Blood Culture:     Recent Labs  Lab 09/09/17  2040 10/05/17  1235 10/05/17  1300   LABBLOO No growth after 5 days. No Growth to date No Growth to date     CBC:     Recent Labs  Lab 10/05/17  0532 10/06/17  0344   WBC 10.41 12.26   HGB 11.5* 12.2*   HCT 34.9* 36.2*    277       Significant Imaging: I have reviewed all pertinent imaging results/findings within the past 24 hours.

## 2017-10-07 NOTE — ASSESSMENT & PLAN NOTE
-POD3 s/p brain bx per NSGY  -Non conclusive left temporal edema workup. Possible vasculitis VS infection VS neoplasm VS encephalomyelitis. 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

## 2017-10-07 NOTE — PLAN OF CARE
Problem: Patient Care Overview  Goal: Plan of Care Review  Outcome: Ongoing (interventions implemented as appropriate)  POC reviewed with pt and family at 1400. Pt verbalized understanding. Questions and concerns addressed. No acute events today. Pt progressing toward goals. Will continue to monitor. See flowsheets for full assessment and VS info.     Patient to transfer to lower acuity unit pending bed availability. No notable behavior issues today thus far. Will continue to monitor.

## 2017-10-07 NOTE — ASSESSMENT & PLAN NOTE
ID reconsulted, appreciate recs.  pancultured  Will check quantiferon gold and added AFB smear and culture to brain biopsy specimen  ABXs started  Vancomycin 2g iv x1 then vanc 1500mg  Cefepime 2g q8h  Flagyl 500mg Q8h  ID recs: to dc cefepime               Start ceftriaxone 2g q12               Continue vancomycin, goal trough 15-20               Check with NSGY  If purulent drainage seen at time of biopsy               F/u path reports

## 2017-10-07 NOTE — PROGRESS NOTES
[facility]  Neurocritical Care  Progress Note    Admit Date: 9/26/2017  Service Date: 10/06/2017  Length of Stay: 10    Subjective:     Chief Complaint: Encephalopathy    History of Present Illness: Patient is a transfer from [facility] who presented for tonic-clonic seizure and AMS on 9/10 per OSH. CTH and follow-up MRI demonstrated L posterior temporal and occipital lobe lesion. 2x LP with analysis were negative for HSV, VZV, syphillis and HHV-6 were all negative and differential was unremarkable. Thorough autoimmune and infectious testing was also completed and returned without result. Patient is transferred to [facility] for further diagnostic work-up per NSGY and neurology. Non conclusive left temporal edema workup. Possible vasculitis VS infection VS neoplasm. Will FU MRI spectroscopy. Pt is now POD0 s/p brain bx with NSGY. Pt admitted to [facility] for higher level of care.     Hospital Course: 10/3: Pt admitted to [facility] s/p brain bx of L posterior temperol and occipitol lobe lesion with NSGY. SBP<160, seizure ppx. CTH post biopsy trace volume  postoperative pneumocephalus and fluid underlying craniotomy site. Stable parenchymal edema within the left temporoparietal region. No significant postoperative hemorrhage present.  10/5 ID reconsulted  recs appreciated. Started vancomycin, cefepime and flagyl. Pan cultured prior to ABX.  CRP, Sed rate.   10/6 Added AFB culture and smear to brain tissue in lab quatiferon gold sent.. Patient restless agitated. Trying to get out of bed. precedex started. Also not sleeping at night will try ramelteon Qhs. If transfers to floor needs sitter    Interval History: Added AFB culture and smear to brain tissue in lab quatiferon gold sent.. Patient restless agitated. Trying to get out of bed. precedex started. Also not sleeping at night will try ramelteon Qhs. If transfers to floor needs sitter    Review of Systems:   Constitutional: Denies fevers or  chills.  Pulmonary: Denies shortness of breath or cough.  Cardiology: Denies chest pain or palpitations.  GI: Denies abdominal pain or constipation.  Neurologic: Denies new weakness,  headache, or paresthesias.      Vitals:   Temp: 98.2 °F (36.8 °C) (10/06/17 1500)  Pulse: 62 (10/06/17 1800)  Resp: 17 (10/06/17 1800)  BP: (!) 163/96 (10/06/17 1800)  SpO2: (!) 94 % (10/06/17 1600)    Temp:  [97.6 °F (36.4 °C)-98.4 °F (36.9 °C)] 98.2 °F (36.8 °C)  Pulse:  [] 62  Resp:  [16-51] 17  SpO2:  [91 %-100 %] 94 %  BP: (110-214)/(64-99) 163/96              10/05 0701 - 10/06 0700  In: 1400 [I.V.:300]  Out: 2370 [Urine:2370]     Examination:   Constitutional: Well-nourished and -developed. No apparent distress.   Eyes: Conjunctiva clear, anicteric. Lids no lesions.  Head/Ears/Nose/Mouth/Throat/Neck: Moist mucous membranes. External ears, nose atraumatic.   Cardiovascular: Regular rhythm. No murmurs. No leg edema.  Respiratory: Comfortable respirations. Clear to auscultation.  Gastrointestinal: No hernia. Soft, nondistended, nontender. + bowel sounds.    Neurologic:  -GCS [id number]  -Alert. Oriented to person disoriented to, place, and time. Partial transcortical aphasia. Follows commands.  --Cranial nerves II-XII grossly intact PERRL 3+  -Motor 5/5 BUE/BLE  -Sensation bilat intact to light touch    Medications:   Continuous  dexmedetomidine (PRECEDEX) infusion   Scheduled  atorvastatin 20 mg QHS   carvedilol 25 mg BID WM   cefTRIAXone 2 g Q12H   dexamethasone 4 mg Q6H   [START ON 10/7/2017] dexamethasone 4 mg Q8H   Followed by     [START ON 10/11/2017] dexamethasone 4 mg Q12H   Followed by     [START ON 10/15/2017] dexamethasone 2 mg Q12H   Followed by     [START ON 10/19/2017] dexamethasone 1 mg Q12H   Followed by     [START ON 10/23/2017] dexamethasone 1 mg Daily   heparin (porcine) 7,500 Units Q8H   lacosamide 200 mg BID   metronidazole 500 mg Q8H   pantoprazole 40 mg Daily   polyethylene glycol 17 g Daily   quetiapine 50  mg QHS   ramelteon 8 mg QHS   thiamine 100 mg Daily   vancomycin (VANCOCIN) IVPB 1,500 mg Q12H   PRN  acetaminophen 650 mg Q6H PRN   bisacodyl 10 mg Daily PRN   dextrose 50% 12.5 g PRN   dextrose 50% 25 g PRN   gadobutrol 10 mL ONCE PRN   glucagon (human recombinant) 1 mg PRN   glucose 16 g PRN   glucose 24 g PRN   hydrALAZINE 10 mg Q4H PRN   HYDROmorphone 0.2 mg Q5 Min PRN   insulin aspart 1-10 Units QID (AC + HS) PRN   labetalol 10 mg Q4H PRN   magnesium oxide 800 mg PRN   magnesium oxide 800 mg PRN   ondansetron 8 mg Q8H PRN   ondansetron 4 mg Daily PRN   potassium chloride 10% 40 mEq PRN   potassium chloride 10% 40 mEq PRN   potassium chloride 10% 60 mEq PRN   potassium, sodium phosphates 2 packet PRN   potassium, sodium phosphates 2 packet PRN   potassium, sodium phosphates 2 packet PRN   promethazine (PHENERGAN) IVPB 6.25 mg Q10 Min PRN   sodium chloride 0.9% 3 mL PRN      Today I independently reviewed pertinent medications, lines/drains/airways, imaging, lab results, microbiology results, notably:   Assessment/Plan:     Cardiac/Vascular   Hyperlipidemia    -atorvastatin 20 mg qhs        Hypertension    - goal SBP<160  -labetalol, hydralazine prn  -carvidilol 25 mg BID   Echo: 1 - No wall motion abnormalities.     2 - Normal left ventricular systolic function (EF 55-60%).     3 - Upper limit of normal aortic root.     4 - Normal left ventricular diastolic function.     5 - Normal right ventricular systolic function .     6 - Trivial aortic regurgitation.         ID   Encephalitis    ID reconsulted, appreciate recs.  pancultured  Will check quantiferon gold and added AFB smear and culture to brain biopsy specimen  ABXs started  Vancomycin 2g iv x1 then vanc 1500mg  Cefepime 2g q8h  Flagyl 500mg Q8h  ID recs: to dc cefepime               Start ceftriaxone 2g q12               Continue vancomycin, goal trough 15-20               Check with NSGY  If purulent drainage seen at time of biopsy               F/u path  reports          Encephalitis and encephalomyelitis, unspecified    -POD3 s/p brain bx per NSGY  -Non conclusive left temporal edema workup. Possible vasculitis VS infection VS neoplasm VS encephalomyelitis. 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

## 2017-10-07 NOTE — SUBJECTIVE & OBJECTIVE
Interval History: headache and urinary sxs    Review of Systems   All other systems reviewed and are negative.    Objective:     Vital Signs (Most Recent):  Temp: 97.8 °F (36.6 °C) (10/07/17 1100)  Pulse: 81 (10/07/17 1400)  Resp: 19 (10/07/17 1400)  BP: 114/66 (10/07/17 1400)  SpO2: 97 % (10/07/17 1400) Vital Signs (24h Range):  Temp:  [96.5 °F (35.8 °C)-97.8 °F (36.6 °C)] 97.8 °F (36.6 °C)  Pulse:  [50-85] 81  Resp:  [13-34] 19  SpO2:  [90 %-99 %] 97 %  BP: ()/() 114/66     Weight: 120.7 kg (266 lb 1.5 oz)  Body mass index is 30.75 kg/m².    Estimated Creatinine Clearance: 143.2 mL/min (based on SCr of 0.8 mg/dL).    Physical Exam   Constitutional: He appears well-developed and well-nourished. No distress.   HENT:   Mouth/Throat: Oropharynx is clear and moist.   EEG leads   Eyes: Conjunctivae and EOM are normal. Pupils are equal, round, and reactive to light.   Neck: Normal range of motion. Neck supple. No JVD present.   Cardiovascular: Normal rate, regular rhythm, normal heart sounds and intact distal pulses.    Pulmonary/Chest: Effort normal and breath sounds normal.   Abdominal: Soft. Bowel sounds are normal.   Genitourinary:   Genitourinary Comments: No nugent   Musculoskeletal: Normal range of motion. He exhibits no tenderness or deformity.   Lymphadenopathy:     He has no cervical adenopathy.   Neurological: He is alert. No cranial nerve deficit. He exhibits normal muscle tone. Coordination normal.   Skin: Skin is warm and dry. Capillary refill takes less than 2 seconds. No rash noted.   Psychiatric:   Cooperative   Nursing note and vitals reviewed.      Significant Labs:   Blood Culture:   Recent Labs  Lab 09/09/17  2040 10/05/17  1235 10/05/17  1300   LABBLOO No growth after 5 days. No Growth to date  No Growth to date No Growth to date  No Growth to date     CBC:   Recent Labs  Lab 10/06/17  0344 10/07/17  0300   WBC 12.26 10.99   HGB 12.2* 12.5*   HCT 36.2* 37.7*    224     CMP:    Recent Labs  Lab 10/06/17  0344 10/07/17  0300    140   K 4.1 3.9    108   CO2 25 22*   * 147*   BUN 22* 22*   CREATININE 0.8 0.8   CALCIUM 9.2 9.0   ANIONGAP 8 10   EGFRNONAA >60.0 >60.0     Respiratory Culture:   Recent Labs  Lab 09/10/17  1520 09/15/17  1410 09/18/17  0905 10/05/17  1429   GSRESP <10 epithelial cells per low power field.  Many WBC's  Few Gram positive cocci in chains  Few Gram positive rods  Rare Gram positive cocci in clusters <10 epithelial cells per low power field.  Many WBC's  Rare Gram positive cocci in chains <10 epithelial cells per low power field.  Moderate WBC's  No organisms seen <10 epithelial cells per low power field.  No WBC's  Moderate Gram positive cocci  Rare Gram negative rods   RESPIRATORYC Normal respiratory verenice Normal respiratory verenice No growth ESCHERICHIA COLIFewNormal respiratory verenice also present     All pertinent labs within the past 24 hours have been reviewed.    Significant Imaging: I have reviewed all pertinent imaging results/findings within the past 24 hours.

## 2017-10-07 NOTE — PROGRESS NOTES
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  Extensive studies on CSF fluid failed to yield a diagnosis.  He was started on steroids.  He is still not fully oriented but he does appear slightly better than he did last week.  His sister feels that once he was started on steroids he has made some improvement.  The operative report from his brain biopsy suggested that the affected brain tissue had a 'musky appearance' to it.      Ecoli in sputum. Seems more interactive than described to me.    Rec:  -continue ceftriaxone 2 grams IV q 12 hours  -continue vancomycin (goal trough of 15-20)  -continue metronidazole  -follow up on pathology results          Thank you for your consult. I will follow-up with patient. Please contact us if you have any additional questions.    Mac Plummer MD  Infectious Disease  [facility]    Subjective:     Principal Problem:Encephalopathy    HPI: 62 y/o male PMHX arthritis, AHTN, admitted on 9/26/17 transferred from [facility]. Patient originally presented to OSH due to 2 episodes of witnessed seizures at home. Patient was at that time intubated in the ED due to desaturation and AMS for airway protection. On 9/11 MRI done showed temporal lobe abnormalities consistent with encephalitis. Patient had lumbar punctures X 2. Multiple CSF studies were ordered and all were negative.  He was started on steroids empirically about 5 days ago.  He ultimately underwent brain biopsy on October 3.  We are re-consulted to assist with his care.   Interval History: headache and urinary  sxs    Review of Systems   All other systems reviewed and are negative.    Objective:     Vital Signs (Most Recent):  Temp: 97.8 °F (36.6 °C) (10/07/17 1100)  Pulse: 81 (10/07/17 1400)  Resp: 19 (10/07/17 1400)  BP: 114/66 (10/07/17 1400)  SpO2: 97 % (10/07/17 1400) Vital Signs (24h Range):  Temp:  [96.5 °F (35.8 °C)-97.8 °F (36.6 °C)] 97.8 °F (36.6 °C)  Pulse:  [50-85] 81  Resp:  [13-34] 19  SpO2:  [90 %-99 %] 97 %  BP: ()/() 114/66     Weight: 120.7 kg (266 lb 1.5 oz)  Body mass index is 30.75 kg/m².    Estimated Creatinine Clearance: 143.2 mL/min (based on SCr of 0.8 mg/dL).    Physical Exam   Constitutional: He appears well-developed and well-nourished. No distress.   HENT:   Mouth/Throat: Oropharynx is clear and moist.   Eyes: Conjunctivae and EOM are normal. Pupils are equal, round, and reactive to light.   Neck: Normal range of motion. Neck supple. No JVD present.   Cardiovascular: Normal rate, regular rhythm, normal heart sounds and intact distal pulses.    Pulmonary/Chest: Effort normal and breath sounds normal.   Abdominal: Soft. Bowel sounds are normal.   Genitourinary:   Genitourinary Comments: No nugent   Musculoskeletal: Normal range of motion. He exhibits no tenderness or deformity.   Lymphadenopathy:     He has no cervical adenopathy.   Neurological: He is alert. No cranial nerve deficit. He exhibits normal muscle tone. Coordination normal.   Skin: Skin is warm and dry. Capillary refill takes less than 2 seconds. No rash noted.   Psychiatric:   Cooperative   Nursing note and vitals reviewed.      Significant Labs:   Blood Culture:   Recent Labs  Lab 09/09/17  2040 10/05/17  1235 10/05/17  1300   LABBLOO No growth after 5 days. No Growth to date  No Growth to date No Growth to date  No Growth to date     CBC:   Recent Labs  Lab 10/06/17  0344 10/07/17  0300   WBC 12.26 10.99   HGB 12.2* 12.5*   HCT 36.2* 37.7*    224     CMP:   Recent Labs  Lab 10/06/17  0344 10/07/17  0300   NA  139 140   K 4.1 3.9    108   CO2 25 22*   * 147*   BUN 22* 22*   CREATININE 0.8 0.8   CALCIUM 9.2 9.0   ANIONGAP 8 10   EGFRNONAA >60.0 >60.0     Respiratory Culture:   Recent Labs  Lab 09/10/17  1520 09/15/17  1410 09/18/17  0905 10/05/17  1429   GSRESP <10 epithelial cells per low power field.  Many WBC's  Few Gram positive cocci in chains  Few Gram positive rods  Rare Gram positive cocci in clusters <10 epithelial cells per low power field.  Many WBC's  Rare Gram positive cocci in chains <10 epithelial cells per low power field.  Moderate WBC's  No organisms seen <10 epithelial cells per low power field.  No WBC's  Moderate Gram positive cocci  Rare Gram negative rods   RESPIRATORYC Normal respiratory verenice Normal respiratory verenice No growth ESCHERICHIA COLIFewNormal respiratory verenice also present     All pertinent labs within the past 24 hours have been reviewed.    Significant Imaging: I have reviewed all pertinent imaging results/findings within the past 24 hours.

## 2017-10-07 NOTE — PLAN OF CARE
Problem: Patient Care Overview  Goal: Plan of Care Review  Outcome: Ongoing (interventions implemented as appropriate)  POC reviewed with pt and wife at 0300. Wife verbalized understanding. Questions and concerns addressed. VS stable, Pt on precedex @ 0.2 and handling well, temp pf 96.5, blankets applied. Pt became agitated and combative through out the night (see note). Venti mask on, wife at bedside. Restraints on, exp at 0106 on 10/08/2017. VS stable. Will continue to monitor. See flowsheets for full assessment and VS info.

## 2017-10-07 NOTE — ASSESSMENT & PLAN NOTE
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  Extensive studies on CSF fluid failed to yield a diagnosis.  He was started on steroids.  He is still not fully oriented but he does appear slightly better than he did last week.  His sister feels that once he was started on steroids he has made some improvement.  The operative report from his brain biopsy suggested that the affected brain tissue had a 'musky appearance' to it.      Ecoli in sputum. Seems more interactive than described to me.    Rec:  -continue ceftriaxone 2 grams IV q 12 hours  -continue vancomycin (goal trough of 15-20)  -continue metronidazole  -follow up on pathology results

## 2017-10-08 NOTE — PLAN OF CARE
Problem: Patient Care Overview  Goal: Plan of Care Review  Outcome: Ongoing (interventions implemented as appropriate)  Plan of care reviewed with pt and his wife . Pt is awake ,alert and oriented to person only . Pt is calm and cooperative . Stable V/S . Pt is able to ambulate in the room with assist and sit on the chair . Pt is free of falls and injuries , fall precautions maintained and family at the bedside . Pt is using the urinal and the bedside commode .

## 2017-10-08 NOTE — PLAN OF CARE
Problem: Patient Care Overview  Goal: Plan of Care Review  Outcome: Outcome(s) achieved Date Met: 10/08/17  POC reviewed with pt and family at 1400. Pt and spouse verbalized understanding. Questions and concerns addressed. No acute events today. Pt progressing toward goals. Will continue to monitor. See flowsheets for full assessment and VS info.     Patient to transfer to med/tele floor at 1430.

## 2017-10-08 NOTE — PLAN OF CARE
Problem: Patient Care Overview  Goal: Plan of Care Review  Outcome: Ongoing (interventions implemented as appropriate)  POC reviewed with pt and wife at 0200. Pt and wife verbalized understanding. Questions and concerns addressed. No acute events today. VS stable. Pt still having mild hallucinations. Restraints discontinued. Pt wife gave complete bed bath, and shaved pt face. Pt progressing toward goals. Will continue to monitor. See flowsheets for full assessment and VS info.

## 2017-10-08 NOTE — PROGRESS NOTES
Pt arrived to the floor from ICU , on wheelchair with RN  nurse . Pt admitted to room 909 . Bed at low position and nursing call within reach , wife at the bedside . Pt is awake ,alert ans oriented to self only . Pt is calm and cooperative . Pt on tele . Will keep monitoring .

## 2017-10-08 NOTE — NURSING TRANSFER
Nursing Transfer Note      10/8/2017     Transfer To: Room 909A From 7086    Transfer via wheelchair    Transfer with cardiac monitoring    Transported by Talon Landa RN    Medicines sent: None    Chart send with patient: Yes    Notified: spouse    Patient reassessed at: 10/8/17 1400    Upon arrival to floor: patient oriented to room, call bell in reach and bed in lowest position.   Patient tolerated well. Tonia, given bedside update SBAR

## 2017-10-09 NOTE — ASSESSMENT & PLAN NOTE
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  Extensive studies on CSF fluid failed to yield a diagnosis.  He was started on steroids.  He is still not fully oriented but he does appear slightly better than he did last week.  His sister feels that once he was started on steroids he has made some improvement.  The operative report from his brain biopsy suggested that the affected brain tissue had a 'musky appearance' to it.      Ecoli in sputum.     Rec:  -ID will follow from afar  -continue ceftriaxone 2 grams IV q 12 hours  -continue vancomycin (goal trough of 15-20)  -continue metronidazole  -follow up on pathology results

## 2017-10-09 NOTE — PLAN OF CARE
Problem: Physical Therapy Goal  Goal: Physical Therapy Goal  Goals to be met by: 7 days (10/13)    Patient will increase functional independence with mobility by performin. Supine to sit with Set-up [place]  2. Sit to supine with Set-up [place]  3. Sit to stand transfer with Contact Guard Assistance-met   Revised: sit to stand transfer with Supervision   4. Gait  x 100 feet with Contact Guard Assistance using Rolling Walker.   5. Stand for 5 minutes with Stand-by Assistance using Rolling Walker  6. Lower extremity exercise program x30 reps per handout, with independence to improve muscular strength and endurance.        Goals remain appropriate at time. Continue with PT POC as indicated.

## 2017-10-09 NOTE — PHARMACY MED REC
"Admission Medication Reconciliation - Pharmacy Consult Note    The home medication history was taken by Kenya Pelaez, Pharmacy Tech.   Based on information gathered and subsequent review by the clinical pharmacist, the items below may need attention.     You may go to "Admission" then "Reconcile Home Medications" tabs to review and/or act upon these items. Based on information gathered and subsequent review by the clinical pharmacist, the items below may need attention.    Potentially problematic discrepancies with current MAR  o Patient IS taking the following which was not ordered upon admit  o Irbesartan-HCTZ  o Aspirin    Potential issues to be addressed PRIOR TO DISCHARGE  o Would consider sequentially adding irbesartan or HCTZ if blood pressure remains elevated  o Consider adding home niacin and ASA      Please address this information as you see fit.  Feel free to contact us if you have any questions or require assistance.    Cristiano Sutton, PharmD  [id number]        Patient's prior to admission medication regimen was as follows:  Prescriptions Prior to Admission   Medication Sig Dispense Refill Last Dose    atorvastatin (LIPITOR) 20 MG tablet Take 20 mg by mouth every evening.   9/9/2017    carvedilol (COREG) 25 MG tablet Take 25 mg by mouth 2 (two) times daily with meals.   10/2/2017 at Unknown time    cyclobenzaprine (FLEXERIL) 10 MG tablet Take 10 mg by mouth daily as needed for Muscle spasms.    Unknown at Unknown time    irbesartan-hydrochlorothiazide (AVALIDE) 150-12.5 mg per tablet Take 1 tablet by mouth once daily.    9/9/2017    niacin (NIASPAN) 1000 MG CR tablet Take 1,000 mg by mouth once daily.   9/9/2017    omeprazole (PRILOSEC) 40 MG capsule Take 40 mg by mouth once daily.   9/9/2017    aspirin (ECOTRIN) 81 MG EC tablet Take 81 mg by mouth once daily.   9/9/2017         Please add appropriate    SmartPhrase below:        "

## 2017-10-09 NOTE — ASSESSMENT & PLAN NOTE
Pt alert but oriented to self only. Follows commands but use of words not appropriate. 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 He was treated with acyclovir and ultimately underwent 2 lumbar punctures which have been unrevealing. HSV PCR from first LP is negative. Additional viral studies  unremarkable. After he failed to improve significantly, diagnostic imaging was done and showed expansion of the meningeal enhancement as well as possible pathologic spnal compression fractures. Awaiting brain bx results, possibly infectious etiology on Abx currently. MRI/CT head non-specific. Awaiting AFB results.

## 2017-10-09 NOTE — ASSESSMENT & PLAN NOTE
-As per above, continue vimpat 200 mg po bid.    EEG INTERPRETATION:  Mildly abnormal long-term EEG due to disorganization and   slowing during wakefulness.  The presence of relatively normal sleep   architecture implies a lesser degree of encephalopathy, however.  There was no   evidence of seizures or a focal cerebral dysfunction

## 2017-10-09 NOTE — PLAN OF CARE
Problem: Fall Risk (Adult)  Goal: Identify Related Risk Factors and Signs and Symptoms  Related risk factors and signs and symptoms are identified upon initiation of Human Response Clinical Practice Guideline (CPG)   Outcome: Ongoing (interventions implemented as appropriate)  Patient is oriented to self , plan of care ongoing. Denies pain the whole shift. Up in the chair most of the day. No acute distress noted. Safety precautions maintained. Wife always at bedside.

## 2017-10-09 NOTE — PT/OT/SLP PROGRESS
Physical Therapy  Treatment    Tommy Way   MRN: [id number]   Admitting Diagnosis: Encephalopathy    PT Received On: 10/09/17  PT Start Time: 1315     PT Stop Time: 1338    PT Total Time (min): 23 min       Billable Minutes:  Therapeutic Activity 15 and Therapeutic Exercise 8    Treatment Type: Treatment  PT/PTA: PTA     PTA Visit Number: 1       General Precautions: Standard, fall, seizure  Orthopedic Precautions: N/A   Braces: N/A         Subjective:  Communicated with nursing prior to session.  Pt was willing to work with therapy.     Pain/Comfort  Pain Rating 1: 0/10  Pain Rating Post-Intervention 1: 0/10    Objective:   Patient found with:  (all lines intact)    Functional Mobility:  Bed Mobility: Not performed 2* to pt left seated and left seated bedside chair.        Transfers:  Sit <> Stand Assistance: Contact Guard Assistance (x2 trials; VC's for proper hand placement. )  Sit <> Stand Assistive Device: Rolling Walker    Gait:   Gait Distance:  (~100 ft., w/ CGA. Verbal cues for RW mgmt and for safety. )  Assistance 1: Contact Guard Assistance  Gait Assistive Device: Rolling walker  Gait Pattern: reciprocal  Gait Deviation(s): increased time in double stance, decreased velocity of limb motion, decreased step length, decreased stride length     Therapeutic Activities and Exercises:  B LE therex x20 reps. VC's for technique and for pt to stay on task.    -AP   -LAQ   -Hip Flexion     AM-PAC 6 CLICK MOBILITY  How much help from another person does this patient currently need?   1 = Unable, Total/Dependent Assistance  2 = A lot, Maximum/Moderate Assistance  3 = A little, Minimum/Contact Guard/Supervision  4 = None, Modified [place]/Independent    Turning over in bed (including adjusting bedclothes, sheets and blankets)?: 3  Sitting down on and standing up from a chair with arms (e.g., wheelchair, bedside commode, etc.): 3  Moving from lying on back to sitting on the side of the bed?: 3  Moving to and from  a bed to a chair (including a wheelchair)?: 3  Need to walk in hospital room?: 3  Climbing 3-5 steps with a railing?: 2  Total Score: 17    AM-PAC Raw Score CMS G-Code Modifier Level of Impairment Assistance   6 % Total / Unable   7 - 9 CM 80 - 100% Maximal Assist   10 - 14 CL 60 - 80% Moderate Assist   15 - 19 CK 40 - 60% Moderate Assist   20 - 22 CJ 20 - 40% Minimal Assist   23 CI 1-20% SBA / CGA   24 CH 0% Independent/ Mod I     Patient left up in chair with all lines intact, call button in reach and spouse present.    Assessment:  Tommy Way is a 61 y.o. male with a medical diagnosis of Encephalopathy and presents with all deficits noted below. Pt tolerated treatment well, and will continue to benefit from PT services at this time. Continue with PT POC as indicated.    Rehab identified problem list/impairments: Rehab identified problem list/impairments: weakness, impaired endurance, impaired self care skills, impaired functional mobilty, gait instability, impaired coordination, impaired cognition, impaired balance    Rehab potential is good.    Activity tolerance: Good    Discharge recommendations: Discharge Facility/Level Of Care Needs: rehabilitation facility     Barriers to discharge: Barriers to Discharge: Decreased caregiver support    Equipment recommendations: Equipment Needed After Discharge: bath bench     GOALS:    Physical Therapy Goals        Problem: Physical Therapy Goal    Goal Priority Disciplines Outcome Goal Variances Interventions   Physical Therapy Goal     PT/OT, PT Ongoing (interventions implemented as appropriate)     Description:  Goals to be met by: 7 days (10/13)    Patient will increase functional independence with mobility by performin. Supine to sit with Set-up [place]  2. Sit to supine with Set-up [place]  3. Sit to stand transfer with Contact Guard Assistance-met   Revised: sit to stand transfer with Supervision   4. Gait  x 100 feet with Contact Guard  Assistance using Rolling Walker.   5. Stand for 5 minutes with Stand-by Assistance using Rolling Walker  6. Lower extremity exercise program x30 reps per handout, with independence to improve muscular strength and endurance.                         PLAN:    Patient to be seen 5 x/week  to address the above listed problems via gait training, therapeutic activities, therapeutic exercises, neuromuscular re-education  Plan of Care expires: 10/29/17  Plan of Care reviewed with: patient, spouse         Pam Ordonez, PTA  10/09/2017

## 2017-10-09 NOTE — PROGRESS NOTES
[facility]  Infectious Disease  Progress Note    Patient Name: Tommy Way  MRN: [id number]  Admission Date: 9/26/2017  Length of Stay: 13 days  Attending Physician: Stone Sanchez MD  Primary Care Provider: Adan Torres MD    Isolation Status: No active isolations  Assessment/Plan:      Encephalitis and encephalomyelitis, unspecified    62 y/o male admitted with temporal lobe encephalitis per brain imaging.  Extensive studies on CSF fluid failed to yield a diagnosis.  He was started on steroids.  He is still not fully oriented but he does appear slightly better than he did last week.  His sister feels that once he was started on steroids he has made some improvement.  The operative report from his brain biopsy suggested that the affected brain tissue had a 'musky appearance' to it.      Ecoli in sputum.     Rec:  -ID will follow from afar  -continue ceftriaxone 2 grams IV q 12 hours  -continue vancomycin (goal trough of 15-20)  -continue metronidazole  -follow up on pathology results              Thank you for your consult. I will follow-up with patient. Please contact us if you have any additional questions.    Waylon Bee MD  Infectious Disease  [facility]    Subjective:     Principal Problem:Encephalopathy    HPI: 62 y/o male PMHX arthritis, AHTN, admitted on 9/26/17 transferred from [facility]. Patient originally presented to OSH due to 2 episodes of witnessed seizures at home. Patient was at that time intubated in the ED due to desaturation and AMS for airway protection. On 9/11 MRI done showed temporal lobe abnormalities consistent with encephalitis. Patient had lumbar punctures X 2. Multiple CSF studies were ordered and all were negative.  He was started on steroids empirically about 5 days ago.  He ultimately underwent brain biopsy on October 3.  We are re-consulted to assist with his care.   Interval History: No change    Review of Systems   All other  systems reviewed and are negative.    Objective:     Vital Signs (Most Recent):  Temp: 98.6 °F (37 °C) (10/09/17 1229)  Pulse: 78 (10/09/17 1433)  Resp: 16 (10/09/17 1229)  BP: 124/75 (10/09/17 1229)  SpO2: 96 % (10/09/17 1229) Vital Signs (24h Range):  Temp:  [97.2 °F (36.2 °C)-98.6 °F (37 °C)] 98.6 °F (37 °C)  Pulse:  [67-89] 78  Resp:  [16-20] 16  SpO2:  [94 %-97 %] 96 %  BP: (124-167)/(75-97) 124/75     Weight: 120.7 kg (266 lb 1.5 oz)  Body mass index is 30.75 kg/m².    Estimated Creatinine Clearance: 143.2 mL/min (based on SCr of 0.8 mg/dL).    Physical Exam   Constitutional: He appears well-developed and well-nourished. No distress.   HENT:   Mouth/Throat: Oropharynx is clear and moist.   EEG leads   Eyes: Conjunctivae and EOM are normal. Pupils are equal, round, and reactive to light.   Neck: Normal range of motion. Neck supple. No JVD present.   Cardiovascular: Normal rate, regular rhythm, normal heart sounds and intact distal pulses.    Pulmonary/Chest: Effort normal and breath sounds normal.   Abdominal: Soft. Bowel sounds are normal.   Genitourinary:   Genitourinary Comments: No nugent   Musculoskeletal: Normal range of motion. He exhibits no tenderness or deformity.   Lymphadenopathy:     He has no cervical adenopathy.   Neurological: He is alert. No cranial nerve deficit. He exhibits normal muscle tone. Coordination normal.   Skin: Skin is warm and dry. Capillary refill takes less than 2 seconds. No rash noted.   Psychiatric:   Cooperative   Nursing note and vitals reviewed.      Significant Labs:   Blood Culture:     Recent Labs  Lab 09/09/17  2040 10/05/17  1235 10/05/17  1300   LABBLOO No growth after 5 days. No Growth to date  No Growth to date  No Growth to date  No Growth to date No Growth to date  No Growth to date  No Growth to date  No Growth to date     CBC:     Recent Labs  Lab 10/08/17  0415 10/09/17  0435   WBC 14.41* 11.62   HGB 11.9* 11.7*   HCT 36.2* 35.9*    184     CMP:      Recent Labs  Lab 10/08/17  0415 10/09/17  0435    141   K 4.2 4.6    108   CO2 25 25   * 118*   BUN 25* 21*   CREATININE 0.8 0.8   CALCIUM 8.7 8.5*   ANIONGAP 8 8   EGFRNONAA >60.0 >60.0     Respiratory Culture:     Recent Labs  Lab 09/10/17  1520 09/15/17  1410 09/18/17  0905 10/05/17  1429   GSRESP <10 epithelial cells per low power field.  Many WBC's  Few Gram positive cocci in chains  Few Gram positive rods  Rare Gram positive cocci in clusters <10 epithelial cells per low power field.  Many WBC's  Rare Gram positive cocci in chains <10 epithelial cells per low power field.  Moderate WBC's  No organisms seen <10 epithelial cells per low power field.  No WBC's  Moderate Gram positive cocci  Rare Gram negative rods   RESPIRATORYC Normal respiratory verenice Normal respiratory verenice No growth ESCHERICHIA COLIFewNormal respiratory verenice also present     All pertinent labs within the past 24 hours have been reviewed.    Significant Imaging: I have reviewed all pertinent imaging results/findings within the past 24 hours.

## 2017-10-09 NOTE — PROGRESS NOTES
"[facility] Hospital Medicine  Progress Note    Patient Name: Tommy Way  MRN: [id number]  Patient Class: IP- Inpatient   Admission Date: 9/26/2017  Length of Stay: 13 days  Attending Physician: Stone Sanchez MD  Primary Care Provider: Adan Torres MD    [facility] Medicine Team: [facility] [place] HOSP MED 1 Edvin Rashid MD    Subjective:     Principal Problem:Encephalopathy    HPI:  A 61-year-old man with  tonic clonic seizure disorder, HTN and dyslipidemia admitted to [facility] after 5 witnessed seizures 9/10/17. He was initially intubated on presentation for low sats and inability to protect airway, extubated 9/14. 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 He was treated with Acyclovir and ultimately underwent 2 lumbar punctures which have been unrevealing. HSV PCR from first LP is negative. After he failed to improve significantly, diagnostic imaging was done and showed expansion of the meningeal enhancement as well as possible pathologic spinal compression fractures. EEG's done failed to reveal epileptic form seizures. MR Spect/Stealth non-specific, infectious/seizure-related edema rather than tumor but non-diagnostic. MRI on 09/22 shows "significant increase in the T2 signal in the left temporal lobe particularly increased in the left posterior parietal lobe with some serpiginous meningeal enhancement suspicious for encephalitis."  Patient subsequently had brain bx of L posterior temperol and occipital lobe lesion with NSGY. Brain bx suspicious for infection that ID was consulted and patient was started on vancomycin, cefepime and flagyl. Patient recent Bcx, Ucx and chest xray are all negative for infectious process. AFB from CSF pending. Currently patient is on seizure prophylaxis and Seroquel for delirium and agitation. Per wife who was at bedside today states patient is not quiet at baseline, however, " he is much improved cognitively, he knows he is in the hospital as well as his mobility has improved. Cheng to pull himself up from his seat and standing though still needs some support. Wife states patient was healty prior initial presentation with only hypertension and HLD. Also said his behavior has improved, less agitation and confusion since been weaned of decadron.      Interval History: Patient not oriented to place and time, oriented to person. Otherwise asymptomatic.     Review of Systems   Constitutional: Negative for activity change and appetite change.   HENT: Negative for drooling and facial swelling.    Eyes: Negative for discharge and itching.   Respiratory: Negative for cough, shortness of breath and wheezing.    Cardiovascular: Negative for chest pain and palpitations.   Gastrointestinal: Negative for abdominal pain and nausea.   Genitourinary: Negative for difficulty urinating and dysuria.   Skin: Negative for color change and pallor.   Neurological: Negative for dizziness and numbness.   Psychiatric/Behavioral: Negative for behavioral problems and confusion.     Objective:     Vital Signs (Most Recent):  Temp: 98.6 °F (37 °C) (10/09/17 1229)  Pulse: 78 (10/09/17 1433)  Resp: 16 (10/09/17 1229)  BP: 124/75 (10/09/17 1229)  SpO2: 96 % (10/09/17 1229) Vital Signs (24h Range):  Temp:  [97.2 °F (36.2 °C)-98.6 °F (37 °C)] 98.6 °F (37 °C)  Pulse:  [67-78] 78  Resp:  [16] 16  SpO2:  [94 %-97 %] 96 %  BP: (124-167)/(75-97) 124/75     Weight: 120.7 kg (266 lb 1.5 oz)  Body mass index is 30.75 kg/m².    Intake/Output Summary (Last 24 hours) at 10/09/17 1631  Last data filed at 10/09/17 0900   Gross per 24 hour   Intake              730 ml   Output                0 ml   Net              730 ml      Physical Exam   Constitutional: He appears well-developed and well-nourished. No distress.   Patient able to hold a normal conversation, with sudden incoherent phrases and makes nonsensical statement    HENT:    Mouth/Throat: Oropharynx is clear and moist.   Eyes: Conjunctivae and EOM are normal. Pupils are equal, round, and reactive to light.   Neck: Normal range of motion. Neck supple. No JVD present.   Cardiovascular: Normal rate, regular rhythm, normal heart sounds and intact distal pulses.    Pulmonary/Chest: Effort normal and breath sounds normal.   Abdominal: Soft. Bowel sounds are normal.   Musculoskeletal: Normal range of motion. He exhibits no tenderness or deformity.   Lymphadenopathy:     He has no cervical adenopathy.   Neurological: He is alert. No cranial nerve deficit. He exhibits normal muscle tone. Coordination normal.   Skin: Skin is warm and dry. Capillary refill takes less than 2 seconds. No rash noted.   Psychiatric:   Cooperative   Nursing note and vitals reviewed.      Significant Labs:   CBC:   Recent Labs  Lab 10/08/17  0415 10/09/17  0435   WBC 14.41* 11.62   HGB 11.9* 11.7*   HCT 36.2* 35.9*    184     CMP:   Recent Labs  Lab 10/08/17  0415 10/09/17  0435    141   K 4.2 4.6    108   CO2 25 25   * 118*   BUN 25* 21*   CREATININE 0.8 0.8   CALCIUM 8.7 8.5*   ANIONGAP 8 8   EGFRNONAA >60.0 >60.0       Significant Imaging: I have reviewed all pertinent imaging results/findings within the past 24 hours.    Assessment/Plan:      * Encephalopathy    Pt alert but oriented to self only. Follows commands but use of words not appropriate. 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 He was treated with acyclovir and ultimately underwent 2 lumbar punctures which have been unrevealing. HSV PCR from first LP is negative. Additional viral studies  unremarkable. After he failed to improve significantly, diagnostic imaging was done and showed expansion of the meningeal enhancement as well as possible pathologic spnal compression fractures. Awaiting brain bx results, possibly infectious etiology on Abx  currently. MRI/CT head non-specific. Awaiting AFB results.      Status epilepticus, generalized convulsive    -complex partial seizures of unknown etiology possibly infectious vs autonomic vs neoplastic  -Awaiting brain bx results  -EEG thus far has not demonstrated specific etiology of seizure  -On lacosamide   -Seroquel for agitation/delirium  neurology following up      Hyperlipidemia    -continue home statin       Hypertension    - cont coreg 25 mg PO BID for now     VTE Risk Mitigation         Ordered     heparin (porcine) injection 7,500 Units  Every 8 hours     Route:  Subcutaneous        10/05/17 1604     Medium Risk of VTE  Once      10/03/17 0835     Place JHONY hose  Until discontinued      10/03/17 0835     Place sequential compression device  Until discontinued      10/03/17 0835          Edvin Rashid MD  Department of Hospital Medicine   [facility]

## 2017-10-09 NOTE — PLAN OF CARE
Problem: Patient Care Overview  Goal: Plan of Care Review  Outcome: Ongoing (interventions implemented as appropriate)  Uneventful night. Alert to self, confused about place and situation.  Pt calm, cooperative and slept throughout night.Fall/safety precautions maintained. Administered IV antibiotics as ordered. No seizure activity noted or reported. Vital signs stable. No s/s of distress. Spouse at bedside.

## 2017-10-09 NOTE — SUBJECTIVE & OBJECTIVE
Subjective:     Interval History:   Patient did well overnight and has generally been improving overall for past few days.  He is up in a chair at bedside reading a magazine.  Wife at bedside notes that patient has slept better for past 2-3 nights and has not had as many issues with agitation or pulling out IVs.  His speech does seem to be more coherent and he may have some more awareness about why he is in the hospital, stating that he does better in the mornings.  Per wife, he is eager to go home.  No pathology results, but will contact Pathology to determine likely timeline for results.  Otherwise, wife notes that Dr. Sweeney had mentioned bx looking like it involved some purulent fluid and patient having recent leukocytosis--he is on ceftriaxone/vancomycin/metronidazole currently. Cxs collected. Plan for steroid taper.    Current Neurological Medications: Dexamethasone--taper schedule ordered    Current Facility-Administered Medications   Medication Dose Route Frequency Provider Last Rate Last Dose    acetaminophen tablet 650 mg  650 mg Oral Q6H PRN Khoi Hinojosa PA-C   650 mg at 10/03/17 1749    atorvastatin tablet 20 mg  20 mg Oral QHS Maren Li MD   20 mg at 10/08/17 2129    bisacodyl suppository 10 mg  10 mg Rectal Daily PRN Gabino Rich MD        carvedilol tablet 25 mg  25 mg Oral BID  Maren Li MD   25 mg at 10/09/17 0855    cefTRIAXone 2 gram/50 mL IVPB 2 g  2 g Intravenous Q12H Norma Negrete  mL/hr at 10/09/17 0856 2 g at 10/09/17 0856    dexamethasone tablet 4 mg  4 mg Oral Q8H Norma Negrete NP   4 mg at 10/09/17 0508    Followed by    [START ON 10/11/2017] dexamethasone tablet 4 mg  4 mg Oral Q12H Norma Negrete NP        Followed by    [START ON 10/15/2017] dexamethasone tablet 2 mg  2 mg Oral Q12H Norma Negrete NP        Followed by    [START ON 10/19/2017] dexamethasone tablet 1 mg  1 mg Oral Q12H Norma Negrete NP        Followed by    [START ON  10/23/2017] dexamethasone tablet 1 mg  1 mg Oral Daily Norma Negrete NP        dextrose 50% injection 12.5 g  12.5 g Intravenous PRN Maren Li MD        dextrose 50% injection 25 g  25 g Intravenous PRN Maren Li MD        gadobutrol 10 mL  10 mL Intravenous ONCE PRN Gregor Abbott MD        glucagon (human recombinant) injection 1 mg  1 mg Intramuscular PRN Maren Li MD        glucose chewable tablet 16 g  16 g Oral PRN Maren Li MD        glucose chewable tablet 24 g  24 g Oral PRN Maren Li MD        heparin (porcine) injection 7,500 Units  7,500 Units Subcutaneous Q8H Norma Negrete NP   7,500 Units at 10/09/17 0508    insulin aspart pen 1-10 Units  1-10 Units Subcutaneous QID (AC + HS) PRN Khoi Hinojosa PA-C   1 Units at 10/06/17 2233    labetalol injection 10 mg  10 mg Intravenous Q4H PRN Gene Wheeler NP   10 mg at 10/06/17 2250    lacosamide tablet 200 mg  200 mg Oral BID Maren Li MD   200 mg at 10/09/17 0909    metronidazole IVPB 500 mg  500 mg Intravenous Q8H Norma Negrete  mL/hr at 10/09/17 0444 500 mg at 10/09/17 0444    ondansetron disintegrating tablet 8 mg  8 mg Oral Q8H PRN Gabino Rich MD        pantoprazole EC tablet 40 mg  40 mg Oral Daily Saw Ramirez MD   40 mg at 10/09/17 0856    polyethylene glycol packet 17 g  17 g Oral Daily Gabino Rich MD   17 g at 10/06/17 0840    quetiapine tablet 25 mg  25 mg Oral Daily Toby Sharp MD   25 mg at 10/09/17 0856    quetiapine tablet 75 mg  75 mg Oral QHS Toby Sharp MD   75 mg at 10/08/17 2130    ramelteon tablet 8 mg  8 mg Oral QHS Anthony Patel MD   8 mg at 10/07/17 2120    thiamine tablet 100 mg  100 mg Oral Daily Maren Li MD   100 mg at 10/09/17 0909    vancomycin 1.25 g in 5 % dextrose 250 mL IVPB  1,250 mg Intravenous Q8H Norma Negrete .7 mL/hr at 10/09/17 1023 1,250 mg at 10/09/17 1023     Review of Systems   Unable to perform ROS: Mental  status change     Objective:     Vital Signs (Most Recent):  Temp: 97.2 °F (36.2 °C) (10/09/17 0743)  Pulse: 70 (10/09/17 1123)  Resp: 16 (10/09/17 0743)  BP: (!) 167/97 (10/09/17 0743)  SpO2: (!) 94 % (10/09/17 0743) Vital Signs (24h Range):  Temp:  [97.2 °F (36.2 °C)-98.3 °F (36.8 °C)] 97.2 °F (36.2 °C)  Pulse:  [67-90] 70  Resp:  [16-27] 16  SpO2:  [94 %-97 %] 94 %  BP: (137-167)/(83-97) 167/97     Weight: 120.7 kg (266 lb 1.5 oz)  Body mass index is 30.75 kg/m².    Physical Exam  General:  Well-developed, well-nourished, nad  HEENT:  NCAT, PERRLA, EOMI, oropharygneal membranes non-erythematous/without exudate  Neck:  Supple, no palpable lad, no nuchal rigidity  Resp:  Symmetric expansion, no increased wob  CVS:  No LE edema, peripheral pulses 2+ (radial, dorsalis pedis)  GI:  Abd soft, non-distended, non-tender to palpation     Neurologic Exam:  Mental Status:  Awake, alert, oriented to self and wife.  Occasional aphasia, some trouble with word-finding difficulty, paraphasias.  Responds appropriately to most questions, able to follow complex commands.    Cranial Nerves:  VFs intact on blinking to threat bilaterally.  PERRLA, EOMI.  Facial movements and sensation intact, symmetric.  Palate raises symmetrically, tongue protrudes midline.  SCM/Trapezius 5/5.  Motor:  Normal bulk and tone.  Strength diffusely 5/5, symmetric.  Sensory:  Intact to light touch, temperature at all extremities.    Reflexes:  Biceps, brachioradialis, patellar 2+ bilaterally.  No ankle clonus.  Downgoing toes bilaterally.  Coordination:  FTN, BRENDA, HTS with no ataxia, no dysmetria, no dysdiadochokinesia.  Gait:  Deferred 2/2 fall risk.    Significant Labs:     Recent Labs  Lab 10/09/17  0435   WBC 11.62   RBC 3.49*   HGB 11.7*   HCT 35.9*      *   MCH 33.5*   MCHC 32.6       Recent Labs  Lab 10/09/17  0435   CALCIUM 8.5*      K 4.6   CO2 25      BUN 21*   CREATININE 0.8     Significant Imaging:   10/04/17 CT head  w/o contrast:  Postoperative changes with interval left parietal craniotomy for open biopsy of the left temporoparietal region. Trace volume of postoperative pneumocephalus and fluid is noted underlying the craniotomy site. No evidence of significant intracranial hemorrhage, midline shift, or hydrocephalus.   Stable appearing nonspecific abnormal region of parenchymal edema within the left temporoparietal region, similar in extent to prior MRI examination.    10/03/17 MRI Spectroscopy, Brain Stealth:  MRI brain demonstrates stable left posterior temporal/parietal edema with associated sulcal vascular prominence and swelling of the left temporal lobe when compared to MR examination 09/25/2017, however progressed when compared to 09/10/2017. Findings suggest encephalitis (such as herpes), other inflammatory etiology, or seizure-related edema.  Tumor is less likely, however cannot be excluded.  Time pattern are does not follow a course that would suggest ischemia/infarction.  Stealth protocol MRI performed for purposes of procedural localization.  MR spectroscopy of the area of interest in the left temporoparietal lobe is overall nonspecific and nondiagnostic.    09/28/17 IR angiogram carotid cerebral bilateral inc arch:  POSTOPERATIVE DIAGNOSIS(ES):    Narrowing / beating appearance of small distal inferior-posterior left MCA territory.  Note: It is unclear whether is this a primary process or sequelae of swelling noted on MRI.  If it is to be a considered a primary process then vasculitis could be questioned, however no other region of vascular abnormality is identified to support this.    09/22/17 MRI Lumbar Spine:  1.  Old mild compression of L1  2.  Abnormal bone marrow signal and enhancement of the L4 vertebral body with mild decreased height suggesting a mild compression fracture there is fluid and this cannot exclude infection or pathologic fracture  3.  Degenerative changes as above    09/22/17 MRI Thoracic  Spine:  1. Bone marrow edema and enhancement of the vertebral body of T11 with loss of body height of 30% suggesting a probable acute compression fracture.  Cannot exclude this pathologic or infection due to clinical history  2.  Degenerative changes as above greatest T7-8.  3.  Old compression fractures of L1-, T4 and T5    09/22/17 MRI Cervical Spine:   Degenerative changes as above.  Mild faint enhancement along cord probably normal physiologic with no definite focal enhancing lesion    09/22/17 MRI Brain w/ w/o contrast:  1.  Significant increase in the T2 signal in the left temporal lobe particularly increased in the left posterior parietal lobe with some serpiginous meningeal enhancement suspicious for encephalitis.  2.  Punctate areas of diffusion signal in the left posterior parietal lobe suspicious for punctate areas of acute ischemia versus less likely shine through recommend followup

## 2017-10-09 NOTE — SUBJECTIVE & OBJECTIVE
Interval History: No change    Review of Systems   All other systems reviewed and are negative.    Objective:     Vital Signs (Most Recent):  Temp: 98.6 °F (37 °C) (10/09/17 1229)  Pulse: 78 (10/09/17 1433)  Resp: 16 (10/09/17 1229)  BP: 124/75 (10/09/17 1229)  SpO2: 96 % (10/09/17 1229) Vital Signs (24h Range):  Temp:  [97.2 °F (36.2 °C)-98.6 °F (37 °C)] 98.6 °F (37 °C)  Pulse:  [67-89] 78  Resp:  [16-20] 16  SpO2:  [94 %-97 %] 96 %  BP: (124-167)/(75-97) 124/75     Weight: 120.7 kg (266 lb 1.5 oz)  Body mass index is 30.75 kg/m².    Estimated Creatinine Clearance: 143.2 mL/min (based on SCr of 0.8 mg/dL).    Physical Exam   Constitutional: He appears well-developed and well-nourished. No distress.   HENT:   Mouth/Throat: Oropharynx is clear and moist.   EEG leads   Eyes: Conjunctivae and EOM are normal. Pupils are equal, round, and reactive to light.   Neck: Normal range of motion. Neck supple. No JVD present.   Cardiovascular: Normal rate, regular rhythm, normal heart sounds and intact distal pulses.    Pulmonary/Chest: Effort normal and breath sounds normal.   Abdominal: Soft. Bowel sounds are normal.   Genitourinary:   Genitourinary Comments: No nugent   Musculoskeletal: Normal range of motion. He exhibits no tenderness or deformity.   Lymphadenopathy:     He has no cervical adenopathy.   Neurological: He is alert. No cranial nerve deficit. He exhibits normal muscle tone. Coordination normal.   Skin: Skin is warm and dry. Capillary refill takes less than 2 seconds. No rash noted.   Psychiatric:   Cooperative   Nursing note and vitals reviewed.      Significant Labs:   Blood Culture:     Recent Labs  Lab 09/09/17 2040 10/05/17  1235 10/05/17  1300   LABBLOO No growth after 5 days. No Growth to date  No Growth to date  No Growth to date  No Growth to date No Growth to date  No Growth to date  No Growth to date  No Growth to date     CBC:     Recent Labs  Lab 10/08/17  0415 10/09/17  0435   WBC 14.41* 11.62    HGB 11.9* 11.7*   HCT 36.2* 35.9*    184     CMP:     Recent Labs  Lab 10/08/17  0415 10/09/17  0435    141   K 4.2 4.6    108   CO2 25 25   * 118*   BUN 25* 21*   CREATININE 0.8 0.8   CALCIUM 8.7 8.5*   ANIONGAP 8 8   EGFRNONAA >60.0 >60.0     Respiratory Culture:     Recent Labs  Lab 09/10/17  1520 09/15/17  1410 09/18/17  0905 10/05/17  1429   GSRESP <10 epithelial cells per low power field.  Many WBC's  Few Gram positive cocci in chains  Few Gram positive rods  Rare Gram positive cocci in clusters <10 epithelial cells per low power field.  Many WBC's  Rare Gram positive cocci in chains <10 epithelial cells per low power field.  Moderate WBC's  No organisms seen <10 epithelial cells per low power field.  No WBC's  Moderate Gram positive cocci  Rare Gram negative rods   RESPIRATORYC Normal respiratory verenice Normal respiratory verenice No growth ESCHERICHIA COLIFewNormal respiratory verenice also present     All pertinent labs within the past 24 hours have been reviewed.    Significant Imaging: I have reviewed all pertinent imaging results/findings within the past 24 hours.

## 2017-10-09 NOTE — PT/OT/SLP PROGRESS
"Speech Language Pathology  Treatment    Tommy Way   MRN: [id number]   Admitting Diagnosis: Encephalopathy    Diet recommendations: Solid Diet Level: Regular  Liquid Diet Level: Thin     SLP Treatment Date: 10/09/17  Speech Start Time: 1022     Speech Stop Time: 1055     Speech Total (min): 33 min       TREATMENT BILLABLE MINUTES:  Treatment Swallowing Dysfunction 23 and Seld Care/Home Management Training 10    Has the patient been evaluated by SLP for swallowing? : Yes  Keep patient NPO?: No   General Precautions: Standard, fall, seizure  Current Respiratory Status:  (room air)       Subjective:  Pt awake and alert ; spouse at bedside   "Oh that one is a brain buster"    Pain/Comfort  Pain Rating 1: 0/10  Pain Rating Post-Intervention 1: 0/10    Objective:    Pt seated in chair upon arrival, spouse at the bedside. Pt continues to exhibit adequate topical conversation and masking of deficits. Pt completed auto speech tasks with 40% acc independently, increased to 80% with slp Min-mod cueing. Pt completed writing copying task with 65% acc with mod SLP visual and verbal cues. Pt completed category inclusion tasks with 60% acc independently and increased to 80% acc with min verbal cues. Education provided to spouse re: ongoing tasks and cues to provided outside of therapy.       Assessment:  Tommy Way is a 61 y.o. male with a medical diagnosis of Encephalopathy and presents with  Cognitive-linguistic impairments and aphasia.     Discharge recommendations: Discharge Facility/Level Of Care Needs: rehabilitation facility     Goals:    SLP Goals        Problem: SLP Goal    Goal Priority Disciplines Outcome   SLP Goal     SLP Ongoing (interventions implemented as appropriate)   Description:  Speech Language Pathology  Goals expected to be met by 10/13:    1. Pt will participate in automatic speech tasks with 100% acc independently  2. Pt will provide 5 items within a concrete category with min cues   3. Pt will " follow 1-step directions with 80% accuracy independently  4. Pt will complete simple problem solving tasks with 80% acc independently   5. Pt will complete simple writing tasks with 75% acc with min cues   6. Pt will complete cancellation tasks with 75% acc with min cues                        Plan:   Patient to be seen Therapy Frequency: 5 x/week   Plan of Care expires: 11/04/17  Plan of Care reviewed with: patient, spouse  SLP Follow-up?: Yes              Juliana Bourgeois CCC-SLP  10/09/2017

## 2017-10-09 NOTE — PLAN OF CARE
Problem: SLP Goal  Goal: SLP Goal  Speech Language Pathology  Goals expected to be met by 10/13:    1. Pt will participate in automatic speech tasks with 100% acc independently  2. Pt will provide 5 items within a concrete category with min cues   3. Pt will follow 1-step directions with 80% accuracy independently  4. Pt will complete simple problem solving tasks with 80% acc independently   5. Pt will complete simple writing tasks with 75% acc with min cues   6. Pt will complete cancellation tasks with 75% acc with min cues      Pt with good progress towards goals    Juliana Bourgeois MS, CCC-SLP  Speech Language Pathologist  Pager: [phone]  Date 10/9/2017

## 2017-10-09 NOTE — PROGRESS NOTES
[facility]  Neurocritical Care  Progress Note    Admit Date: 9/26/2017  Service Date: 10/08/2017  Length of Stay: 12    Subjective:     Chief Complaint: Encephalopathy    History of Present Illness: Patient is a transfer from [facility] who presented for tonic-clonic seizure and AMS on 9/10 per OSH. CTH and follow-up MRI demonstrated L posterior temporal and occipital lobe lesion. 2x LP with analysis were negative for HSV, VZV, syphillis and HHV-6 were all negative and differential was unremarkable. Thorough autoimmune and infectious testing was also completed and returned without result. Patient is transferred to [facility] for further diagnostic work-up per NSGY and neurology. Non conclusive left temporal edema workup. Possible vasculitis VS infection VS neoplasm. Will FU MRI spectroscopy. Pt is now POD0 s/p brain bx with NSGY. Pt admitted to [facility] for higher level of care.     Hospital Course: 10/3: Pt admitted to [facility] s/p brain bx of L posterior temperol and occipitol lobe lesion with NSGY. SBP<160, seizure ppx. CTH post biopsy trace volume  postoperative pneumocephalus and fluid underlying craniotomy site. Stable parenchymal edema within the left temporoparietal region. No significant postoperative hemorrhage present.  10/5 ID reconsulted  recs appreciated. Started vancomycin, cefepime and flagyl. Pan cultured prior to ABX.  CRP, Sed rate.   10/6 Added AFB culture and smear to brain tissue in lab quatiferon gold sent.. Patient restless agitated. Trying to get out of bed. precedex started. Also not sleeping at night will try ramelteon Qhs. If transfers to floor needs sitter.  10.07: POD 5  S/P L-lesion bx. Apparently had infectious characteristics. WBC jumped.. Prior pro calcitonin was normal. Clinical exam improving.  Slept well during the night. Delirium resolved.    Interval History: >4 elements OR status of 3 inpatient conditions  POD 5  S/P L-lesion bx. Apparently had infectious  characteristics. WBC jumped.. Prior pro calcitonin was normal. Clinical exam improving.  Slept well during the night. Delirium resolved.  Review of Systems   Neurological: Positive for speech difficulty (Dysnomia) and weakness.     2 systems OR Unable to obtain a complete ROS due to level of consciousness.  Objective:     Vitals:  Temp: 97.8 °F (36.6 °C) (10/08/17 1948)  Pulse: 77 (10/08/17 1948)  Resp: 16 (10/08/17 1948)  BP: (!) 153/88 (10/08/17 1948)  SpO2: 95 % (10/08/17 1948)    Temp:  [97.5 °F (36.4 °C)-98.4 °F (36.9 °C)] 97.8 °F (36.6 °C)  Pulse:  [64-96] 77  Resp:  [13-29] 16  SpO2:  [90 %-96 %] 95 %  BP: (121-170)/() 153/88              10/07 0701 - 10/08 0700  In: 2156 [P.O.:960; I.V.:46]  Out: 1835 [Urine:1835]    Physical Exam  Unable to test memory, judgment, insight, fund of knowledge, gait due to level of consciousness.  General   HEENT: UK  Chest Heart RRR / Lungs Clear to auscultation  Adbomen: Soft nontender + BS  Extremities: OK distal pulses.  Skin: UK  Neurological Exam:  MS; Alert, disoriented, partial transcortical dysphasia with dysnomia.improving.  CN: II-XII UK  Motor: LUE  5 /5 / RUE  5/5  Tone normal bilaterally             LLE  5 /5 /  RLE  5/5  Tone normal bilaterally  Sensory: LT/PP/T/ Vibration UK                 Complex sensory modalities: not tested  DTR:  normal throughout.  Coordination /Fine motor: UK  Gait: Not tested.  Meningeal signs: Absent  Medications:  Continuous Scheduled  atorvastatin 20 mg QHS   carvedilol 25 mg BID WM   cefTRIAXone 2 g Q12H   dexamethasone 4 mg Q8H   Followed by     [START ON 10/11/2017] dexamethasone 4 mg Q12H   Followed by     [START ON 10/15/2017] dexamethasone 2 mg Q12H   Followed by     [START ON 10/19/2017] dexamethasone 1 mg Q12H   Followed by     [START ON 10/23/2017] dexamethasone 1 mg Daily   heparin (porcine) 7,500 Units Q8H   lacosamide 200 mg BID   metronidazole 500 mg Q8H   pantoprazole 40 mg Daily   polyethylene glycol 17 g Daily    quetiapine 25 mg Daily   quetiapine 75 mg QHS   ramelteon 8 mg QHS   thiamine 100 mg Daily   vancomycin (VANCOCIN) IVPB 1,250 mg Q8H   PRN  acetaminophen 650 mg Q6H PRN   bisacodyl 10 mg Daily PRN   dextrose 50% 12.5 g PRN   dextrose 50% 25 g PRN   gadobutrol 10 mL ONCE PRN   glucagon (human recombinant) 1 mg PRN   glucose 16 g PRN   glucose 24 g PRN   insulin aspart 1-10 Units QID (AC + HS) PRN   labetalol 10 mg Q4H PRN   ondansetron 8 mg Q8H PRN     Today I personally reviewed pertinent medications, lines/drains/airways, imaging, cardiology, lab results, microbiology results, notably:      Assessment/Plan:     No new Assessment & Plan notes have been filed under this hospital service since the last note was generated.  Service: Neuro Critical Care      }    Active Problem List:   1.L-parietotemporal lesion : met vs abscess. S/p brain bx.  2. Vasogenic edema.  3. Obesity  4. Partial transcortical receptive dysphasia.  5. ICU Delirium.  6. Dysnomia.        Assessment / Plan:     Neuro:  -Decadron 4mg q 8hrs. Wean every 4 days.  -Vimpat 200mg q 12hrs.  -Keep normothermic and normoglycemic.  -Rameltion 8mg qd  -Seroquel 25mg AM and 75mg qhs.  -Delirium protocol.  Resp: CXR: poor penetration  -RA  -IS  CV: Keep SBP <=160mmHg  -TTE to R/O endocarditis.  -Coreg 25 mg bid  -Lipitor 20mg qd  -PRN hydralazine and labetalol  -12 lead ECG.  IVF/nutrition/Renal/GI:  -Restart PO diet.  -NS at 50cc/hr D/C  -BR: senna and colace.  Hem / ID: WBC 14K.  -Procalcitonin in serum. Repeat.  -Panculture if procal elevated.  -ESR  -Vanc  1.25gr q 8hhrs (Vanc trough levels 20-25) / Flagyl 500mg q 8hrs IVD  -Ceftriaxone 2gr q 12hrs   -ID re-consulted.  -Quantiferon Gold test.  Endo: LDL 93.   -SSI qAC  -Lipid profile  -HgA1c  Prophylaxis:  -Protonix 40mg qd  -SCD  -SC Heparin prophylaxis 7500 U q 8hrs  Advance Directives and Disposition:    Full Code. PT/OT Transfer to medicine. PT/OT. !;! Watch at night.     Uninterrupted level 3   Follow-up /Counseling Time (not including procedures):  = 35 min          Activity Orders          Commode at bedside starting at 10/06 1000    Bed rest starting at 10/03 0827        Full Code    Saw Ramirez MD  Neurocritical Care  [facility]

## 2017-10-09 NOTE — SUBJECTIVE & OBJECTIVE
Interval History: Patient not oriented to place and time, oriented to person. Otherwise asymptomatic.     Review of Systems   Constitutional: Negative for activity change and appetite change.   HENT: Negative for drooling and facial swelling.    Eyes: Negative for discharge and itching.   Respiratory: Negative for cough, shortness of breath and wheezing.    Cardiovascular: Negative for chest pain and palpitations.   Gastrointestinal: Negative for abdominal pain and nausea.   Genitourinary: Negative for difficulty urinating and dysuria.   Skin: Negative for color change and pallor.   Neurological: Negative for dizziness and numbness.   Psychiatric/Behavioral: Negative for behavioral problems and confusion.     Objective:     Vital Signs (Most Recent):  Temp: 98.6 °F (37 °C) (10/09/17 1229)  Pulse: 78 (10/09/17 1433)  Resp: 16 (10/09/17 1229)  BP: 124/75 (10/09/17 1229)  SpO2: 96 % (10/09/17 1229) Vital Signs (24h Range):  Temp:  [97.2 °F (36.2 °C)-98.6 °F (37 °C)] 98.6 °F (37 °C)  Pulse:  [67-78] 78  Resp:  [16] 16  SpO2:  [94 %-97 %] 96 %  BP: (124-167)/(75-97) 124/75     Weight: 120.7 kg (266 lb 1.5 oz)  Body mass index is 30.75 kg/m².    Intake/Output Summary (Last 24 hours) at 10/09/17 1631  Last data filed at 10/09/17 0900   Gross per 24 hour   Intake              730 ml   Output                0 ml   Net              730 ml      Physical Exam   Constitutional: He appears well-developed and well-nourished. No distress.   Patient able to hold a normal conversation, with sudden incoherent phrases and makes nonsensical statement    HENT:   Mouth/Throat: Oropharynx is clear and moist.   Eyes: Conjunctivae and EOM are normal. Pupils are equal, round, and reactive to light.   Neck: Normal range of motion. Neck supple. No JVD present.   Cardiovascular: Normal rate, regular rhythm, normal heart sounds and intact distal pulses.    Pulmonary/Chest: Effort normal and breath sounds normal.   Abdominal: Soft. Bowel sounds  are normal.   Musculoskeletal: Normal range of motion. He exhibits no tenderness or deformity.   Lymphadenopathy:     He has no cervical adenopathy.   Neurological: He is alert. No cranial nerve deficit. He exhibits normal muscle tone. Coordination normal.   Skin: Skin is warm and dry. Capillary refill takes less than 2 seconds. No rash noted.   Psychiatric:   Cooperative   Nursing note and vitals reviewed.      Significant Labs:   CBC:   Recent Labs  Lab 10/08/17  0415 10/09/17  0435   WBC 14.41* 11.62   HGB 11.9* 11.7*   HCT 36.2* 35.9*    184     CMP:   Recent Labs  Lab 10/08/17  0415 10/09/17  0435    141   K 4.2 4.6    108   CO2 25 25   * 118*   BUN 25* 21*   CREATININE 0.8 0.8   CALCIUM 8.7 8.5*   ANIONGAP 8 8   EGFRNONAA >60.0 >60.0       Significant Imaging: I have reviewed all pertinent imaging results/findings within the past 24 hours.

## 2017-10-09 NOTE — ASSESSMENT & PLAN NOTE
-complex partial seizures of unknown etiology possibly infectious vs autonomic vs neoplastic  -Awaiting brain bx results  -EEG thus far does not demonstrate specific etiology of seizure  -On lacosamide   -Seroquel for agitation/delirium  neurology following up

## 2017-10-09 NOTE — PROGRESS NOTES
[facility]  Neurology  Progress Note    Patient Name: Tommy Way  MRN: [id number]  Admission Date: 9/26/2017  Hospital Length of Stay: 13 days  Code Status: Full Code   Attending Provider: Stone Sanchez MD  Primary Care Physician: Adan Torres MD   Principal Problem:Encephalopathy    Subjective:     Interval History:   Patient did well overnight and has generally been improving overall for past few days.  He is up in a chair at bedside reading a magazine.  Wife at bedside notes that patient has slept better for past 2-3 nights and has not had as many issues with agitation or pulling out IVs.  His speech does seem to be more coherent and he may have some more awareness about why he is in the hospital, stating that he does better in the mornings.  Per wife, he is eager to go home.  No pathology results, but will contact Pathology to determine likely timeline for results.  Otherwise, wife notes that Dr. Sweeney had mentioned bx looking like it involved some purulent fluid and patient having recent leukocytosis--he is on ceftriaxone/vancomycin/metronidazole currently. Cxs collected. Plan for steroid taper.    Current Neurological Medications: Dexamethasone--taper schedule ordered    Current Facility-Administered Medications   Medication Dose Route Frequency Provider Last Rate Last Dose    acetaminophen tablet 650 mg  650 mg Oral Q6H PRN Khoi Hinojosa PA-C   650 mg at 10/03/17 1749    atorvastatin tablet 20 mg  20 mg Oral QHS Maren Li MD   20 mg at 10/08/17 2129    bisacodyl suppository 10 mg  10 mg Rectal Daily PRN Gabino Rich MD        carvedilol tablet 25 mg  25 mg Oral BID  Maren Li MD   25 mg at 10/09/17 0855    cefTRIAXone 2 gram/50 mL IVPB 2 g  2 g Intravenous Q12H Norma Negrete  mL/hr at 10/09/17 0856 2 g at 10/09/17 0856    dexamethasone tablet 4 mg  4 mg Oral Q8H Norma Negrete NP   4 mg at 10/09/17 0508    Followed by    [START ON 10/11/2017]  dexamethasone tablet 4 mg  4 mg Oral Q12H Norma Negrete NP        Followed by    [START ON 10/15/2017] dexamethasone tablet 2 mg  2 mg Oral Q12H Norma Negrete NP        Followed by    [START ON 10/19/2017] dexamethasone tablet 1 mg  1 mg Oral Q12H Norma Negrete NP        Followed by    [START ON 10/23/2017] dexamethasone tablet 1 mg  1 mg Oral Daily Norma Negrete NP        dextrose 50% injection 12.5 g  12.5 g Intravenous PRN Maren Li MD        dextrose 50% injection 25 g  25 g Intravenous PRN Maren Li MD        gadobutrol 10 mL  10 mL Intravenous ONCE PRN Gregor Abbott MD        glucagon (human recombinant) injection 1 mg  1 mg Intramuscular PRN Maren Li MD        glucose chewable tablet 16 g  16 g Oral PRN Maren Li MD        glucose chewable tablet 24 g  24 g Oral PRN Maren Li MD        heparin (porcine) injection 7,500 Units  7,500 Units Subcutaneous Q8H Norma Negrete NP   7,500 Units at 10/09/17 0508    insulin aspart pen 1-10 Units  1-10 Units Subcutaneous QID (AC + HS) PRN Khoi Hinojosa PA-C   1 Units at 10/06/17 2233    labetalol injection 10 mg  10 mg Intravenous Q4H PRN Gene Wheeler NP   10 mg at 10/06/17 2250    lacosamide tablet 200 mg  200 mg Oral BID Maren Li MD   200 mg at 10/09/17 0909    metronidazole IVPB 500 mg  500 mg Intravenous Q8H Norma Negrete  mL/hr at 10/09/17 0444 500 mg at 10/09/17 0444    ondansetron disintegrating tablet 8 mg  8 mg Oral Q8H PRN Gabino Rich MD        pantoprazole EC tablet 40 mg  40 mg Oral Daily Saw Ramirez MD   40 mg at 10/09/17 0856    polyethylene glycol packet 17 g  17 g Oral Daily Gabino Rich MD   17 g at 10/06/17 0840    quetiapine tablet 25 mg  25 mg Oral Daily Toby Sharp MD   25 mg at 10/09/17 0856    quetiapine tablet 75 mg  75 mg Oral QHS Toby Sharp MD   75 mg at 10/08/17 2130    ramelteon tablet 8 mg  8 mg Oral QHS Anthony Patel MD   8 mg at  10/07/17 2120    thiamine tablet 100 mg  100 mg Oral Daily Maren Li MD   100 mg at 10/09/17 0909    vancomycin 1.25 g in 5 % dextrose 250 mL IVPB  1,250 mg Intravenous Q8H Norma Negrete .7 mL/hr at 10/09/17 1023 1,250 mg at 10/09/17 1023     Review of Systems   Unable to perform ROS: Mental status change     Objective:     Vital Signs (Most Recent):  Temp: 97.2 °F (36.2 °C) (10/09/17 0743)  Pulse: 70 (10/09/17 1123)  Resp: 16 (10/09/17 0743)  BP: (!) 167/97 (10/09/17 0743)  SpO2: (!) 94 % (10/09/17 0743) Vital Signs (24h Range):  Temp:  [97.2 °F (36.2 °C)-98.3 °F (36.8 °C)] 97.2 °F (36.2 °C)  Pulse:  [67-90] 70  Resp:  [16-27] 16  SpO2:  [94 %-97 %] 94 %  BP: (137-167)/(83-97) 167/97     Weight: 120.7 kg (266 lb 1.5 oz)  Body mass index is 30.75 kg/m².    Physical Exam  General:  Well-developed, well-nourished, nad  HEENT:  NCAT, PERRLA, EOMI, oropharygneal membranes non-erythematous/without exudate  Neck:  Supple, no palpable lad, no nuchal rigidity  Resp:  Symmetric expansion, no increased wob  CVS:  No LE edema, peripheral pulses 2+ (radial, dorsalis pedis)  GI:  Abd soft, non-distended, non-tender to palpation     Neurologic Exam:  Mental Status:  Awake, alert, oriented to self and wife.  Occasional aphasia, some trouble with word-finding difficulty, paraphasias.  Responds appropriately to most questions, able to follow complex commands.    Cranial Nerves:  VFs intact on blinking to threat bilaterally.  PERRLA, EOMI.  Facial movements and sensation intact, symmetric.  Palate raises symmetrically, tongue protrudes midline.  SCM/Trapezius 5/5.  Motor:  Normal bulk and tone.  Strength diffusely 5/5, symmetric.  Sensory:  Intact to light touch, temperature at all extremities.    Reflexes:  Biceps, brachioradialis, patellar 2+ bilaterally.  No ankle clonus.  Downgoing toes bilaterally.  Coordination:  FTN, BRENDA, HTS with no ataxia, no dysmetria, no dysdiadochokinesia.  Gait:  Deferred 2/2 fall  risk.    Significant Labs:     Recent Labs  Lab 10/09/17  0435   WBC 11.62   RBC 3.49*   HGB 11.7*   HCT 35.9*      *   MCH 33.5*   MCHC 32.6       Recent Labs  Lab 10/09/17  0435   CALCIUM 8.5*      K 4.6   CO2 25      BUN 21*   CREATININE 0.8     Significant Imaging:   10/04/17 CT head w/o contrast:  Postoperative changes with interval left parietal craniotomy for open biopsy of the left temporoparietal region. Trace volume of postoperative pneumocephalus and fluid is noted underlying the craniotomy site. No evidence of significant intracranial hemorrhage, midline shift, or hydrocephalus.   Stable appearing nonspecific abnormal region of parenchymal edema within the left temporoparietal region, similar in extent to prior MRI examination.    10/03/17 MRI Spectroscopy, Brain Stealth:  MRI brain demonstrates stable left posterior temporal/parietal edema with associated sulcal vascular prominence and swelling of the left temporal lobe when compared to MR examination 09/25/2017, however progressed when compared to 09/10/2017. Findings suggest encephalitis (such as herpes), other inflammatory etiology, or seizure-related edema.  Tumor is less likely, however cannot be excluded.  Time pattern are does not follow a course that would suggest ischemia/infarction.  Stealth protocol MRI performed for purposes of procedural localization.  MR spectroscopy of the area of interest in the left temporoparietal lobe is overall nonspecific and nondiagnostic.    09/28/17 IR angiogram carotid cerebral bilateral inc arch:  POSTOPERATIVE DIAGNOSIS(ES):    Narrowing / beating appearance of small distal inferior-posterior left MCA territory.  Note: It is unclear whether is this a primary process or sequelae of swelling noted on MRI.  If it is to be a considered a primary process then vasculitis could be questioned, however no other region of vascular abnormality is identified to support this.    09/22/17 MRI  Lumbar Spine:  1.  Old mild compression of L1  2.  Abnormal bone marrow signal and enhancement of the L4 vertebral body with mild decreased height suggesting a mild compression fracture there is fluid and this cannot exclude infection or pathologic fracture  3.  Degenerative changes as above    09/22/17 MRI Thoracic Spine:  1. Bone marrow edema and enhancement of the vertebral body of T11 with loss of body height of 30% suggesting a probable acute compression fracture.  Cannot exclude this pathologic or infection due to clinical history  2.  Degenerative changes as above greatest T7-8.  3.  Old compression fractures of L1-, T4 and T5    09/22/17 MRI Cervical Spine:   Degenerative changes as above.  Mild faint enhancement along cord probably normal physiologic with no definite focal enhancing lesion    09/22/17 MRI Brain w/ w/o contrast:  1.  Significant increase in the T2 signal in the left temporal lobe particularly increased in the left posterior parietal lobe with some serpiginous meningeal enhancement suspicious for encephalitis.  2.  Punctate areas of diffusion signal in the left posterior parietal lobe suspicious for punctate areas of acute ischemia versus less likely shine through recommend followup    Assessment and Plan:     * Encephalopathy    61 y.o male with hx of HTN transferred from [facility] 9/26/17. Patient originally presented to OSH after witnessed seizures at home. Intubated for airway protection. 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 LP done at OSH x 2 with benign CSF besides elevated protein 77. On course of vancomycin, zosyn for aspiration pneumonia as well. LP repeated 9/19/17. HSV PCR, VDRL, HIV and arbovirus panel negative. Quantiferon gold and influenza panel positive. Followed by Dr. Reyes (ID) and Osmany (Neuro) in [place].     Recommendations:  -Continue dexamethasone, taper orders in  -Will follow up brain bx  "results, pending CSF labs  -MR Spect/Stealth non-specific, infectious/seizure-related edema rather than tumor but non-diagnostic.  -Continue Vimpat 200 mg po bid.  EEG "mildly abnormal due to disorganization and slowing during wakefulness; relatively normal sleep architecture implies a lesser degree of encephalopathy; no evidence of seizures or a focal cerebral dysfunction."  -Continue delirium precautions. Doing well on seroquel 25 mg po qd, 75 mg po qHS.  -Recommend PT/OT daily      Complex partial seizure evolving to generalized seizure    -As per above, continue vimpat 200 mg po bid.    EEG INTERPRETATION:  Mildly abnormal long-term EEG due to disorganization and   slowing during wakefulness.  The presence of relatively normal sleep   architecture implies a lesser degree of encephalopathy, however.  There was no   evidence of seizures or a focal cerebral dysfunction     VTE Risk Mitigation         Ordered     heparin (porcine) injection 7,500 Units  Every 8 hours     Route:  Subcutaneous        10/05/17 1604     Medium Risk of VTE  Once      10/03/17 0835     Place JHONY hose  Until discontinued      10/03/17 0835     Place sequential compression device  Until discontinued      10/03/17 0835        Nuvia Garcia MD  Neurology  [facility]  "

## 2017-10-09 NOTE — SUBJECTIVE & OBJECTIVE
Interval History: >4 elements OR status of 3 inpatient conditions  POD 5  S/P L-lesion bx. Apparently had infectious characteristics. WBC jumped.. Prior pro calcitonin was normal. Clinical exam improving.  Slept well during the night. Delirium resolved.  Review of Systems   Neurological: Positive for speech difficulty (Dysnomia) and weakness.     2 systems OR Unable to obtain a complete ROS due to level of consciousness.  Objective:     Vitals:  Temp: 97.8 °F (36.6 °C) (10/08/17 1948)  Pulse: 77 (10/08/17 1948)  Resp: 16 (10/08/17 1948)  BP: (!) 153/88 (10/08/17 1948)  SpO2: 95 % (10/08/17 1948)    Temp:  [97.5 °F (36.4 °C)-98.4 °F (36.9 °C)] 97.8 °F (36.6 °C)  Pulse:  [64-96] 77  Resp:  [13-29] 16  SpO2:  [90 %-96 %] 95 %  BP: (121-170)/() 153/88              10/07 0701 - 10/08 0700  In: 2156 [P.O.:960; I.V.:46]  Out: 1835 [Urine:1835]    Physical Exam  Unable to test memory, judgment, insight, fund of knowledge, gait due to level of consciousness.  General   HEENT: UK  Chest Heart RRR / Lungs Clear to auscultation  Adbomen: Soft nontender + BS  Extremities: OK distal pulses.  Skin: UK  Neurological Exam:  MS; Alert, disoriented, partial transcortical dysphasia with dysnomia.improving.  CN: II-XII UK  Motor: LUE  5 /5 / RUE  5/5  Tone normal bilaterally             LLE  5 /5 /  RLE  5/5  Tone normal bilaterally  Sensory: LT/PP/T/ Vibration UK                 Complex sensory modalities: not tested  DTR:  normal throughout.  Coordination /Fine motor:   Gait: Not tested.  Meningeal signs: Absent  Medications:  Continuous Scheduled  atorvastatin 20 mg QHS   carvedilol 25 mg BID WM   cefTRIAXone 2 g Q12H   dexamethasone 4 mg Q8H   Followed by     [START ON 10/11/2017] dexamethasone 4 mg Q12H   Followed by     [START ON 10/15/2017] dexamethasone 2 mg Q12H   Followed by     [START ON 10/19/2017] dexamethasone 1 mg Q12H   Followed by     [START ON 10/23/2017] dexamethasone 1 mg Daily   heparin (porcine) 7,500 Units  Q8H   lacosamide 200 mg BID   metronidazole 500 mg Q8H   pantoprazole 40 mg Daily   polyethylene glycol 17 g Daily   quetiapine 25 mg Daily   quetiapine 75 mg QHS   ramelteon 8 mg QHS   thiamine 100 mg Daily   vancomycin (VANCOCIN) IVPB 1,250 mg Q8H   PRN  acetaminophen 650 mg Q6H PRN   bisacodyl 10 mg Daily PRN   dextrose 50% 12.5 g PRN   dextrose 50% 25 g PRN   gadobutrol 10 mL ONCE PRN   glucagon (human recombinant) 1 mg PRN   glucose 16 g PRN   glucose 24 g PRN   insulin aspart 1-10 Units QID (AC + HS) PRN   labetalol 10 mg Q4H PRN   ondansetron 8 mg Q8H PRN     Today I personally reviewed pertinent medications, lines/drains/airways, imaging, cardiology, lab results, microbiology results, notably:

## 2017-10-10 NOTE — ASSESSMENT & PLAN NOTE
"61 y.o male with hx of HTN transferred from [facility] 9/26/17. Patient originally presented to OSH after witnessed seizures at home. Intubated for airway protection. 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 LP done at OSH x 2 with benign CSF besides elevated protein 77. On course of vancomycin, zosyn for aspiration pneumonia as well. LP repeated 9/19/17. HSV PCR, VDRL, HIV and arbovirus panel negative. Quantiferon gold and influenza panel positive. Followed by Dr. Reyes (ID) and Osmany (Neuro) in [place].     Recommendations:  -Continue dexamethasone, taper orders in chart from Neurosurgery  -Will follow up brain bx results, pending CSF labs--so far negative  -MR Spect/Stealth non-specific, infectious/seizure-related edema rather than tumor but non-diagnostic.  -Continue Vimpat 200 mg po bid.  EEG "mildly abnormal due to disorganization and slowing during wakefulness; relatively normal sleep architecture implies a lesser degree of encephalopathy; no evidence of seizures or a focal cerebral dysfunction."  -Continue delirium precautions. Doing well on seroquel 25 mg po qd, 75 mg po qHS.  -Recommend PT/OT daily, plan for discharge to inpatient rehab  -Follow up with Neurosurgery in ~ 2 weeks for pathology results   "

## 2017-10-10 NOTE — PLAN OF CARE
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       Allergies:  Review of patient's allergies indicates:   Allergen Reactions    Keppra [levetiracetam] Other (See Comments)     How trouble speaking , agitation       Diet: regular diet    Activities: Activity as tolerated    Nursing:  Please take stable out on 10/18/2017    Labs: CBC and CMP per facility protocol     CONSULTS:    Physical Therapy to evaluate and treat. , Occupational Therapy to evaluate and treat., Speech Therapy to evaluate and treat for Language, Swallowing and Cognition. and  to evaluate for community resources/long-range planning.      WOUND CARE ORDERS no    Medications: Review discharge medications with patient and family and provide education.      Current Discharge Medication List      START taking these medications    Details   !! dexamethasone (DECADRON) 1 MG Tab Take 1 tablet (1 mg total) by mouth every 12 (twelve) hours.  Qty: 8 tablet,10/23-10/27       !! dexamethasone (DECADRON) 1 MG Tab Take 1 tablet (1 mg total) by mouth once daily.  Qty: 4 tablet, 10/19-10/23      !! dexamethasone (DECADRON) 2 MG tablet Take 1 tablet (2 mg total) by mouth every 12 (twelve) hours.  Qty: 8 tablet, 10/15-10/19      !! dexamethasone (DECADRON) 4 MG Tab Take 1 tablet (4 mg total) by mouth every 12 (twelve) hours.  Qty: 8 tablet,   10/11-10/15        lacosamide (VIMPAT) 50 mg Tab Take 4 tablets (200 mg total) by mouth 2 (two) times daily.  Qty: 240 tablet, Refills: 2      !! quetiapine (SEROQUEL) 25 MG Tab Take 1 tablet (25 mg total) by mouth once daily.  Qty: 30 tablet, Refills: 2      !! quetiapine (SEROQUEL) 25 MG Tab Take 3 tablets (75 mg total) by mouth every evening.  Qty: 90 tablet, Refills: 1      ramelteon (ROZEREM) 8 mg tablet Take 1 tablet (8 mg total) by mouth every evening.  Qty: 30 tablet, Refills: 0      thiamine 100 MG tablet Take 1 tablet (100 mg total) by mouth once daily.       !! - Potential duplicate medications found. Please discuss with provider.      CONTINUE these medications which have CHANGED    Details   aspirin (ECOTRIN) 81 MG EC tablet Take 1 tablet (81 mg total) by mouth once daily. Do NOT resume until neurosurgeon says ok to  Refills: 0         CONTINUE these medications which have NOT CHANGED    Details   atorvastatin (LIPITOR) 20 MG tablet Take 20 mg by mouth every evening.      carvedilol (COREG) 25 MG tablet Take 25 mg by mouth 2 (two) times daily with meals.      cyclobenzaprine (FLEXERIL) 10 MG tablet Take 10 mg by mouth daily as needed for Muscle spasms.       irbesartan-hydrochlorothiazide (AVALIDE) 150-12.5 mg per tablet Take 1 tablet by mouth once daily.       niacin (NIASPAN) 1000 MG CR tablet Take 1,000 mg by mouth once daily.      omeprazole (PRILOSEC) 40 MG capsule Take 40 mg by mouth once daily.                  _________________________________  Elizabet Perez MD  10/10/2017

## 2017-10-10 NOTE — PLAN OF CARE
ROSS received call from [facility], informing this SW that they were consulted and has been following case through [facility]. ROSS informed Pamela that pt is anticipating discharge on Friday, 10/13. Pamela would like to review biopsy results and verify level of therapy to make sure that he is still appropriate for their facility.     ROSS will continue to follow up on rehab placement.

## 2017-10-10 NOTE — PLAN OF CARE
Problem: SLP Goal  Goal: SLP Goal  Speech Language Pathology  Goals expected to be met by 10/13:    1. Pt will participate in automatic speech tasks with 100% acc independently  2. Pt will provide 5 items within a concrete category with min cues   3. Pt will follow 1-step directions with 80% accuracy independently  4. Pt will complete simple problem solving tasks with 80% acc independently   5. Pt will complete simple writing tasks with 75% acc with min cues   6. Pt will complete cancellation tasks with 75% acc with min cues      Outcome: Ongoing (interventions implemented as appropriate)  Pt progressing towards goals. ST will continue to follow.  CASIMIRO Steen., CCC-SLP  Pager: [phone]  10/10/2017

## 2017-10-10 NOTE — PLAN OF CARE
Recently stepped down from neuro critical care. AMS. Brain Bx, LP results pend. PT/OT Recs Rehab.      10/10/17 1010   Discharge Reassessment   Assessment Type Discharge Planning Reassessment   Provided patient/caregiver education on the expected discharge date and the discharge plan No   Do you have any problems affording any of your prescribed medications? No   Discharge Plan A Rehab   Discharge Plan B Home with family;Home Health   Can the patient answer the patient profile reliably? No, cognitively impaired   Describe the patient's ability to walk at the present time. Major restrictions/daily assistance from another person   How often would a person be available to care for the patient? Whenever needed   Number of comorbid conditions (as recorded on the chart) Five or more

## 2017-10-10 NOTE — PT/OT/SLP PROGRESS
Occupational Therapy  Treatment    Tommy Way   MRN: [id number]   Admitting Diagnosis: Encephalopathy    OT Date of Treatment: 10/10/17   OT Start Time: 1138  OT Stop Time: 1216  OT Total Time (min): 38 min    Billable Minutes:  Self Care/Home Management 12, Therapeutic Exercise 12 and Cognitive Retraining 14    General Precautions: Standard, fall, seizure, aphasia  Orthopedic Precautions: N/A  Braces: N/A    Do you have any cultural, spiritual, Zoroastrian conflicts, given your current situation?: no issues    Subjective:  Communicated with RN prior to session.  Pt agreeable to participate with therapy.   Pain/Comfort  Pain Rating 1: 0/10  Pain Rating Post-Intervention 1: 0/10    Objective:  Patient found with: telemetry, peripheral IV     Functional Mobility:  Bed Mobility:  Rolling/Turning to Left:  (Pt found sitting UIC)    Transfers:   Sit <> Stand Assistance: Stand By Assistance (from bedside chair)  Sit <> Stand Assistive Device: Rolling Walker    Functional Ambulation: He completed functional house hold distance to gather certain items around room requiring SBA-CGA for balance using RW for support. He tolerated well with no LOB or SOB. Pt required mod cues for sequencing when turning with RW .   Activities of Daily Living:     UE Dressing Level of Assistance: Minimum assistance (to trevor gown like jacket while standing)    Grooming Position: Standing at sink (to wash face and brush teeth)  Grooming Level of Assistance: Stand by assistance     Balance:   Static Sit: GOOD+: Takes MAXIMAL challenges from all directions.    Dynamic Sit: GOOD: Maintains balance through MODERATE excursions of active trunk movement  Static Stand: GOOD: Takes MODERATE challenges from all directions  Dynamic stand: FAIR+: Needs CLOSE SUPERVISION during gait and is able to right self with minor LOB    Therapeutic Activities and Exercises:  -Pt edu on OT role/POC  -Importance of OOB activity with staff assistance  -Safety during  "functional t/f and mobility   -Communication board updated  - Grooming tasks performed-- assistance level noted above  - Pt completed cognitive task challenging following mulit-step commands, sequencing, and memory to find 5/5 items around room to complete grooming task in bathroom--- pt was able to find 3/5 items without VC's and picked up 3 random items that were not included    AM-PAC 6 CLICK ADL   How much help from another person does this patient currently need?   1 = Unable, Total/Dependent Assistance  2 = A lot, Maximum/Moderate Assistance  3 = A little, Minimum/Contact Guard/Supervision  4 = None, Modified [place]/Independent    Putting on and taking off regular lower body clothing? : 2  Bathing (including washing, rinsing, drying)?: 2  Toileting, which includes using toilet, bedpan, or urinal? : 3  Putting on and taking off regular upper body clothing?: 3  Taking care of personal grooming such as brushing teeth?: 3  Eating meals?: 4  Total Score: 17     AM-PAC Raw Score CMS "G-Code Modifier Level of Impairment Assistance   6 % Total / Unable   7 - 8 CM 80 - 100% Maximal Assist   9-13 CL 60 - 80% Moderate Assist   14 - 19 CK 40 - 60% Moderate Assist   20 - 22 CJ 20 - 40% Minimal Assist   23 CI 1-20% SBA / CGA   24 CH 0% Independent/ Mod I       Patient left up in chair with all lines intact, call button in reach and RN notified    ASSESSMENT:  Tommy Way is a 61 y.o. male with a medical diagnosis of Encephalopathy and progressing well with goals. Pt would benefit from continued skilled OT to address deficits listed below and maximize return to PLOF. OT recommending rehab following d/c to increase overall independence and safety with ADL.     Rehab identified problem list/impairments: Rehab identified problem list/impairments: weakness, impaired functional mobilty, impaired self care skills, impaired endurance, impaired balance, impaired cognition    Rehab potential is good.    Activity " tolerance: Good    Discharge recommendations: Discharge Facility/Level Of Care Needs: rehabilitation facility     Barriers to discharge: Barriers to Discharge: Decreased caregiver support    Equipment recommendations: bath bench     GOALS:    Occupational Therapy Goals        Problem: Occupational Therapy Goal    Goal Priority Disciplines Outcome Interventions   Occupational Therapy Goal     OT, PT/OT Ongoing (interventions implemented as appropriate)    Description:  Goals to be met by 10/13/2017:    1.  Brush teeth with min cues for the steps MET ( with no cues)  2.  Don robe with tie completion with SBA and min cues  3.  Transfer bed-chair with min a and minimal cues for sequencing/safety  4.  Toilet self with mod a from BSC  5.  BSC transfer with CGA and minimal cues for sequencing/safety                         Plan:  Patient to be seen 5 x/week to address the above listed problems via neuromuscular re-education, self-care/home management, therapeutic activities, therapeutic exercises, cognitive retraining  Plan of Care expires: 11/05/17  Plan of Care reviewed with: patient, spouse         Hilary josé OT  10/10/2017

## 2017-10-10 NOTE — MEDICAL/APP STUDENT
[facility]  Neurology  Progress Note    Patient Name: Tommy Way  MRN: [id number]  Admission Date: 9/26/2017  Hospital Length of Stay: 14 days  Code Status: Full Code   Attending Provider: Felisa Jimenez MD  Primary Care Physician: Adan Torres MD   Principal Problem:Encephalopathy    Subjective:     Interval History: NAEON, pt slept well last night, states that he feels well this morning.    Current Neurological Medications: Dexamethasone taper, lacosamide 200mg, quietapine 25 am, 75 pm.    Current Facility-Administered Medications   Medication Dose Route Frequency Provider Last Rate Last Dose    acetaminophen tablet 650 mg  650 mg Oral Q6H PRN Khoi Hinojosa PA-C   650 mg at 10/03/17 1749    atorvastatin tablet 20 mg  20 mg Oral QHS Maren Li MD   20 mg at 10/09/17 2046    bisacodyl suppository 10 mg  10 mg Rectal Daily PRN Gabino Rich MD        carvedilol tablet 25 mg  25 mg Oral BID WM Maren Li MD   25 mg at 10/10/17 0742    cefTRIAXone 2 gram/50 mL IVPB 2 g  2 g Intravenous Q12H Norma Negrete  mL/hr at 10/09/17 2152 2 g at 10/09/17 2152    dexamethasone tablet 4 mg  4 mg Oral Q8H Norma Negrete NP   4 mg at 10/10/17 0524    Followed by    [START ON 10/11/2017] dexamethasone tablet 4 mg  4 mg Oral Q12H Norma Negrete NP        Followed by    [START ON 10/15/2017] dexamethasone tablet 2 mg  2 mg Oral Q12H Norma Negrete NP        Followed by    [START ON 10/19/2017] dexamethasone tablet 1 mg  1 mg Oral Q12H Norma Negrete NP        Followed by    [START ON 10/23/2017] dexamethasone tablet 1 mg  1 mg Oral Daily Norma Negrete NP        dextrose 50% injection 12.5 g  12.5 g Intravenous PRN Maren Li MD        dextrose 50% injection 25 g  25 g Intravenous PRN Maren Li MD        gadobutrol 10 mL  10 mL Intravenous ONCE PRN Gregor Abbott MD        glucagon (human recombinant) injection 1 mg  1 mg Intramuscular PRN Maren Li  MD        glucose chewable tablet 16 g  16 g Oral PRN Maren Li MD        glucose chewable tablet 24 g  24 g Oral PRN Maren Li MD        heparin (porcine) injection 7,500 Units  7,500 Units Subcutaneous Q8H Norma Negrete NP   7,500 Units at 10/10/17 0524    insulin aspart pen 1-10 Units  1-10 Units Subcutaneous QID (AC + HS) PRN Khoi Hinojosa PA-C   1 Units at 10/09/17 2113    labetalol injection 10 mg  10 mg Intravenous Q4H PRN eGne Wheeler NP   10 mg at 10/06/17 2250    lacosamide tablet 200 mg  200 mg Oral BID Maren Li MD   200 mg at 10/09/17 2047    magnesium sulfate 2g in water 50mL IVPB (premix)  2 g Intravenous Once Elizabet Perez MD   2 g at 10/10/17 0748    metronidazole IVPB 500 mg  500 mg Intravenous Q8H Norma Negrete  mL/hr at 10/10/17 0512 500 mg at 10/10/17 0512    ondansetron disintegrating tablet 8 mg  8 mg Oral Q8H PRN Gabino Rich MD        pantoprazole EC tablet 40 mg  40 mg Oral Daily Saw Ramirez MD   40 mg at 10/09/17 0856    polyethylene glycol packet 17 g  17 g Oral Daily Gabino Rich MD   17 g at 10/06/17 0840    quetiapine tablet 25 mg  25 mg Oral Daily Toby Sharp MD   25 mg at 10/09/17 0856    quetiapine tablet 75 mg  75 mg Oral QHS Toby Sharp MD   75 mg at 10/09/17 2047    ramelteon tablet 8 mg  8 mg Oral QHS Anthony Patel MD   8 mg at 10/09/17 2047    thiamine tablet 100 mg  100 mg Oral Daily Maren Li MD   100 mg at 10/09/17 0909    vancomycin 1.25 g in 5 % dextrose 250 mL IVPB  1,250 mg Intravenous Q8H Norma Negrete .7 mL/hr at 10/10/17 0224 1,250 mg at 10/10/17 0224       Review of Systems   Constitutional: Negative for activity change and appetite change.   HENT: Positive for congestion. Negative for sinus pain, sinus pressure, sneezing and sore throat.    Respiratory: Positive for cough. Negative for chest tightness and shortness of breath.    Cardiovascular: Negative for chest  pain.   Gastrointestinal: Negative for abdominal pain.   Genitourinary: Negative for difficulty urinating.   Neurological: Negative for dizziness, tremors, seizures, syncope, facial asymmetry, weakness and headaches.     Objective:     Vital Signs (Most Recent):  Temp: 97.9 °F (36.6 °C) (10/10/17 0740)  Pulse: 66 (10/10/17 0740)  Resp: 16 (10/10/17 0740)  BP: (!) 177/97 (10/10/17 0740)  SpO2: 96 % (10/10/17 0355) Vital Signs (24h Range):  Temp:  [97.2 °F (36.2 °C)-98.6 °F (37 °C)] 97.9 °F (36.6 °C)  Pulse:  [63-90] 66  Resp:  [16-18] 16  SpO2:  [95 %-96 %] 96 %  BP: (124-177)/(75-97) 177/97     Weight: 120.7 kg (266 lb 1.5 oz)  Body mass index is 30.75 kg/m².    Physical Exam   Constitutional: He is oriented to person, place, and time. He appears well-developed and well-nourished.   Eyes: Pupils are equal, round, and reactive to light.   Neck: Normal range of motion.   Cardiovascular: Normal rate and regular rhythm.    Pulmonary/Chest: Effort normal and breath sounds normal.   Abdominal: Soft.   Neurological: He is oriented to person, place, and time. He has normal strength.   Reflex Scores:       Tricep reflexes are 2+ on the right side and 2+ on the left side.       Bicep reflexes are 2+ on the right side and 2+ on the left side.       Brachioradialis reflexes are 2+ on the right side and 2+ on the left side.       Patellar reflexes are 2+ on the right side and 2+ on the left side.       Achilles reflexes are 2+ on the right side and 2+ on the left side.  Psychiatric: His speech is normal.       NEUROLOGICAL EXAMINATION:     MENTAL STATUS   Oriented to person, place, and time.   Registration: recalls 2 of 3 objects. Recall at 5 minutes: recalls 3 of 3 objects.   Attention: normal. Concentration: normal.   Speech: speech is normal   Level of consciousness: alert  Knowledge: good. Unable to perform simple calculations.   Able to name object. Normal comprehension.     CRANIAL NERVES     CN III, IV, VI   Pupils are  equal, round, and reactive to light.    CN V   Facial sensation intact.     CN VII   Facial expression full, symmetric.     CN IX, X   CN IX normal.   CN X normal.   Palate: symmetric    CN XI   CN XI normal.     MOTOR EXAM   Muscle bulk: normal  Overall muscle tone: normal  Right arm pronator drift: absent    Strength   Strength 5/5 throughout.     REFLEXES     Reflexes   Right brachioradialis: 2+  Left brachioradialis: 2+  Right biceps: 2+  Left biceps: 2+  Right triceps: 2+  Left triceps: 2+  Right patellar: 2+  Left patellar: 2+  Right achilles: 2+  Left achilles: 2+  Right plantar: upgoing  Left plantar: upgoing  Right ankle clonus: absent  Left ankle clonus: absent    SENSORY EXAM   Light touch normal.   Right arm vibration: normal  Left arm vibration: normal  Right leg vibration: decreased from knee  Left leg vibration: normal    GAIT AND COORDINATION        Not assessed, working with PT/OT       Significant Labs: All pertinent lab results from the past 24 hours have been reviewed.    Significant Imaging: I have reviewed and interpreted all pertinent imaging results/findings within the past 24 hours.    Assessment and Plan:   1 Encephalopathy   - Tommy Way is a 61 y.o. male who  has a past medical history of Acid reflux; Arthritis; Elevated cholesterol; and Hypertension., presenting with recurrent seizures found to have temporal lobe lesion progressing since September. Pt is doing well today, appears to be clinically improving on current regimen.    - Pt doing well on steroids, quietapine, and Vimpat   - Pt still on Metronidazole, ceftriaxone, vancomycin   - Awaiting brain biopsy results from Pathology   - Continue to work with PT/OT daily              VTE Risk Mitigation         Ordered     heparin (porcine) injection 7,500 Units  Every 8 hours     Route:  Subcutaneous        10/05/17 1604     Medium Risk of VTE  Once      10/03/17 0835     Place JHONY hose  Until discontinued      10/03/17 0835     Place  sequential compression device  Until discontinued      10/03/17 0880          Reginald Fairchild  Neurology  [facility]

## 2017-10-10 NOTE — SUBJECTIVE & OBJECTIVE
Subjective:     Interval History:   Patient doing very well this am and more alert and oriented than he has been on previous exams.  Did full exam with patient doing quite well with mental status exam.  Patient and his wife are wondering if they would be close to going to inpatient rehab.  Discussed plan for abx with ID, likely ok to discontinue until pathology results.  Patient would just need to follow planned steroid taper and have follow up within 2 weeks with Neurosurgery to follow up pathology results.      Current Neurological Medications:   Lacosamide 200 mg po bid    Current Facility-Administered Medications   Medication Dose Route Frequency Provider Last Rate Last Dose    acetaminophen tablet 650 mg  650 mg Oral Q6H PRN Khoi Hinojosa PA-C   650 mg at 10/03/17 1749    atorvastatin tablet 20 mg  20 mg Oral QHS Maren Li MD   20 mg at 10/09/17 2046    bisacodyl suppository 10 mg  10 mg Rectal Daily PRN Gabino Rich MD        carvedilol tablet 25 mg  25 mg Oral BID WM Maren Li MD   25 mg at 10/10/17 0742    cefTRIAXone 2 gram/50 mL IVPB 2 g  2 g Intravenous Q12H Norma Negrete  mL/hr at 10/09/17 2152 2 g at 10/09/17 2152    dexamethasone tablet 4 mg  4 mg Oral Q8H Norma Negrete NP   4 mg at 10/10/17 0524    Followed by    [START ON 10/11/2017] dexamethasone tablet 4 mg  4 mg Oral Q12H Norma Negrete NP        Followed by    [START ON 10/15/2017] dexamethasone tablet 2 mg  2 mg Oral Q12H Norma Negrete NP        Followed by    [START ON 10/19/2017] dexamethasone tablet 1 mg  1 mg Oral Q12H Norma Negrete NP        Followed by    [START ON 10/23/2017] dexamethasone tablet 1 mg  1 mg Oral Daily Norma Negrete NP        dextrose 50% injection 12.5 g  12.5 g Intravenous PRN Maren Li MD        dextrose 50% injection 25 g  25 g Intravenous PRN Maren Li MD        gadobutrol 10 mL  10 mL Intravenous ONCE PRN Gregor Abbott MD        glucagon (human  recombinant) injection 1 mg  1 mg Intramuscular PRN Maren Li MD        glucose chewable tablet 16 g  16 g Oral PRN Maren Li MD        glucose chewable tablet 24 g  24 g Oral PRN Maren Li MD        heparin (porcine) injection 7,500 Units  7,500 Units Subcutaneous Q8H Norma Negrete NP   7,500 Units at 10/10/17 0524    insulin aspart pen 1-10 Units  1-10 Units Subcutaneous QID (AC + HS) PRN Khoi Hinojosa PA-C   1 Units at 10/09/17 2113    labetalol injection 10 mg  10 mg Intravenous Q4H PRN Gene Wheeler NP   10 mg at 10/06/17 2250    lacosamide tablet 200 mg  200 mg Oral BID Maren Li MD   200 mg at 10/09/17 2047    magnesium sulfate 2g in water 50mL IVPB (premix)  2 g Intravenous Once Elizabet Perez MD   2 g at 10/10/17 0748    metronidazole IVPB 500 mg  500 mg Intravenous Q8H Norma Negrete  mL/hr at 10/10/17 0512 500 mg at 10/10/17 0512    ondansetron disintegrating tablet 8 mg  8 mg Oral Q8H PRN Gabino Rich MD        pantoprazole EC tablet 40 mg  40 mg Oral Daily Saw Ramirez MD   40 mg at 10/09/17 0856    polyethylene glycol packet 17 g  17 g Oral Daily Gabino Rich MD   17 g at 10/06/17 0840    quetiapine tablet 25 mg  25 mg Oral Daily Toby Sharp MD   25 mg at 10/09/17 0856    quetiapine tablet 75 mg  75 mg Oral QHS Toby Sharp MD   75 mg at 10/09/17 2047    ramelteon tablet 8 mg  8 mg Oral QHS Anthony Patel MD   8 mg at 10/09/17 2047    thiamine tablet 100 mg  100 mg Oral Daily Maren Li MD   100 mg at 10/09/17 0909    vancomycin 1.25 g in 5 % dextrose 250 mL IVPB  1,250 mg Intravenous Q8H Norma Negrete .7 mL/hr at 10/10/17 0224 1,250 mg at 10/10/17 0224     Review of Systems  Objective:     Vital Signs (Most Recent):  Temp: 97.9 °F (36.6 °C) (10/10/17 0740)  Pulse: 66 (10/10/17 0740)  Resp: 16 (10/10/17 0740)  BP: (!) 177/97 (10/10/17 0740)  SpO2: 96 % (10/10/17 0355) Vital Signs (24h Range):  Temp:   [97.2 °F (36.2 °C)-98.6 °F (37 °C)] 97.9 °F (36.6 °C)  Pulse:  [63-90] 66  Resp:  [16-18] 16  SpO2:  [95 %-96 %] 96 %  BP: (124-177)/(75-97) 177/97     Weight: 120.7 kg (266 lb 1.5 oz)  Body mass index is 30.75 kg/m².    Physical Exam  General:  Well-developed, well-nourished, nad  HEENT:  NCAT, PERRLA, EOMI, oropharygneal membranes non-erythematous/without exudate  Neck:  Supple, no palpable lad, no nuchal rigidity  Resp:  Symmetric expansion, no increased wob  CVS:  No LE edema, peripheral pulses 2+ (radial, dorsalis pedis)  GI:  Abd soft, non-distended, non-tender to palpation     Neurologic Exam:  Mental Status:  Awake, alert, oriented to self, location, and date. Able to spell 'world' forward and backward, recall 2/3 and 3/3 with category clues.  Speech mostly coherent with occasional paraphasias or word-finding difficulty.  Cranial Nerves:  VFs intact on blinking to threat bilaterally.  PERRLA, EOMI.  Facial movements and sensation intact, symmetric.  Palate raises symmetrically, tongue protrudes midline.  SCM/Trapezius 5/5.  Motor:  Normal bulk and tone.  Strength diffusely 5/5, symmetric.  Sensory:  Intact to light touch, temperature at all extremities.    Reflexes:  Biceps, brachioradialis, patellar 2+ bilaterally.  No ankle clonus.  Downgoing toes bilaterally.  Coordination:  FTN, BRENDA, HTS with no ataxia, no dysmetria, no dysdiadochokinesia.  Gait:  Deferred 2/2 fall risk.     Significant Labs:    Recent Labs  Lab 10/10/17  0510   WBC 12.24   RBC 3.53*   HGB 12.0*   HCT 36.7*      *   MCH 34.0*   MCHC 32.7       Recent Labs  Lab 10/10/17  0510   CALCIUM 8.9      K 4.2   CO2 27      BUN 21*   CREATININE 0.8     Significant Imaging:  10/04/17 CT head w/o contrast:  Postoperative changes with interval left parietal craniotomy for open biopsy of the left temporoparietal region. Trace volume of postoperative pneumocephalus and fluid is noted underlying the craniotomy site. No evidence  of significant intracranial hemorrhage, midline shift, or hydrocephalus.   Stable appearing nonspecific abnormal region of parenchymal edema within the left temporoparietal region, similar in extent to prior MRI examination.     10/03/17 MRI Spectroscopy, Brain Stealth:  MRI brain demonstrates stable left posterior temporal/parietal edema with associated sulcal vascular prominence and swelling of the left temporal lobe when compared to MR examination 09/25/2017, however progressed when compared to 09/10/2017. Findings suggest encephalitis (such as herpes), other inflammatory etiology, or seizure-related edema.  Tumor is less likely, however cannot be excluded.  Time pattern are does not follow a course that would suggest ischemia/infarction.  Stealth protocol MRI performed for purposes of procedural localization.  MR spectroscopy of the area of interest in the left temporoparietal lobe is overall nonspecific and nondiagnostic.     09/28/17 IR angiogram carotid cerebral bilateral inc arch:  POSTOPERATIVE DIAGNOSIS(ES):    Narrowing / beating appearance of small distal inferior-posterior left MCA territory.  Note: It is unclear whether is this a primary process or sequelae of swelling noted on MRI.  If it is to be a considered a primary process then vasculitis could be questioned, however no other region of vascular abnormality is identified to support this.     09/22/17 MRI Lumbar Spine:  1.  Old mild compression of L1  2.  Abnormal bone marrow signal and enhancement of the L4 vertebral body with mild decreased height suggesting a mild compression fracture there is fluid and this cannot exclude infection or pathologic fracture  3.  Degenerative changes as above     09/22/17 MRI Thoracic Spine:  1. Bone marrow edema and enhancement of the vertebral body of T11 with loss of body height of 30% suggesting a probable acute compression fracture.  Cannot exclude this pathologic or infection due to clinical history  2.   Degenerative changes as above greatest T7-8.  3.  Old compression fractures of L1-, T4 and T5     09/22/17 MRI Cervical Spine:   Degenerative changes as above.  Mild faint enhancement along cord probably normal physiologic with no definite focal enhancing lesion     09/22/17 MRI Brain w/ w/o contrast:  1.  Significant increase in the T2 signal in the left temporal lobe particularly increased in the left posterior parietal lobe with some serpiginous meningeal enhancement suspicious for encephalitis.  2.  Punctate areas of diffusion signal in the left posterior parietal lobe suspicious for punctate areas of acute ischemia versus less likely shine through recommend followup

## 2017-10-10 NOTE — PLAN OF CARE
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       Patient is homebound due to:  Encephalopathy    Allergies:  Review of patient's allergies indicates:   Allergen Reactions    Keppra [levetiracetam] Other (See Comments)     How trouble speaking , agitation       Vitals:      Routine    Diet:regular    Acitivities:  Per facility protocol    LABS:  Per facility protocol    Nursing Precautions:               - Fall precautions per nursing home protocol   - Seizure precaution per nursing home protocol      CONSULTS:      Physical Therapy to evaluate and treat     Occupational Therapy to evaluate and treat     Speech Therapy  to evaluate and treat                       Medications: Discontinue all previous medication orders, if any. See new list below.     Tommy Way   Home Medication Instructions CINDY:[id number]    Printed on:10/10/17 5559   Medication Information                                   atorvastatin (LIPITOR) 20 MG tablet  Take 20 mg by mouth every evening.             carvedilol (COREG) 25 MG tablet  Take  25 mg by mouth 2 (two) times daily with meals.             cyclobenzaprine (FLEXERIL) 10 MG tablet  Take 10 mg by mouth daily as needed for Muscle spasms.              dexamethasone (DECADRON) 1 MG Tab  Take 1 tablet (1 mg total) by mouth every 12 (twelve) hours. 10/11-10/15             dexamethasone (DECADRON) 1 MG Tab  Take 1 tablet (1 mg total) by mouth once daily.  10/15-10/19           dexamethasone (DECADRON) 2 MG tablet  Take 1 tablet (2 mg total) by mouth every 12 (twelve) hours.  10/19-10/23           dexamethasone (DECADRON) 4 MG Tab  Take 1 tablet (4 mg total) by mouth every 12 (twelve) hours. 10/23-10/27             irbesartan-hydrochlorothiazide (AVALIDE) 150-12.5 mg per tablet  Take 1 tablet by mouth once daily.              lacosamide (VIMPAT) 50 mg Tab  Take 4 tablets (200 mg total) by mouth 2 (two) times daily.             niacin (NIASPAN) 1000 MG CR tablet  Take 1,000 mg by mouth once daily.             omeprazole (PRILOSEC) 40 MG capsule  Take 40 mg by mouth once daily.             quetiapine (SEROQUEL) 25 MG Tab  Take 1 tablet (25 mg total) by mouth once daily.             quetiapine (SEROQUEL) 25 MG Tab  Take 3 tablets (75 mg total) by mouth every evening.             ramelteon (ROZEREM) 8 mg tablet  Take 1 tablet (8 mg total) by mouth every evening.             thiamine 100 MG tablet  Take 1 tablet (100 mg total) by mouth once daily.                       _________________________________  Elizabet Perez MD  10/10/2017

## 2017-10-10 NOTE — PLAN OF CARE
Problem: Physical Therapy Goal  Goal: Physical Therapy Goal  Goals to be met by: 7 days (10/13)    Patient will increase functional independence with mobility by performin. Supine to sit with Set-up [place]  2. Sit to supine with Set-up [place]  3. Sit to stand transfer with Contact Guard Assistance-met   Revised: sit to stand transfer with Supervision   4. Gait  x 100 feet with Contact Guard Assistance using Rolling Walker.   5. Stand for 5 minutes with Stand-by Assistance using Rolling Walker  6. Lower extremity exercise program x30 reps per handout, with independence to improve muscular strength and endurance.        Goals remain appropriate at time. Continue with PT POC as indicated.

## 2017-10-10 NOTE — PROGRESS NOTES
"[facility] Hospital Medicine  Progress Note    Patient Name: Tommy Way  MRN: [id number]  Patient Class: IP- Inpatient   Admission Date: 9/26/2017  Length of Stay: 14 days  Attending Physician: Felisa Jimenez MD  Primary Care Provider: Adan Torres MD    [facility] Medicine Team: [facility] MED 1 Elizabet Perez MD    Subjective:     Principal Problem:Encephalopathy    HPI:  A 61-year-old man with recently diagnosed tonic clonic seizure disorder,  HTN and dyslipidemia admitted to [facility] after 5 witnessed seizures 9/10/17. He was initially intubated on presentation for low sats and inability to protect airway, extubated 9/14. 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 He was treated with Acyclovir and ultimately underwent 2 lumbar punctures which have been unrevealing. HSV PCR from first LP is negative. After he failed to improve significantly, diagnostic imaging was done and showed expansion of the meningeal enhancement as well as possible pathologic spinal compression fractures. EEG's done failed to reveal epileptic form seizures. MR Spect/Stealth non-specific, infectious/seizure-related edema rather than tumor but non-diagnostic. MRI on 09/22 shows "significant increase in the T2 signal in the left temporal lobe particularly increased in the left posterior parietal lobe with some serpiginous meningeal enhancement suspicious for encephalitis."  Patient subsequently had brain bx of L posterior temperol and occipital lobe lesion with NSGY. Brain bx suspicious for infection that ID was consulted and patient was started on vancomycin, cefepime and flagyl. Patient recent Bcx, Ucx and chest xray are all negative for infectious process. AFB from CSF pending. Currently patient is on seizure prophylaxis and Seroquel for delirium and agitation. Per wife who was at bedside today states patient is not quiet " at baseline, however, he is much improved cognitively, he knows he is in the hospital as well as his mobility has improved. Cheng to pull himself up from his seat and standing though still needs some support. Wife states patient was health prior initial presentation with only hypertension and HLD. Also said his behavior has improved, less agitation and confusion since been weaned of decadron.            Hospital Course:  No notes on file    Interval History: NAEON    Review of Systems   Constitutional: Negative for chills and fever.   Respiratory: Negative for cough and shortness of breath.    Cardiovascular: Negative for chest pain and palpitations.   Gastrointestinal: Negative for abdominal pain and nausea.   Genitourinary: Negative for difficulty urinating and dysuria.   Skin: Negative for color change and pallor.   Neurological: Negative for dizziness and numbness.     Objective:     Vital Signs (Most Recent):  Temp: 97.4 °F (36.3 °C) (10/10/17 1208)  Pulse: 70 (10/10/17 1300)  Resp: 14 (10/10/17 1208)  BP: 130/80 (10/10/17 1208)  SpO2: 96 % (10/10/17 1208) Vital Signs (24h Range):  Temp:  [97.2 °F (36.2 °C)-98.2 °F (36.8 °C)] 97.4 °F (36.3 °C)  Pulse:  [63-82] 70  Resp:  [14-18] 14  SpO2:  [95 %-96 %] 96 %  BP: (120-177)/(70-97) 130/80     Weight: 120.7 kg (266 lb 1.5 oz)  Body mass index is 30.75 kg/m².    Intake/Output Summary (Last 24 hours) at 10/10/17 1427  Last data filed at 10/10/17 0948   Gross per 24 hour   Intake              480 ml   Output                0 ml   Net              480 ml      Physical Exam   Constitutional: He appears well-developed and well-nourished. No distress.   HENT:   Head: Normocephalic.   [place] left scalp clean and without drainage or erythema   Eyes: Conjunctivae are normal. No scleral icterus.   Neck: Normal range of motion. Neck supple.   Cardiovascular: Normal rate and regular rhythm.    Pulmonary/Chest: Effort normal. No respiratory distress. He has no wheezes.    Abdominal: Soft. Bowel sounds are normal. He exhibits no distension. There is no tenderness.   Neurological: He is alert.   Oriented only to self   Skin: Skin is warm and dry. He is not diaphoretic.   Vitals reviewed.      Significant Labs: All pertinent labs within the past 24 hours have been reviewed.    Significant Imaging: I have reviewed all pertinent imaging results/findings within the past 24 hours.    Assessment/Plan:      * Encephalopathy    Pt alert but oriented to self only. Follows commands but use of words not appropriate. 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 He was treated with acyclovir and ultimately underwent 2 lumbar punctures which have been unrevealing. HSV PCR from first LP is negative. Additional viral studies  unremarkable. After he failed to improve significantly, diagnostic imaging was done and showed expansion of the meningeal enhancement as well as possible pathologic spnal compression fractures. Awaiting brain bx results, possibly infectious etiology on Abx currently. MRI/CT head non-specific. Awaiting AFB results.   - cont broad spec abx  - appreciate neuro recs  - biopsy pending        Encephalitis and encephalomyelitis, unspecified    .        Status epilepticus, generalized convulsive    -complex partial seizures of unknown etiology possibly infectious vs autonomic vs neoplastic  -Awaiting brain bx results  -EEG thus far does not demonstrate specific etiology of seizure  -On lacosamide   -Seroquel for agitation/delirium  neurology following up         Hypomagnesemia    - replace          Hypokalemia    replace          T11 vertebral fracture    OSH MRI with acute compression fracture at T11. Mild compression fracture of L4. Old compression fractures at L1, T4, T5. No neuro deficits noted on exam other than altered mentation. Neurosurgery consult as above.           Aspiration pneumonia of  both lower lobes due to vomit    Treated with Zosyn for 11 days at OSH; will not continue abx. Respiratory status, VS stable.           Hyperlipidemia    -continue home statin           Hypertension    - cont coreg 25 mg PO BID for now          VTE Risk Mitigation         Ordered     heparin (porcine) injection 7,500 Units  Every 8 hours     Route:  Subcutaneous        10/05/17 1604     Medium Risk of VTE  Once      10/03/17 0835     Place JHONY hose  Until discontinued      10/03/17 0835     Place sequential compression device  Until discontinued      10/03/17 0835              Elizabet Perez MD  Department of Hospital Medicine   [facility]

## 2017-10-10 NOTE — PLAN OF CARE
Problem: Patient Care Overview  Goal: Plan of Care Review  Outcome: Ongoing (interventions implemented as appropriate)  Patient is awake and alert to self and reoriented as needed. Plan of care continued. Blood glucose check done and doesn't require any coverage. Up in the chair most of the day. Uneventful day, no distress noted. Wife at bedside.

## 2017-10-10 NOTE — PT/OT/SLP PROGRESS
Physical Therapy  Treatment    Tommy Way   MRN: [id number]   Admitting Diagnosis: Encephalopathy    PT Received On: 10/10/17  PT Start Time: 1035     PT Stop Time: 1100    PT Total Time (min): 25 min       Billable Minutes:  Gait Training9 and Therapeutic Activity 16    Treatment Type: Treatment  PT/PTA: PTA     PTA Visit Number: 2       General Precautions: Standard, fall, seizure, aphasia  Orthopedic Precautions: N/A   Braces: N/A         Subjective:  Communicated with nursing prior to session.  Pt agreed to work with therapy.          Objective:   Patient found with:  (all lines intact)    Functional Mobility:  Bed Mobility:    Not performed 2* pt found and left seated bedside chair.     Transfers:  Sit <> Stand Assistance: Contact Guard Assistance  Sit <> Stand Assistive Device: Rolling Walker    Gait:   Gait Distance:  (120 ft., with cueing for RW mgmt and safety. )  Assistance 1: Contact Guard Assistance  Gait Assistive Device: Rolling walker  Gait Pattern: reciprocal  Gait Deviation(s): increased time in double stance, decreased velocity of limb motion, decreased step length, decreased stride length    Therapeutic Activities and Exercises:  B LE therex x20 reps with cueing for technique and for pt to stay on task.    -AP   -LAQ   -Hip Flexion     AM-PAC 6 CLICK MOBILITY  How much help from another person does this patient currently need?   1 = Unable, Total/Dependent Assistance  2 = A lot, Maximum/Moderate Assistance  3 = A little, Minimum/Contact Guard/Supervision  4 = None, Modified [place]/Independent    Turning over in bed (including adjusting bedclothes, sheets and blankets)?: 3  Sitting down on and standing up from a chair with arms (e.g., wheelchair, bedside commode, etc.): 3  Moving from lying on back to sitting on the side of the bed?: 3  Moving to and from a bed to a chair (including a wheelchair)?: 3  Need to walk in hospital room?: 3  Climbing 3-5 steps with a railing?: 2  Total Score:  17    AM-PAC Raw Score CMS G-Code Modifier Level of Impairment Assistance   6 % Total / Unable   7 - 9 CM 80 - 100% Maximal Assist   10 - 14 CL 60 - 80% Moderate Assist   15 - 19 CK 40 - 60% Moderate Assist   20 - 22 CJ 20 - 40% Minimal Assist   23 CI 1-20% SBA / CGA   24 CH 0% Independent/ Mod I     Patient left up in chair with all lines intact, call button in reach and spouse present.    Assessment:  Tommy Way is a 61 y.o. male with a medical diagnosis of Encephalopathy and presents with all deficits noted below. Pt tolerated treatment well, and will continue to benefit from PT services at this time. Continue with PT POC as indicated.    Rehab identified problem list/impairments: Rehab identified problem list/impairments: weakness, impaired endurance, impaired self care skills, impaired functional mobilty, gait instability, impaired coordination, impaired cognition, impaired balance    Rehab potential is good.    Activity tolerance: Good    Discharge recommendations: Discharge Facility/Level Of Care Needs: rehabilitation facility     Barriers to discharge: Barriers to Discharge: Decreased caregiver support    Equipment recommendations: Equipment Needed After Discharge: bath bench     GOALS:    Physical Therapy Goals        Problem: Physical Therapy Goal    Goal Priority Disciplines Outcome Goal Variances Interventions   Physical Therapy Goal     PT/OT, PT Ongoing (interventions implemented as appropriate)     Description:  Goals to be met by: 7 days (10/13)    Patient will increase functional independence with mobility by performin. Supine to sit with Set-up [place]  2. Sit to supine with Set-up [place]  3. Sit to stand transfer with Contact Guard Assistance-met   Revised: sit to stand transfer with Supervision   4. Gait  x 100 feet with Contact Guard Assistance using Rolling Walker.   5. Stand for 5 minutes with Stand-by Assistance using Rolling Walker  6. Lower extremity exercise  program x30 reps per handout, with independence to improve muscular strength and endurance.                         PLAN:    Patient to be seen 5 x/week  to address the above listed problems via gait training, therapeutic activities, therapeutic exercises, neuromuscular re-education  Plan of Care expires: 10/29/17  Plan of Care reviewed with: patient, spouse         Pam Mery, PTA  10/10/2017

## 2017-10-10 NOTE — PROGRESS NOTES
ID Plan of Care Note:    -see yesterday's note for details  -would stop all antibiotics and monitor  -follow up on path  -will sign off

## 2017-10-10 NOTE — PLAN OF CARE
Problem: Occupational Therapy Goal  Goal: Occupational Therapy Goal  Goals to be met by 10/13/2017:    1.  Brush teeth with min cues for the steps MET ( with no cues)  2.  Don robe with tie completion with SBA and min cues  3.  Transfer bed-chair with min a and minimal cues for sequencing/safety  4.  Toilet self with mod a from BSC  5.  BSC transfer with CGA and minimal cues for sequencing/safety       Outcome: Ongoing (interventions implemented as appropriate)  Continue with POC. Pt progressing well with goals.   Hilary josé OT  10/10/2017

## 2017-10-10 NOTE — SUBJECTIVE & OBJECTIVE
Interval History: NAEON    Review of Systems   Constitutional: Negative for chills and fever.   Respiratory: Negative for cough and shortness of breath.    Cardiovascular: Negative for chest pain and palpitations.   Gastrointestinal: Negative for abdominal pain and nausea.   Genitourinary: Negative for difficulty urinating and dysuria.   Skin: Negative for color change and pallor.   Neurological: Negative for dizziness and numbness.     Objective:     Vital Signs (Most Recent):  Temp: 97.4 °F (36.3 °C) (10/10/17 1208)  Pulse: 70 (10/10/17 1300)  Resp: 14 (10/10/17 1208)  BP: 130/80 (10/10/17 1208)  SpO2: 96 % (10/10/17 1208) Vital Signs (24h Range):  Temp:  [97.2 °F (36.2 °C)-98.2 °F (36.8 °C)] 97.4 °F (36.3 °C)  Pulse:  [63-82] 70  Resp:  [14-18] 14  SpO2:  [95 %-96 %] 96 %  BP: (120-177)/(70-97) 130/80     Weight: 120.7 kg (266 lb 1.5 oz)  Body mass index is 30.75 kg/m².    Intake/Output Summary (Last 24 hours) at 10/10/17 1427  Last data filed at 10/10/17 0948   Gross per 24 hour   Intake              480 ml   Output                0 ml   Net              480 ml      Physical Exam   Constitutional: He appears well-developed and well-nourished. No distress.   HENT:   Head: Normocephalic.   [place] left scalp clean and without drainage or erythema   Eyes: Conjunctivae are normal. No scleral icterus.   Neck: Normal range of motion. Neck supple.   Cardiovascular: Normal rate and regular rhythm.    Pulmonary/Chest: Effort normal. No respiratory distress. He has no wheezes.   Abdominal: Soft. Bowel sounds are normal. He exhibits no distension. There is no tenderness.   Neurological: He is alert.   Oriented only to self   Skin: Skin is warm and dry. He is not diaphoretic.   Vitals reviewed.      Significant Labs: All pertinent labs within the past 24 hours have been reviewed.    Significant Imaging: I have reviewed all pertinent imaging results/findings within the past 24 hours.

## 2017-10-10 NOTE — PT/OT/SLP PROGRESS
"Speech Language Pathology  Treatment    Tommy Way   MRN: [id number]   909/909 A    Admitting Diagnosis: Encephalopathy    Diet recommendations: Solid Diet Level: Regular  Liquid Diet Level: Thin   Universal aspiration precautions    SLP Treatment Date: 10/10/17  Speech Start Time: 0938     Speech Stop Time: 1016     Speech Total (min): 38 min       TREATMENT BILLABLE MINUTES:  Speech Therapy Individual 38    Has the patient been evaluated by SLP for swallowing? : Yes  Keep patient NPO?: No   General Precautions: Standard, fall, aphasia, seizure  Current Respiratory Status:  (room air)       Subjective:  Pt awake and seated in chair with RN and RN student administering meds and taking BP.  Wife expressing frustration and concern about wanting pt to discharge to rehab soon to continue therapy and get out of the hospital.           Objective:   Pt greeted SLP stated, "i'm eating right now." SLP told pt she would return when he was done eating and he stated, "Okay I eat. You speech therapy 10 minutes." When SLP returned pt stated, "I was wondering if you were coming back." As previously noted by ST, pt masks deficits well with adequate topical conversation. Noted tangential speech throughout session.   Pt's wife reported completing activities independently to work on word finding and visual spatial skills. She reported pt has been doing word searches with min cues to redirect to tasks. SHe reported she can tell "it is mentally tiring for him." She plays card games with pt as well. Wife noted pt with increased difficulty naming colors.  Pt named colors of various items in the room with 100% acc no cues. Wife reported that he was not able to do this earlier today.   Pt named pictures of objects with 100% acc no cues.   Pt  Followed 1 step directions with 60% acc and increasing to 80% acc with repetitions of instructions and redirection to tasks.   Pt named the NENA with one cue (Starting pt with Monday). Pt began " "telling SLP the date when asking to name days of week without cue. Pt named January-September without cues. He required cues for Oct, Nov, Dec.   SLP named holidays and pt identified the correct month with 80% acc, however, was noted to perseverate on saying "[birth year]" which is his birthday.  Pt identified kids and grandchildren, name of street, where he works, wife's birthday, and current day/month with no cues. Pt had difficulty with naming the year.   Pt identified an object described with 100% acc no cues.   Pt identified a category given 3 words with 80% acc no cues and increasing to 90% acc given cues.       Assessment:  Tommy Way is a 61 y.o. male with a medical diagnosis of Encephalopathy and presents with Aphasia and cognitive-linguistic impairments. Pt progressing towards all goals. ST will continue to follow.     Discharge recommendations: Discharge Facility/Level Of Care Needs: rehabilitation facility     Goals:    SLP Goals        Problem: SLP Goal    Goal Priority Disciplines Outcome   SLP Goal     SLP Ongoing (interventions implemented as appropriate)   Description:  Speech Language Pathology  Goals expected to be met by 10/13:    1. Pt will participate in automatic speech tasks with 100% acc independently  2. Pt will provide 5 items within a concrete category with min cues   3. Pt will follow 1-step directions with 80% accuracy independently  4. Pt will complete simple problem solving tasks with 80% acc independently   5. Pt will complete simple writing tasks with 75% acc with min cues   6. Pt will complete cancellation tasks with 75% acc with min cues                        Plan:   Patient to be seen Therapy Frequency: 5 x/week   Plan of Care expires: 11/04/17  Plan of Care reviewed with: patient, spouse  SLP Follow-up?: Yes          FILIPE Velarde, CCC-SLP   Pager: [phone]  10/10/2017    "

## 2017-10-10 NOTE — PLAN OF CARE
Problem: Patient Care Overview  Goal: Plan of Care Review  Outcome: Ongoing (interventions implemented as appropriate)  Uneventful night. Alert and oriented to self only.  Received IV antibiotics as ordered. Neuro checks completed, no deviations from baseline. Safety/Fall precautions maintained. Bed in lowest position, call bell in reach. No complaints of pain or discomfort. Spouse at bedside.

## 2017-10-10 NOTE — PROGRESS NOTES
[facility]  Neurology  Progress Note    Patient Name: Tommy Way  MRN: [id number]  Admission Date: 9/26/2017  Hospital Length of Stay: 14 days  Code Status: Full Code   Attending Provider: Felisa Jimenez MD  Primary Care Physician: Adan Torres MD   Principal Problem:Encephalopathy    Subjective:     Interval History:   Patient doing very well this am and more alert and oriented than he has been on previous exams.  Did full exam with patient doing quite well with mental status exam.  Patient and his wife are wondering if they would be close to going to inpatient rehab.  Discussed plan for abx with ID, likely ok to discontinue until pathology results.  Patient would just need to follow planned steroid taper and have follow up within 2 weeks with Neurosurgery to follow up pathology results.      Current Neurological Medications:   Lacosamide 200 mg po bid    Current Facility-Administered Medications   Medication Dose Route Frequency Provider Last Rate Last Dose    acetaminophen tablet 650 mg  650 mg Oral Q6H PRN Khoi Hinojosa PA-C   650 mg at 10/03/17 1749    atorvastatin tablet 20 mg  20 mg Oral QHS Maren Li MD   20 mg at 10/09/17 2046    bisacodyl suppository 10 mg  10 mg Rectal Daily PRN Gabino Rich MD        carvedilol tablet 25 mg  25 mg Oral BID  Maren Li MD   25 mg at 10/10/17 0742    cefTRIAXone 2 gram/50 mL IVPB 2 g  2 g Intravenous Q12H Norma Negrete  mL/hr at 10/09/17 2152 2 g at 10/09/17 2152    dexamethasone tablet 4 mg  4 mg Oral Q8H Norma Negrete NP   4 mg at 10/10/17 0524    Followed by    [START ON 10/11/2017] dexamethasone tablet 4 mg  4 mg Oral Q12H Norma Negrete NP        Followed by    [START ON 10/15/2017] dexamethasone tablet 2 mg  2 mg Oral Q12H Norma Negrete NP        Followed by    [START ON 10/19/2017] dexamethasone tablet 1 mg  1 mg Oral Q12H Norma Negrete NP        Followed by    [START ON 10/23/2017]  dexamethasone tablet 1 mg  1 mg Oral Daily Norma Negrete NP        dextrose 50% injection 12.5 g  12.5 g Intravenous PRN Maren Li MD        dextrose 50% injection 25 g  25 g Intravenous PRN Maren Li MD        gadobutrol 10 mL  10 mL Intravenous ONCE PRN Gregor Abbott MD        glucagon (human recombinant) injection 1 mg  1 mg Intramuscular PRN Maren Li MD        glucose chewable tablet 16 g  16 g Oral PRN Maren Li MD        glucose chewable tablet 24 g  24 g Oral PRN Maren Li MD        heparin (porcine) injection 7,500 Units  7,500 Units Subcutaneous Q8H Norma Negrete NP   7,500 Units at 10/10/17 0524    insulin aspart pen 1-10 Units  1-10 Units Subcutaneous QID (AC + HS) PRN Khoi Hinojosa PA-C   1 Units at 10/09/17 2113    labetalol injection 10 mg  10 mg Intravenous Q4H PRN Gene Wheeler NP   10 mg at 10/06/17 2250    lacosamide tablet 200 mg  200 mg Oral BID Maren Li MD   200 mg at 10/09/17 2047    magnesium sulfate 2g in water 50mL IVPB (premix)  2 g Intravenous Once Elizabet Perez MD   2 g at 10/10/17 0748    metronidazole IVPB 500 mg  500 mg Intravenous Q8H Norma Negrete  mL/hr at 10/10/17 0512 500 mg at 10/10/17 0512    ondansetron disintegrating tablet 8 mg  8 mg Oral Q8H PRN Gabino Rich MD        pantoprazole EC tablet 40 mg  40 mg Oral Daily Saw Ramirez MD   40 mg at 10/09/17 0856    polyethylene glycol packet 17 g  17 g Oral Daily Gabino Rich MD   17 g at 10/06/17 0840    quetiapine tablet 25 mg  25 mg Oral Daily Toby Sharp MD   25 mg at 10/09/17 0856    quetiapine tablet 75 mg  75 mg Oral QHS Toby Sharp MD   75 mg at 10/09/17 2047    ramelteon tablet 8 mg  8 mg Oral QHS Anthony Patel MD   8 mg at 10/09/17 2047    thiamine tablet 100 mg  100 mg Oral Daily Maren Li MD   100 mg at 10/09/17 0909    vancomycin 1.25 g in 5 % dextrose 250 mL IVPB  1,250 mg Intravenous Q8H Norma Negrete NP  166.7 mL/hr at 10/10/17 0224 1,250 mg at 10/10/17 0224     Review of Systems  Objective:     Vital Signs (Most Recent):  Temp: 97.9 °F (36.6 °C) (10/10/17 0740)  Pulse: 66 (10/10/17 0740)  Resp: 16 (10/10/17 0740)  BP: (!) 177/97 (10/10/17 0740)  SpO2: 96 % (10/10/17 0355) Vital Signs (24h Range):  Temp:  [97.2 °F (36.2 °C)-98.6 °F (37 °C)] 97.9 °F (36.6 °C)  Pulse:  [63-90] 66  Resp:  [16-18] 16  SpO2:  [95 %-96 %] 96 %  BP: (124-177)/(75-97) 177/97     Weight: 120.7 kg (266 lb 1.5 oz)  Body mass index is 30.75 kg/m².    Physical Exam  General:  Well-developed, well-nourished, nad  HEENT:  NCAT, PERRLA, EOMI, oropharygneal membranes non-erythematous/without exudate  Neck:  Supple, no palpable lad, no nuchal rigidity  Resp:  Symmetric expansion, no increased wob  CVS:  No LE edema, peripheral pulses 2+ (radial, dorsalis pedis)  GI:  Abd soft, non-distended, non-tender to palpation     Neurologic Exam:  Mental Status:  Awake, alert, oriented to self, location, and date. Able to spell 'world' forward and backward, recall 2/3 and 3/3 with category clues.  Speech mostly coherent with occasional paraphasias or word-finding difficulty.  Cranial Nerves:  VFs intact on blinking to threat bilaterally.  PERRLA, EOMI.  Facial movements and sensation intact, symmetric.  Palate raises symmetrically, tongue protrudes midline.  SCM/Trapezius 5/5.  Motor:  Normal bulk and tone.  Strength diffusely 5/5, symmetric.  Sensory:  Intact to light touch, temperature at all extremities.    Reflexes:  Biceps, brachioradialis, patellar 2+ bilaterally.  No ankle clonus.  Downgoing toes bilaterally.  Coordination:  FTN, BRENDA, HTS with no ataxia, no dysmetria, no dysdiadochokinesia.  Gait:  Deferred 2/2 fall risk.     Significant Labs:    Recent Labs  Lab 10/10/17  0510   WBC 12.24   RBC 3.53*   HGB 12.0*   HCT 36.7*      *   MCH 34.0*   MCHC 32.7       Recent Labs  Lab 10/10/17  0510   CALCIUM 8.9      K 4.2   CO2 27       BUN 21*   CREATININE 0.8     Significant Imaging:  10/04/17 CT head w/o contrast:  Postoperative changes with interval left parietal craniotomy for open biopsy of the left temporoparietal region. Trace volume of postoperative pneumocephalus and fluid is noted underlying the craniotomy site. No evidence of significant intracranial hemorrhage, midline shift, or hydrocephalus.   Stable appearing nonspecific abnormal region of parenchymal edema within the left temporoparietal region, similar in extent to prior MRI examination.     10/03/17 MRI Spectroscopy, Brain Stealth:  MRI brain demonstrates stable left posterior temporal/parietal edema with associated sulcal vascular prominence and swelling of the left temporal lobe when compared to MR examination 09/25/2017, however progressed when compared to 09/10/2017. Findings suggest encephalitis (such as herpes), other inflammatory etiology, or seizure-related edema.  Tumor is less likely, however cannot be excluded.  Time pattern are does not follow a course that would suggest ischemia/infarction.  Stealth protocol MRI performed for purposes of procedural localization.  MR spectroscopy of the area of interest in the left temporoparietal lobe is overall nonspecific and nondiagnostic.     09/28/17 IR angiogram carotid cerebral bilateral inc arch:  POSTOPERATIVE DIAGNOSIS(ES):    Narrowing / beating appearance of small distal inferior-posterior left MCA territory.  Note: It is unclear whether is this a primary process or sequelae of swelling noted on MRI.  If it is to be a considered a primary process then vasculitis could be questioned, however no other region of vascular abnormality is identified to support this.     09/22/17 MRI Lumbar Spine:  1.  Old mild compression of L1  2.  Abnormal bone marrow signal and enhancement of the L4 vertebral body with mild decreased height suggesting a mild compression fracture there is fluid and this cannot exclude infection or  "pathologic fracture  3.  Degenerative changes as above     09/22/17 MRI Thoracic Spine:  1. Bone marrow edema and enhancement of the vertebral body of T11 with loss of body height of 30% suggesting a probable acute compression fracture.  Cannot exclude this pathologic or infection due to clinical history  2.  Degenerative changes as above greatest T7-8.  3.  Old compression fractures of L1-, T4 and T5     09/22/17 MRI Cervical Spine:   Degenerative changes as above.  Mild faint enhancement along cord probably normal physiologic with no definite focal enhancing lesion     09/22/17 MRI Brain w/ w/o contrast:  1.  Significant increase in the T2 signal in the left temporal lobe particularly increased in the left posterior parietal lobe with some serpiginous meningeal enhancement suspicious for encephalitis.  2.  Punctate areas of diffusion signal in the left posterior parietal lobe suspicious for punctate areas of acute ischemia versus less likely shine through recommend followup    Assessment and Plan:     * Encephalopathy    61 y.o male with hx of HTN transferred from [facility] 9/26/17. Patient originally presented to OSH after witnessed seizures at home. Intubated for airway protection. 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 LP done at OSH x 2 with benign CSF besides elevated protein 77. On course of vancomycin, zosyn for aspiration pneumonia as well. LP repeated 9/19/17. HSV PCR, VDRL, HIV and arbovirus panel negative. Quantiferon gold and influenza panel positive. Followed by Dr. Reyes (ID) and Osmany (Neuro) in [place].     Recommendations:  -Continue dexamethasone, taper orders in chart from Neurosurgery  -Will follow up brain bx results, pending CSF labs--so far negative  -MR Spect/Stealth non-specific, infectious/seizure-related edema rather than tumor but non-diagnostic.  -Continue Vimpat 200 mg po bid.  EEG "mildly abnormal due to " "disorganization and slowing during wakefulness; relatively normal sleep architecture implies a lesser degree of encephalopathy; no evidence of seizures or a focal cerebral dysfunction."  -Continue delirium precautions. Doing well on seroquel 25 mg po qd, 75 mg po qHS.  -Recommend PT/OT daily, plan for discharge to inpatient rehab  -Follow up with Neurosurgery in ~ 2 weeks for pathology results       Complex partial seizure evolving to generalized seizure    -As per above, continue vimpat 200 mg po bid.    EEG INTERPRETATION:  Mildly abnormal long-term EEG due to disorganization and   slowing during wakefulness.  The presence of relatively normal sleep   architecture implies a lesser degree of encephalopathy, however.  There was no   evidence of seizures or a focal cerebral dysfunction     VTE Risk Mitigation         Ordered     heparin (porcine) injection 7,500 Units  Every 8 hours     Route:  Subcutaneous        10/05/17 1604     Medium Risk of VTE  Once      10/03/17 0835     Place JHONY hose  Until discontinued      10/03/17 0835     Place sequential compression device  Until discontinued      10/03/17 0835        Nuvia Garcia MD  Neurology  [facility]  "

## 2017-10-10 NOTE — ASSESSMENT & PLAN NOTE
Pt alert but oriented to self only. Follows commands but use of words not appropriate. 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 He was treated with acyclovir and ultimately underwent 2 lumbar punctures which have been unrevealing. HSV PCR from first LP is negative. Additional viral studies  unremarkable. After he failed to improve significantly, diagnostic imaging was done and showed expansion of the meningeal enhancement as well as possible pathologic spnal compression fractures. Awaiting brain bx results, possibly infectious etiology on Abx currently. MRI/CT head non-specific. Awaiting AFB results.   - cont broad spec abx  - appreciate neuro recs  - biopsy pending

## 2017-10-11 NOTE — ASSESSMENT & PLAN NOTE
-Unknown etiology at this point, treated with acyclovir for possible HSV encephalitis, subsequent HSV PCR negative   -MRI with right parieto-occipital meningeal enhancement, concerning for infectious process s/p biopsy, Abx discontinued, awaiting bx results.

## 2017-10-11 NOTE — PLAN OF CARE
Problem: Fall Risk (Adult)  Goal: Identify Related Risk Factors and Signs and Symptoms  Related risk factors and signs and symptoms are identified upon initiation of Human Response Clinical Practice Guideline (CPG)   Outcome: Ongoing (interventions implemented as appropriate)  Pt remain free from injury during this shift; will continue to monitor.

## 2017-10-11 NOTE — PT/OT/SLP PROGRESS
Physical Therapy  Treatment    Tommy Way   MRN: [id number]   Admitting Diagnosis: Encephalopathy    PT Received On: 10/11/17  PT Start Time: 0940     PT Stop Time: 1003    PT Total Time (min): 23 min       Billable Minutes:  Therapeutic Activity 15 and Therapeutic Exercise 8    Treatment Type: Treatment  PT/PTA: PTA     PTA Visit Number: 3       General Precautions: Standard, aphasia, fall, seizure  Orthopedic Precautions: N/A   Braces: N/A         Subjective:  Communicated with nursing prior to session.  Pt agreed to work with therapy.     Pain/Comfort  Pain Rating 1: 0/10  Pain Rating Post-Intervention 1: 0/10    Objective:        Functional Mobility:  Bed Mobility:    Not performed 2* to pt found and left seated EOB.    Transfers:  Sit <> Stand Assistance: Contact Guard Assistance (x5 trials )  Sit <> Stand Assistive Device: Rolling Walker    Gait:    Not performed on this date.     Therapeutic Activities and Exercises:  Pt performed B LE therex x30 reps: AP, LAQ, Hip Flexion, and GS. Cueing for pt to stay on task.  Pt stood with RW and SBA for ~8 min. Cueing for upright posture.      AM-PAC 6 CLICK MOBILITY  How much help from another person does this patient currently need?   1 = Unable, Total/Dependent Assistance  2 = A lot, Maximum/Moderate Assistance  3 = A little, Minimum/Contact Guard/Supervision  4 = None, Modified [place]/Independent    Turning over in bed (including adjusting bedclothes, sheets and blankets)?: 3  Sitting down on and standing up from a chair with arms (e.g., wheelchair, bedside commode, etc.): 3  Moving from lying on back to sitting on the side of the bed?: 3  Moving to and from a bed to a chair (including a wheelchair)?: 3  Need to walk in hospital room?: 3  Climbing 3-5 steps with a railing?: 2  Total Score: 17    AM-PAC Raw Score CMS G-Code Modifier Level of Impairment Assistance   6 % Total / Unable   7 - 9 CM 80 - 100% Maximal Assist   10 - 14 CL 60 - 80% Moderate  Assist   15 - 19 CK 40 - 60% Moderate Assist   20 - 22 CJ 20 - 40% Minimal Assist   23 CI 1-20% SBA / CGA   24 CH 0% Independent/ Mod I     Patient left up in chair with call button in reach and spouse present.    Assessment:  Tommy Way is a 61 y.o. male with a medical diagnosis of Encephalopathy and presents with all deficits noted below. Pt tolerated treatment well, and will continue to benefit from PT services at this time. Continue with PT POC as indicated.    Rehab identified problem list/impairments: Rehab identified problem list/impairments: weakness, impaired functional mobilty, impaired self care skills, impaired endurance, impaired balance, impaired cognition    Rehab potential is good.    Activity tolerance: Good    Discharge recommendations: Discharge Facility/Level Of Care Needs: rehabilitation facility     Barriers to discharge: Barriers to Discharge: Decreased caregiver support    Equipment recommendations: Equipment Needed After Discharge: bath bench     GOALS:    Physical Therapy Goals        Problem: Physical Therapy Goal    Goal Priority Disciplines Outcome Goal Variances Interventions   Physical Therapy Goal     PT/OT, PT Ongoing (interventions implemented as appropriate)     Description:  Goals to be met by: 7 days (10/13)    Patient will increase functional independence with mobility by performin. Supine to sit with Set-up [place]  2. Sit to supine with Set-up [place]  3. Sit to stand transfer with Contact Guard Assistance-met   Revised: sit to stand transfer with Supervision   4. Gait  x 100 feet with Contact Guard Assistance using Rolling Walker.   5. Stand for 5 minutes with Stand-by Assistance using Rolling Walker. MET  6. Lower extremity exercise program x30 reps per handout, with independence to improve muscular strength and endurance.                          PLAN:    Patient to be seen 5 x/week  to address the above listed problems via gait training, therapeutic  activities, therapeutic exercises, neuromuscular re-education  Plan of Care expires: 10/29/17  Plan of Care reviewed with: patient, spouse         Pam Ordonez, PTA  10/11/2017

## 2017-10-11 NOTE — PT/OT/SLP PROGRESS
"Speech Language Pathology  Treatment    Tommy Way   MRN: [id number]   909/909 A    Admitting Diagnosis: Encephalopathy    Diet recommendations: Solid Diet Level: Regular  Liquid Diet Level: Thin     SLP Treatment Date: 10/11/17  Speech Start Time: 1105     Speech Stop Time: 1136     Speech Total (min): 31 min       TREATMENT BILLABLE MINUTES:  Speech Therapy Individual 23 and [place] Care/Home Management Training 8    Has the patient been evaluated by SLP for swallowing? : Yes  Keep patient NPO?: No   General Precautions: Standard, fall, seizure  Current Respiratory Status:  (room air)       Subjective:  "Feather." Pt perseverating on this word for portion of this session.     Pain/Comfort  Pain Rating 1: 0/10  Pain Rating Post-Intervention 1: 0/10    Objective:     Pt awake and alert upon entry with wife at b/s. Male friend arrived during this session. Pt completed category exclusion task given f/o 3 with 67% ind'ly. He was unable to recall series of 3 related words in reverse order. Pt generated synonym to given word with 25% ind'ly and 50% cues. In divergent categorization task to generate 5 items within a category, recalled 5 items ind'ly on trial x1, and 3 items ind'ly/ 2 items given cues x1. Perseveration on words included in previous cognitive-linguistic tasks evident throughout session. Pt, wife, and friend provided with education re: circumlocuting with list of key concepts used to aid circumlocution left at b/s. Additional education provided re: ways to re-direct pt when perseveration evident. White board updated. No further questions.     Assessment:  Tommy Way is a 61 y.o. male with a medical diagnosis of Encephalopathy and presents with cognitive-linguistic deficits.     Discharge recommendations: Discharge Facility/Level Of Care Needs: rehabilitation facility     Goals:    SLP Goals        Problem: SLP Goal    Goal Priority Disciplines Outcome   SLP Goal     SLP Ongoing (interventions implemented " as appropriate)   Description:  Speech Language Pathology  Goals expected to be met by 10/13:    1. Pt will participate in automatic speech tasks with 100% acc independently  2. Pt will provide 5 items within a concrete category with min cues   3. Pt will follow 1-step directions with 80% accuracy independently  4. Pt will complete simple problem solving tasks with 80% acc independently   5. Pt will complete simple writing tasks with 75% acc with min cues   6. Pt will complete cancellation tasks with 75% acc with min cues                        Plan:   Patient to be seen Therapy Frequency: 5 x/week   Plan of Care expires: 11/04/17  Plan of Care reviewed with: patient, spouse, friend  SLP Follow-up?: Yes           FILIPE Ying, CCC-SLP  [phone]  10/11/2017

## 2017-10-11 NOTE — PROGRESS NOTES
Peripheral IV d/c'ed inadvertently, catheter intact, patient and wife hesitant to replace due to plan to transfer to rehab today. WCTM.

## 2017-10-11 NOTE — ASSESSMENT & PLAN NOTE
Pt alert but oriented to self only. Follows commands but use of words not appropriate. 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 He was treated with acyclovir and ultimately underwent 2 lumbar punctures which have been unrevealing. HSV PCR from first LP is negative. Additional viral studies  unremarkable. After he failed to improve significantly, diagnostic imaging was done and showed expansion of the meningeal enhancement as well as possible pathologic spnal compression fractures. Awaiting brain bx results, possibly infectious etiology on Abx currently. MRI/CT head non-specific. Awaiting AFB results.   - Abx discontinued per ID recs   - appreciate neuro recs  - biopsy pending

## 2017-10-11 NOTE — PLAN OF CARE
Problem: Physical Therapy Goal  Goal: Physical Therapy Goal  Goals to be met by: 7 days (10/13)    Patient will increase functional independence with mobility by performin. Supine to sit with Set-up [place]  2. Sit to supine with Set-up [place]  3. Sit to stand transfer with Contact Guard Assistance-met   Revised: sit to stand transfer with Supervision   4. Gait  x 100 feet with Contact Guard Assistance using Rolling Walker.   5. Stand for 5 minutes with Stand-by Assistance using Rolling Walker. MET  6. Lower extremity exercise program x30 reps per handout, with independence to improve muscular strength and endurance.        Goals remain appropriate at time. Continue with PT POC as indicated.

## 2017-10-11 NOTE — HOSPITAL COURSE
10/3: Pt admitted to [facility] s/p brain bx of L posterior temperol and occipitol lobe lesion with NSGY. SBP<160, seizure ppx. CTH post biopsy trace volume  postoperative pneumocephalus and fluid underlying craniotomy site. Stable parenchymal edema within the left temporoparietal region. No significant postoperative hemorrhage present.  10/5 ID reconsulted  recs appreciated. Started vancomycin, cefepime and flagyl. Pan cultured prior to ABX.  CRP, Sed rate.   10/6 Added AFB culture and smear to brain tissue in lab quatiferon gold sent.. Patient restless agitated. Trying to get out of bed. precedex started. Also not sleeping at night will try ramelteon Qhs. If transfers to floor needs sitter.  10.07: POD 5  S/P L-lesion bx. Apparently had infectious characteristics. WBC jumped.. Prior pro calcitonin was normal. Clinical exam improving.  Slept well during the night. Delirium resolved.    10/11: Patient remains afebrile. H & H stable. Electrolytes wnl. Patient oriented to self only. Wife at bedside states he is progressing with mobility, able to get up and take steps with support. Also stated he had mild confusion at night which she attributed to Seroquel.  less lucid He is otherwise asymptomatic. Awaiting insurance approval to discharge to rehab.     10/12: Mentation remains the same, oriented to self only. Functionally better, moving around with moderate support. Remains afebrile.VSS. Electrolytes/CBC reviewed, wnl. Awaiting insurance approval to Rehab. BP elevated in the 200's earlier in the AM, optimal later in the morning.

## 2017-10-11 NOTE — TELEPHONE ENCOUNTER
----- Message from Pooja Mayen sent at 10/11/2017  3:48 PM CDT -----  Contact: Graciela/ MAGALIE/[phone]  CC: Elizabet Olivares called in regards needing to inform that medication ramelteon (ROZEREM) 8 mg tablet is not cover by insurance, if generic can be prescribe. Please call and advise.      Thank you!!!

## 2017-10-11 NOTE — PLAN OF CARE
Problem: Fall Risk (Adult)  Goal: Identify Related Risk Factors and Signs and Symptoms  Related risk factors and signs and symptoms are identified upon initiation of Human Response Clinical Practice Guideline (CPG)   Outcome: Ongoing (interventions implemented as appropriate)  No signs of fall this shift, fall reduction program in place, wife at bedside, WCTM.

## 2017-10-11 NOTE — SUBJECTIVE & OBJECTIVE
Interval History: NAEON.     Review of Systems   Constitutional: Negative for chills and fever.   Respiratory: Negative for cough and shortness of breath.    Cardiovascular: Negative for chest pain and palpitations.   Gastrointestinal: Negative for abdominal pain and nausea.   Genitourinary: Negative for difficulty urinating and dysuria.   Skin: Negative for color change and pallor.   Neurological: Negative for dizziness and numbness.     Objective:     Vital Signs (Most Recent):  Temp: 98.4 °F (36.9 °C) (10/11/17 1541)  Pulse: 72 (10/11/17 1541)  Resp: 17 (10/11/17 1541)  BP: 133/81 (10/11/17 1541)  SpO2: (!) 94 % (10/11/17 1541) Vital Signs (24h Range):  Temp:  [96.7 °F (35.9 °C)-98.4 °F (36.9 °C)] 98.4 °F (36.9 °C)  Pulse:  [66-90] 72  Resp:  [17-20] 17  SpO2:  [90 %-96 %] 94 %  BP: (129-139)/(79-89) 133/81     Weight: 120.7 kg (266 lb 1.5 oz)  Body mass index is 30.75 kg/m².    Intake/Output Summary (Last 24 hours) at 10/11/17 1546  Last data filed at 10/10/17 1700   Gross per 24 hour   Intake              240 ml   Output                0 ml   Net              240 ml      Physical Exam   Constitutional: He appears well-developed and well-nourished. No distress.   HENT:   Head: Normocephalic.   Jalen left scalp clean and without drainage or erythema   Eyes: Conjunctivae are normal. No scleral icterus.   Neck: Normal range of motion. Neck supple.   Cardiovascular: Normal rate and regular rhythm.    Pulmonary/Chest: Effort normal. No respiratory distress. He has no wheezes.   Abdominal: Soft. Bowel sounds are normal. He exhibits no distension. There is no tenderness.   Neurological: He is alert.   Oriented only to self   Skin: Skin is warm and dry. He is not diaphoretic.   Vitals reviewed.      Significant Labs:   CBC:   Recent Labs  Lab 10/10/17  0510 10/11/17  0426   WBC 12.24 12.67   HGB 12.0* 12.2*   HCT 36.7* 37.1*    173     CMP:   Recent Labs  Lab 10/10/17  0510 10/11/17  0426    137   K 4.2  4.4    104   CO2 27 27   * 124*   BUN 21* 25*   CREATININE 0.8 0.8   CALCIUM 8.9 8.9   ANIONGAP 7* 6*   EGFRNONAA >60.0 >60.0       Significant Imaging: I have reviewed all pertinent imaging results/findings within the past 24 hours.

## 2017-10-11 NOTE — PLAN OF CARE
IM 1 team ready for [facility] to [facility].     SW placed call to Pamela at [facility] [phone], she is willing to accept pt for admission once she received insurance authorization from [facility].

## 2017-10-11 NOTE — PROGRESS NOTES
"[facility] Hospital Medicine  Progress Note    Patient Name: Tommy Way  MRN: [id number]  Patient Class: IP- Inpatient   Admission Date: 9/26/2017  Length of Stay: 15 days  Attending Physician: Felisa Jimenez MD  Primary Care Provider: Adan Torres MD    [facility] Medicine Team: [facility]OS[facility] MED 1 Edvin Rashid MD    Subjective:     Principal Problem:Encephalopathy    HPI:  A 61-year-old man with recently diagnosed tonic clonic seizure disorder,  HTN and dyslipidemia admitted to [facility] after 5 witnessed seizures 9/10/17. He was initially intubated on presentation for low sats and inability to protect airway, extubated 9/14. 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 He was treated with Acyclovir and ultimately underwent 2 lumbar punctures which have been unrevealing. HSV PCR from first LP is negative. After he failed to improve significantly, diagnostic imaging was done and showed expansion of the meningeal enhancement as well as possible pathologic spinal compression fractures. EEG's done failed to reveal epileptic form seizures. MR Spect/Stealth non-specific, infectious/seizure-related edema rather than tumor but non-diagnostic. MRI on 09/22 shows "significant increase in the T2 signal in the left temporal lobe particularly increased in the left posterior parietal lobe with some serpiginous meningeal enhancement suspicious for encephalitis."  Patient subsequently had brain bx of L posterior temperol and occipital lobe lesion with NSGY. Brain bx suspicious for infection that ID was consulted and patient was started on vancomycin, cefepime and flagyl. Patient recent Bcx, Ucx and chest xray are all negative for infectious process. AFB from CSF pending. Currently patient is on seizure prophylaxis and Seroquel for delirium and agitation. Per wife who was at bedside today states patient is not quiet " at baseline, however, he is much improved cognitively, he knows he is in the hospital as well as his mobility has improved. Cheng to pull himself up from his seat and standing though still needs some support. Wife states patient was health prior initial presentation with only hypertension and HLD. Also said his behavior has improved, less agitation and confusion since been weaned of decadron.            Hospital Course:  10/3: Pt admitted to [facility] s/p brain bx of L posterior temperol and occipitol lobe lesion with NSGY. SBP<160, seizure ppx. CTH post biopsy trace volume  postoperative pneumocephalus and fluid underlying craniotomy site. Stable parenchymal edema within the left temporoparietal region. No significant postoperative hemorrhage present.  10/5 ID reconsulted  recs appreciated. Started vancomycin, cefepime and flagyl. Pan cultured prior to ABX.  CRP, Sed rate.   10/6 Added AFB culture and smear to brain tissue in lab quatiferon gold sent.. Patient restless agitated. Trying to get out of bed. precedex started. Also not sleeping at night will try ramelteon Qhs. If transfers to floor needs sitter.  10.07: POD 5  S/P L-lesion bx. Apparently had infectious characteristics. WBC jumped.. Prior pro calcitonin was normal. Clinical exam improving.  Slept well during the night. Delirium resolved.    10/11: Patient remains afebrile. H & H stable. Electrolytes wnl. Patient oriented to self only. Wife at bedside states he is progressing with mobility, able to get up and take steps with support. Also stated he had mild confusion at night which she attributed to Seroquel.  less lucid He is otherwise asymptomatic. Awaiting insurance approval to discharge to rehab.     Interval History: NAEON.     Review of Systems   Constitutional: Negative for chills and fever.   Respiratory: Negative for cough and shortness of breath.    Cardiovascular: Negative for chest pain and palpitations.   Gastrointestinal: Negative for abdominal  pain and nausea.   Genitourinary: Negative for difficulty urinating and dysuria.   Skin: Negative for color change and pallor.   Neurological: Negative for dizziness and numbness.     Objective:     Vital Signs (Most Recent):  Temp: 98.4 °F (36.9 °C) (10/11/17 1541)  Pulse: 72 (10/11/17 1541)  Resp: 17 (10/11/17 1541)  BP: 133/81 (10/11/17 1541)  SpO2: (!) 94 % (10/11/17 1541) Vital Signs (24h Range):  Temp:  [96.7 °F (35.9 °C)-98.4 °F (36.9 °C)] 98.4 °F (36.9 °C)  Pulse:  [66-90] 72  Resp:  [17-20] 17  SpO2:  [90 %-96 %] 94 %  BP: (129-139)/(79-89) 133/81     Weight: 120.7 kg (266 lb 1.5 oz)  Body mass index is 30.75 kg/m².    Intake/Output Summary (Last 24 hours) at 10/11/17 1546  Last data filed at 10/10/17 1700   Gross per 24 hour   Intake              240 ml   Output                0 ml   Net              240 ml      Physical Exam   Constitutional: He appears well-developed and well-nourished. No distress.   HENT:   Head: Normocephalic.   [place] left scalp clean and without drainage or erythema   Eyes: Conjunctivae are normal. No scleral icterus.   Neck: Normal range of motion. Neck supple.   Cardiovascular: Normal rate and regular rhythm.    Pulmonary/Chest: Effort normal. No respiratory distress. He has no wheezes.   Abdominal: Soft. Bowel sounds are normal. He exhibits no distension. There is no tenderness.   Neurological: He is alert.   Oriented only to self   Skin: Skin is warm and dry. He is not diaphoretic.   Vitals reviewed.      Significant Labs:   CBC:   Recent Labs  Lab 10/10/17  0510 10/11/17  0426   WBC 12.24 12.67   HGB 12.0* 12.2*   HCT 36.7* 37.1*    173     CMP:   Recent Labs  Lab 10/10/17  0510 10/11/17  0426    137   K 4.2 4.4    104   CO2 27 27   * 124*   BUN 21* 25*   CREATININE 0.8 0.8   CALCIUM 8.9 8.9   ANIONGAP 7* 6*   EGFRNONAA >60.0 >60.0       Significant Imaging: I have reviewed all pertinent imaging results/findings within the past 24  hours.    Assessment/Plan:      * Encephalopathy    Pt alert but oriented to self only. Follows commands but use of words not appropriate. 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 He was treated with acyclovir and ultimately underwent 2 lumbar punctures which have been unrevealing. HSV PCR from first LP is negative. Additional viral studies  unremarkable. After he failed to improve significantly, diagnostic imaging was done and showed expansion of the meningeal enhancement as well as possible pathologic spnal compression fractures. Awaiting brain bx results, possibly infectious etiology on Abx currently. MRI/CT head non-specific. Awaiting AFB results.   - Abx discontinued per ID recs   - appreciate neuro recs  - biopsy pending        Hypomagnesemia    - replace          T11 vertebral fracture    OSH MRI with acute compression fracture at T11. Mild compression fracture of L4. Old compression fractures at L1, T4, T5. No neuro deficits noted on exam other than altered mentation. Neurosurgery consult as above.           Aspiration pneumonia of both lower lobes due to vomit    Treated with Zosyn for 11 days at OSH; will not continue abx. Respiratory status, VS stable.           Hypokalemia    replace          Encephalitis and encephalomyelitis, unspecified    .        Status epilepticus, generalized convulsive    -complex partial seizures of unknown etiology possibly infectious vs autonomic vs neoplastic  -Awaiting brain bx results  -EEG thus far does not demonstrate specific etiology of seizure  -On lacosamide   -Seroquel for agitation/delirium  neurology following up         Hyperlipidemia    -continue home statin           Hypertension    - cont coreg 25 mg PO BID for now          VTE Risk Mitigation         Ordered     heparin (porcine) injection 5,000 Units  Every 8 hours     Route:  Subcutaneous        10/11/17 0802     Medium  Risk of VTE  Once      10/03/17 0835     Place JHONY hose  Until discontinued      10/03/17 0835     Place sequential compression device  Until discontinued      10/03/17 0835              Edvin Rashid MD  Department of Hospital Medicine   [facility]

## 2017-10-11 NOTE — PLAN OF CARE
Problem: SLP Goal  Goal: SLP Goal  Speech Language Pathology  Goals expected to be met by 10/13:    1. Pt will participate in automatic speech tasks with 100% acc independently  2. Pt will provide 5 items within a concrete category with min cues   3. Pt will follow 1-step directions with 80% accuracy independently  4. Pt will complete simple problem solving tasks with 80% acc independently   5. Pt will complete simple writing tasks with 75% acc with min cues   6. Pt will complete cancellation tasks with 75% acc with min cues      Outcome: Ongoing (interventions implemented as appropriate)  Continue current SLP POC. Goals remain appropriate.     FILIPE Ying, CCC-SLP  [phone]  10/11/2017

## 2017-10-12 NOTE — PLAN OF CARE
Problem: SLP Goal  Goal: SLP Goal  Speech Language Pathology  Goals expected to be met by 10/13:    1. Pt will participate in automatic speech tasks with 100% acc independently  2. Pt will provide 5 items within a concrete category with min cues   3. Pt will follow 1-step directions with 80% accuracy independently  4. Pt will complete simple problem solving tasks with 80% acc independently   5. Pt will complete simple writing tasks with 75% acc with min cues   6. Pt will complete cancellation tasks with 75% acc with min cues      Outcome: Ongoing (interventions implemented as appropriate)  Continue current SLP POC. Goals remain appropriate.     FILIPE Ying, CCC-SLP  [phone]  10/12/2017

## 2017-10-12 NOTE — PLAN OF CARE
Cassieyash Way (spouse) [phone], called by this SW. There was no answer, however detailed voice message left with d/c planning update. SW awaiting response from pt's insurance [employer] to gain authorization to transfer to [facility].

## 2017-10-12 NOTE — PT/OT/SLP PROGRESS
Physical Therapy  Treatment    Tommy Way   MRN: [id number]   Admitting Diagnosis: Encephalopathy    PT Received On: 10/12/17  PT Start Time: 1040     PT Stop Time: 1108    PT Total Time (min): 28 min       Billable Minutes:  Gait Training9 and Therapeutic Activity 19    Treatment Type: Treatment  PT/PTA: PTA     PTA Visit Number: 4       General Precautions: Standard, fall, seizure  Orthopedic Precautions: N/A   Braces: N/A         Subjective:  Communicated with nursing prior to session.  Pt agreed to work with therapy.     Pain/Comfort  Pain Rating 1: 0/10  Pain Rating Post-Intervention 1: 0/10    Objective:        Functional Mobility:  Bed Mobility: Not performed 2* to pt found and left seated UIC.       Transfers:  Sit <> Stand Assistance: Contact Guard Assistance  Sit <> Stand Assistive Device: Rolling Walker    Gait:   Gait Distance:  (120 ft., w/ cueing for safety. )  Assistance 1: Contact Guard Assistance  Gait Assistive Device: Rolling walker  Gait Pattern: reciprocal  Gait Deviation(s): increased time in double stance, decreased velocity of limb motion, decreased step length, decreased stride length    Therapeutic Activities and Exercises:  B LE therex x30 reps w/ cueing for pt to stay on task.    -AP   -LAQ   -Hip Flexion   -GS     AM-PAC 6 CLICK MOBILITY  How much help from another person does this patient currently need?   1 = Unable, Total/Dependent Assistance  2 = A lot, Maximum/Moderate Assistance  3 = A little, Minimum/Contact Guard/Supervision  4 = None, Modified [place]/Independent    Turning over in bed (including adjusting bedclothes, sheets and blankets)?: 3  Sitting down on and standing up from a chair with arms (e.g., wheelchair, bedside commode, etc.): 3  Moving from lying on back to sitting on the side of the bed?: 3  Moving to and from a bed to a chair (including a wheelchair)?: 3  Need to walk in hospital room?: 3  Climbing 3-5 steps with a railing?: 2  Total Score: 17    AM-PAC  Raw Score CMS G-Code Modifier Level of Impairment Assistance   6 % Total / Unable   7 - 9 CM 80 - 100% Maximal Assist   10 - 14 CL 60 - 80% Moderate Assist   15 - 19 CK 40 - 60% Moderate Assist   20 - 22 CJ 20 - 40% Minimal Assist   23 CI 1-20% SBA / CGA   24 CH 0% Independent/ Mod I     Patient left up in chair with all lines intact, call button in reach and spouse present.    Assessment:  Tommy Way is a 61 y.o. male with a medical diagnosis of Encephalopathy and presents with all deficits noted below. Pt tolerated treatment well, and will continue to benefit from PT services at this time. Continue with PT POC as indicated.    Rehab identified problem list/impairments: Rehab identified problem list/impairments: impaired endurance, impaired functional mobilty, gait instability, impaired balance, impaired self care skills, impaired cognition    Rehab potential is good.    Activity tolerance: Good    Discharge recommendations: Discharge Facility/Level Of Care Needs: rehabilitation facility     Barriers to discharge: Barriers to Discharge: Decreased caregiver support    Equipment recommendations: Equipment Needed After Discharge: bath bench     GOALS:    Physical Therapy Goals        Problem: Physical Therapy Goal    Goal Priority Disciplines Outcome Goal Variances Interventions   Physical Therapy Goal     PT/OT, PT Ongoing (interventions implemented as appropriate)     Description:  Goals to be met by: 7 days (10/13)    Patient will increase functional independence with mobility by performin. Supine to sit with Set-up [place]  2. Sit to supine with Set-up [place]  3. Sit to stand transfer with Contact Guard Assistance-met   Revised: sit to stand transfer with Supervision   4. Gait  x 100 feet with Contact Guard Assistance using Rolling Walker.   5. Stand for 5 minutes with Stand-by Assistance using Rolling Walker. MET  6. Lower extremity exercise program x30 reps per handout, with  independence to improve muscular strength and endurance.                          PLAN:    Patient to be seen 5 x/week  to address the above listed problems via gait training, therapeutic activities, therapeutic exercises, neuromuscular re-education  Plan of Care expires: 10/29/17  Plan of Care reviewed with: patient, spouse         Pam Mery, PTA  10/12/2017

## 2017-10-12 NOTE — PT/OT/SLP PROGRESS
Occupational Therapy  Treatment    Tommy Way   MRN: [id number]   Admitting Diagnosis: Encephalopathy    OT Date of Treatment: 10/12/17   OT Start Time: 0902  OT Stop Time: 0932  OT Total Time (min): 30 min    Billable Minutes:  Therapeutic Activity 30    General Precautions: Standard, fall, seizure  Orthopedic Precautions: N/A  Braces: N/A    Do you have any cultural, spiritual, Synagogue conflicts, given your current situation?: no issues    Subjective:  Communicated with RN prior to session.    Pain/Comfort  Pain Rating 1: 0/10  Pain Addressed 1: Reposition, Nurse notified  Pain Rating Post-Intervention 1: 0/10    Objective:  Patient found with: telemetry     Functional Mobility:  Bed Mobility:  Rolling/Turning to Left: Supervision  Scooting/Bridging: Supervision  Supine to Sit: Supervision  Sit to Supine:  (Pt remained up in chair)    Transfers:  Sit <> Stand Assistance: Stand By Assistance x 4 trials from EOB  Sit <> Stand Assistive Device: No Assistive Device (RW not right size)    Functional Ambulation: Pt ambulated 15 ft within room (to bathroom and back to bed) with CGA.  RW not correct size so OT provided HHA.  Mild gait instability noted with near instances of LOB noted.    Activities of Daily Living:    Grooming Position: Standing at sink  Grooming Level of Assistance: Stand by assistance to wash face and brush teeth    Balance:   Static Sit: GOOD: Takes MODERATE challenges from all directions  Dynamic Sit: GOOD: Maintains balance through MODERATE excursions of active trunk movement  Static Stand: FAIR: Maintains without assist but unable to take challenges  Dynamic stand: FAIR: Needs CONTACT GUARD during gait    Therapeutic Activities and Exercises:  *Pt ambulated household distance to address coordination, balance, and endurance needed for mobility.    *Pt stood to perform ADLs in grooming as noted above.    *OT asked pt to discuss his daily routine to assess processing and cognitive abilities.  Pt  "able to discuss full routine without pause.    *Pt stood to perform activities to address balance and coordination needed for ADLs in standing:  -March in place: 2 sets x 20 reps with HHA; pt reported increased pain in (B) LE after task.    -Toe taps front and forward: 1 set x 20 reps on right side with HHA  -Toe taps front and forward: 1 set x 5  Reps on left side with HHA.  Pt reported increased difficulty coordinating movements on left side  -Touch above and below target on wall: 1 set x 20 reps on each side; CGA  -Touch to the right and to the left of target on wall: 1 set x 20 reps on each side; CGA    *Pt performed UE exercises while seated to address coordination needed for ADLs and sequencing:  -Cross over punches: 1 set x 20 reps hitting target of OT's hand  -Arm bike: 1 set x 20 reps    *Pt performed 2 sets x 20 reps of ankle pumps    *POC reviewed with pt     AM-PAC 6 CLICK ADL   How much help from another person does this patient currently need?   1 = Unable, Total/Dependent Assistance  2 = A lot, Maximum/Moderate Assistance  3 = A little, Minimum/Contact Guard/Supervision  4 = None, Modified [place]/Independent    Putting on and taking off regular lower body clothing? : 2  Bathing (including washing, rinsing, drying)?: 2  Toileting, which includes using toilet, bedpan, or urinal? : 3  Putting on and taking off regular upper body clothing?: 3  Taking care of personal grooming such as brushing teeth?: 3  Eating meals?: 4  Total Score: 17     AM-PAC Raw Score CMS "G-Code Modifier Level of Impairment Assistance   6 % Total / Unable   7 - 8 CM 80 - 100% Maximal Assist   9-13 CL 60 - 80% Moderate Assist   14 - 19 CK 40 - 60% Moderate Assist   20 - 22 CJ 20 - 40% Minimal Assist   23 CI 1-20% SBA / CGA   24 CH 0% Independent/ Mod I       Patient left up in chair with call button in reach and wife present    ASSESSMENT:  Tommy Way is a 61 y.o. male with a medical diagnosis of Encephalopathy and " presents with impaired coordination, balance, and endurance impacting performance with ADLs and mobility.  Pt demonstrated improvement with ADLs in standing and with exercises in standing; however, continues to display gait and postural instability.  Pt is making progress towards goals and would continue to benefit from skilled OT services to address problems listed below and increase independence with ADLs.    Rehab identified problem list/impairments: Rehab identified problem list/impairments: impaired endurance, impaired functional mobilty, gait instability, impaired balance, impaired self care skills, impaired cognition    Rehab potential is good.    Activity tolerance: Good    Discharge recommendations: Discharge Facility/Level Of Care Needs: rehabilitation facility     Barriers to discharge: Barriers to Discharge: Decreased caregiver support    Equipment recommendations: bath bench     GOALS:    Occupational Therapy Goals        Problem: Occupational Therapy Goal    Goal Priority Disciplines Outcome Interventions   Occupational Therapy Goal     OT, PT/OT Ongoing (interventions implemented as appropriate)    Description:  Goals to be met by 10/13/2017:    1.  Brush teeth with min cues for the steps MET ( with no cues)  2.  Don robe with tie completion with SBA and min cues  3.  Transfer bed-chair with min a and minimal cues for sequencing/safety  MET 10/12/2017  4.  Toilet self with mod a from BSC  5.  BSC transfer with CGA and minimal cues for sequencing/safety                          Plan:  Patient to be seen 5 x/week to address the above listed problems via self-care/home management, therapeutic activities, neuromuscular re-education, therapeutic exercises, cognitive retraining  Plan of Care expires: 11/05/17  Plan of Care reviewed with: patient, spouse         PABLO Bella  10/12/2017

## 2017-10-12 NOTE — PROGRESS NOTES
"[facility] Hospital Medicine  Progress Note    Patient Name: Tommy Way  MRN: [id number]  Patient Class: IP- Inpatient   Admission Date: 9/26/2017  Length of Stay: 16 days  Attending Physician: Felisa Jimenez MD  Primary Care Provider: Adan Torres MD    [facility] Medicine Team: [facility] MED 1 Edvin Rashid MD    Subjective:     Principal Problem:Encephalopathy    HPI:  A 61-year-old man with recently diagnosed tonic clonic seizure disorder,  HTN and dyslipidemia admitted to [facility] after 5 witnessed seizures 9/10/17. He was initially intubated on presentation for low sats and inability to protect airway, extubated 9/14. 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 He was treated with Acyclovir and ultimately underwent 2 lumbar punctures which have been unrevealing. HSV PCR from first LP is negative. After he failed to improve significantly, diagnostic imaging was done and showed expansion of the meningeal enhancement as well as possible pathologic spinal compression fractures. EEG's done failed to reveal epileptic form seizures. MR Spect/Stealth non-specific, infectious/seizure-related edema rather than tumor but non-diagnostic. MRI on 09/22 shows "significant increase in the T2 signal in the left temporal lobe particularly increased in the left posterior parietal lobe with some serpiginous meningeal enhancement suspicious for encephalitis."  Patient subsequently had brain bx of L posterior temperol and occipital lobe lesion with NSGY. Brain bx suspicious for infection that ID was consulted and patient was started on vancomycin, cefepime and flagyl. Patient recent Bcx, Ucx and chest xray are all negative for infectious process. AFB from CSF pending. Currently patient is on seizure prophylaxis and Seroquel for delirium and agitation. Per wife who was at bedside today states patient is not quiet " at baseline, however, he is much improved cognitively, he knows he is in the hospital as well as his mobility has improved. Cheng to pull himself up from his seat and standing though still needs some support. Wife states patient was health prior initial presentation with only hypertension and HLD. Also said his behavior has improved, less agitation and confusion since been weaned of decadron.            Hospital Course:  10/3: Pt admitted to [facility] s/p brain bx of L posterior temperol and occipitol lobe lesion with NSGY. SBP<160, seizure ppx. CTH post biopsy trace volume  postoperative pneumocephalus and fluid underlying craniotomy site. Stable parenchymal edema within the left temporoparietal region. No significant postoperative hemorrhage present.  10/5 ID reconsulted  recs appreciated. Started vancomycin, cefepime and flagyl. Pan cultured prior to ABX.  CRP, Sed rate.   10/6 Added AFB culture and smear to brain tissue in lab quatiferon gold sent.. Patient restless agitated. Trying to get out of bed. precedex started. Also not sleeping at night will try ramelteon Qhs. If transfers to floor needs sitter.  10.07: POD 5  S/P L-lesion bx. Apparently had infectious characteristics. WBC jumped.. Prior pro calcitonin was normal. Clinical exam improving.  Slept well during the night. Delirium resolved.    10/11: Patient remains afebrile. H & H stable. Electrolytes wnl. Patient oriented to self only. Wife at bedside states he is progressing with mobility, able to get up and take steps with support. Also stated he had mild confusion at night which she attributed to Seroquel.  less lucid He is otherwise asymptomatic. Awaiting insurance approval to discharge to rehab.     10/11: Mentation remains the same, oriented to self only. Functionally better, moving around with moderate support. Remains afebrile.VSS. Electrolytes/CBC reviewed, wnl. Awaiting insurance approval to Rehab. BP elevated in the 200's earlier in the AM, optimal  later in the morning.     Interval History: NAEON.     Review of Systems   Constitutional: Negative for chills and fever.   Respiratory: Negative for cough and shortness of breath.    Cardiovascular: Negative for chest pain and palpitations.   Gastrointestinal: Negative for abdominal pain and nausea.   Genitourinary: Negative for difficulty urinating and dysuria.   Skin: Negative for color change and pallor.   Neurological: Negative for dizziness and numbness.     Objective:     Vital Signs (Most Recent):  Temp: 98.5 °F (36.9 °C) (10/12/17 1327)  Pulse: 79 (10/12/17 1327)  Resp: 19 (10/12/17 1327)  BP: 130/83 (10/12/17 1327)  SpO2: 96 % (10/12/17 1327) Vital Signs (24h Range):  Temp:  [97.5 °F (36.4 °C)-98.5 °F (36.9 °C)] 98.5 °F (36.9 °C)  Pulse:  [55-83] 79  Resp:  [16-20] 19  SpO2:  [94 %-98 %] 96 %  BP: (130-209)/() 130/83     Weight: 120.7 kg (266 lb 1.5 oz)  Body mass index is 30.75 kg/m².  No intake or output data in the 24 hours ending 10/12/17 1333   Physical Exam   Constitutional: He appears well-developed and well-nourished. No distress.   HENT:   Head: Normocephalic.   [place] left scalp clean and without drainage or erythema   Eyes: Conjunctivae are normal. No scleral icterus.   Neck: Normal range of motion. Neck supple.   Cardiovascular: Normal rate and regular rhythm.    Pulmonary/Chest: Effort normal. No respiratory distress. He has no wheezes.   Abdominal: Soft. Bowel sounds are normal. He exhibits no distension. There is no tenderness.   Neurological: He is alert.   Oriented only to self   Skin: Skin is warm and dry. He is not diaphoretic.   Vitals reviewed.      Significant Labs:   CBC:   Recent Labs  Lab 10/11/17  0426 10/12/17  0428   WBC 12.67 12.62   HGB 12.2* 12.5*   HCT 37.1* 38.2*    179     CMP:   Recent Labs  Lab 10/11/17  0426 10/12/17  0428    140   K 4.4 4.5    105   CO2 27 29   * 115*   BUN 25* 23*   CREATININE 0.8 0.8   CALCIUM 8.9 9.0   ANIONGAP 6* 6*    EGFRNONAA >60.0 >60.0       Significant Imaging: I have reviewed all pertinent imaging results/findings within the past 24 hours.    Assessment/Plan:      * Encephalopathy    Pt alert but oriented to self only. Follows commands but use of words not appropriate. 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 He was treated with acyclovir and ultimately underwent 2 lumbar punctures which have been unrevealing. HSV PCR from first LP is negative. Additional viral studies  unremarkable. After he failed to improve significantly, diagnostic imaging was done and showed expansion of the meningeal enhancement as well as possible pathologic spnal compression fractures. Awaiting brain bx results, possibly infectious etiology on Abx currently. MRI/CT head non-specific.  - Abx discontinued per ID recs   - appreciate neuro recs  - biopsy pending        Hypomagnesemia    - replace          T11 vertebral fracture    OSH MRI with acute compression fracture at T11. Mild compression fracture of L4. Old compression fractures at L1, T4, T5. No neuro deficits noted on exam other than altered mentation. Neurosurgery consult as above.           Aspiration pneumonia of both lower lobes due to vomit    Treated with Zosyn for 11 days at OSH; will not continue abx. Respiratory status, VS stable.           Hypokalemia    replace          Encephalitis and encephalomyelitis, unspecified    .        Status epilepticus, generalized convulsive    -complex partial seizures of unknown etiology : infectious vs autonomic vs neoplastic  -Awaiting brain bx results  -EEG thus far does not demonstrate specific etiology of seizure, no seizure activity since stepping down   -On lacosamide   -Seroquel for agitation/delirium  -neurology following up         Hyperlipidemia    -continue home statin           Hypertension     cont coreg 25 mg PO BID for now          VTE Risk  Mitigation         Ordered     heparin (porcine) injection 5,000 Units  Every 8 hours     Route:  Subcutaneous        10/11/17 0802     Medium Risk of VTE  Once      10/03/17 0835     Place JHONY hose  Until discontinued      10/03/17 0835     Place sequential compression device  Until discontinued      10/03/17 0835          Edvin Rashid MD  Department of Hospital Medicine   [facility]

## 2017-10-12 NOTE — PLAN OF CARE
Problem: Physical Therapy Goal  Goal: Physical Therapy Goal  Goals to be met by: 7 days (10/13)    Patient will increase functional independence with mobility by performin. Supine to sit with Set-up [place]  2. Sit to supine with Set-up [place]  3. Sit to stand transfer with Contact Guard Assistance-met   Revised: sit to stand transfer with Supervision   4. Gait  x 100 feet with Contact Guard Assistance using Rolling Walker.   5. Stand for 5 minutes with Stand-by Assistance using Rolling Walker. MET  6. Lower extremity exercise program x30 reps per handout, with independence to improve muscular strength and endurance.         Goals remain appropriate at time. Continue with PT POC as indicated.

## 2017-10-12 NOTE — PLAN OF CARE
Problem: Occupational Therapy Goal  Goal: Occupational Therapy Goal  Goals to be met by 10/13/2017:    1.  Brush teeth with min cues for the steps MET ( with no cues)  2.  Don robe with tie completion with SBA and min cues  3.  Transfer bed-chair with min a and minimal cues for sequencing/safety  MET 10/12/2017  4.  Toilet self with mod a from BSC  5.  BSC transfer with CGA and minimal cues for sequencing/safety          Pt is making progress towards goals.    PABLO Bella  10/12/2017

## 2017-10-12 NOTE — ASSESSMENT & PLAN NOTE
Pt alert but oriented to self only. Follows commands but use of words not appropriate. 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 He was treated with acyclovir and ultimately underwent 2 lumbar punctures which have been unrevealing. HSV PCR from first LP is negative. Additional viral studies  unremarkable. After he failed to improve significantly, diagnostic imaging was done and showed expansion of the meningeal enhancement as well as possible pathologic spnal compression fractures. Awaiting brain bx results, possibly infectious etiology on Abx currently. MRI/CT head non-specific.  - Abx discontinued per ID recs   - appreciate neuro recs  - biopsy pending

## 2017-10-12 NOTE — SUBJECTIVE & OBJECTIVE
Interval History: NAEON.     Review of Systems   Constitutional: Negative for chills and fever.   Respiratory: Negative for cough and shortness of breath.    Cardiovascular: Negative for chest pain and palpitations.   Gastrointestinal: Negative for abdominal pain and nausea.   Genitourinary: Negative for difficulty urinating and dysuria.   Skin: Negative for color change and pallor.   Neurological: Negative for dizziness and numbness.     Objective:     Vital Signs (Most Recent):  Temp: 98.5 °F (36.9 °C) (10/12/17 1327)  Pulse: 79 (10/12/17 1327)  Resp: 19 (10/12/17 1327)  BP: 130/83 (10/12/17 1327)  SpO2: 96 % (10/12/17 1327) Vital Signs (24h Range):  Temp:  [97.5 °F (36.4 °C)-98.5 °F (36.9 °C)] 98.5 °F (36.9 °C)  Pulse:  [55-83] 79  Resp:  [16-20] 19  SpO2:  [94 %-98 %] 96 %  BP: (130-209)/() 130/83     Weight: 120.7 kg (266 lb 1.5 oz)  Body mass index is 30.75 kg/m².  No intake or output data in the 24 hours ending 10/12/17 1333   Physical Exam   Constitutional: He appears well-developed and well-nourished. No distress.   HENT:   Head: Normocephalic.   [place] left scalp clean and without drainage or erythema   Eyes: Conjunctivae are normal. No scleral icterus.   Neck: Normal range of motion. Neck supple.   Cardiovascular: Normal rate and regular rhythm.    Pulmonary/Chest: Effort normal. No respiratory distress. He has no wheezes.   Abdominal: Soft. Bowel sounds are normal. He exhibits no distension. There is no tenderness.   Neurological: He is alert.   Oriented only to self   Skin: Skin is warm and dry. He is not diaphoretic.   Vitals reviewed.      Significant Labs:   CBC:   Recent Labs  Lab 10/11/17  0426 10/12/17  0428   WBC 12.67 12.62   HGB 12.2* 12.5*   HCT 37.1* 38.2*    179     CMP:   Recent Labs  Lab 10/11/17  0426 10/12/17  0428    140   K 4.4 4.5    105   CO2 27 29   * 115*   BUN 25* 23*   CREATININE 0.8 0.8   CALCIUM 8.9 9.0   ANIONGAP 6* 6*   EGFRNONAA >60.0 >60.0        Significant Imaging: I have reviewed all pertinent imaging results/findings within the past 24 hours.

## 2017-10-12 NOTE — PT/OT/SLP PROGRESS
"Speech Language Pathology  Treatment    Tommy Way   MRN: [id number]   909/909 A    Admitting Diagnosis: Encephalopathy    SLP Treatment Date: 10/12/17  Speech Start Time: 1420     Speech Stop Time: 1446     Speech Total (min): 26 min       TREATMENT BILLABLE MINUTES:  Speech Therapy Individual 26    Has the patient been evaluated by SLP for swallowing? : Yes  Keep patient NPO?: No   General Precautions: Standard, fall, seizure  Current Respiratory Status:  (room air)       Subjective:  "It's yellow and it has a steering wheel." Pt observed to intermittently perseverate on previously discussed topics.     Pain/Comfort  Pain Rating 1: 0/10  Pain Rating Post-Intervention 1: 0/10    Objective:   Pt awake and alert upon entry, sitting upright in b/s chair. Sister at b/s. Given 3 descriptors of an object, pt identified the object with 75% ind'ly and 100% cues. Pt generated the opposite to a given word with 100% ind'ly. Given an attribute, pt generated an object possessing the attribute with 50% ind'ly and 100% cues. He answered basic where q's with 80% ind'ly and 100% cues. Pt and sister educated re: definition of circumlocution and ways to aid pt's ability to do so. Desciptors to aid pt's ability to circumlocute remain on whiteboard. Additionally, sister educated re: when to redirect pt in order to avoid negative practice and frustration. White board current. No further questions.     Assessment:  Tommy Way is a 61 y.o. male with a medical diagnosis of Encephalopathy and presents with cognitive linguistic deficits.     Discharge recommendations: Discharge Facility/Level Of Care Needs: rehabilitation facility     Goals:    SLP Goals        Problem: SLP Goal    Goal Priority Disciplines Outcome   SLP Goal     SLP Ongoing (interventions implemented as appropriate)   Description:  Speech Language Pathology  Goals expected to be met by 10/13:    1. Pt will participate in automatic speech tasks with 100% acc " independently  2. Pt will provide 5 items within a concrete category with min cues   3. Pt will follow 1-step directions with 80% accuracy independently  4. Pt will complete simple problem solving tasks with 80% acc independently   5. Pt will complete simple writing tasks with 75% acc with min cues   6. Pt will complete cancellation tasks with 75% acc with min cues                        Plan:   Patient to be seen Therapy Frequency: 5 x/week   Plan of Care expires: 11/05/17  Plan of Care reviewed with: patient, sibling  SLP Follow-up?: Yes           FILIPE Ying, CCC-SLP  [phone]  10/12/2017

## 2017-10-12 NOTE — PLAN OF CARE
ROSS [facility] obtained [facility] and pt can admit tomorrow morning, unable to transfer today due to their 11:00am admit cut off time.     ROSS updated attending Resident and it was agreed that transport is to be set up for a 9:00am  time.     ROSS then met with pt and his sister at bedside and updated them on plans to discharge tomorrow. ROSS also placed call to pt's wife [phone] and updated her as well.     ROSS placed call to [facility] at [phone], spoke to Jeffery who arranged wheelchair van transport for 8:30am  for tomorrow 10/13.     Attending nurse Sofiya updated about tomorrow discharge plan.

## 2017-10-12 NOTE — ASSESSMENT & PLAN NOTE
-complex partial seizures of unknown etiology : infectious vs autonomic vs neoplastic  -Awaiting brain bx results  -EEG thus far does not demonstrate specific etiology of seizure, no seizure activity since stepping down   -On lacosamide   -Seroquel for agitation/delirium  -neurology following up

## 2017-10-12 NOTE — SUBJECTIVE & OBJECTIVE
Interval History: NAEON. Patient pending rehab.    Medications:  Continuous Infusions:   Scheduled Meds:   atorvastatin  20 mg Oral QHS    carvedilol  25 mg Oral BID WM    dexamethasone  4 mg Oral Q12H    Followed by    [START ON 10/15/2017] dexamethasone  2 mg Oral Q12H    Followed by    [START ON 10/19/2017] dexamethasone  1 mg Oral Q12H    Followed by    [START ON 10/23/2017] dexamethasone  1 mg Oral Daily    heparin (porcine)  5,000 Units Subcutaneous Q8H    lacosamide  200 mg Oral BID    pantoprazole  40 mg Oral Daily    polyethylene glycol  17 g Oral Daily    quetiapine  25 mg Oral Daily    quetiapine  75 mg Oral QHS    ramelteon  8 mg Oral QHS    thiamine  100 mg Oral Daily     PRN Meds:acetaminophen, bisacodyl, dextrose 50%, dextrose 50%, gadobutrol, glucagon (human recombinant), glucose, glucose, insulin aspart, labetalol, ondansetron     Review of Systems  Objective:     Weight: 120.7 kg (266 lb 1.5 oz)  Body mass index is 30.75 kg/m².  Vital Signs (Most Recent):  Temp: 98.5 °F (36.9 °C) (10/12/17 1327)  Pulse: 79 (10/12/17 1327)  Resp: 19 (10/12/17 1327)  BP: 130/83 (10/12/17 1327)  SpO2: 96 % (10/12/17 1327) Vital Signs (24h Range):  Temp:  [97.5 °F (36.4 °C)-98.5 °F (36.9 °C)] 98.5 °F (36.9 °C)  Pulse:  [55-83] 79  Resp:  [16-20] 19  SpO2:  [94 %-98 %] 96 %  BP: (130-209)/() 130/83         Male External Urinary Catheter 10/03/17 0952 Medium (Active)   Collection Container Urimeter 10/5/2017  7:01 AM   Securement Method secured to top of thigh w/ adhesive device 10/5/2017  7:01 AM   Skin no redness;no breakdown 10/5/2017  7:01 AM   Tolerance no signs/symptoms of discomfort 10/5/2017  7:01 AM   Output (mL) 50 mL 10/5/2017  2:00 PM   Catheter Change Date 10/05/17 10/5/2017  7:01 AM   Catheter Change Time 1200 10/5/2017  7:01 AM       Arterial Sheath 09/28/17 0800 Right (Active)   Closure Device Angio seal 9/28/2017  8:51 AM       Physical Exam:  Vitals reviewed.    Constitutional: He  appears well-developed and well-nourished. He is not diaphoretic. No distress.     Eyes: Pupils are equal, round, and reactive to light.     Cardiovascular: Normal pulses.     Abdominal: Soft.     Psych/Behavior: He is alert. He is oriented to person, place, and time. He has a normal mood and affect.     Musculoskeletal:   IVAN, full strength throughout no focal weakness      Neurological:        Coordination: He has normal finger to nose coordination.        Sensory: There is no sensory deficit in the trunk. There is no sensory deficit in the extremities.        DTRs: DTRs are DTRS NORMAL AND SYMMETRICnormal and symmetric.        Cranial nerves: Cranial nerve(s) II, III, IV, V, VI, VII, VIII, IX, X, XI and XII are intact.   No drift  No dysmetria    Incision clean dry and intact with staples present     Significant Labs:    Recent Labs  Lab 10/11/17  0426 10/12/17  0428   * 115*    140   K 4.4 4.5    105   CO2 27 29   BUN 25* 23*   CREATININE 0.8 0.8   CALCIUM 8.9 9.0   MG 2.0 1.6       Recent Labs  Lab 10/11/17  0426 10/12/17  0428   WBC 12.67 12.62   HGB 12.2* 12.5*   HCT 37.1* 38.2*    179     No results for input(s): LABPT, INR, APTT in the last 48 hours.  Microbiology Results (last 7 days)     Procedure Component Value Units Date/Time    Blood culture [[id number]] Collected:  10/05/17 1300    Order Status:  Completed Specimen:  Blood from Peripheral, Forearm, Left Updated:  10/10/17 1812     Blood Culture, Routine No growth after 5 days.    Narrative:       Blood cultures x 2 different sites. 4 bottles total. Please  draw cultures before administering antibiotics.    Blood culture [[id number]] Collected:  10/05/17 1235    Order Status:  Completed Specimen:  Blood from Peripheral, Upper Arm, Left Updated:  10/10/17 1812     Blood Culture, Routine No growth after 5 days.    Narrative:       Blood cultures from 2 different sites. 4 bottles total.  Please draw before starting antibiotics.     Culture, Respiratory with Gram Stain [[id number]]  (Susceptibility) Collected:  10/05/17 1429    Order Status:  Completed Specimen:  Respiratory from Sputum, Expectorated Updated:  10/07/17 1016     Respiratory Culture --     ESCHERICHIA COLI  Few  Normal respiratory verenice also present       Gram Stain (Respiratory) <10 epithelial cells per low power field.     Gram Stain (Respiratory) No WBC's     Gram Stain (Respiratory) Moderate Gram positive cocci     Gram Stain (Respiratory) Rare Gram negative rods    AFB Culture & Smear [[id number]]     Order Status:  No result Specimen:  Biopsy from Brain         Recent Lab Results       10/12/17  1138 10/12/17  0826 10/12/17  0428 10/11/17  2054 10/11/17  1819      Anion Gap   6(L)       Baso #   0.00       Basophil%   0.0       BUN, Bld   23(H)       Calcium   9.0       Chloride   105       CO2   29       Creatinine   0.8       Differential Method   Automated       eGFR if    >60.0       eGFR if non    >60.0  Comment:  Calculation used to obtain the estimated glomerular filtration  rate (eGFR) is the CKD-EPI equation. Since race is unknown   in our information system, the eGFR values for   -American and Non--American patients are given   for each creatinine result.         Eos #   0.0       Eosinophil%   0.0       Glucose   115(H)       Gran #   10.2(H)       Gran%   80.9(H)       Hematocrit   38.2(L)       Hemoglobin   12.5(L)       Lymph #   0.9(L)       Lymph%   7.1(L)       Magnesium   1.6       MCH   34.3(H)       MCHC   32.7       MCV   105(H)       Mono #   1.3(H)       Mono%   10.5       MPV   11.7       Phosphorus   2.8       Platelets   179       POCT Glucose 132(H) 136(H)  167(H) 129(H)     Potassium   4.5       RBC   3.64(L)       RDW   13.7       Sodium   140       WBC   12.62                     All pertinent labs from the last 24 hours have been reviewed.    Significant Diagnostics:  CT: No results found in the  last 24 hours.  MRI: No results found in the last 24 hours.  I have reviewed all pertinent imaging results/findings within the past 24 hours.

## 2017-10-12 NOTE — PROGRESS NOTES
[facility]  Neurosurgery  Progress Note    Subjective:     History of Present Illness: Patient is a transfer from [facility] who presented for tonic-clonic seizure and AMS on 9/10 per OSH. CTH and follow-up MRI demonstrated L posterior temporal and occipital lobe lesion. 2x LP with analysis were negative for HSV, VZV, syphillis and HHV-6 were all negative and differential was unremarkable. Thorough autoimmune and infectious testing was also completed and returned without result. Patient is transferred to [facility] for further diagnostic work-up per NSGY and neurology. He remains altered on mentation with otherwise non-focal exam.    Post-Op Info:  Procedure(s) (LRB):  BIOPSY-BRAIN - WITH STEALTH (Left)   9 Days Post-Op     Interval History: NAEON. Patient pending rehab.    Medications:  Continuous Infusions:   Scheduled Meds:   atorvastatin  20 mg Oral QHS    carvedilol  25 mg Oral BID WM    dexamethasone  4 mg Oral Q12H    Followed by    [START ON 10/15/2017] dexamethasone  2 mg Oral Q12H    Followed by    [START ON 10/19/2017] dexamethasone  1 mg Oral Q12H    Followed by    [START ON 10/23/2017] dexamethasone  1 mg Oral Daily    heparin (porcine)  5,000 Units Subcutaneous Q8H    lacosamide  200 mg Oral BID    pantoprazole  40 mg Oral Daily    polyethylene glycol  17 g Oral Daily    quetiapine  25 mg Oral Daily    quetiapine  75 mg Oral QHS    ramelteon  8 mg Oral QHS    thiamine  100 mg Oral Daily     PRN Meds:acetaminophen, bisacodyl, dextrose 50%, dextrose 50%, gadobutrol, glucagon (human recombinant), glucose, glucose, insulin aspart, labetalol, ondansetron     Review of Systems  Objective:     Weight: 120.7 kg (266 lb 1.5 oz)  Body mass index is 30.75 kg/m².  Vital Signs (Most Recent):  Temp: 98.5 °F (36.9 °C) (10/12/17 1327)  Pulse: 79 (10/12/17 1327)  Resp: 19 (10/12/17 1327)  BP: 130/83 (10/12/17 1327)  SpO2: 96 % (10/12/17 1327) Vital Signs (24h Range):  Temp:  [97.5 °F  (36.4 °C)-98.5 °F (36.9 °C)] 98.5 °F (36.9 °C)  Pulse:  [55-83] 79  Resp:  [16-20] 19  SpO2:  [94 %-98 %] 96 %  BP: (130-209)/() 130/83         Male External Urinary Catheter 10/03/17 0952 Medium (Active)   Collection Container Urimeter 10/5/2017  7:01 AM   Securement Method secured to top of thigh w/ adhesive device 10/5/2017  7:01 AM   Skin no redness;no breakdown 10/5/2017  7:01 AM   Tolerance no signs/symptoms of discomfort 10/5/2017  7:01 AM   Output (mL) 50 mL 10/5/2017  2:00 PM   Catheter Change Date 10/05/17 10/5/2017  7:01 AM   Catheter Change Time 1200 10/5/2017  7:01 AM       Arterial Sheath 09/28/17 0800 Right (Active)   Closure Device Angio seal 9/28/2017  8:51 AM       Physical Exam:  Vitals reviewed.    Constitutional: He appears well-developed and well-nourished. He is not diaphoretic. No distress.     Eyes: Pupils are equal, round, and reactive to light.     Cardiovascular: Normal pulses.     Abdominal: Soft.     Psych/Behavior: He is alert. He is oriented to person, place, and time. He has a normal mood and affect.     Musculoskeletal:   IVAN, full strength throughout no focal weakness      Neurological:        Coordination: He has normal finger to nose coordination.        Sensory: There is no sensory deficit in the trunk. There is no sensory deficit in the extremities.        DTRs: DTRs are DTRS NORMAL AND SYMMETRICnormal and symmetric.        Cranial nerves: Cranial nerve(s) II, III, IV, V, VI, VII, VIII, IX, X, XI and XII are intact.   No drift  No dysmetria    Incision clean dry and intact with staples present     Significant Labs:    Recent Labs  Lab 10/11/17  0426 10/12/17  0428   * 115*    140   K 4.4 4.5    105   CO2 27 29   BUN 25* 23*   CREATININE 0.8 0.8   CALCIUM 8.9 9.0   MG 2.0 1.6       Recent Labs  Lab 10/11/17  0426 10/12/17  0428   WBC 12.67 12.62   HGB 12.2* 12.5*   HCT 37.1* 38.2*    179     No results for input(s): LABPT, INR, APTT in the last  48 hours.  Microbiology Results (last 7 days)     Procedure Component Value Units Date/Time    Blood culture [[id number]] Collected:  10/05/17 1300    Order Status:  Completed Specimen:  Blood from Peripheral, Forearm, Left Updated:  10/10/17 1812     Blood Culture, Routine No growth after 5 days.    Narrative:       Blood cultures x 2 different sites. 4 bottles total. Please  draw cultures before administering antibiotics.    Blood culture [[id number]] Collected:  10/05/17 1235    Order Status:  Completed Specimen:  Blood from Peripheral, Upper Arm, Left Updated:  10/10/17 1812     Blood Culture, Routine No growth after 5 days.    Narrative:       Blood cultures from 2 different sites. 4 bottles total.  Please draw before starting antibiotics.    Culture, Respiratory with Gram Stain [[id number]]  (Susceptibility) Collected:  10/05/17 1429    Order Status:  Completed Specimen:  Respiratory from Sputum, Expectorated Updated:  10/07/17 1016     Respiratory Culture --     ESCHERICHIA COLI  Few  Normal respiratory verenice also present       Gram Stain (Respiratory) <10 epithelial cells per low power field.     Gram Stain (Respiratory) No WBC's     Gram Stain (Respiratory) Moderate Gram positive cocci     Gram Stain (Respiratory) Rare Gram negative rods    AFB Culture & Smear [[id number]]     Order Status:  No result Specimen:  Biopsy from Brain         Recent Lab Results       10/12/17  1138 10/12/17  0826 10/12/17  0428 10/11/17  2054 10/11/17  1819      Anion Gap   6(L)       Baso #   0.00       Basophil%   0.0       BUN, Bld   23(H)       Calcium   9.0       Chloride   105       CO2   29       Creatinine   0.8       Differential Method   Automated       eGFR if    >60.0       eGFR if non    >60.0  Comment:  Calculation used to obtain the estimated glomerular filtration  rate (eGFR) is the CKD-EPI equation. Since race is unknown   in our information system, the eGFR values for    -American and Non--American patients are given   for each creatinine result.         Eos #   0.0       Eosinophil%   0.0       Glucose   115(H)       Gran #   10.2(H)       Gran%   80.9(H)       Hematocrit   38.2(L)       Hemoglobin   12.5(L)       Lymph #   0.9(L)       Lymph%   7.1(L)       Magnesium   1.6       MCH   34.3(H)       MCHC   32.7       MCV   105(H)       Mono #   1.3(H)       Mono%   10.5       MPV   11.7       Phosphorus   2.8       Platelets   179       POCT Glucose 132(H) 136(H)  167(H) 129(H)     Potassium   4.5       RBC   3.64(L)       RDW   13.7       Sodium   140       WBC   12.62                     All pertinent labs from the last 24 hours have been reviewed.    Significant Diagnostics:  CT: No results found in the last 24 hours.  MRI: No results found in the last 24 hours.  I have reviewed all pertinent imaging results/findings within the past 24 hours.    Assessment/Plan:     Encephalitis and encephalomyelitis, unspecified    61M who presents from OSH with presenting sx on 9/10 of AMS and tonic-clonic seizure for further work-up.    Neuro  -- cerebral angiogram 9/28: narrowing of left distal MCA arteries possibly d/t vasculitis or secondary to cerebral edema  -- Continue decadron taper   -- Neurology arranged lumbar puncture for infectious workup per ID and paraneoplastic panel   -CSF culture NG, viral culture neg, ACE and Paraneoplastic panel pending   -Continue to follow neuro reccs  -- Underwent bx 10/3   -Pain control with PO/IV tylenol   -Post op CTH acceptable   -Continue Vimpat for seizure ppx   -Follow-up final Path when avaliable. Path pending   -- May continue scheduled seroquel for aggitation  -- Continue ramelteon  -- Medical management per primary team   -- Pending rehab placement. Follow up scheduled for wound check 2 weeks from surgery.              Brenda Goldstein PA-C  Neurosurgery  [facility]

## 2017-10-12 NOTE — ASSESSMENT & PLAN NOTE
61M who presents from OSH with presenting sx on 9/10 of AMS and tonic-clonic seizure for further work-up.    Neuro  -- cerebral angiogram 9/28: narrowing of left distal MCA arteries possibly d/t vasculitis or secondary to cerebral edema  -- Continue decadron taper   -- Neurology arranged lumbar puncture for infectious workup per ID and paraneoplastic panel   -CSF culture NG, viral culture neg, ACE and Paraneoplastic panel pending   -Continue to follow neuro reccs  -- Underwent bx 10/3   -Pain control with PO/IV tylenol   -Post op CTH acceptable   -Continue Vimpat for seizure ppx   -Follow-up final Path when avaliable. Path pending   -- May continue scheduled seroquel for aggitation  -- Continue ramelteon  -- Medical management per primary team   -- Pending rehab placement. Follow up scheduled for wound check 2 weeks from surgery.

## 2017-10-12 NOTE — PLAN OF CARE
Problem: Patient Care Overview  Goal: Plan of Care Review  Outcome: Ongoing (interventions implemented as appropriate)  Remains alert to self only. CBG@  167. BP elevated 153/93. 17 staples to scalp incisions dry and intact with no s/s of infection. Fall/ safety precautions maintained. Bed in lowest position, call bell in reach. Spouse at bedside.

## 2017-10-12 NOTE — PLAN OF CARE
Problem: Patient Care Overview  Goal: Plan of Care Review  Outcome: Ongoing (interventions implemented as appropriate)  POC reviewed with pt and spouse. AAO x1. Pt remained up in chair most of the day. Telemetry monitoring. Glucose monitoring. Pt D/C in the AM tomorrow. Pt remained free from falls. Questions and concerns addressed. Pt progressing towards goals. Will continue to monitor. See flow sheets for full assessment and VS

## 2017-10-13 NOTE — PLAN OF CARE
Problem: Patient Care Overview  Goal: Plan of Care Review  Outcome: Ongoing (interventions implemented as appropriate)  Uneventful night. Remains alert to self only. No decline/deviations from baseline neuro checks. 17 staples to head incisions remain dry and intact with no s/s of infection. Vital signs stable, bed in lowest position, call bell in reach bed alarm set. Spouse at bedside.

## 2017-10-14 NOTE — DISCHARGE SUMMARY
"[facility] Hospital Medicine  Discharge Summary      Patient Name: Tommy Way  MRN: [id number]  Admission Date: 9/26/2017  Hospital Length of Stay: 17 days  Discharge Date and Time: 10/13/2017  8:36 AM  Attending Physician: No att. providers found   Discharging Provider: Edvin Rashid MD  Primary Care Provider: Adan Torres MD  Hospital Medicine Team: [facility] 1 Edvin Rashid MD    HPI:   A 61-year-old man with recently diagnosed tonic clonic seizure disorder,  HTN and dyslipidemia admitted to [facility] after 5 witnessed seizures 9/10/17. He was initially intubated on presentation for low sats and inability to protect airway, extubated 9/14. 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 He was treated with Acyclovir and ultimately underwent 2 lumbar punctures which have been unrevealing. HSV PCR from first LP is negative. After he failed to improve significantly, diagnostic imaging was done and showed expansion of the meningeal enhancement as well as possible pathologic spinal compression fractures. EEG's done failed to reveal epileptic form seizures. MR Spect/Stealth non-specific, infectious/seizure-related edema rather than tumor but non-diagnostic. MRI on 09/22 shows "significant increase in the T2 signal in the left temporal lobe particularly increased in the left posterior parietal lobe with some serpiginous meningeal enhancement suspicious for encephalitis."  Patient subsequently had brain bx of L posterior temperol and occipital lobe lesion with NSGY. Brain bx suspicious for infection that ID was consulted and patient was started on vancomycin, cefepime and flagyl. Patient recent Bcx, Ucx and chest xray are all negative for infectious process. AFB from CSF pending. Currently patient is on seizure prophylaxis and Seroquel for delirium and agitation. Per wife who was at bedside today states " patient is not quiet at baseline, however, he is much improved cognitively, he knows he is in the hospital as well as his mobility has improved. Cheng to pull himself up from his seat and standing though still needs some support. Wife states patient was health prior initial presentation with only hypertension and HLD. Also said his behavior has improved, less agitation and confusion since been weaned of decadron.            Procedure(s) (LRB):  BIOPSY-BRAIN - WITH STEALTH (Left)      Indwelling Lines/Drains at time of discharge:   Lines/Drains/Airways          No matching active lines, drains, or airways        Hospital Course:   10/3: Pt admitted to [facility] s/p brain bx of L posterior temperol and occipitol lobe lesion with NSGY. SBP<160, seizure ppx. CTH post biopsy trace volume  postoperative pneumocephalus and fluid underlying craniotomy site. Stable parenchymal edema within the left temporoparietal region. No significant postoperative hemorrhage present.  10/5 ID reconsulted  recs appreciated. Started vancomycin, cefepime and flagyl. Pan cultured prior to ABX.  CRP, Sed rate.   10/6 Added AFB culture and smear to brain tissue in lab quatiferon gold sent.. Patient restless agitated. Trying to get out of bed. precedex started. Also not sleeping at night will try ramelteon Qhs. If transfers to floor needs sitter.  10.07: POD 5  S/P L-lesion bx. Apparently had infectious characteristics. WBC jumped.. Prior pro calcitonin was normal. Clinical exam improving.  Slept well during the night. Delirium resolved.    10/11: Patient remains afebrile. H & H stable. Electrolytes wnl. Patient oriented to self only. Wife at bedside states he is progressing with mobility, able to get up and take steps with support. Also stated he had mild confusion at night which she attributed to Seroquel.  less lucid He is otherwise asymptomatic. Awaiting insurance approval to discharge to rehab.     10/12: Mentation remains the same, oriented to  self only. Functionally better, moving around with moderate support. Remains afebrile.VSS. Electrolytes/CBC reviewed, wnl. Awaiting insurance approval to Rehab. BP elevated in the 200's earlier in the AM, optimal later in the morning.      Consults:   Consults         Status Ordering Provider     Inpatient consult to Infectious Diseases  Once     Provider:  (Not yet assigned)    Completed KASEY MONGE     Inpatient consult to Infectious Diseases  Once     Provider:  (Not yet assigned)    Completed DARRELL RODRIGUEZ     Inpatient consult to Neurology  Once     Provider:  (Not yet assigned)    Completed SHAVON CARLOS        Vitals:    10/13/17 0818   BP: 130/87   Pulse: 82   Resp: 16   Temp: 97.6 °F (36.4 °C)     Physical Exam   Constitutional: He appears well-developed and well-nourished. No distress.   HENT:   Head: Normocephalic.   [place] left scalp clean and without drainage or erythema   Eyes: Conjunctivae are normal. No scleral icterus.   Neck: Normal range of motion. Neck supple.   Cardiovascular: Normal rate and regular rhythm.    Pulmonary/Chest: Effort normal. No respiratory distress. He has no wheezes.   Abdominal: Soft. Bowel sounds are normal. He exhibits no distension. There is no tenderness.   Neurological: He is alert.   Oriented only to self   Skin: Skin is warm and dry. He is not diaphoretic.   Vitals reviewed.    Significant Diagnostic Studies: Labs:   CMP   Recent Labs  Lab 10/12/17  0428 10/13/17  0402    140   K 4.5 4.8    104   CO2 29 27   * 122*   BUN 23* 25*   CREATININE 0.8 0.9   CALCIUM 9.0 8.9   ANIONGAP 6* 9   ESTGFRAFRICA >60.0 >60.0   EGFRNONAA >60.0 >60.0   , CBC   Recent Labs  Lab 10/12/17  0428 10/13/17  0402   WBC 12.62 12.46   HGB 12.5* 12.3*   HCT 38.2* 37.4*    170    and INR   Lab Results   Component Value Date    INR 1.1 10/02/2017    INR 1.1 08/11/2017       Pending Diagnostic Studies:     Procedure Component Value Units Date/Time    Basic metabolic  panel [[id number]] Collected:  10/03/17 0835    Order Status:  Sent Lab Status:  In process Updated:  10/03/17 0836    Specimen:  Blood from Blood     CBC auto differential [[id number]] Collected:  10/03/17 0835    Order Status:  Sent Lab Status:  In process Updated:  10/03/17 0836    Specimen:  Blood from Blood     Enterovirus Panel II, CF, CSF [[id number]] Collected:  09/28/17 1716    Order Status:  Sent Lab Status:  In process Updated:  09/28/17 1914    Specimen:  CSF (Spinal Fluid) from Cerebrospinal Fluid     Quantiferon Gold TB [[id number]] Collected:  10/06/17 1413    Order Status:  Sent Lab Status:  In process Updated:  10/06/17 1413    Specimen:  Blood from Blood         Final Active Diagnoses:    Diagnosis Date Noted POA    PRINCIPAL PROBLEM:  Encephalopathy [G93.40] 09/26/2017 Yes    Hypomagnesemia [E83.42] 10/02/2017 Yes    T11 vertebral fracture [S22.089A] 09/21/2017 Yes    Aspiration pneumonia of both lower lobes due to vomit [J69.0] 09/10/2017 Yes    Encephalitis and encephalomyelitis, unspecified [G04.90] 09/10/2017 Yes    Hypokalemia [E87.6] 09/10/2017 Yes    Status epilepticus, generalized convulsive [G40.901] 09/09/2017 Yes    Complex partial seizure evolving to generalized seizure [G40.209]  Yes    Hypertension [I10] 04/20/2017 Yes    Hyperlipidemia [E78.5] 04/20/2017 Yes      Problems Resolved During this Admission:    Diagnosis Date Noted Date Resolved POA      * Encephalopathy    Pt alert but oriented to self only. Follows commands but use of words not appropriate. 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 He was treated with acyclovir and ultimately underwent 2 lumbar punctures which have been unrevealing. HSV PCR from first LP is negative. Additional viral studies  unremarkable. After he failed to improve significantly, diagnostic imaging was done and showed expansion of the meningeal enhancement as  well as possible pathologic spnal compression fractures. Awaiting brain bx results, possibly infectious etiology on Abx currently. MRI/CT head non-specific.  - Abx discontinued per ID recs   - appreciate neuro recs  - biopsy pending        Status epilepticus, generalized convulsive    -complex partial seizures of unknown etiology : infectious vs autonomic vs neoplastic  -Awaiting brain bx results  -EEG thus far does not demonstrate specific etiology of seizure, no seizure activity since stepping down   -On lacosamide   -Seroquel for agitation/delirium  -neurology following up         Hyperlipidemia    -continue home statin           Hypertension     cont coreg 25 mg PO BID for now            Discharged Condition: stable    Disposition: Rehab Facility    Follow Up: Neurosurgery     Patient Instructions:     Ambulatory Referral to Neurosurgery   Referral Priority: Routine Referral Type: Consultation   Referral Reason: Specialty Services Required    Requested Specialty: Neurosurgery    Number of Visits Requested: 1      Diet general     Activity as tolerated     Call MD for:  temperature >100.4     Call MD for:  severe uncontrolled pain     Call MD for:  severe persistent headache     Call MD for:  persistent dizziness, light-headedness, or visual disturbances       Medications:  Reconciled Home Medications:   Discharge Medication List as of 10/13/2017  8:07 AM      START taking these medications    Details   !! dexamethasone (DECADRON) 1 MG Tab Take 1 tablet (1 mg total) by mouth every 12 (twelve) hours., Starting u 10/19/2017, Until Mon 10/23/2017, No Print      !! dexamethasone (DECADRON) 1 MG Tab Take 1 tablet (1 mg total) by mouth once daily., Starting Mon 10/23/2017, Until Fri 10/27/2017, No Print      !! dexamethasone (DECADRON) 2 MG tablet Take 1 tablet (2 mg total) by mouth every 12 (twelve) hours., Starting Sun 10/15/2017, Until Thu 10/19/2017, No Print      !! dexamethasone (DECADRON) 4 MG Tab Take 1  tablet (4 mg total) by mouth every 12 (twelve) hours., Starting Wed 10/11/2017, Until Sun 10/15/2017, No Print      lacosamide (VIMPAT) 50 mg Tab Take 4 tablets (200 mg total) by mouth 2 (two) times daily., Starting Tue 10/10/2017, Print      !! quetiapine (SEROQUEL) 25 MG Tab Take 1 tablet (25 mg total) by mouth once daily., Starting Wed 10/11/2017, Normal      !! quetiapine (SEROQUEL) 25 MG Tab Take 3 tablets (75 mg total) by mouth every evening., Starting Tue 10/10/2017, Normal      ramelteon (ROZEREM) 8 mg tablet Take 1 tablet (8 mg total) by mouth every evening., Starting Tue 10/10/2017, Normal      thiamine 100 MG tablet Take 1 tablet (100 mg total) by mouth once daily., Starting Wed 10/11/2017, OTC       !! - Potential duplicate medications found. Please discuss with provider.      CONTINUE these medications which have CHANGED    Details   aspirin (ECOTRIN) 81 MG EC tablet Take 1 tablet (81 mg total) by mouth once daily. Do NOT resume until neurosurgeon says ok to, Starting Tue 10/10/2017, OTC         CONTINUE these medications which have NOT CHANGED    Details   atorvastatin (LIPITOR) 20 MG tablet Take 20 mg by mouth every evening., Until Discontinued, Historical Med      carvedilol (COREG) 25 MG tablet Take 25 mg by mouth 2 (two) times daily with meals., Until Discontinued, Historical Med      cyclobenzaprine (FLEXERIL) 10 MG tablet Take 10 mg by mouth daily as needed for Muscle spasms. , Historical Med      irbesartan-hydrochlorothiazide (AVALIDE) 150-12.5 mg per tablet Take 1 tablet by mouth once daily. , Historical Med      niacin (NIASPAN) 1000 MG CR tablet Take 1,000 mg by mouth once daily., Until Discontinued, Historical Med      omeprazole (PRILOSEC) 40 MG capsule Take 40 mg by mouth once daily., Until Discontinued, Historical Med           Time spent on the discharge of patient: 20 minutes      Edvin Rashid MD  Department of Hospital Medicine  [facility]

## 2017-10-15 PROBLEM — I46.9 CARDIAC ARREST: Status: ACTIVE | Noted: 2017-01-01

## 2017-10-16 PROBLEM — J96.02 ACUTE RESPIRATORY FAILURE WITH HYPOXIA AND HYPERCAPNIA: Status: ACTIVE | Noted: 2017-01-01

## 2017-10-16 PROBLEM — A41.9 SEPSIS: Status: ACTIVE | Noted: 2017-01-01

## 2017-10-16 PROBLEM — R74.01 TRANSAMINITIS: Status: ACTIVE | Noted: 2017-01-01

## 2017-10-16 PROBLEM — J96.01 ACUTE RESPIRATORY FAILURE WITH HYPOXIA AND HYPERCAPNIA: Status: ACTIVE | Noted: 2017-01-01

## 2017-10-16 NOTE — PT/OT/SLP DISCHARGE
Occupational Therapy Discharge Summary    Tommy Way  MRN: [id number]   Encephalopathy   Patient Discharged from acute Occupational Therapy on 10/13/2017.  Please refer to prior OT note dated on 10/12/2017 for functional status.     Assessment:   Patient appropriate for care in another setting.     GOALS:    Occupational Therapy Goals        Problem: Occupational Therapy Goal    Goal Priority Disciplines Outcome Interventions   Occupational Therapy Goal     OT, PT/OT Ongoing (interventions implemented as appropriate)    Description:  Goals to be met by 10/13/2017:    1.  Brush teeth with min cues for the steps MET ( with no cues)  2.  Don robe with tie completion with SBA and min cues  3.  Transfer bed-chair with min a and minimal cues for sequencing/safety  MET 10/12/2017  4.  Toilet self with mod a from BSC  5.  BSC transfer with CGA and minimal cues for sequencing/safety                        Reasons for Discontinuation of Therapy Services  Transfer to alternate level of care.      Plan:  Patient Discharged to: Inpatient Rehab.    PABLO Bella    10/16/2017

## 2017-10-17 PROBLEM — I82.403 ACUTE DEEP VEIN THROMBOSIS (DVT) OF BOTH LOWER EXTREMITIES: Status: ACTIVE | Noted: 2017-01-01

## 2017-10-18 PROBLEM — J69.0 ASPIRATION PNEUMONIA: Status: ACTIVE | Noted: 2017-01-01

## 2017-10-19 PROBLEM — R68.89 INADEQUATE ENTERAL NUTRITION INFUSION: Status: ACTIVE | Noted: 2017-01-01

## 2017-10-20 PROBLEM — R56.9 SEIZURES: Status: RESOLVED | Noted: 2017-01-01 | Resolved: 2017-01-01

## 2017-10-22 PROBLEM — E85.4 CEREBRAL AMYLOID ANGIOPATHY: Status: ACTIVE | Noted: 2017-01-01

## 2017-10-22 PROBLEM — I68.0 CEREBRAL AMYLOID ANGIOPATHY: Status: ACTIVE | Noted: 2017-01-01

## 2017-10-26 PROBLEM — I46.9 CARDIORESPIRATORY ARREST: Status: RESOLVED | Noted: 2017-01-01 | Resolved: 2017-01-01

## 2017-10-30 NOTE — PHYSICIAN QUERY
PT Name: Tommy Way  MR #: [id number]    Physician Query Form - Pathology Findings Clarification     CDS/: Stacia Del Cid RN  CCDS               Contact information: [email or web address]  This form is a permanent document in the medical record.     Query Date: October 30, 2017      By submitting this query, we are merely seeking further clarification of documentation.  Please utilize your independent clinical judgment when addressing the question(s) below.      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 He was treated with Acyclovir and ultimately underwent 2 lumbar punctures which have been unrevealing. HSV PCR from first LP is negative. After he failed to improve significantly, diagnostic imaging was done and showed expansion of the meningeal enhancement as well as possible pathologic spinal compression fractures    Brain bx suspicious for infection that ID was consulted and patient was started on vancomycin, cefepime and flagyl. Patient recent Bcx, Ucx and chest xray are all negative for infectious process  PRINCIPAL PROBLEM: Encephalopathy   Encephalitis and encephalomyelitis, unspecified       FINAL PATHOLOGIC DIAGNOSIS  [facility] DIAGNOSIS:  FINAL DIAGNOSIS:  BRAIN, LEFT TEMPORAL LOBE LESION, BIOPSY ([id number]; [facility]; [place], [place]; COLLECTED 10/3/2017; FOR REVIEW):  CEREBRAL AMYLOID ANGIOPATHY  NO EVIDENCE OF INFECTION, VASCULITIS, OR NEOPLASM. Discharge Summary                                             Pathology reported 10/19     Please document the clinical significance of the Pathologists findings of cerebral amyloid angiopathy:          [ X ] I agree with the Pathology Findings        [  ] I do not agree with the Pathology Findings        [   ] Clinically Insignificant        [  ] Clinically Undetermined        [  ] Other/Clarification of Findings: ______________________________________________    Please document in your progress notes daily for the duration of treatment until resolved and include in your discharge summary.

## 2020-01-05 NOTE — ASSESSMENT & PLAN NOTE
"61 y.o male with hx of HTN transferred from [facility] 9/26/17. Patient originally presented to OSH after witnessed seizures at home. Intubated for airway protection. 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 LP done at OSH x 2 with benign CSF besides elevated protein 77. On course of vancomycin, zosyn for aspiration pneumonia as well. LP repeated 9/19/17. HSV PCR, VDRL, HIV and arbovirus panel negative. Quantiferon gold and influenza panel positive. Followed by Dr. Reyes (ID) and Osmany (Neuro) in [place].     Recommendations:  -Continue dexamethasone, taper orders in  -Will follow up brain bx results, pending CSF labs  -MR Spect/Stealth non-specific, infectious/seizure-related edema rather than tumor but non-diagnostic.  -Continue Vimpat 200 mg po bid.  EEG "mildly abnormal due to disorganization and slowing during wakefulness; relatively normal sleep architecture implies a lesser degree of encephalopathy; no evidence of seizures or a focal cerebral dysfunction."  -Continue delirium precautions. Doing well on seroquel 25 mg po qd, 75 mg po qHS.  -Recommend PT/OT daily  " English

## 2022-07-13 NOTE — NURSING
at , covered with 1 unit per s/s. Wife stated he had cake and cookies.  
"2:30 a.m. Patient woke up extremely confused, when his wife tried calming him down and to stay in the bed he started swinging at her.      From 2:30 a.m. To 4:15 a.m. Was in contact with Dr. Argenis Hough.  Orders were given for PO meds to help calm him down.  Patient did not take the PO meds, would not put them in his mouth.  Very agitated  Orders were given for Olanzapine 5 mg IM.  Did not help calm him down.  Patient kept trying to get out of bed and talking"mumbled" words, not making any sense.  4:15 a.m. Orders were given for Midazolam  IV.    5:41 patient is still awake but seems calm.    Patient was not able to get his anti-viral IV piggyback nor his dexamethasone at 2:30 and 3:30 this morning due to the situation.    "
2045- Notified ERMA Vora of pt agitated. Precedex increased to 0.7 Instructed to give Seroquel.    2050- RN called NCC to see if precedex could be increased. PEDRO Green said okay to increase drip to 1.4.    2100- Pt combative. Seroquel given. Will continue to monitor.    2200- Medication ineffective. Precedex continued. Will continue to monitor.    2245- Notified Jordan, NP of pt SBP of 182, Pulse 48. Gave hydralazine PRN, labetalol. Also notified Jordan, NP of pt having one IV. No new orders.    2300- Jordan, NP at bedside. Put in R forearm Iv. Will continue to monitor.    0100- Pt still combative and restless. Restraints applied.    0200- Notified Jordan, NP of pt desat of 89. RT notifed. Venti mask applied. Will continue to monitor.    0300- 4 RN's and 1 PCT at bedside to bathe pt. Pt became combative and verbally abusive. Restraints continued, Precedex increased. Pt temp checked 96.5, blankets applied.  Will continue to monitor.    0500- Pt still combative, verbally abusive, refusing care, attempting to grab RN with restraints on.  
6:00 a.m. Patient still awake.  Agitation level is elevating.  Tried getting out of bed again, then got angry  
Patient has become more confused at night.  More confused than he was at 19:00 (7:00 p.m.). Patient on delirium precautions.    
Patient rounds made more than every hour.  Awake. Alert.  Disoriented to person, place, and time.  Patient could not identify his wife.  No complaints of chest pain or shortness of breath.  External cath in place.  At 230 patient became agitated, was trying to get out of the bed,  His wife and several nurses talked to him for over 30 minutes trying to get him to lie down.  He removed his external catheter.  He removed some of the telemetry electrodes.  Several times him attempted to get out of the bed.  At 0600 incont of stool.  His wife gave him a bath.  At 0630 patient has calmed down; however, he remains disoriented to wife, person, place, time. Etc.  
Patient would not allow any vital signs to be taken nor an assessment done.    Patient did take his medication, but agitated.    
Pt getting agitated; pulling out IV lines; NCC team at bedside. Precedex started at 0.4mcg/kg/hr. Will continue to monitor.  
Pt has attempted to exit the bed a number of times this shift despite bed alarm.  AVASYS at bedside to monitor pt.  
Pt transported via ems stretcher.  Pt AAOx2.  Pt disoriented to situation and location.  
Talked with Dr. Vargas from  A.  Patient will not leave EKG leads on.  Nurse will put leads back on and the patient pulls them off immediately.  Patient getting very agitated with it.    Orders were given to D/C the telemetry.    
[phone] cynthia saul notified of bed request   
No

## 2022-08-30 NOTE — PLAN OF CARE
Adan Torres MD  [address] [place] [address] / DAVID BACON [id number]    Future Appointments  Date Time Provider Department Center   9/27/2017 12:30 PM NEURODIAGNOSTICS, APPT [facility]       Payor: BLUE [employer]UE [employer] / Plan: BCBS ALL OUT OF STATE / Product Type: PPO /        09/27/17 1223   Discharge Assessment   Assessment Type Discharge Planning Assessment   Confirmed/corrected address and phone number on facesheet? Yes   Assessment information obtained from? (spouse at the bedside)   Expected Length of Stay (days) (TBD)   Prior to hospitilization cognitive status: Unable to Assess   Prior to hospitalization functional status: Independent   Current cognitive status: Not Oriented to Person;Not Oriented to Place;Not Oriented to Time   Current Functional Status: Assistive Equipment;Needs Assistance   Facility Arrived From: (Transfer from [facility] )   Lives With spouse   Able to Return to Prior Arrangements yes   Is patient able to care for self after discharge? Yes   Who are your caregiver(s) and their phone number(s)? (Cassie Way  spouse [phone])   Patient's perception of discharge disposition other (comments)  (pending progression )   Patient currently receives any other outside agency services? No   Equipment Currently Used at Home none   Does the patient have transportation home? Yes   Transportation Available family or friend will provide   Discharge Plan A Home with family;Home Health   Discharge Plan B Rehab   Patient/Family In Agreement With Plan yes      Complex Repair Preamble Text (Leave Blank If You Do Not Want): Extensive wide undermining was performed.

## 2024-03-01 NOTE — NURSING TRANSFER
Nursing Transfer Note      10/3/2017     Transfer To: [facility]    Transfer via stretcher    Transfer with family     Transported by pct    Medicines sent: none    Chart send with patient: Yes    Notified: spouse    Patient reassessed at:  (10/03/2017 1521)    Upon arrival to floor: patient oriented to room, call bell at bedside   impaired balance/pain/decreased strength

## (undated) DEVICE — SUT VICRYL PLUS 3-0 SH 18IN

## (undated) DEVICE — CLIPS RANEY SCALP FFS/ASSY

## (undated) DEVICE — BIT DRILL WIRE PASS 1.0MM

## (undated) DEVICE — CORD BIPOLAR 12 FOOT

## (undated) DEVICE — MARKERS SPHERZ PASSIVE

## (undated) DEVICE — TUBE FRAZIER 5MM 2FT SOFT TIP

## (undated) DEVICE — CONTAINER SPECIMEN STRL 4OZ

## (undated) DEVICE — RUBBERBAND STERILE 3X1/8IN

## (undated) DEVICE — BLADE SURG CARBON STEEL SZ11

## (undated) DEVICE — ROUTER TAPERED 2.3MM

## (undated) DEVICE — SYR 10CC LUER LOCK

## (undated) DEVICE — SEE MEDLINE ITEM 156905

## (undated) DEVICE — DIFFUSER

## (undated) DEVICE — DRAPE THYROID WITH ARMBOARD

## (undated) DEVICE — SUT 4/0 18IN NUROLON BLK B

## (undated) DEVICE — STOCKINET 4INX48

## (undated) DEVICE — TAPE SURG MEDIPORE 6X72IN

## (undated) DEVICE — CARTRIDGE OIL

## (undated) DEVICE — HOOK LONE STAR BLUNT 12MM

## (undated) DEVICE — DRAPE STERI INSTRUMENT 1018

## (undated) DEVICE — DRESSING SURGICAL 1X3

## (undated) DEVICE — DRESSING TELFA STRL 4X3 LF

## (undated) DEVICE — DRESSING SURGICAL 1/2X1/2

## (undated) DEVICE — PINS SKULL ADULT MAYFIELD
Type: IMPLANTABLE DEVICE | Site: SCALP | Status: NON-FUNCTIONAL
Removed: 2017-10-03

## (undated) DEVICE — DRAPE INCISE IOBAN 2 23X17IN

## (undated) DEVICE — DRESSING TELFA N ADH 3X8

## (undated) DEVICE — BUR BONE CUT MICRO TPS 3X3.8MM

## (undated) DEVICE — KIT NEEDLE BX

## (undated) DEVICE — KIT SURGIFLO EVITHROM

## (undated) DEVICE — SPRAY MASTISOL